# Patient Record
Sex: FEMALE | Race: WHITE | NOT HISPANIC OR LATINO | Employment: OTHER | URBAN - NONMETROPOLITAN AREA
[De-identification: names, ages, dates, MRNs, and addresses within clinical notes are randomized per-mention and may not be internally consistent; named-entity substitution may affect disease eponyms.]

---

## 2018-01-02 ENCOUNTER — COMMUNICATION - GICH (OUTPATIENT)
Dept: FAMILY MEDICINE | Facility: OTHER | Age: 68
End: 2018-01-02

## 2018-01-02 DIAGNOSIS — J00 ACUTE NASOPHARYNGITIS (COMMON COLD): ICD-10-CM

## 2018-01-16 PROBLEM — N31.8 HYPERTONICITY OF BLADDER: Status: ACTIVE | Noted: 2018-01-16

## 2018-01-16 PROBLEM — F34.1 DYSTHYMIC DISORDER: Status: ACTIVE | Noted: 2018-01-16

## 2018-01-16 RX ORDER — LOSARTAN POTASSIUM 100 MG/1
100 TABLET ORAL
COMMUNITY
Start: 2012-10-10 | End: 2018-11-15

## 2018-01-16 RX ORDER — FUROSEMIDE 20 MG
20 TABLET ORAL
COMMUNITY
End: 2018-11-15

## 2018-01-16 RX ORDER — CYANOCOBALAMIN 1000 UG/ML
1 INJECTION, SOLUTION INTRAMUSCULAR; SUBCUTANEOUS
COMMUNITY
End: 2018-11-15

## 2018-01-16 RX ORDER — FERROUS SULFATE 325(65) MG
325 TABLET ORAL
COMMUNITY
End: 2023-01-02

## 2018-01-16 RX ORDER — FLUTICASONE PROPIONATE 50 MCG
1 SPRAY, SUSPENSION (ML) NASAL
COMMUNITY
Start: 2016-04-08 | End: 2018-11-15

## 2018-01-16 RX ORDER — CHLORAL HYDRATE 500 MG
1 CAPSULE ORAL
COMMUNITY
End: 2023-01-02

## 2018-01-16 RX ORDER — METOPROLOL SUCCINATE 100 MG/1
200 TABLET, EXTENDED RELEASE ORAL
COMMUNITY
End: 2018-11-15

## 2018-01-16 RX ORDER — POTASSIUM CHLORIDE 1500 MG/1
20 TABLET, EXTENDED RELEASE ORAL
COMMUNITY
Start: 2012-10-10 | End: 2020-04-07

## 2018-02-12 NOTE — TELEPHONE ENCOUNTER
Patient Information     Patient Name MRN Ness Stein [id number] Female 1950      Telephone Encounter by Louis Gannon RN at 2018 12:43 PM     Author:  Louis Gannon RN Service:  (none) Author Type:  NURS- Registered Nurse     Filed:  2018 12:50 PM Encounter Date:  2018 Status:  Signed     :  Louis Gannon RN (NURS- Registered Nurse)            Nasal Steroids    Office visit in the past 12 months.    Last visit with KESHAWN ROBERSON was on: 2016 in [facility]  Next visit with KESHAWN ROBERSON is on: No future appointment listed with this provider  Next visit with Family Practice is on: No future appointment listed in this department    Max refills 12 months from last office visit.    Chart review shows that patient's PCP is Dr. Lopez from [facility] in [place]. However, writer is aware that Dr. Lopez has relocated and is no longer working at [facility] in [place]. Call placed to patient to discuss, as patient has not been seen at [facility] since 16. Patient reports that she doesn't need a refill of flonase, and is now seeing Steff Landrum NP at [facility] in [place]. PCP should be that NP. Writer will update patient's chart to reflect her PCP as of now, as well as will refuse refill request for flonase as per patient's report.    Unable to complete prescription refill per RN Medication Refill Policy.................... Louis Gannon RN ....................  2018   12:48 PM

## 2018-11-15 ENCOUNTER — MEDICAL CORRESPONDENCE (OUTPATIENT)
Dept: HEALTH INFORMATION MANAGEMENT | Facility: OTHER | Age: 68
End: 2018-11-15

## 2018-11-15 ENCOUNTER — OFFICE VISIT (OUTPATIENT)
Dept: FAMILY MEDICINE | Facility: OTHER | Age: 68
End: 2018-11-15
Attending: FAMILY MEDICINE
Payer: MEDICARE

## 2018-11-15 VITALS
HEIGHT: 64 IN | HEART RATE: 64 BPM | BODY MASS INDEX: 39.91 KG/M2 | SYSTOLIC BLOOD PRESSURE: 170 MMHG | WEIGHT: 233.8 LBS | DIASTOLIC BLOOD PRESSURE: 90 MMHG

## 2018-11-15 DIAGNOSIS — G47.33 OBSTRUCTIVE SLEEP APNEA SYNDROME: Primary | ICD-10-CM

## 2018-11-15 DIAGNOSIS — E03.9 HYPOTHYROIDISM, UNSPECIFIED TYPE: ICD-10-CM

## 2018-11-15 DIAGNOSIS — E55.9 VITAMIN D DEFICIENCY: ICD-10-CM

## 2018-11-15 DIAGNOSIS — Z13.1 SCREENING FOR DIABETES MELLITUS: ICD-10-CM

## 2018-11-15 DIAGNOSIS — N32.81 OVERACTIVE BLADDER: ICD-10-CM

## 2018-11-15 DIAGNOSIS — I10 ESSENTIAL HYPERTENSION, BENIGN: ICD-10-CM

## 2018-11-15 DIAGNOSIS — F34.1 DYSTHYMIC DISORDER: ICD-10-CM

## 2018-11-15 DIAGNOSIS — Z79.899 OTHER LONG TERM (CURRENT) DRUG THERAPY: ICD-10-CM

## 2018-11-15 DIAGNOSIS — R53.83 FATIGUE, UNSPECIFIED TYPE: ICD-10-CM

## 2018-11-15 DIAGNOSIS — Z98.84 S/P GASTRIC BYPASS: ICD-10-CM

## 2018-11-15 DIAGNOSIS — M48.061 SPINAL STENOSIS OF LUMBAR REGION, UNSPECIFIED WHETHER NEUROGENIC CLAUDICATION PRESENT: ICD-10-CM

## 2018-11-15 DIAGNOSIS — E78.00 HYPERCHOLESTEROLEMIA: ICD-10-CM

## 2018-11-15 LAB
ANION GAP SERPL CALCULATED.3IONS-SCNC: 6 MMOL/L (ref 3–14)
BUN SERPL-MCNC: 14 MG/DL (ref 7–25)
CALCIUM SERPL-MCNC: 9.7 MG/DL (ref 8.6–10.3)
CHLORIDE SERPL-SCNC: 104 MMOL/L (ref 98–107)
CHOLEST SERPL-MCNC: 190 MG/DL
CO2 SERPL-SCNC: 27 MMOL/L (ref 21–31)
CREAT SERPL-MCNC: 0.71 MG/DL (ref 0.6–1.2)
DEPRECATED CALCIDIOL+CALCIFEROL SERPL-MC: 36.5 NG/ML
GFR SERPL CREATININE-BSD FRML MDRD: 82 ML/MIN/1.7M2
GLUCOSE SERPL-MCNC: 103 MG/DL (ref 70–105)
HBA1C MFR BLD: 5.3 % (ref 4–6)
HDLC SERPL-MCNC: 64 MG/DL (ref 23–92)
LDLC SERPL CALC-MCNC: 104 MG/DL
NONHDLC SERPL-MCNC: 126 MG/DL
POTASSIUM SERPL-SCNC: 3.7 MMOL/L (ref 3.5–5.1)
SODIUM SERPL-SCNC: 137 MMOL/L (ref 134–144)
T4 FREE SERPL-MCNC: 0.68 NG/DL (ref 0.6–1.6)
TRIGL SERPL-MCNC: 110 MG/DL
TSH SERPL DL<=0.05 MIU/L-ACNC: 5.06 IU/ML (ref 0.34–5.6)

## 2018-11-15 PROCEDURE — 84443 ASSAY THYROID STIM HORMONE: CPT | Performed by: FAMILY MEDICINE

## 2018-11-15 PROCEDURE — 36415 COLL VENOUS BLD VENIPUNCTURE: CPT | Performed by: FAMILY MEDICINE

## 2018-11-15 PROCEDURE — 84439 ASSAY OF FREE THYROXINE: CPT | Performed by: FAMILY MEDICINE

## 2018-11-15 PROCEDURE — 99214 OFFICE O/P EST MOD 30 MIN: CPT | Performed by: FAMILY MEDICINE

## 2018-11-15 PROCEDURE — 80061 LIPID PANEL: CPT | Performed by: FAMILY MEDICINE

## 2018-11-15 PROCEDURE — 82306 VITAMIN D 25 HYDROXY: CPT | Performed by: FAMILY MEDICINE

## 2018-11-15 PROCEDURE — 83036 HEMOGLOBIN GLYCOSYLATED A1C: CPT | Performed by: FAMILY MEDICINE

## 2018-11-15 PROCEDURE — G0463 HOSPITAL OUTPT CLINIC VISIT: HCPCS

## 2018-11-15 PROCEDURE — 80048 BASIC METABOLIC PNL TOTAL CA: CPT | Performed by: FAMILY MEDICINE

## 2018-11-15 RX ORDER — CYANOCOBALAMIN 1000 UG/ML
1 INJECTION, SOLUTION INTRAMUSCULAR; SUBCUTANEOUS
Qty: 0.9 ML | Refills: 11 | Status: SHIPPED | OUTPATIENT
Start: 2018-11-15 | End: 2020-04-17

## 2018-11-15 RX ORDER — LEVOTHYROXINE SODIUM 25 UG/1
25 TABLET ORAL DAILY
Qty: 90 TABLET | Refills: 1 | Status: SHIPPED | OUTPATIENT
Start: 2018-11-15 | End: 2019-04-28

## 2018-11-15 RX ORDER — ESCITALOPRAM OXALATE 10 MG/1
10 TABLET ORAL DAILY
Refills: 0 | COMMUNITY
Start: 2018-07-02 | End: 2018-11-15

## 2018-11-15 RX ORDER — MIRABEGRON 25 MG/1
25 TABLET, FILM COATED, EXTENDED RELEASE ORAL DAILY
Qty: 90 TABLET | Refills: 1 | Status: SHIPPED | OUTPATIENT
Start: 2018-11-15 | End: 2019-04-28

## 2018-11-15 RX ORDER — TRAMADOL HYDROCHLORIDE 50 MG/1
50 TABLET ORAL EVERY 6 HOURS PRN
Qty: 60 TABLET | Refills: 2 | Status: SHIPPED | OUTPATIENT
Start: 2018-11-15 | End: 2019-06-27

## 2018-11-15 RX ORDER — ESCITALOPRAM OXALATE 20 MG/1
10 TABLET ORAL DAILY
Qty: 90 TABLET | Refills: 4 | Status: SHIPPED | OUTPATIENT
Start: 2018-11-15 | End: 2019-01-31

## 2018-11-15 RX ORDER — FUROSEMIDE 20 MG
20 TABLET ORAL DAILY
Qty: 90 TABLET | Refills: 4 | Status: SHIPPED | OUTPATIENT
Start: 2018-11-15 | End: 2020-01-29

## 2018-11-15 RX ORDER — METOPROLOL SUCCINATE 100 MG/1
200 TABLET, EXTENDED RELEASE ORAL DAILY
Qty: 180 TABLET | Refills: 4 | Status: SHIPPED | OUTPATIENT
Start: 2018-11-15 | End: 2020-01-16

## 2018-11-15 RX ORDER — LOSARTAN POTASSIUM 100 MG/1
100 TABLET ORAL DAILY
Qty: 90 TABLET | Refills: 4 | Status: SHIPPED | OUTPATIENT
Start: 2018-11-15 | End: 2020-01-08

## 2018-11-15 RX ORDER — POTASSIUM CHLORIDE 1500 MG/1
20 TABLET, EXTENDED RELEASE ORAL
Qty: 60 TABLET | Status: CANCELLED | OUTPATIENT
Start: 2018-11-15

## 2018-11-15 ASSESSMENT — ANXIETY QUESTIONNAIRES
7. FEELING AFRAID AS IF SOMETHING AWFUL MIGHT HAPPEN: NOT AT ALL
6. BECOMING EASILY ANNOYED OR IRRITABLE: NOT AT ALL
2. NOT BEING ABLE TO STOP OR CONTROL WORRYING: NOT AT ALL
5. BEING SO RESTLESS THAT IT IS HARD TO SIT STILL: NOT AT ALL
GAD7 TOTAL SCORE: 1
1. FEELING NERVOUS, ANXIOUS, OR ON EDGE: SEVERAL DAYS
3. WORRYING TOO MUCH ABOUT DIFFERENT THINGS: NOT AT ALL

## 2018-11-15 ASSESSMENT — PATIENT HEALTH QUESTIONNAIRE - PHQ9
SUM OF ALL RESPONSES TO PHQ QUESTIONS 1-9: 15
5. POOR APPETITE OR OVEREATING: NOT AT ALL

## 2018-11-15 NOTE — PROGRESS NOTES
Nursing Notes:   Ana Yung LPN  11/15/2018  7:57 AM  Unsigned  Patient here for medication management.  Ana Mei............................... 11/15/2018 7:57 AM    SUBJECTIVE:   Ness Bales is a 67 year old female who presents to clinic today for the following health issues:    HPI  Ness is here for refills of her medications for chronic conditions:  1. ALICE.  Uses CPAP.  Doing well.  2. Hypothyroidism.  Has had issues with this in the past.  Interested in having her levels checked and if she may need some medication.  Noticed increased fatigue, decreased mood.  3. HLP. Taking fish oil.  Is aware of risks for statin therapy.  Doesn't watch what she eats all the time.  4. Spinal stenosis/generalized OA.  Occasionally receives Tramadol for aches/pains.  Seems to be worse in the winter.  No swollen/red joints.  5. S/p gastric bypass.  On multiple vitamins/supplements.   6. HTN. Has been off metoprolol x 1 month.  Noticing she is feeling worse without it.  Checks her BPs at home.    Her other concern today is of an overactive bladder.  Has tried a few medications in the past that were not helpful.  She has done some research and would like to discuss either Myrbetriq OR botox injections with Urology.      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     Priority: Medium     Contusion of lower leg 02/25/2011     Priority: Medium     Hypothyroidism 11/12/2010     Priority: Medium     Degeneration of lumbar or lumbosacral intervertebral disc 11/05/2010     Priority: Medium     Spinal stenosis, lumbar 11/05/2010     Priority: Medium     Esophageal reflux 06/25/2010     Priority: Medium     Sacroiliac joint dysfunction 06/25/2010     Priority: Medium     Hypercholesterolemia 04/20/2009     Priority: Medium     Sleep apnea 02/12/2009     Priority: Medium     Disorder of bone and cartilage  10/29/2007     Priority: Medium     Enthesopathy of hip region 08/22/2006     Priority: Medium     Other vitamin B12 deficiency anemia 09/24/2001     Priority: Medium     Essential hypertension, benign 09/24/2001     Priority: Medium     Urge incontinence of urine 09/24/2001     Priority: Medium     Obesity 09/24/2001     Priority: Medium     Past Medical History:   Diagnosis Date     Obesity     No Comments Provided     Personal history of other medical treatment (CODE)     2 vaginal childbirth uncomplicated     Personal history of other medical treatment (CODE)     1983,blood transfusion with stomach stapling      Past Surgical History:   Procedure Laterality Date     ARTHROPLASTY KNEE      2008,Total Right knee replacement     BIOPSY BREAST      Incisional x2 benign breast biopsies     FRACTURE SURGERY      open treatment of Humeral Fx w/ prosthesis     LAPAROSCOPIC BYPASS GASTRIC      1983,Stomach stapling complicated by incision rupture, then repaired wih second surgery     OTHER SURGICAL HISTORY      1983,16085.0,SKIN/SUBCUTANEOUS SURGERY,repaired incision from stomach stapling     OTHER SURGICAL HISTORY      2006,[id number],ARTHROPLASTY,Total glenohumeral joint replacement     OTHER SURGICAL HISTORY      615773,ANESTHESIA ALERT,No problems with anaesthesia. ?     Family History   Problem Relation Age of Onset     Other - See Comments Mother      macular degeneration/Psychiatric illness,takes antidepressant     HEART DISEASE Father 40     Heart Disease,MI     Diabetes Father      Diabetes     Other - See Comments Son      Psychiatric illness,psychotic episode this year smoking marijuana, ephedra use ? bipolar     Family History Negative Child      Good Health     Family History Negative Child      Good Health     Other - See Comments Sister      Psychiatric illness,depression     Other - See Comments Brother      Psychiatric illness,depression     Social History   Substance Use Topics     Smoking status: Never  "Smoker     Smokeless tobacco: Never Used     Alcohol use No      Comment: very little      Social History     Social History Narrative    , 3 adult children, does clerical work part-time for [employer]    p 10/1/2013.     Current Outpatient Prescriptions   Medication Sig Dispense Refill     cholecalciferol (VITAMIN D-1000 MAX ST) 1000 UNITS TABS Take 1,000 Units by mouth       cyanocobalamin (VITAMIN B12) 1000 MCG/ML injection Inject 1 mL into the muscle       escitalopram (LEXAPRO) 10 MG tablet Take 10 mg by mouth daily  0     ferrous sulfate (IRON) 325 (65 FE) MG tablet Take 325 mg by mouth       fish oil-omega-3 fatty acids 1000 MG capsule Take 1 capsule by mouth       furosemide (LASIX) 20 MG tablet Take 20 mg by mouth       losartan (COZAAR) 100 MG tablet Take 100 mg by mouth       metoprolol succinate (TOPROL-XL) 100 MG 24 hr tablet Take 200 mg by mouth       potassium chloride SA (K-DUR/KLOR-CON M) 20 MEQ CR tablet Take 20 mEq by mouth       No Known Allergies    Review of Systems   All other systems reviewed and are negative.       OBJECTIVE:     Ht 5' 4.25\" (1.632 m)  Wt 233 lb 12.8 oz (106.1 kg)  BMI 39.82 kg/m2  Body mass index is 39.82 kg/(m^2).  Physical Exam   Constitutional: She appears well-developed and well-nourished. No distress.   HENT:   Head: Normocephalic and atraumatic.   Right Ear: External ear normal.   Left Ear: External ear normal.   Nose: Nose normal.   Mouth/Throat: Oropharynx is clear and moist. No oropharyngeal exudate.   Eyes: EOM are normal. Pupils are equal, round, and reactive to light. Right eye exhibits no discharge. Left eye exhibits no discharge.   Neck: Normal range of motion. Neck supple. No thyromegaly present.   Cardiovascular: Normal rate and normal heart sounds.    Lymphadenopathy:     She has no cervical adenopathy.   Skin: She is not diaphoretic.   Psychiatric: She has a normal mood and affect. Judgment normal.   Nursing note and vitals reviewed.    Diagnostic Test " Results:  Results for orders placed or performed in visit on 11/15/18   Basic Metabolic Panel   Result Value Ref Range    Sodium 137 134 - 144 mmol/L    Potassium 3.7 3.5 - 5.1 mmol/L    Chloride 104 98 - 107 mmol/L    Carbon Dioxide 27 21 - 31 mmol/L    Anion Gap 6 3 - 14 mmol/L    Glucose 103 70 - 105 mg/dL    Urea Nitrogen 14 7 - 25 mg/dL    Creatinine 0.71 0.60 - 1.20 mg/dL    GFR Estimate 82 >60 mL/min/1.7m2    GFR Estimate If Black >90 >60 mL/min/1.7m2    Calcium 9.7 8.6 - 10.3 mg/dL   TSH   Result Value Ref Range    Thyrotropin 5.06 0.34 - 5.60 IU/mL   T4, Free   Result Value Ref Range    T4 Free 0.68 0.60 - 1.60 ng/dL   Hemoglobin A1c   Result Value Ref Range    Hemoglobin A1C 5.3 4.0 - 6.0 %   Lipid Panel   Result Value Ref Range    Cholesterol 190 <200 mg/dL    Triglycerides 110 <150 mg/dL    HDL Cholesterol 64 23 - 92 mg/dL    LDL Cholesterol Calculated 104 (H) <100 mg/dL    Non HDL Cholesterol 126 <130 mg/dL   Vitamin D Total GH   Result Value Ref Range    Vitamin D Total 36.5 ng/mL     ASSESSMENT/PLAN:     1. Obstructive sleep apnea syndrome  Chronic, stable.  Continue CPAP.  - Hemoglobin A1c; Future  - Hemoglobin A1c    2. Hypothyroidism, unspecified type  High end of normal; with history of problems.  Due to increased symptoms; will start with low dose of levothyroxine x 6 weeks and have levels rechecked at that time.  Monitor for improvement in fatigue/mood.  - levothyroxine (SYNTHROID/LEVOTHROID) 25 MCG tablet; Take 1 tablet (25 mcg) by mouth daily  Dispense: 90 tablet; Refill: 1    3. Hypercholesterolemia  Lipid panel will be monitored today.  - Lipid Panel; Future  - Lipid Panel    4. Essential hypertension, benign  Refilled current medications; BP uncontrolled today.  Discussed goal <140/90; which Ness normally states she is at when she has her medications.  Continue to monitor and return sooner if no improvement.  - furosemide (LASIX) 20 MG tablet; Take 1 tablet (20 mg) by mouth daily   Dispense: 90 tablet; Refill: 4  - losartan (COZAAR) 100 MG tablet; Take 1 tablet (100 mg) by mouth daily  Dispense: 90 tablet; Refill: 4  - metoprolol succinate (TOPROL-XL) 100 MG 24 hr tablet; Take 2 tablets (200 mg) by mouth daily  Dispense: 180 tablet; Refill: 4  - Basic Metabolic Panel; Future  - Basic Metabolic Panel    5. Dysthymic disorder  Refilled current medication.  - escitalopram (LEXAPRO) 20 MG tablet; Take 0.5 tablets (10 mg) by mouth daily  Dispense: 90 tablet; Refill: 4    6. Spinal stenosis of lumbar region, unspecified whether neurogenic claudication present  Worsening with weather changes; will renew Tramadol #60 with 2 refills that will hopefully last her at least through the season.  Other activity and pain control measures reviewed.  - traMADol (ULTRAM) 50 MG tablet; Take 1 tablet (50 mg) by mouth every 6 hours as needed for severe pain  Dispense: 60 tablet; Refill: 2    7. Vitamin D deficiency  On treatment; check level.  - cholecalciferol (VITAMIN D-1000 MAX ST) 1000 units TABS; Take 1,000 Units by mouth daily  Dispense: 90 tablet; Refill: 4  - Vitamin D Total GH    8. S/P gastric bypass  On treatment; will add B12 level.  - cyanocobalamin (VITAMIN B12) 1000 MCG/ML injection; Inject 1 mL (1,000 mcg) into the muscle every 30 days  Dispense: 0.9 mL; Refill: 11  - Hemoglobin A1c; Future  - Hemoglobin A1c    9. Fatigue, unspecified type  See above.  Tx with levothyroxine.  - TSH; Future  - T4, Free; Future  - TSH  - T4, Free    10. Screening for diabetes mellitus  - Hemoglobin A1c; Future  - Hemoglobin A1c    11. Other long term (current) drug therapy   - Hemoglobin A1c; Future  - Hemoglobin A1c    12. Overactive bladder  Start myrbetriq; and reviewed cost vs generics.  She will try this for ~2-3 months.  If not improving - will refer to Urology for possible urodynamic testing and trial of Botox.  - mirabegron (MYRBETRIQ) 25 MG 24 hr tablet; Take 1 tablet (25 mg) by mouth daily  Dispense: 90  tablet; Refill: 1    Follow up in 2-3 months; sooner prn.    Jennifer Maurice, [facility]

## 2018-11-15 NOTE — MR AVS SNAPSHOT
"              After Visit Summary   11/15/2018    Ness Bales    MRN: [id number]           Patient Information     Date Of Birth          1950        Visit Information        Provider Department      11/15/2018 7:45 AM Jennifer Maurice DO [facility]        Today's Diagnoses     Obstructive sleep apnea syndrome    -  1    Hypothyroidism, unspecified type        Hypercholesterolemia        Essential hypertension, benign        Dysthymic disorder        Spinal stenosis of lumbar region, unspecified whether neurogenic claudication present        Vitamin D deficiency        S/P gastric bypass        Fatigue, unspecified type        Screening for diabetes mellitus        Other long term (current) drug therapy         Overactive bladder           Follow-ups after your visit        Who to contact     If you have questions or need follow up information about today's clinic visit or your schedule please contact [facility] directly at [phone].  Normal or non-critical lab and imaging results will be communicated to you by MyChart, letter or phone within 4 business days after the clinic has received the results. If you do not hear from us within 7 days, please contact the clinic through MyChart or phone. If you have a critical or abnormal lab result, we will notify you by phone as soon as possible.  Submit refill requests through [employer] or call your pharmacy and they will forward the refill request to us. Please allow 3 business days for your refill to be completed.          Additional Information About Your Visit        Care EveryWhere ID     This is your Care EveryWhere ID. This could be used by other organizations to access your [facility] medical records  [id number]        Your Vitals Were     Pulse Height BMI (Body Mass Index)             64 1.632 m (5' 4.25\") 39.82 kg/m2          Blood Pressure from Last 3 Encounters:   11/15/18 170/90   04/08/16 147/76    Weight " from Last 3 Encounters:   11/15/18 106.1 kg (233 lb 12.8 oz)   04/08/16 104.8 kg (231 lb)              We Performed the Following     Basic Metabolic Panel     Hemoglobin A1c     Lipid Panel     T4, Free     TSH          Today's Medication Changes          These changes are accurate as of 11/15/18  8:51 AM.  If you have any questions, ask your nurse or doctor.               Start taking these medicines.        Dose/Directions    mirabegron 25 MG 24 hr tablet   Commonly known as:  MYRBETRIQ   Used for:  Overactive bladder   Started by:  Jennifer Maurice DO        Dose:  25 mg   Take 1 tablet (25 mg) by mouth daily   Quantity:  90 tablet   Refills:  1       traMADol 50 MG tablet   Commonly known as:  ULTRAM   Used for:  Spinal stenosis of lumbar region, unspecified whether neurogenic claudication present   Started by:  Jennifer Maurice DO        Dose:  50 mg   Take 1 tablet (50 mg) by mouth every 6 hours as needed for severe pain   Quantity:  60 tablet   Refills:  2         These medicines have changed or have updated prescriptions.        Dose/Directions    cholecalciferol 1000 units Tabs   Commonly known as:  VITAMIN D-1000 MAX ST   This may have changed:  when to take this   Used for:  Vitamin D deficiency   Changed by:  Jennifer Maurice DO        Dose:  1000 Units   Take 1,000 Units by mouth daily   Quantity:  90 tablet   Refills:  4       cyanocobalamin 1000 MCG/ML injection   Commonly known as:  VITAMIN B12   This may have changed:  when to take this   Used for:  S/P gastric bypass   Changed by:  Jennifer Maurice DO        Dose:  1 mL   Inject 1 mL (1,000 mcg) into the muscle every 30 days   Quantity:  0.9 mL   Refills:  11       escitalopram 20 MG tablet   Commonly known as:  LEXAPRO   This may have changed:  medication strength   Used for:  Dysthymic disorder   Changed by:  Jennifer Maurice DO        Dose:  10 mg   Take 0.5 tablets (10 mg) by mouth daily   Quantity:  90 tablet   Refills:  4        furosemide 20 MG tablet   Commonly known as:  LASIX   This may have changed:  when to take this   Used for:  Essential hypertension, benign   Changed by:  Jennifer Maurice DO        Dose:  20 mg   Take 1 tablet (20 mg) by mouth daily   Quantity:  90 tablet   Refills:  4       losartan 100 MG tablet   Commonly known as:  COZAAR   This may have changed:  when to take this   Used for:  Essential hypertension, benign   Changed by:  Jennifer Maurice,         Dose:  100 mg   Take 1 tablet (100 mg) by mouth daily   Quantity:  90 tablet   Refills:  4       metoprolol succinate 100 MG 24 hr tablet   Commonly known as:  TOPROL-XL   This may have changed:  when to take this   Used for:  Essential hypertension, benign   Changed by:  Jennifer Maurice,         Dose:  200 mg   Take 2 tablets (200 mg) by mouth daily   Quantity:  180 tablet   Refills:  4            Where to get your medicines      These medications were sent to [place] [address] [place] [facility] [address]  [address] [facility] [address]     Phone:  [phone]     cholecalciferol 1000 units Tabs    cyanocobalamin 1000 MCG/ML injection    escitalopram 20 MG tablet    furosemide 20 MG tablet    losartan 100 MG tablet    metoprolol succinate 100 MG 24 hr tablet    mirabegron 25 MG 24 hr tablet         Some of these will need a paper prescription and others can be bought over the counter.  Ask your nurse if you have questions.     Bring a paper prescription for each of these medications     traMADol 50 MG tablet               Information about OPIOIDS     PRESCRIPTION OPIOIDS: WHAT YOU NEED TO KNOW   We gave you an opioid (narcotic) pain medicine. It is important to manage your pain, but opioids are not always the best choice. You should first try all the other options your care team gave you. Take this medicine for as short a time (and as few doses) as possible.    Some activities can increase your pain, such as bandage changes or  therapy sessions. It may help to take your pain medicine 30 to 60 minutes before these activities. Reduce your stress by getting enough sleep, working on hobbies you enjoy and practicing relaxation or meditation. Talk to your care team about ways to manage your pain beyond prescription opioids.    These medicines have risks:    DO NOT drive when on new or higher doses of pain medicine. These medicines can affect your alertness and reaction times, and you could be arrested for driving under the influence (DUI). If you need to use opioids long-term, talk to your care team about driving.    DO NOT operate heavy machinery    DO NOT do any other dangerous activities while taking these medicines.    DO NOT drink any alcohol while taking these medicines.     If the opioid prescribed includes acetaminophen, DO NOT take with any other medicines that contain acetaminophen. Read all labels carefully. Look for the word  acetaminophen  or  Tylenol.  Ask your pharmacist if you have questions or are unsure.    You can get addicted to pain medicines, especially if you have a history of addiction (chemical, alcohol or substance dependence). Talk to your care team about ways to reduce this risk.    All opioids tend to cause constipation. Drink plenty of water and eat foods that have a lot of fiber, such as fruits, vegetables, prune juice, apple juice and high-fiber cereal. Take a laxative (Miralax, milk of magnesia, Colace, Senna) if you don t move your bowels at least every other day. Other side effects include upset stomach, sleepiness, dizziness, throwing up, tolerance (needing more of the medicine to have the same effect), physical dependence and slowed breathing.    Store your pills in a secure place, locked if possible. We will not replace any lost or stolen medicine. If you don t finish your medicine, please throw away (dispose) as directed by your pharmacist. The [place] Pollution Control Agency has more information about  safe disposal: https://www.pca.[email or web address].[place].us/living-green/managing-unwanted-medications         Primary Care Provider Fax #    Physician No Ref-Primary [phone]       No address on file        Equal Access to Services     NIKOLAI HIGGINBOTHAM : María aad ku hadhamlet Phillips, waisabellada luqkain, qaybta kaalmada maryan, ricky morenoshadia uribe. So [facility] [phone].    ATENCIÓN: Si habla español, tiene a sandoval disposición servicios gratuitos de asistencia lingüística. Llame al [phone].    We comply with applicable federal civil rights laws and [place] laws. We do not discriminate on the basis of race, color, national origin, age, disability, sex, sexual orientation, or gender identity.            Thank you!     Thank you for choosing [facility]  for your care. Our goal is always to provide you with excellent care. Hearing back from our patients is one way we can continue to improve our services. Please take a few minutes to complete the written survey that you may receive in the mail after your visit with us. Thank you!             Your Updated Medication List - Protect others around you: Learn how to safely use, store and throw away your medicines at www.disposemymeds.org.          This list is accurate as of 11/15/18  8:51 AM.  Always use your most recent med list.                   Brand Name Dispense Instructions for use Diagnosis    cholecalciferol 1000 units Tabs    VITAMIN D-1000 MAX ST    90 tablet    Take 1,000 Units by mouth daily    Vitamin D deficiency       Co Q 10 100 MG Caps           cyanocobalamin 1000 MCG/ML injection    VITAMIN B12    0.9 mL    Inject 1 mL (1,000 mcg) into the muscle every 30 days    S/P gastric bypass       escitalopram 20 MG tablet    LEXAPRO    90 tablet    Take 0.5 tablets (10 mg) by mouth daily    Dysthymic disorder       ferrous sulfate 325 (65 Fe) MG tablet    IRON     Take 325 mg by mouth        fish oil-omega-3 fatty acids 1000 MG  capsule      Take 1 capsule by mouth        furosemide 20 MG tablet    LASIX    90 tablet    Take 1 tablet (20 mg) by mouth daily    Essential hypertension, benign       losartan 100 MG tablet    COZAAR    90 tablet    Take 1 tablet (100 mg) by mouth daily    Essential hypertension, benign       metoprolol succinate 100 MG 24 hr tablet    TOPROL-XL    180 tablet    Take 2 tablets (200 mg) by mouth daily    Essential hypertension, benign       mirabegron 25 MG 24 hr tablet    MYRBETRIQ    90 tablet    Take 1 tablet (25 mg) by mouth daily    Overactive bladder       potassium chloride SA 20 MEQ CR tablet    K-DUR/KLOR-CON M     Take 20 mEq by mouth        traMADol 50 MG tablet    ULTRAM    60 tablet    Take 1 tablet (50 mg) by mouth every 6 hours as needed for severe pain    Spinal stenosis of lumbar region, unspecified whether neurogenic claudication present

## 2018-11-15 NOTE — NURSING NOTE
Patient here for medication management.  Ana Mei............................... 11/15/2018 7:57 AM

## 2018-11-16 ASSESSMENT — ANXIETY QUESTIONNAIRES: GAD7 TOTAL SCORE: 1

## 2018-12-11 ENCOUNTER — TRANSFERRED RECORDS (OUTPATIENT)
Dept: HEALTH INFORMATION MANAGEMENT | Facility: OTHER | Age: 68
End: 2018-12-11

## 2019-01-31 DIAGNOSIS — F34.1 DYSTHYMIC DISORDER: ICD-10-CM

## 2019-01-31 NOTE — TELEPHONE ENCOUNTER
Per pharmacy fax- Patient requesting a new Rx- Please send new Rx for Lexapro 20 mg one tablet daily. Qty 90. Thanks Oc. Current order is for 1/2 tab.     Please note esther'd up as requested.     escitalopram (LEXAPRO) 20 MG tablet 90 tablet 4 11/15/2018  No   Sig - Route: Take 0.5 tablets (10 mg) by mouth daily - Oral   Sent to pharmacy as: escitalopram (LEXAPRO) 20 MG tablet   Class: E-Prescribe   Order: [id number]   E-Prescribing Status: Receipt confirmed by pharmacy (11/15/2018  8:13 AM CST)     LOV-11/15/2018-Follow up in 2-3 months; sooner prn.  Reminder letter sent.     Unable to complete prescription refill per RNMedication Refill Policy.................... Diya Germain ....................  1/31/2019   3:55 PM

## 2019-01-31 NOTE — LETTER
January 31, 2019      Ness Bales  [address] [place] [address]        Dear Ness,    This is to remind you that you are due for your follow up medication management office visit with Jennifer Maurice DO.  Your last visit was on 11/15/2018.     Additional refills of your medication require you to complete this visit.    Please call [phone] to schedule your appointment.    Thank you for choosing [facility] and [facility] for your health care needs.    Sincerely,      Refill RN  [facility]

## 2019-02-01 NOTE — TELEPHONE ENCOUNTER
Called patient and verified last name and , she states she is currently taking 20 mg. She thought this was increased at last office visit.    She just picked up the prescription yesterday and the pharmacy is preparing for the next refill.    She also wanted to have her thyroid rechecked, labs are currently placed and sent to scheduling for lab only appointment.  Dorys Almanza LPN on 2019 at 10:47 AM

## 2019-02-01 NOTE — TELEPHONE ENCOUNTER
Is she taking 10mg (1/2 tablet) or 20mg (full tablet)? My last note states 10mg daily.  Jennifer Maurice, DO

## 2019-02-04 DIAGNOSIS — E03.9 HYPOTHYROIDISM, UNSPECIFIED TYPE: ICD-10-CM

## 2019-02-04 LAB
T4 FREE SERPL-MCNC: 0.68 NG/DL (ref 0.6–1.6)
TSH SERPL DL<=0.05 MIU/L-ACNC: 3.59 IU/ML (ref 0.34–5.6)

## 2019-02-04 PROCEDURE — 36415 COLL VENOUS BLD VENIPUNCTURE: CPT | Performed by: FAMILY MEDICINE

## 2019-02-04 PROCEDURE — 84443 ASSAY THYROID STIM HORMONE: CPT | Performed by: FAMILY MEDICINE

## 2019-02-04 PROCEDURE — 84439 ASSAY OF FREE THYROXINE: CPT | Performed by: FAMILY MEDICINE

## 2019-02-04 RX ORDER — ESCITALOPRAM OXALATE 20 MG/1
20 TABLET ORAL DAILY
Qty: 90 TABLET | Refills: 4 | Status: SHIPPED | OUTPATIENT
Start: 2019-02-04 | End: 2020-10-24

## 2019-02-13 DIAGNOSIS — E03.9 HYPOTHYROIDISM, UNSPECIFIED TYPE: ICD-10-CM

## 2019-02-13 RX ORDER — LEVOTHYROXINE SODIUM 25 UG/1
25 TABLET ORAL DAILY
Qty: 93 TABLET | Refills: 3 | OUTPATIENT
Start: 2019-02-13

## 2019-02-13 NOTE — TELEPHONE ENCOUNTER
Component      Latest Ref Rng & Units 2/4/2019   T4 Free      0.60 - 1.60 ng/dL 0.68   Thyrotropin      0.34 - 5.60 IU/mL 3.59

## 2019-02-13 NOTE — TELEPHONE ENCOUNTER
Filled 11/15/18 #90 x 1. Due 5/15/19. Pharmacy alerted. Unable to complete prescription refill per RNMedication Refill Policy.................... Diya Germain ....................  2/13/2019   9:28 AM    levothyroxine (SYNTHROID/LEVOTHROID) 25 MCG tablet 90 tablet 1 11/15/2018  --   Sig - Route: Take 1 tablet (25 mcg) by mouth daily - Oral   Sent to pharmacy as: levothyroxine (SYNTHROID/LEVOTHROID) 25 MCG tablet   Class: E-Prescribe   Order: [id number]   E-Prescribing Status: Receipt confirmed by pharmacy (11/15/2018 12:36 PM CST)   Printout Tracking     External Result Report   Pharmacy     [facility] [id number] [facility] - [place], [place] - [address]

## 2019-04-28 DIAGNOSIS — E03.9 HYPOTHYROIDISM, UNSPECIFIED TYPE: ICD-10-CM

## 2019-04-28 DIAGNOSIS — N32.81 OVERACTIVE BLADDER: ICD-10-CM

## 2019-05-01 RX ORDER — LEVOTHYROXINE SODIUM 25 UG/1
25 TABLET ORAL DAILY
Qty: 30 TABLET | Refills: 0 | Status: SHIPPED | OUTPATIENT
Start: 2019-05-01 | End: 2019-06-13

## 2019-05-01 RX ORDER — MIRABEGRON 25 MG/1
TABLET, FILM COATED, EXTENDED RELEASE ORAL
Qty: 30 TABLET | Refills: 0 | Status: SHIPPED | OUTPATIENT
Start: 2019-05-01 | End: 2019-05-29

## 2019-05-01 NOTE — TELEPHONE ENCOUNTER
[facility] [place] sent Rx request for the following:      MYRBETRIQ ER 25 MG TABLET  Sig: TAKE 1 TABLET BY MOUTH EVERY DAY  Last Prescription Date:   11/15/18  Last Fill Qty/Refills:         90, R-1    Beta 3 Adrenergic Agonists Failed5/1 3:57 PM  Most recent BP less than 140/90 on record  BP Readings from Last 3 Encounters:   11/15/18 170/90   04/08/16 147/76        LEVOTHYROXINE 25 MCG TABLET  Sig: Take 1 tablet (25 mcg) by mouth daily  Last Prescription Date:   11/15/18  Last Fill Qty/Refills:         90, R-1      Last Office Visit:              11/12/18 (SMS)  Future Office visit:           None.    Per LOV Note, Pt was to follow-up with SMS, between 1/12 and 2/12/18. Pt was going to trial Myrbetriq for 2-3 months, for overactive bladder. If not improving, she could be referred to urology for further testing or trial of Botox. Also, was started on levothyroxine that visit. Per lab result, dated 2/4/19, Pt to continue with same dosing. Pt failed to schedule follow-up appointment with SMS.     Called and spoke to Patient after verifying last name and date of birth. Pt reminded of the above information. Pt currently driving and verbalized plan to call back when she gets home, to schedule appointment. Pt agreed a 30-day zach refill should be adequate. Prescriptions approved per [facility] Refill Protocol for 30-day zach refill at this time, with note to pharmacy. Shawna Prince RN .............. 5/1/2019  4:08 PM

## 2019-05-16 ENCOUNTER — MEDICAL CORRESPONDENCE (OUTPATIENT)
Dept: LAB | Facility: OTHER | Age: 69
End: 2019-05-16

## 2019-05-16 DIAGNOSIS — F01.53 VASCULAR DEMENTIA WITH DEPRESSED MOOD (H): Primary | ICD-10-CM

## 2019-05-16 LAB
ALBUMIN SERPL-MCNC: 3.6 G/DL (ref 3.5–5.7)
ALP SERPL-CCNC: 71 U/L (ref 34–104)
ALT SERPL W P-5'-P-CCNC: 24 U/L (ref 7–52)
AST SERPL W P-5'-P-CCNC: 21 U/L (ref 13–39)
BASOPHILS # BLD AUTO: 0 10E9/L (ref 0–0.2)
BASOPHILS NFR BLD AUTO: 0.4 %
BILIRUB DIRECT SERPL-MCNC: 0.1 MG/DL (ref 0–0.2)
BILIRUB SERPL-MCNC: 0.6 MG/DL (ref 0.3–1)
DIFFERENTIAL METHOD BLD: NORMAL
EOSINOPHIL # BLD AUTO: 0.2 10E9/L (ref 0–0.7)
EOSINOPHIL NFR BLD AUTO: 3.2 %
ERYTHROCYTE [DISTWIDTH] IN BLOOD BY AUTOMATED COUNT: 12.4 % (ref 10–15)
FERRITIN SERPL-MCNC: 91 NG/ML (ref 23.9–336.2)
FOLATE SERPL-MCNC: 16.8 NG/ML
GLUCOSE SERPL-MCNC: 97 MG/DL (ref 70–105)
HCT VFR BLD AUTO: 40.9 % (ref 35–47)
HGB BLD-MCNC: 13.6 G/DL (ref 11.7–15.7)
IMM GRANULOCYTES # BLD: 0 10E9/L (ref 0–0.4)
IMM GRANULOCYTES NFR BLD: 0.1 %
IRON SERPL-MCNC: 97 UG/DL (ref 50–212)
LYMPHOCYTES # BLD AUTO: 2.1 10E9/L (ref 0.8–5.3)
LYMPHOCYTES NFR BLD AUTO: 29.7 %
MAGNESIUM SERPL-MCNC: 1.7 MG/DL (ref 1.9–2.7)
MCH RBC QN AUTO: 29.1 PG (ref 26.5–33)
MCHC RBC AUTO-ENTMCNC: 33.3 G/DL (ref 31.5–36.5)
MCV RBC AUTO: 87 FL (ref 78–100)
MONOCYTES # BLD AUTO: 0.3 10E9/L (ref 0–1.3)
MONOCYTES NFR BLD AUTO: 4.6 %
NEUTROPHILS # BLD AUTO: 4.5 10E9/L (ref 1.6–8.3)
NEUTROPHILS NFR BLD AUTO: 62 %
PLATELET # BLD AUTO: 252 10E9/L (ref 150–450)
PROT SERPL-MCNC: 6.5 G/DL (ref 6.4–8.9)
RBC # BLD AUTO: 4.68 10E12/L (ref 3.8–5.2)
T4 FREE SERPL-MCNC: 0.72 NG/DL (ref 0.6–1.6)
TSH SERPL DL<=0.05 MIU/L-ACNC: 2.71 IU/ML (ref 0.34–5.6)
VIT B12 SERPL-MCNC: 850 PG/ML (ref 180–914)
WBC # BLD AUTO: 7.2 10E9/L (ref 4–11)

## 2019-05-16 PROCEDURE — 84439 ASSAY OF FREE THYROXINE: CPT

## 2019-05-16 PROCEDURE — 80076 HEPATIC FUNCTION PANEL: CPT

## 2019-05-16 PROCEDURE — 85025 COMPLETE CBC W/AUTO DIFF WBC: CPT

## 2019-05-16 PROCEDURE — 83735 ASSAY OF MAGNESIUM: CPT

## 2019-05-16 PROCEDURE — 83540 ASSAY OF IRON: CPT | Mod: GZ

## 2019-05-16 PROCEDURE — 82607 VITAMIN B-12: CPT

## 2019-05-16 PROCEDURE — 82306 VITAMIN D 25 HYDROXY: CPT | Mod: GZ

## 2019-05-16 PROCEDURE — 84630 ASSAY OF ZINC: CPT

## 2019-05-16 PROCEDURE — 36415 COLL VENOUS BLD VENIPUNCTURE: CPT

## 2019-05-16 PROCEDURE — 86800 THYROGLOBULIN ANTIBODY: CPT

## 2019-05-16 PROCEDURE — 84443 ASSAY THYROID STIM HORMONE: CPT

## 2019-05-16 PROCEDURE — 82728 ASSAY OF FERRITIN: CPT

## 2019-05-16 PROCEDURE — 82746 ASSAY OF FOLIC ACID SERUM: CPT

## 2019-05-16 PROCEDURE — 82947 ASSAY GLUCOSE BLOOD QUANT: CPT

## 2019-05-17 LAB
DEPRECATED CALCIDIOL+CALCIFEROL SERPL-MC: 35 UG/L (ref 20–75)
THYROGLOB AB SERPL IA-ACNC: <20 IU/ML (ref 0–40)

## 2019-05-21 LAB — ZINC SERPL-MCNC: 58 UG/DL (ref 60–120)

## 2019-05-29 DIAGNOSIS — N32.81 OVERACTIVE BLADDER: ICD-10-CM

## 2019-05-31 RX ORDER — MIRABEGRON 25 MG/1
TABLET, FILM COATED, EXTENDED RELEASE ORAL
Qty: 30 TABLET | Refills: 0 | Status: SHIPPED | OUTPATIENT
Start: 2019-05-31 | End: 2019-06-27

## 2019-05-31 NOTE — TELEPHONE ENCOUNTER
Prescription approved per [facility] Refill Protocol.  Has office visit scheduled on 6-6-19 with Dr. Maurice. Given small refill at this time. Shawna Rowe RN on 5/31/2019 at 8:41 AM

## 2019-06-13 DIAGNOSIS — E03.9 HYPOTHYROIDISM, UNSPECIFIED TYPE: ICD-10-CM

## 2019-06-14 RX ORDER — LEVOTHYROXINE SODIUM 25 UG/1
25 TABLET ORAL DAILY
Qty: 30 TABLET | Refills: 11 | Status: SHIPPED | OUTPATIENT
Start: 2019-06-14 | End: 2019-06-27

## 2019-06-14 NOTE — TELEPHONE ENCOUNTER
"Requested Prescriptions   Pending Prescriptions Disp Refills     levothyroxine (SYNTHROID/LEVOTHROID) 25 MCG tablet [Pharmacy Med Name: LEVOTHYROXINE 25 MCG TABLET] 30 tablet 0     Sig: TAKE 1 TABLET (25 MCG) BY MOUTH DAILY       Thyroid Protocol Passed - 6/13/2019  8:35 AM        Passed - Patient is 12 years or older        Passed - Recent (12 mo) or future (30 days) visit within the authorizing provider's specialty     Patient had office visit in the last 12 months or has a visit in the next 30 days with authorizing provider or within the authorizing provider's specialty.  See \"Patient Info\" tab in inbasket, or \"Choose Columns\" in Meds & Orders section of the refill encounter.              Passed - Medication is active on med list        Passed - Normal TSH on file in past 12 months     No lab results found.           Passed - No active pregnancy on record     If patient is pregnant or has had a positive pregnancy test, please check TSH.          Passed - No positive pregnancy test in past 12 months     If patient is pregnant or has had a positive pregnancy test, please check TSH.          LOV 11/15/2018  Prescription approved per [facility] Refill Protocol.    "

## 2019-06-22 DIAGNOSIS — N32.81 OVERACTIVE BLADDER: ICD-10-CM

## 2019-06-25 RX ORDER — MIRABEGRON 25 MG/1
TABLET, FILM COATED, EXTENDED RELEASE ORAL
Qty: 30 TABLET | Refills: 0 | OUTPATIENT
Start: 2019-06-25

## 2019-06-25 NOTE — TELEPHONE ENCOUNTER
Chart review shows that patient with upcoming appointment scheduled with PCP for 6/27/19. Patient no showed her appointment as scheduled for 6/6/19. Rx as requested was last filled on 5/31/19 for a 30 day supply as noted below:    Outpatient Medication Detail      Disp Refills Start End ENID   MYRBETRIQ 25 MG 24 hr tablet 30 tablet 0 5/31/2019  No   Sig: TAKE 1 TABLET BY MOUTH EVERY DAY   Sent to pharmacy as: MYRBETRIQ 25 MG 24 hr tablet   Class: E-Prescribe   Notes to Pharmacy: Patient needs to keep visit on 6-6-19 for further refills. Thank you.   Order: [id number]   E-Prescribing Status: Receipt confirmed by pharmacy (5/31/2019  8:42 AM CDT)   Printout Tracking     External Result Report   Pharmacy     [facility] [id number] [place] [facility] [place] [address]     Future refills can be addressed at office visit with PCP on 6/27/19.    Unable to complete prescription refill per RN Medication Refill Policy. Louis Gannon 6/25/2019 12:01 PM

## 2019-06-27 ENCOUNTER — OFFICE VISIT (OUTPATIENT)
Dept: FAMILY MEDICINE | Facility: OTHER | Age: 69
End: 2019-06-27
Attending: FAMILY MEDICINE
Payer: COMMERCIAL

## 2019-06-27 VITALS
HEIGHT: 65 IN | TEMPERATURE: 97.8 F | WEIGHT: 235.6 LBS | RESPIRATION RATE: 20 BRPM | DIASTOLIC BLOOD PRESSURE: 82 MMHG | SYSTOLIC BLOOD PRESSURE: 150 MMHG | BODY MASS INDEX: 39.25 KG/M2 | HEART RATE: 76 BPM

## 2019-06-27 DIAGNOSIS — M48.061 SPINAL STENOSIS OF LUMBAR REGION, UNSPECIFIED WHETHER NEUROGENIC CLAUDICATION PRESENT: ICD-10-CM

## 2019-06-27 DIAGNOSIS — E03.9 HYPOTHYROIDISM, UNSPECIFIED TYPE: ICD-10-CM

## 2019-06-27 DIAGNOSIS — N39.41 URGE INCONTINENCE OF URINE: ICD-10-CM

## 2019-06-27 DIAGNOSIS — M76.899 ENTHESOPATHY OF HIP REGION, UNSPECIFIED LATERALITY: ICD-10-CM

## 2019-06-27 DIAGNOSIS — N31.8 HYPERTONICITY OF BLADDER: Primary | ICD-10-CM

## 2019-06-27 DIAGNOSIS — I10 ESSENTIAL HYPERTENSION, BENIGN: ICD-10-CM

## 2019-06-27 DIAGNOSIS — R60.0 EDEMA OF LEFT LOWER EXTREMITY: ICD-10-CM

## 2019-06-27 PROCEDURE — 99214 OFFICE O/P EST MOD 30 MIN: CPT | Performed by: FAMILY MEDICINE

## 2019-06-27 PROCEDURE — G0463 HOSPITAL OUTPT CLINIC VISIT: HCPCS

## 2019-06-27 RX ORDER — TRAMADOL HYDROCHLORIDE 50 MG/1
50 TABLET ORAL EVERY 6 HOURS PRN
Qty: 60 TABLET | Refills: 2 | Status: SHIPPED | OUTPATIENT
Start: 2019-06-27 | End: 2020-03-09

## 2019-06-27 RX ORDER — LEVOTHYROXINE SODIUM 25 UG/1
25 TABLET ORAL DAILY
Qty: 90 TABLET | Refills: 4 | Status: SHIPPED | OUTPATIENT
Start: 2019-06-27 | End: 2020-08-25

## 2019-06-27 RX ORDER — ARIPIPRAZOLE 5 MG/1
5 TABLET ORAL DAILY
COMMUNITY
Start: 2019-06-27 | End: 2024-01-31

## 2019-06-27 ASSESSMENT — MIFFLIN-ST. JEOR: SCORE: 1604.55

## 2019-06-27 ASSESSMENT — PAIN SCALES - GENERAL: PAINLEVEL: NO PAIN (0)

## 2019-06-27 NOTE — PROGRESS NOTES
"Nursing Notes:   Ana Yung LPN  6/27/2019  2:17 PM  Signed  Patient here to discuss her Tramadol.  Medication Reconciliation: complete  Ana Yung LPN    SUBJECTIVE:   Ness Bales is a 68 year old female who presents to clinic today for the following health issues:    HPI  Ness is here primarily to follow up efficacy of Myrbetriq for OAB.  Started this medication after visit on 11/15/2018.  Since that time has noticed no improvement; and has since taken herself off the medication.  She states she has now tried every medication out there.  Previously followed with Dr. Johnson ([place], [place]) Urology - and interested in returning to discuss botox injections to the bladder.  Continues to have interstim device in place but without being functional (batteries, etc) - will discuss with him re: removal of device.    Knee pain improved with new glucosamine/chondroiten OR her CBD oil she is using.    Is seeing Ar Conseulo in [place] for psychiatric needs - and has recently added Abilify with some results but \"not quite where I want to be.\"  Has appointment next week and is seeing her ~1x/month.    Is needing a refill of her Tramadol.  Changes to scheduled medications as of 7/1/19 where reviewed.  She uses this medication only prn.  #60 with one refill (and has only used ~1/2 that refill) since November -- this averages out to ~90 pills in 6+ months.  Taking for chronic low back pain.    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     Priority: Medium     Contusion of lower leg 02/25/2011     Priority: Medium     Hypothyroidism 11/12/2010     Priority: Medium     Degeneration of lumbar or lumbosacral intervertebral disc 11/05/2010     Priority: Medium     Spinal stenosis, lumbar 11/05/2010     Priority: Medium     Esophageal reflux 06/25/2010     Priority: Medium     Sacroiliac joint dysfunction " 06/25/2010     Priority: Medium     Hypercholesterolemia 04/20/2009     Priority: Medium     Sleep apnea 02/12/2009     Priority: Medium     Disorder of bone and cartilage 10/29/2007     Priority: Medium     Enthesopathy of hip region 08/22/2006     Priority: Medium     Other vitamin B12 deficiency anemia 09/24/2001     Priority: Medium     Essential hypertension, benign 09/24/2001     Priority: Medium     Urge incontinence of urine 09/24/2001     Priority: Medium     Obesity 09/24/2001     Priority: Medium     Past Medical History:   Diagnosis Date     Obesity     No Comments Provided     Personal history of other medical treatment (CODE)     3 vaginal childbirth uncomplicated     Personal history of other medical treatment (CODE)     1983,blood transfusion with stomach stapling      Past Surgical History:   Procedure Laterality Date     ARTHROPLASTY KNEE      2008,Total Right knee replacement     BIOPSY BREAST      Incisional x2 benign breast biopsies     FRACTURE SURGERY      open treatment of Humeral Fx w/ prosthesis     LAPAROSCOPIC BYPASS GASTRIC      1983,Stomach stapling complicated by incision rupture, then repaired wih second surgery     OTHER SURGICAL HISTORY      1983,62462.0,SKIN/SUBCUTANEOUS SURGERY,repaired incision from stomach stapling     OTHER SURGICAL HISTORY      2006,[id number],ARTHROPLASTY,Total glenohumeral joint replacement     OTHER SURGICAL HISTORY      608223,ANESTHESIA ALERT,No problems with anaesthesia. ?     Family History   Problem Relation Age of Onset     Other - See Comments Mother         macular degeneration/Psychiatric illness,takes antidepressant     Heart Disease Father 40        Heart Disease,MI     Diabetes Father         Diabetes     Other - See Comments Son         Psychiatric illness,psychotic episode this year smoking marijuana, ephedra use ? bipolar     Family History Negative Child         Good Health     Family History Negative Child         Good Health     Other - See  Comments Sister         Psychiatric illness,depression     Other - See Comments Brother         Psychiatric illness,depression     Social History     Tobacco Use     Smoking status: Never Smoker     Smokeless tobacco: Never Used   Substance Use Topics     Alcohol use: No     Comment: very little      Social History     Social History Narrative    , 3 adult children, does clerical work part-time for [employer]     Current Outpatient Medications   Medication Sig Dispense Refill     ARIPiprazole (ABILIFY) 5 MG tablet Take 1 tablet (5 mg) by mouth daily       cholecalciferol (VITAMIN D-1000 MAX ST) 1000 units TABS Take 1,000 Units by mouth daily 90 tablet 4     Coenzyme Q10 (CO Q 10) 100 MG CAPS Take by mouth daily        cyanocobalamin (VITAMIN B12) 1000 MCG/ML injection Inject 1 mL (1,000 mcg) into the muscle every 30 days 0.9 mL 11     escitalopram (LEXAPRO) 20 MG tablet Take 1 tablet (20 mg) by mouth daily 90 tablet 4     ferrous sulfate (IRON) 325 (65 FE) MG tablet Take 325 mg by mouth       fish oil-omega-3 fatty acids 1000 MG capsule Take 1 capsule by mouth       furosemide (LASIX) 20 MG tablet Take 1 tablet (20 mg) by mouth daily 90 tablet 4     levothyroxine (SYNTHROID/LEVOTHROID) 25 MCG tablet TAKE 1 TABLET (25 MCG) BY MOUTH DAILY 30 tablet 11     losartan (COZAAR) 100 MG tablet Take 1 tablet (100 mg) by mouth daily 90 tablet 4     metoprolol succinate (TOPROL-XL) 100 MG 24 hr tablet Take 2 tablets (200 mg) by mouth daily 180 tablet 4     MYRBETRIQ 25 MG 24 hr tablet TAKE 1 TABLET BY MOUTH EVERY DAY 30 tablet 0     potassium chloride SA (K-DUR/KLOR-CON M) 20 MEQ CR tablet Take 20 mEq by mouth       traMADol (ULTRAM) 50 MG tablet Take 1 tablet (50 mg) by mouth every 6 hours as needed for severe pain 60 tablet 2     No Known Allergies    Review of Systems   Respiratory: Negative for cough, chest tightness, shortness of breath, wheezing and stridor.    Cardiovascular: Positive for peripheral edema (mild; L>R).  "Negative for chest pain and palpitations.   All other systems reviewed and are negative.     OBJECTIVE:     /82 (BP Location: Right arm, Patient Position: Sitting, Cuff Size: Adult Large)   Pulse 76   Temp 97.8  F (36.6  C) (Tympanic)   Resp 20   Ht 1.651 m (5' 5\")   Wt 106.9 kg (235 lb 9.6 oz)   BMI 39.21 kg/m    Body mass index is 39.21 kg/m .  Physical Exam   Constitutional: She appears well-developed and well-nourished.   HENT:   Head: Normocephalic and atraumatic.   Right Ear: External ear normal.   Left Ear: External ear normal.   Mouth/Throat: Oropharynx is clear and moist.   Eyes: Pupils are equal, round, and reactive to light. EOM are normal.   Neck: Normal range of motion. Neck supple.   Cardiovascular: Normal rate and intact distal pulses.   Mild LLE edema; compared to R.   Pulmonary/Chest: Effort normal and breath sounds normal.   Neurological: She is alert.   Skin: Skin is warm. Capillary refill takes less than 2 seconds. No rash noted. No erythema.   Psychiatric: She has a normal mood and affect. Judgment normal.   Nursing note and vitals reviewed.    Diagnostic Test Results:  Results for orders placed or performed in visit on 05/16/19   Thyrotropin GH   Result Value Ref Range    Thyrotropin 2.71 0.34 - 5.60 IU/mL   T4, free   Result Value Ref Range    T4 Free 0.72 0.60 - 1.60 ng/dL   Iron   Result Value Ref Range    Iron 97 50 - 212 ug/dL   Ferritin   Result Value Ref Range    Ferritin 91 23.9 - 336.2 ng/mL   Magnesium   Result Value Ref Range    Magnesium 1.7 (L) 1.9 - 2.7 mg/dL   Zinc   Result Value Ref Range    Zinc 58.0 (L) 60.0 - 120.0 ug/dL   Vitamin B12   Result Value Ref Range    Vitamin B12 850 180 - 914 pg/mL   Folate   Result Value Ref Range    Folate 16.8 >5.21 ng/mL   Glucose   Result Value Ref Range    Glucose 97 70 - 105 mg/dL   CBC with platelets and differential   Result Value Ref Range    WBC 7.2 4.0 - 11.0 10e9/L    RBC Count 4.68 3.8 - 5.2 10e12/L    Hemoglobin 13.6 " 11.7 - 15.7 g/dL    Hematocrit 40.9 35.0 - 47.0 %    MCV 87 78 - 100 fl    MCH 29.1 26.5 - 33.0 pg    MCHC 33.3 31.5 - 36.5 g/dL    RDW 12.4 10.0 - 15.0 %    Platelet Count 252 150 - 450 10e9/L    Diff Method Automated Method     % Neutrophils 62.0 %    % Lymphocytes 29.7 %    % Monocytes 4.6 %    % Eosinophils 3.2 %    % Basophils 0.4 %    % Immature Granulocytes 0.1 %    Absolute Neutrophil 4.5 1.6 - 8.3 10e9/L    Absolute Lymphocytes 2.1 0.8 - 5.3 10e9/L    Absolute Monocytes 0.3 0.0 - 1.3 10e9/L    Absolute Eosinophils 0.2 0.0 - 0.7 10e9/L    Absolute Basophils 0.0 0.0 - 0.2 10e9/L    Abs Immature Granulocytes 0.0 0 - 0.4 10e9/L   Hepatic panel (Albumin, ALT, AST, Bili, Alk Phos, TP)   Result Value Ref Range    Bilirubin Direct 0.1 0.0 - 0.2 mg/dL    Bilirubin Total 0.6 0.3 - 1.0 mg/dL    Albumin 3.6 3.5 - 5.7 g/dL    Protein Total 6.5 6.4 - 8.9 g/dL    Alkaline Phosphatase 71 34 - 104 U/L    ALT 24 7 - 52 U/L    AST 21 13 - 39 U/L   Anti thyroglobulin antibody   Result Value Ref Range    Thyroglobulin Antibody <20 <40 IU/mL   Vitamin D Deficiency   Result Value Ref Range    Vitamin D Deficiency screening 35 20 - 75 ug/L     Reviewed labs recently completed.    ASSESSMENT/PLAN:     1. Hypertonicity of bladder  Chronic; uncontrolled.  Medications not effective.  Will refer to Dr. Johnson to discuss botox vs need for surgical repair (Urology vs OB/GYN).  - UROLOGY ADULT REFERRAL    2. Urge incontinence of urine  As above.  - UROLOGY ADULT REFERRAL    3. Spinal stenosis of lumbar region, unspecified whether neurogenic claudication present  Chronic, unchanged.  Referral to massage therapy placed; along with refill of current dose of tramadol.  - traMADol (ULTRAM) 50 MG tablet; Take 1 tablet (50 mg) by mouth every 6 hours as needed for severe pain  Dispense: 60 tablet; Refill: 2  - MASSAGE THERAPY REFERRAL    4. Hypothyroidism, unspecified type  Chronic, stable on recent labs.  Medication to be refilled at current  dose.  - levothyroxine (SYNTHROID/LEVOTHROID) 25 MCG tablet; Take 1 tablet (25 mcg) by mouth daily  Dispense: 90 tablet; Refill: 4    5. Enthesopathy of hip region, unspecified laterality  Massage therapy referral placed.  - MASSAGE THERAPY REFERRAL    6. Physiological LLE edema  Compression stockings, low salt, elevation of foot.  Resolves by morning.  Reassurance given.  Will continue to work on BP.    7. HTN  Improved compared to normal baseline.  Denies medication adjustment today despite long term BP effects - discussed.  Encouraged low salt diet.    Jennifer Maurice, [facility]

## 2019-06-28 ASSESSMENT — ENCOUNTER SYMPTOMS
COUGH: 0
CHEST TIGHTNESS: 0
SHORTNESS OF BREATH: 0
STRIDOR: 0
WHEEZING: 0
PALPITATIONS: 0

## 2019-09-12 ENCOUNTER — TRANSFERRED RECORDS (OUTPATIENT)
Dept: HEALTH INFORMATION MANAGEMENT | Facility: OTHER | Age: 69
End: 2019-09-12

## 2019-12-16 ENCOUNTER — OFFICE VISIT (OUTPATIENT)
Dept: FAMILY MEDICINE | Facility: OTHER | Age: 69
End: 2019-12-16
Attending: PHYSICIAN ASSISTANT
Payer: MEDICARE

## 2019-12-16 VITALS
WEIGHT: 218.25 LBS | HEIGHT: 64 IN | RESPIRATION RATE: 20 BRPM | DIASTOLIC BLOOD PRESSURE: 64 MMHG | BODY MASS INDEX: 37.26 KG/M2 | HEART RATE: 76 BPM | TEMPERATURE: 97.4 F | OXYGEN SATURATION: 96 % | SYSTOLIC BLOOD PRESSURE: 124 MMHG

## 2019-12-16 DIAGNOSIS — N39.0 ACUTE UTI: ICD-10-CM

## 2019-12-16 DIAGNOSIS — R35.0 URINARY FREQUENCY: ICD-10-CM

## 2019-12-16 DIAGNOSIS — N39.41 URGE INCONTINENCE OF URINE: ICD-10-CM

## 2019-12-16 DIAGNOSIS — Z91.89 AT RISK FOR OBSTRUCTIVE SLEEP APNEA: ICD-10-CM

## 2019-12-16 DIAGNOSIS — R94.31 ABNORMAL ELECTROCARDIOGRAM: ICD-10-CM

## 2019-12-16 DIAGNOSIS — N31.8 HYPERTONICITY OF BLADDER: ICD-10-CM

## 2019-12-16 DIAGNOSIS — Z01.818 PREOP GENERAL PHYSICAL EXAM: Primary | ICD-10-CM

## 2019-12-16 DIAGNOSIS — I10 ESSENTIAL HYPERTENSION, BENIGN: ICD-10-CM

## 2019-12-16 LAB
ALBUMIN UR-MCNC: NEGATIVE MG/DL
ANION GAP SERPL CALCULATED.3IONS-SCNC: 9 MMOL/L (ref 3–14)
APPEARANCE UR: ABNORMAL
BACTERIA #/AREA URNS HPF: ABNORMAL /HPF
BILIRUB UR QL STRIP: NEGATIVE
BUN SERPL-MCNC: 13 MG/DL (ref 7–25)
CALCIUM SERPL-MCNC: 10.5 MG/DL (ref 8.6–10.3)
CHLORIDE SERPL-SCNC: 98 MMOL/L (ref 98–107)
CO2 SERPL-SCNC: 31 MMOL/L (ref 21–31)
COLOR UR AUTO: YELLOW
CREAT SERPL-MCNC: 0.87 MG/DL (ref 0.6–1.2)
GFR SERPL CREATININE-BSD FRML MDRD: 65 ML/MIN/{1.73_M2}
GLUCOSE SERPL-MCNC: 145 MG/DL (ref 70–105)
GLUCOSE UR STRIP-MCNC: NEGATIVE MG/DL
HGB BLD-MCNC: 14.8 G/DL (ref 11.7–15.7)
HGB UR QL STRIP: NEGATIVE
KETONES UR STRIP-MCNC: ABNORMAL MG/DL
LEUKOCYTE ESTERASE UR QL STRIP: ABNORMAL
MUCOUS THREADS #/AREA URNS LPF: PRESENT /LPF
NITRATE UR QL: POSITIVE
NON-SQ EPI CELLS #/AREA URNS LPF: ABNORMAL /LPF
PH UR STRIP: 5 PH (ref 5–9)
POTASSIUM SERPL-SCNC: 3.6 MMOL/L (ref 3.5–5.1)
RBC #/AREA URNS AUTO: ABNORMAL /HPF
SODIUM SERPL-SCNC: 138 MMOL/L (ref 134–144)
SOURCE: ABNORMAL
SP GR UR STRIP: <1.005 (ref 1–1.03)
UROBILINOGEN UR STRIP-ACNC: 0.2 EU/DL (ref 0.2–1)
WBC #/AREA URNS AUTO: ABNORMAL /HPF

## 2019-12-16 PROCEDURE — 85018 HEMOGLOBIN: CPT | Mod: ZL | Performed by: PHYSICIAN ASSISTANT

## 2019-12-16 PROCEDURE — G0463 HOSPITAL OUTPT CLINIC VISIT: HCPCS | Mod: 25

## 2019-12-16 PROCEDURE — 93010 ELECTROCARDIOGRAM REPORT: CPT | Performed by: INTERNAL MEDICINE

## 2019-12-16 PROCEDURE — 80048 BASIC METABOLIC PNL TOTAL CA: CPT | Mod: ZL | Performed by: PHYSICIAN ASSISTANT

## 2019-12-16 PROCEDURE — 87088 URINE BACTERIA CULTURE: CPT | Mod: ZL | Performed by: PHYSICIAN ASSISTANT

## 2019-12-16 PROCEDURE — 87086 URINE CULTURE/COLONY COUNT: CPT | Mod: ZL | Performed by: PHYSICIAN ASSISTANT

## 2019-12-16 PROCEDURE — 99214 OFFICE O/P EST MOD 30 MIN: CPT | Performed by: PHYSICIAN ASSISTANT

## 2019-12-16 PROCEDURE — 81001 URINALYSIS AUTO W/SCOPE: CPT | Mod: ZL | Performed by: PHYSICIAN ASSISTANT

## 2019-12-16 PROCEDURE — 93005 ELECTROCARDIOGRAM TRACING: CPT

## 2019-12-16 PROCEDURE — 36415 COLL VENOUS BLD VENIPUNCTURE: CPT | Mod: ZL | Performed by: PHYSICIAN ASSISTANT

## 2019-12-16 RX ORDER — PHENTERMINE HYDROCHLORIDE 37.5 MG/1
18.75 TABLET ORAL DAILY
COMMUNITY
Start: 2019-12-16 | End: 2020-03-09

## 2019-12-16 ASSESSMENT — ANXIETY QUESTIONNAIRES
IF YOU CHECKED OFF ANY PROBLEMS ON THIS QUESTIONNAIRE, HOW DIFFICULT HAVE THESE PROBLEMS MADE IT FOR YOU TO DO YOUR WORK, TAKE CARE OF THINGS AT HOME, OR GET ALONG WITH OTHER PEOPLE: NOT DIFFICULT AT ALL
2. NOT BEING ABLE TO STOP OR CONTROL WORRYING: NOT AT ALL
5. BEING SO RESTLESS THAT IT IS HARD TO SIT STILL: NOT AT ALL
1. FEELING NERVOUS, ANXIOUS, OR ON EDGE: NOT AT ALL
7. FEELING AFRAID AS IF SOMETHING AWFUL MIGHT HAPPEN: NOT AT ALL
GAD7 TOTAL SCORE: 0
3. WORRYING TOO MUCH ABOUT DIFFERENT THINGS: NOT AT ALL
6. BECOMING EASILY ANNOYED OR IRRITABLE: NOT AT ALL

## 2019-12-16 ASSESSMENT — PATIENT HEALTH QUESTIONNAIRE - PHQ9
5. POOR APPETITE OR OVEREATING: NOT AT ALL
SUM OF ALL RESPONSES TO PHQ QUESTIONS 1-9: 0

## 2019-12-16 ASSESSMENT — PAIN SCALES - GENERAL: PAINLEVEL: NO PAIN (0)

## 2019-12-16 ASSESSMENT — MIFFLIN-ST. JEOR: SCORE: 1509.98

## 2019-12-16 NOTE — PATIENT INSTRUCTIONS
Patient should take the following medications the morning of surgery with a small sip of water: losartan, lasix, metoprolol.  Patient was instructed to hold the following medications the morning of surgery: hold all meds.     Patient was advised to call our office and the surgical services with any change in condition or new symptoms if they were to develop between today and their surgical date.  Especially any cardiopulmonary symptoms or symptoms concerning for an infection.     Discontinue aspirin, aleve, naproxen and ibuprofen 7 days prior to surgery to reduce bleeding risk.  It is fine to take tylenol the week before surgery.  Hold vitamins and herbal remedies for 7 days before surgery.

## 2019-12-16 NOTE — PROGRESS NOTES
----------------- PREOPERATIVE EXAM ------------------  12/16/2019    SUBJECTIVE:  Ness Bales is a 68 year old female here for preoperative optimization.    I was asked to see Ness Bales by Dr. Weldon for a preoperative optimization due to history of hypertension.    Patient has a history of overactive bladder and urinary incontinence.  Symptoms have been present for many years.  Patient has urinary frequency and urgency.  Over the last few months she has had increased urinary urgency to the point where she is chronically incontinent.  Patient voids at night.  No hematuria, dysuria or recurrent urinary tract infections.  No history of kidney stones.  Patient did have an InterStim 4 years ago.  No fevers or chills.    Patient has history of hypertension well controlled with her medication.  No side effects appreciated with the medication.  Tolerating the medication well.  No chest pain, palpitations, problems breathing, lightheadedness, dizziness.  No vision or hearing concerns.    Patient has tolerated surgery well in the past.    Patient is an elevated risk for sleep apnea.  Patient would like an order for a sleep study to be completed.    Patient has history of hypothyroidism currently well controlled with medication.  Tolerating the levothyroxine well.  No acute concerns.    Fever/Chills or other infectious symptoms in past month: no  >10lb weight loss in past two months: yes, intentional    Health Care Directive/Code status:  Full code  Hx of blood transfusions:   YES? - possibly after gastric bypass surgery   Td up to date:  12/12/2012  History of VRE/MRSA:  no Date: na    Preoperative Evaluation: Obstructive Sleep Apnea screening    S:Snore -  Do you snore loudly? (louder than talking or loud enough to be heard through closed doors)  YES  T: Tired - Do you often feel tired, fatigued, or sleepy during the daytime?  YES  O: Observed - Has anyone ever observed you stop breathing during your sleep?   "no  P: Pressure - Do you have or are you being treated for high blood pressure?  YES  B: BMI - BMI greater than 35kg/m2?  YES- 37.46  A: Age - Age over 50 years old?  YES  N: Neck - Neck circumference greater than 40 cm?  no  G: Gender - Gender: Male?  no    Total number of \"YES\" responses:  5    Scoring: Low risk of ALICE 0-2  At Risk of ALICE: >3 High Risk of ALICE: 5-8      Nursing Notes:   Rehana Milligan LPN  12/16/2019  2:33 PM  Signed  Patient presents to the clinic for pre-op exam.      This patient presents today for a Preoperative exam for this procedure: Botox Injection of the Bladder   Date of Surgery: 12-30-19   Surgeon:  Dr. Joe Weldon  Facility:  [facility]  Fax:  N/a          Chief Complaint   Patient presents with     Pre-Op Exam       Initial /64 (BP Location: Left arm, Patient Position: Sitting, Cuff Size: Adult Regular)   Pulse 76   Temp 97.4  F (36.3  C) (Tympanic)   Resp 20   Ht 1.626 m (5' 4\")   Wt 99 kg (218 lb 4 oz)   SpO2 96%   BMI 37.46 kg/m    Estimated body mass index is 37.46 kg/m  as calculated from the following:    Height as of this encounter: 1.626 m (5' 4\").    Weight as of this encounter: 99 kg (218 lb 4 oz).  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     Priority: Medium     Contusion of lower leg 02/25/2011     Priority: Medium     Hypothyroidism 11/12/2010     Priority: Medium     Degeneration of lumbar or lumbosacral intervertebral disc 11/05/2010     Priority: Medium     Spinal stenosis, lumbar 11/05/2010     Priority: Medium     Esophageal reflux 06/25/2010     Priority: Medium     Sacroiliac joint dysfunction 06/25/2010     Priority: Medium     Hypercholesterolemia 04/20/2009     Priority: Medium     Sleep apnea 02/12/2009     Priority: Medium     Disorder of bone and cartilage 10/29/2007     " Priority: Medium     Enthesopathy of hip region 08/22/2006     Priority: Medium     Other vitamin B12 deficiency anemia 09/24/2001     Priority: Medium     Essential hypertension, benign 09/24/2001     Priority: Medium     Urge incontinence of urine 09/24/2001     Priority: Medium     Obesity 09/24/2001     Priority: Medium       Past Medical History:   Diagnosis Date     Obesity     No Comments Provided     Personal history of other medical treatment (CODE)     3 vaginal childbirth uncomplicated     Personal history of other medical treatment (CODE)     1983,blood transfusion with stomach stapling       Past Surgical History:   Procedure Laterality Date     ARTHROPLASTY KNEE      2008,Total Right knee replacement     BIOPSY BREAST      Incisional x2 benign breast biopsies     FRACTURE SURGERY      open treatment of Humeral Fx w/ prosthesis     LAPAROSCOPIC BYPASS GASTRIC      1983,Stomach stapling complicated by incision rupture, then repaired wih second surgery     OTHER SURGICAL HISTORY      1983,18016.0,SKIN/SUBCUTANEOUS SURGERY,repaired incision from stomach stapling     OTHER SURGICAL HISTORY      2006,[id number],ARTHROPLASTY,Total glenohumeral joint replacement     OTHER SURGICAL HISTORY      [id number],ANESTHESIA ALERT,No problems with anaesthesia. ?       Current Outpatient Medications   Medication Sig Dispense Refill     ARIPiprazole (ABILIFY) 5 MG tablet Take 1 tablet (5 mg) by mouth daily       cholecalciferol (VITAMIN D-1000 MAX ST) 1000 units TABS Take 1,000 Units by mouth daily 90 tablet 4     Coenzyme Q10 (CO Q 10) 100 MG CAPS Take by mouth daily        cyanocobalamin (VITAMIN B12) 1000 MCG/ML injection Inject 1 mL (1,000 mcg) into the muscle every 30 days 0.9 mL 11     escitalopram (LEXAPRO) 20 MG tablet Take 1 tablet (20 mg) by mouth daily 90 tablet 4     ferrous sulfate (IRON) 325 (65 FE) MG tablet Take 325 mg by mouth       fish oil-omega-3 fatty acids 1000 MG capsule Take 1 capsule by mouth        furosemide (LASIX) 20 MG tablet Take 1 tablet (20 mg) by mouth daily 90 tablet 4     levothyroxine (SYNTHROID/LEVOTHROID) 25 MCG tablet Take 1 tablet (25 mcg) by mouth daily 90 tablet 4     losartan (COZAAR) 100 MG tablet Take 1 tablet (100 mg) by mouth daily 90 tablet 4     metoprolol succinate (TOPROL-XL) 100 MG 24 hr tablet Take 2 tablets (200 mg) by mouth daily 180 tablet 4     phentermine (ADIPEX-P) 37.5 MG tablet Take 18.75 mg by mouth daily       potassium chloride SA (K-DUR/KLOR-CON M) 20 MEQ CR tablet Take 20 mEq by mouth       traMADol (ULTRAM) 50 MG tablet Take 1 tablet (50 mg) by mouth every 6 hours as needed for severe pain 60 tablet 2     sulfamethoxazole-trimethoprim (BACTRIM DS/SEPTRA DS) 800-160 MG tablet Take 1 tablet by mouth 2 times daily for 7 days 14 tablet 0       Recent use of ibuprofen, aspirin or aleve: No     Allergies:  No Known Allergies    Latex allergy  No    Family History   Problem Relation Age of Onset     Other - See Comments Mother         macular degeneration/Psychiatric illness,takes antidepressant     Heart Disease Father 40        Heart Disease,MI     Diabetes Father         Diabetes     Other - See Comments Son         Psychiatric illness,psychotic episode this year smoking marijuana, ephedra use ? bipolar     Other - See Comments Sister         Psychiatric illness,depression     Other - See Comments Brother         Psychiatric illness,depression       Denies family hx of bleeding tendencies, anesthesia complications, or other problems with surgery.      Social History     Tobacco Use     Smoking status: Never Smoker     Smokeless tobacco: Never Used   Substance Use Topics     Alcohol use: No     Comment: very little          ROS:    surgical:  patient denies previous complications from prior surgeries including but not limited to prolonged bleeding, anesthesia complications, dysrhythmias, surgical wound infections, or prolonged hospital stay.      REVIEW OF SYSTEMS:  A  "comprehensive review of systems was negative except foritems noted in HPI/Subjective.    Constitutional: Negative for fever, chills and fatigue .   Eyes: Negative for irritation  and redness .  Ears, nose, mouth, throat, and face: Negative for ear drainage, nasal congestion, sore throat and hoarseness .  Respiratory: Negative for cough, sputum production , hemoptysis, dyspnea  and wheezing .  Cardiovascular: Negative for chest discomfort, palpitations and lightheadedness .  Gastrointestinal: Negative for dysphagia, nausea, vomiting, melena, diarrhea, constipation and abdominal pain.  Genitourinary: Negative for frequency, dysuria, urinary incontinence, hematuria.  Integument/breast: Negative for rash.   Hematologic/lymphatic: Negative for easy bruising, bleeding, lymphadenopathy and petechiae.   Musculoskeletal: Negative for myalgias and arthralgias .  Neurological: Negative for headaches, dizziness, vertigo, seizures and weakness.   Psychiatric: Negative for anxiety, depression, panic attacks and suicidal ideations.       -------------------------------------------------------------    PHYSICAL EXAM:  /64 (BP Location: Left arm, Patient Position: Sitting, Cuff Size: Adult Regular)   Pulse 76   Temp 97.4  F (36.3  C) (Tympanic)   Resp 20   Ht 1.626 m (5' 4\")   Wt 99 kg (218 lb 4 oz)   SpO2 96%   BMI 37.46 kg/m      EXAM:  General Appearance: Pleasant, alert, appropriate appearance for age. No acute distress  Head Exam: Normal. Normocephalic, atraumatic.  Eye Exam: Normal external eye, conjunctiva, lids, cornea. RIANNA.  Ear Exam: Normal TM's bilaterally. Normal auditory canals and external ears. Non-tender.  Nose Exam: Normal external nose, mucus membranes, and septum.  OroPharynx Exam: Dental hygiene adequate. Normal buccal mucosa. Normal pharynx.  Neck Exam: Supple, no masses or nodes., no lymphadenopathy  Thyroid Exam: Normal., No nodules or enlargement., normal to palpation  Chest/Respiratory Exam: " Normal chest wall and respirations. Clear to auscultation.  Cardiovascular Exam: Regular rate and rhythm. S1, S2, no murmur, click, gallop, or rubs.  Gastrointestinal Exam: Soft, nontender, no abnormal masses or organomegaly. BS x 4.  Lymphatic Exam: Normal.  Musculoskeletal Exam: Back is straight and non-tender, full ROM of upper and lower extremities.  Skin: no rash or abnormalities  Neurologic Exam: symmetric DTRs, normal gross motor movement, tone, and coordination. No tremor.  Psychiatric Exam: Alert and oriented, appropriate affect.    PHQ Depression Screen  PHQ-9 SCORE 4/8/2016 11/15/2018 12/16/2019   PHQ-9 Total Score 6 15 0       Patient can walk up a flight of stairs without becoming short of breath or having chest pain: YES   Patient is able to tolerate greater than 4 METs of activity without any cardiopulmonary symptoms.    LABS:    Results for orders placed or performed in visit on 12/16/19   Urinalysis w Reflex Microscopic If Positive     Status: Abnormal   Result Value Ref Range    Color Urine Yellow     Appearance Urine Slightly Cloudy     Glucose Urine Negative NEG^Negative mg/dL    Bilirubin Urine Negative NEG^Negative    Ketones Urine Trace (A) NEG^Negative mg/dL    Specific Gravity Urine <1.005 1.000 - 1.030    Blood Urine Negative NEG^Negative    pH Urine 5.0 5.0 - 9.0 pH    Protein Albumin Urine Negative NEG^Negative mg/dL    Urobilinogen Urine 0.2 0.2 - 1.0 EU/dL    Nitrite Urine Positive (A) NEG^Negative    Leukocyte Esterase Urine Small (A) NEG^Negative    Source Midstream Urine    Basic Metabolic Panel     Status: Abnormal   Result Value Ref Range    Sodium 138 134 - 144 mmol/L    Potassium 3.6 3.5 - 5.1 mmol/L    Chloride 98 98 - 107 mmol/L    Carbon Dioxide 31 21 - 31 mmol/L    Anion Gap 9 3 - 14 mmol/L    Glucose 145 (H) 70 - 105 mg/dL    Urea Nitrogen 13 7 - 25 mg/dL    Creatinine 0.87 0.60 - 1.20 mg/dL    GFR Estimate 65 >60 mL/min/[1.73_m2]    GFR Estimate If Black 79 >60  mL/min/[1.73_m2]    Calcium 10.5 (H) 8.6 - 10.3 mg/dL   Hemoglobin     Status: None   Result Value Ref Range    Hemoglobin 14.8 11.7 - 15.7 g/dL   Urine Microscopic     Status: Abnormal   Result Value Ref Range    WBC Urine 10-25 (A) OTO5^0 - 5 /HPF    RBC Urine O - 2 OTO2^O - 2 /HPF    Squamous Epithelial /LPF Urine Moderate (A) FEW^Few /LPF    Bacteria Urine Many (A) NEG^Negative /HPF    Mucous Urine Present (A) NEG^Negative /LPF   Urine Culture Aerobic Bacterial     Status: Abnormal   Result Value Ref Range    Specimen Description Midstream Urine     Culture Micro >100,000 colonies/mL  Escherichia coli   (A)     Culture Micro       ESBL-positive (Extended-spectrum beta-lactamase) multi drug-resistant organism.       Susceptibility    Escherichia coli - GLENNY     CIPROFLOXACIN >2 Resistant ug/mL     NITROFURANTOIN <=32 Sensitive ug/mL     GENTAMICIN <=4 Sensitive ug/mL     IMIPENEM <=1 Sensitive ug/mL     LEVOFLOXACIN <=2 Sensitive ug/mL     Piperacillin/Tazo <=16 Sensitive ug/mL     Trimethoprim/Sulfa <=2/38 Sensitive ug/mL     TETRACYCLINE >8 Resistant ug/mL          CXR:  Not necessary    EKG: Normal Sinus Rhythm, there are no prior tracings available, final read pending by internal medicine.      ---------------------------------------------------------------    No family history of problems with bleeding or anesthetia.    ASA PS class 3. Patient's perioperative risk is minimized and no further cardiopulmonary workup is neccesary.  Please contact the office with any questions or concerns.      ICD-10-CM    1. Preop general physical exam Z01.818 Urinalysis w Reflex Microscopic If Positive     Hemoglobin     Basic Metabolic Panel     Basic Metabolic Panel     Hemoglobin     Urine Microscopic     EKG 12-lead complete w/read - Clinics   2. Urge incontinence of urine N39.41 Urinalysis w Reflex Microscopic If Positive     Hemoglobin     Basic Metabolic Panel     Basic Metabolic Panel     Hemoglobin   3. Hypertonicity  of bladder N31.8    4. Essential hypertension, benign I10 Urinalysis w Reflex Microscopic If Positive     Hemoglobin     Basic Metabolic Panel     Basic Metabolic Panel     Hemoglobin     EKG 12-lead complete w/read - Clinics   5. Urinary frequency R35.0 Urine Culture Aerobic Bacterial   6. Abnormal electrocardiogram R94.31 Echocardiogram Complete   7. At risk for obstructive sleep apnea Z91.89 SLEEP EVALUATION & MANAGEMENT REFERRAL - [facility] [phone] (Age 13 and up)   8. Acute UTI N39.0          ASSESSEMNT AND PLAN:  1.  Preoperative history and physical   consults:  None  Patient is approved for the surgery.  No concerns.     For above listed surgery and anesthesia:    - Patient is Medium risk for perioperative complications.    Patient was administered the following vaccines today: none.  Completed labs today: hemoglobin, BMP, UA/UC. Hemoglobin and BMP are stable.   Nitirites appreciated on UA.  UC showed a bladder infection.  Patient was treated with a seven-day course of Bactrim antibiotic for the bladder infection to be completed prior to surgery.    PRE OP RECOMMENDATIONS:  Patient is on chronic pain medications no   Patient is on antiplatlet/anticoagulation no   Other medications that need adjustment perioperatively no     Patient Instructions   Patient should take the following medications the morning of surgery with a small sip of water: losartan, lasix, metoprolol.  Patient was instructed to hold the following medications the morning of surgery: hold all meds.     Patient was advised to call our office and the surgical services with any change in condition or new symptoms if they were to develop between today and their surgical date.  Especially any cardiopulmonary symptoms or symptoms concerning for an infection.     Discontinue aspirin, aleve, naproxen and ibuprofen 7 days prior to surgery to reduce bleeding risk.  It is fine to take tylenol the week  before surgery.  Hold vitamins and herbal remedies for 7 days before surgery.      Ordered a sleep study to be completed due to being high risk for sleep apnea.     Patient had some minor EKG changes. Consulted with internal medicine, Dr. Black.  There were no acute concerns prior to surgery however he did recommend that the patient have an echocardiogram to rule out concerns.  This can be scheduled after surgery.  Referral placed for an echocardiogram to be completed.    Other:  Patient was advised to call our office and the surgical services with any change in condition or new symptoms if they were to develop between today and their surgical date.  Especially any cardiopulmonary symptoms or symptoms concerning for an infection.     PRE OP RECOMMENDATIONS:  Discontinue ASA 7 days prior to reduce bleeding risk and Discontinue NSAIDS 7 days prior to procedure to reduce bleeding risk    Greater than 25 minutes were spent in counseling and coordination of care.    Ciarra Elizondo PA-C PA-C..................12/16/2019 8:32 AM

## 2019-12-16 NOTE — NURSING NOTE
"Patient presents to the clinic for pre-op exam.      This patient presents today for a Preoperative exam for this procedure: Botox Injection of the Bladder   Date of Surgery: 12-30-19   Surgeon:  Dr. Joe Weldon  Facility:  [facility]  Fax:  N/a          Chief Complaint   Patient presents with     Pre-Op Exam       Initial /64 (BP Location: Left arm, Patient Position: Sitting, Cuff Size: Adult Regular)   Pulse 76   Temp 97.4  F (36.3  C) (Tympanic)   Resp 20   Ht 1.626 m (5' 4\")   Wt 99 kg (218 lb 4 oz)   SpO2 96%   BMI 37.46 kg/m   Estimated body mass index is 37.46 kg/m  as calculated from the following:    Height as of this encounter: 1.626 m (5' 4\").    Weight as of this encounter: 99 kg (218 lb 4 oz).  Medication Reconciliation: complete    Rehana Milligan, RACHELLE    "

## 2019-12-17 ENCOUNTER — TELEPHONE (OUTPATIENT)
Dept: FAMILY MEDICINE | Facility: OTHER | Age: 69
End: 2019-12-17

## 2019-12-17 ASSESSMENT — ANXIETY QUESTIONNAIRES: GAD7 TOTAL SCORE: 0

## 2019-12-17 NOTE — TELEPHONE ENCOUNTER
Reason for call: Request for a referral.    Referral requested for what concern?  Sleep issues    Have you already been seen by the specialty you need the referral for?  no    If yes, Date:   ,  Location:   ,  Provider:       If no,  Where do you want to go?   Sleep study center    Additional comments:   none    Preferred method for responding to this message: Telephone Call    Phone number patient can be reached at? Cell number on file:    Telephone Information:   Mobile [phone]       If we can't reach you directly, may we leave a detailed response at the number you provided? Yes

## 2019-12-18 ENCOUNTER — TELEPHONE (OUTPATIENT)
Dept: FAMILY MEDICINE | Facility: OTHER | Age: 69
End: 2019-12-18

## 2019-12-18 DIAGNOSIS — N39.0 ACUTE UTI: Primary | ICD-10-CM

## 2019-12-18 LAB
BACTERIA SPEC CULT: ABNORMAL
BACTERIA SPEC CULT: ABNORMAL
SPECIMEN SOURCE: ABNORMAL

## 2019-12-18 RX ORDER — SULFAMETHOXAZOLE/TRIMETHOPRIM 800-160 MG
1 TABLET ORAL 2 TIMES DAILY
Qty: 14 TABLET | Refills: 0 | Status: SHIPPED | OUTPATIENT
Start: 2019-12-18 | End: 2019-12-25

## 2019-12-18 NOTE — TELEPHONE ENCOUNTER
Ordered sleep study per our discussion on 12/16/2019.  Paperwork in outbox.  Ciarra Elizondo PA-C.......... 12/18/2019 10:59 AM

## 2019-12-18 NOTE — TELEPHONE ENCOUNTER
Your urine culture shows that you have a bladder infection.  I sent Bactrim antibiotic to the pharmacy for treatment.  Please complete the full 7-day course of antibiotic prior to surgery.  Ciarra Elizondo PA-C.......... 12/18/2019 10:37 AM

## 2020-01-06 DIAGNOSIS — I10 ESSENTIAL HYPERTENSION, BENIGN: ICD-10-CM

## 2020-01-07 ENCOUNTER — HOSPITAL ENCOUNTER (OUTPATIENT)
Dept: CARDIOLOGY | Facility: OTHER | Age: 70
Discharge: HOME | End: 2020-01-07
Attending: PHYSICIAN ASSISTANT | Admitting: PHYSICIAN ASSISTANT
Payer: COMMERCIAL

## 2020-01-07 DIAGNOSIS — R94.31 ABNORMAL ELECTROCARDIOGRAM: ICD-10-CM

## 2020-01-07 PROCEDURE — 25500064 ZZH RX 255 OP 636: Performed by: PHYSICIAN ASSISTANT

## 2020-01-07 PROCEDURE — 93306 TTE W/DOPPLER COMPLETE: CPT

## 2020-01-07 PROCEDURE — 93306 TTE W/DOPPLER COMPLETE: CPT | Mod: 26 | Performed by: INTERNAL MEDICINE

## 2020-01-07 RX ADMIN — PERFLUTREN 3 ML: 6.52 INJECTION, SUSPENSION INTRAVENOUS at 09:30

## 2020-01-08 RX ORDER — LOSARTAN POTASSIUM 100 MG/1
TABLET ORAL
Qty: 90 TABLET | Refills: 2 | Status: SHIPPED | OUTPATIENT
Start: 2020-01-08 | End: 2020-08-25

## 2020-01-08 NOTE — TELEPHONE ENCOUNTER
Prescription approved per [facility] Refill Protocol.  LOV and labs: 12/16/19  Maggie Biswas RN on 1/8/2020 at 8:30 AM

## 2020-01-09 ENCOUNTER — TELEPHONE (OUTPATIENT)
Dept: FAMILY MEDICINE | Facility: OTHER | Age: 70
End: 2020-01-09

## 2020-01-09 DIAGNOSIS — R94.31 ABNORMAL ELECTROCARDIOGRAM: Primary | ICD-10-CM

## 2020-01-09 NOTE — TELEPHONE ENCOUNTER
Your heart echocardiogram is normal.  No acute concerns are appreciated.    With your recent EKG abnormalities that were found during the preop, I would recommend meeting with cardiology in order to rule out concerns.  A referral has been placed.  Ciarra Elizondo PA-C ..................1/9/2020 5:11 PM

## 2020-01-16 DIAGNOSIS — I10 ESSENTIAL HYPERTENSION, BENIGN: ICD-10-CM

## 2020-01-16 RX ORDER — METOPROLOL SUCCINATE 100 MG/1
TABLET, EXTENDED RELEASE ORAL
Qty: 180 TABLET | Refills: 3 | Status: SHIPPED | OUTPATIENT
Start: 2020-01-16 | End: 2020-01-29

## 2020-01-16 NOTE — TELEPHONE ENCOUNTER
"Requested Prescriptions   Pending Prescriptions Disp Refills     metoprolol succinate ER (TOPROL-XL) 100 MG 24 hr tablet [Pharmacy Med Name: METOPROLOL SUCC  MG TAB] 180 tablet 4     Sig: TAKE 2 TABLETS BY MOUTH EVERY DAY       Beta-Blockers Protocol Passed - 1/16/2020  4:09 AM        Passed - Blood pressure under 140/90 in past 12 months     BP Readings from Last 3 Encounters:   12/16/19 124/64   06/27/19 150/82   11/15/18 170/90                 Passed - Patient is age 6 or older        Passed - Recent (12 mo) or future (30 days) visit within the authorizing provider's specialty     Patient has had an office visit with the authorizing provider or a provider within the authorizing providers department within the previous 12 mos or has a future within next 30 days. See \"Patient Info\" tab in inbasket, or \"Choose Columns\" in Meds & Orders section of the refill encounter.              Passed - Medication is active on med list        LOV 12/16/19  Prescription approved per [facility] Refill Protocol.  Brenda J. Goodell, RN on 1/16/2020 at 3:20 PM    "

## 2020-01-22 ENCOUNTER — THERAPY VISIT (OUTPATIENT)
Dept: SLEEP MEDICINE | Facility: OTHER | Age: 70
End: 2020-01-22
Attending: INTERNAL MEDICINE
Payer: COMMERCIAL

## 2020-01-22 DIAGNOSIS — G47.33 OBSTRUCTIVE SLEEP APNEA SYNDROME: Primary | ICD-10-CM

## 2020-01-22 PROCEDURE — 95810 POLYSOM 6/> YRS 4/> PARAM: CPT | Mod: TC

## 2020-01-29 ENCOUNTER — OFFICE VISIT (OUTPATIENT)
Dept: CARDIOLOGY | Facility: OTHER | Age: 70
End: 2020-01-29
Attending: PHYSICIAN ASSISTANT
Payer: COMMERCIAL

## 2020-01-29 VITALS
DIASTOLIC BLOOD PRESSURE: 78 MMHG | OXYGEN SATURATION: 97 % | WEIGHT: 211.6 LBS | TEMPERATURE: 98.6 F | SYSTOLIC BLOOD PRESSURE: 134 MMHG | HEART RATE: 60 BPM | BODY MASS INDEX: 36.12 KG/M2 | RESPIRATION RATE: 16 BRPM | HEIGHT: 64 IN

## 2020-01-29 DIAGNOSIS — R94.31 ABNORMAL ELECTROCARDIOGRAM: Primary | ICD-10-CM

## 2020-01-29 DIAGNOSIS — E66.01 MORBID OBESITY (H): ICD-10-CM

## 2020-01-29 DIAGNOSIS — E78.00 HYPERCHOLESTEROLEMIA: ICD-10-CM

## 2020-01-29 DIAGNOSIS — R53.83 FATIGUE, UNSPECIFIED TYPE: ICD-10-CM

## 2020-01-29 DIAGNOSIS — Z82.49 FAMILY HISTORY OF PREMATURE CAD: ICD-10-CM

## 2020-01-29 DIAGNOSIS — G47.33 OSA (OBSTRUCTIVE SLEEP APNEA): ICD-10-CM

## 2020-01-29 DIAGNOSIS — I10 ESSENTIAL HYPERTENSION, BENIGN: ICD-10-CM

## 2020-01-29 PROCEDURE — 93000 ELECTROCARDIOGRAM COMPLETE: CPT | Performed by: INTERNAL MEDICINE

## 2020-01-29 PROCEDURE — 93005 ELECTROCARDIOGRAM TRACING: CPT

## 2020-01-29 PROCEDURE — G0463 HOSPITAL OUTPT CLINIC VISIT: HCPCS

## 2020-01-29 PROCEDURE — 99204 OFFICE O/P NEW MOD 45 MIN: CPT | Performed by: NURSE PRACTITIONER

## 2020-01-29 RX ORDER — METOPROLOL SUCCINATE 100 MG/1
100 TABLET, EXTENDED RELEASE ORAL DAILY
Qty: 90 TABLET | Refills: 1 | COMMUNITY
Start: 2020-01-29 | End: 2020-08-25

## 2020-01-29 RX ORDER — AMLODIPINE BESYLATE 5 MG/1
5 TABLET ORAL DAILY
Qty: 90 TABLET | Refills: 1 | Status: SHIPPED | OUTPATIENT
Start: 2020-01-29 | End: 2020-07-14

## 2020-01-29 ASSESSMENT — PAIN SCALES - GENERAL: PAINLEVEL: NO PAIN (0)

## 2020-01-29 ASSESSMENT — MIFFLIN-ST. JEOR: SCORE: 1475.06

## 2020-01-29 ASSESSMENT — PATIENT HEALTH QUESTIONNAIRE - PHQ9: SUM OF ALL RESPONSES TO PHQ QUESTIONS 1-9: 7

## 2020-01-29 NOTE — PATIENT INSTRUCTIONS
You were seen by  LIZBETH Muller CNP       1. A Stress Echocardiogram has been ordered. You will be called to schedule this. You will receive instructions for testing at that time. You will be contacted with results.       2. Decrease  Toprol XL 200mg once daily to 100mg once daily.    3. Stop Lasix.  If any signs of swelling (edema) please call the clinic.    4. Start Amlodipine (Norvasc) 5 mg once daily.  Prescription sent to your pharmacy.      5. Call on Friday with blood pressure and heart rate.    6. No other changes.    You will follow up with [facility] after stress test, sooner if needed.       Please call the cardiology office with problems, questions, or concerns at [phone].    If you experience chest pain, chest pressure, chest tightness, shortness of breath, fainting, lightheadedness, nausea, vomiting, or other concerning symptoms, please report to the Emergency Department or call 911. These symptoms may be emergent, and best treated in the Emergency Department.     Cardiology Nurses  DOMINGO Ramirez, RACHELLE SCHUMACHER, RACHELLE  [facility] ([address])  [phone]

## 2020-01-29 NOTE — PROGRESS NOTES
[facility] HEART CARE   CARDIOLOGY CONSULT     Ness Bales   [address] [place] [address]    Jennifer Maurice     Chief Complaint   Patient presents with     Consult        HPI:   Ms. Bales is a 69 year old female who presents for cardiology evaluation with identified abnormal EKG at recent preoperative optimization visit. Patient has a history of hyperlipidemia, hypertension, dysthymia, hypertonicity of bladder, hypothyroidism, degenerative disc disease of the lumbar spine with spinal stenosis, GERD, sleep apnea, B12 deficiency and obesity. She does report a family history of premature CAD, father with fatal MI in his early 60's.    Patient has been following Dr. Jimenez for medical weight management. She is currently following a low carbohydrate and high fat diet along with low dose phentermine.     Recent echocardiogram on 1/7/2020 with normal biventricular function, LVEF 55 to 60%.  LV wall thickness relatively in increased consistent with LVH.  LV diastolic function was indeterminate.  There was no LV regional wall motion abnormalities.  Both atria appeared normal.  The atrial septum was intact.  No hemodynamically significant valvular abnormalities.  No pericardial effusion.    Today patient reports feeling good. No reported chest pain or pressure. No pain in the jaw, arm, neck or back. Denies any exertional symptoms. Admits to energy loss and fatigue which has been present for nearly past 6 months. She denies any palpitations, lightheadedness or syncope. No edema. Occasional shortness of breath, denies any progressive exertional dyspnea.     PAST MEDICAL HISTORY:   Past Medical History:   Diagnosis Date     Obesity     No Comments Provided     Personal history of other medical treatment (CODE)     3 vaginal childbirth uncomplicated     Personal history of other medical treatment (CODE)     1983,blood transfusion with stomach stapling          FAMILY HISTORY:   Family History   Problem Relation Age  of Onset     Other - See Comments Mother         macular degeneration/Psychiatric illness,takes antidepressant     Heart Disease Father 40        Heart Disease,MI     Diabetes Father         Diabetes     Other - See Comments Son         Psychiatric illness,psychotic episode this year smoking marijuana, ephedra use ? bipolar     Other - See Comments Sister         Psychiatric illness,depression     Other - See Comments Brother         Psychiatric illness,depression          PAST SURGICAL HISTORY:   Past Surgical History:   Procedure Laterality Date     ARTHROPLASTY KNEE      2008,Total Right knee replacement     BIOPSY BREAST      Incisional x2 benign breast biopsies     FRACTURE SURGERY      open treatment of Humeral Fx w/ prosthesis     LAPAROSCOPIC BYPASS GASTRIC      1983,Stomach stapling complicated by incision rupture, then repaired wih second surgery     OTHER SURGICAL HISTORY      1983,78728.0,SKIN/SUBCUTANEOUS SURGERY,repaired incision from stomach stapling     OTHER SURGICAL HISTORY      2006,[id number],ARTHROPLASTY,Total glenohumeral joint replacement     OTHER SURGICAL HISTORY      [id number],ANESTHESIA ALERT,No problems with anaesthesia. ?          SOCIAL HISTORY:   Social History     Socioeconomic History     Marital status:      Spouse name: None     Number of children: None     Years of education: None     Highest education level: None   Occupational History     None   Social Needs     Financial resource strain: None     Food insecurity:     Worry: None     Inability: None     Transportation needs:     Medical: None     Non-medical: None   Tobacco Use     Smoking status: Never Smoker     Smokeless tobacco: Never Used   Substance and Sexual Activity     Alcohol use: No     Comment: very little      Drug use: No     Sexual activity: Yes     Partners: Male   Lifestyle     Physical activity:     Days per week: None     Minutes per session: None     Stress: None   Relationships     Social connections:      Talks on phone: None     Gets together: None     Attends Druze service: None     Active member of club or organization: None     Attends meetings of clubs or organizations: None     Relationship status: None     Intimate partner violence:     Fear of current or ex partner: None     Emotionally abused: None     Physically abused: None     Forced sexual activity: None   Other Topics Concern     Parent/sibling w/ CABG, MI or angioplasty before 65F 55M? Not Asked   Social History Narrative    , 3 adult children, does clerical work part-time for [employer]          CURRENT MEDICATIONS:   Prior to Admission medications    Medication Sig Start Date End Date Taking? Authorizing Provider   ferrous sulfate (IRON) 325 (65 FE) MG tablet Take 325 mg by mouth   Yes Reported, Patient   fish oil-omega-3 fatty acids 1000 MG capsule Take 1 capsule by mouth   Yes Reported, Patient   furosemide (LASIX) 20 MG tablet Take 1 tablet (20 mg) by mouth daily 11/15/18  Yes Jennifer Maurice, DO   levothyroxine (SYNTHROID/LEVOTHROID) 25 MCG tablet Take 1 tablet (25 mcg) by mouth daily 6/27/19  Yes Jennifer Maurice, DO   losartan (COZAAR) 100 MG tablet TAKE 1 TABLET BY MOUTH EVERY DAY 1/8/20  Yes Jennifer Maurice, DO   metoprolol succinate ER (TOPROL-XL) 100 MG 24 hr tablet TAKE 2 TABLETS BY MOUTH EVERY DAY 1/16/20  Yes Jennifer Maurice DO   potassium chloride SA (K-DUR/KLOR-CON M) 20 MEQ CR tablet Take 20 mEq by mouth 10/10/12  Yes Reported, Patient   traMADol (ULTRAM) 50 MG tablet Take 1 tablet (50 mg) by mouth every 6 hours as needed for severe pain 6/27/19  Yes Jennifer Maurice,    ARIPiprazole (ABILIFY) 5 MG tablet Take 1 tablet (5 mg) by mouth daily 6/27/19   Jennifer Maurice, DO   cholecalciferol (VITAMIN D-1000 MAX ST) 1000 units TABS Take 1,000 Units by mouth daily 11/15/18   Jennifer Maurice, DO   Coenzyme Q10 (CO Q 10) 100 MG CAPS Take by mouth daily  11/15/18   Jennifer Maurice, DO   cyanocobalamin (VITAMIN B12) 1000 MCG/ML  "injection Inject 1 mL (1,000 mcg) into the muscle every 30 days 11/15/18   Jennifer Maurice ZEENAT, DO   escitalopram (LEXAPRO) 20 MG tablet Take 1 tablet (20 mg) by mouth daily 2/4/19   Kaylene Jennifer ZEENAT, DO   phentermine (ADIPEX-P) 37.5 MG tablet Take 18.75 mg by mouth daily 12/16/19   Reported, Patient   sulfamethoxazole-trimethoprim (BACTRIM DS/SEPTRA DS) 800-160 MG tablet Take 1 tablet by mouth 2 times daily for 7 days 12/18/19 12/25/19  Ciarra Elizondo PA-C          ALLERGIES:   No Known Allergies       ROS:   CONSTITUTIONAL: No reported fever or chills. No changes in weight.  ENT: No visual disturbance, ear ache, epistaxis or sore throat.   CARDIOVASCULAR: No chest pain, chest pressure or chest discomfort. No palpitations or lower extremity edema.   RESPIRATORY: No shortness of breath or increased dyspnea upon exertion. No cough, wheezing or hemoptysis.   GI: No reported abdominal pain.   : No reported hematuria or dysuria.   NEUROLOGICAL: No lightheadedness, dizziness, syncope, ataxia, paresthesias or weakness.   HEMATOLOGIC: No history of anemia. No bleeding or excessive bruising. No history of blood clots.   MUSCULOSKELETAL: No new joint pain or swelling, no muscle pain.  ENDOCRINOLOGIC: No temperature intolerance. No hair or skin changes.  SKIN: No abnormal rashes or sores, no unusual itching.  PSYCHIATRIC: Positive for history of depression. No changes in mood, feeling down or anxious. No changes in sleep.      PHYSICAL EXAM:   /78 (BP Location: Right arm, Patient Position: Sitting, Cuff Size: Adult Large)   Pulse 60   Temp 98.6  F (37  C) (Tympanic)   Resp 16   Ht 1.626 m (5' 4.02\")   Wt 96 kg (211 lb 9.6 oz)   SpO2 97%   BMI 36.30 kg/m     GENERAL: The patient is a well-developed, well-nourished, in no apparent distress.  HEENT: Head is normocephalic and atraumatic. Eyes are symmetrical with normal visual tracking. No icterus, no xanthelasmas. Nares appeared normal without nasal drainage. " Mucous membranes are moist, no cyanosis.  NECK: Supple. No cervical bruits, JVP not visible.   CHEST/ LUNGS: Lungs clear to auscultation, no rales, rhonchi or wheezes, no use of accessory muscles, no retractions, respirations unlabored and normal respiratory rate.   CARDIO: Regular rate and rhythm normal with S1 and S2, no S3 or S4 and no murmur, click or rub.   ABD: Abdomen is nondistended.   EXTREMITIES: No LE edema present.  MUSCULOSKELETAL: No visible joint swelling.   NEUROLOGIC: Alert and oriented X3. Normal speech, gait and affect. No focal neurologic deficits.   SKIN: No jaundice. No rashes or visible skin lesions present. No ecchymosis.       EKG:    Sinus bradycardia with rate 53 bpm, nonspecific T wave changes in V3-6    LAB RESULTS:   Office Visit on 12/16/2019   Component Date Value Ref Range Status     Color Urine 12/16/2019 Yellow   Final     Appearance Urine 12/16/2019 Slightly Cloudy   Final     Glucose Urine 12/16/2019 Negative  NEG^Negative mg/dL Final     Bilirubin Urine 12/16/2019 Negative  NEG^Negative Final     Ketones Urine 12/16/2019 Trace* NEG^Negative mg/dL Final     Specific Gravity Urine 12/16/2019 <1.005  1.000 - 1.030 Final     Blood Urine 12/16/2019 Negative  NEG^Negative Final     pH Urine 12/16/2019 5.0  5.0 - 9.0 pH Final     Protein Albumin Urine 12/16/2019 Negative  NEG^Negative mg/dL Final     Urobilinogen Urine 12/16/2019 0.2  0.2 - 1.0 EU/dL Final     Nitrite Urine 12/16/2019 Positive* NEG^Negative Final     Leukocyte Esterase Urine 12/16/2019 Small* NEG^Negative Final     Source 12/16/2019 Midstream Urine   Final     Sodium 12/16/2019 138  134 - 144 mmol/L Final     Potassium 12/16/2019 3.6  3.5 - 5.1 mmol/L Final     Chloride 12/16/2019 98  98 - 107 mmol/L Final     Carbon Dioxide 12/16/2019 31  21 - 31 mmol/L Final     Anion Gap 12/16/2019 9  3 - 14 mmol/L Final     Glucose 12/16/2019 145* 70 - 105 mg/dL Final     Urea Nitrogen 12/16/2019 13  7 - 25 mg/dL Final      Creatinine 12/16/2019 0.87  0.60 - 1.20 mg/dL Final     GFR Estimate 12/16/2019 65  >60 mL/min/[1.73_m2] Final     GFR Estimate If Black 12/16/2019 79  >60 mL/min/[1.73_m2] Final     Calcium 12/16/2019 10.5* 8.6 - 10.3 mg/dL Final     Hemoglobin 12/16/2019 14.8  11.7 - 15.7 g/dL Final     Specimen Description 12/16/2019 Midstream Urine   Final     Culture Micro 12/16/2019 *  Final                    Value:>100,000 colonies/mL  Escherichia coli       Culture Micro 12/16/2019 ESBL-positive (Extended-spectrum beta-lactamase) multi drug-resistant organism.   Final     WBC Urine 12/16/2019 10-25* OTO5^0 - 5 /HPF Final     RBC Urine 12/16/2019 O - 2  OTO2^O - 2 /HPF Final     Squamous Epithelial /LPF Urine 12/16/2019 Moderate* FEW^Few /LPF Final     Bacteria Urine 12/16/2019 Many* NEG^Negative /HPF Final     Mucous Urine 12/16/2019 Present* NEG^Negative /LPF Final          ASSESSMENT:   Ness Bales presents for  cardiology evaluation with identified abnormal EKG at recent preoperative optimization visit. Patient has a history of hyperlipidemia, hypertension, dysthymia, hypertonicity of bladder, hypothyroidism, degenerative disc disease of the lumbar spine with spinal stenosis, GERD, sleep apnea, B12 deficiency and obesity. She does report a family history of premature CAD, father with fatal MI in his early 60's.    PLAN:   1. Abnormal electrocardiogram  Reviewed nonspecific T wave changes, no significant anginal symptoms. Given her family history and CAD risk factors with EKG change, recommended stress echocardiogram.     - EKG 12-lead, tracing only (Same Day)    2. Essential hypertension, benign  Adjustment to medications today. She will continue on Losartan 100 mg daily, continue Toprol XL at reduced dose of 100 mg daily, stop the loop diuretic she was using for HTN and start Amlodipine 5 mg daily. If increased edema after going off lasix consider thiazide diuretic for HTN, would recommend chlorthalidone.     -  amLODIPine (NORVASC) 5 MG tablet; Take 1 tablet (5 mg) by mouth daily  Dispense: 90 tablet; Refill: 1  - metoprolol succinate ER (TOPROL-XL) 100 MG 24 hr tablet; Take 1 tablet (100 mg) by mouth daily  Dispense: 90 tablet; Refill: 1    3. Fatigue, unspecified type  Patient reports fatigue and energy loss.   B12 level and Vit D level normal.   Thyroid function has been stable.   Recommended she reduce her Toprol XL down to 100 mg daily, was on 200 mg daily with bradycardia and likely contributing to her activity intolerance and energy loss.     4. ALICE (obstructive sleep apnea)  History of ALICE, recent sleep study.   Results have not yet been received.     5. Morbid obesity (H)  Following Dr. Jimenez for medical weight management.     6. Family history of premature CAD  See above.     7. Hypercholesterolemia  Continue on fish oil, lipids have been stable with minimally elevated LDL of 104. Not on statin medication.     Follow-up with cardiology in 6 weeks, certainly sooner if needed.     45 min spent counseling patient on findings, cardiac optimization and HTN management.    Thank you for allowing me to participate in the care of your patient. Please do not hesitate to contact me if you have any questions.     Brie Washington, APRN CNP [facility]

## 2020-01-29 NOTE — NURSING NOTE
"Chief Complaint   Patient presents with     Consult       Initial /78 (BP Location: Right arm, Patient Position: Sitting, Cuff Size: Adult Large)   Pulse 60   Temp 98.6  F (37  C) (Tympanic)   Resp 16   Ht 1.626 m (5' 4.02\")   Wt 96 kg (211 lb 9.6 oz)   SpO2 97%   BMI 36.30 kg/m   Estimated body mass index is 36.3 kg/m  as calculated from the following:    Height as of this encounter: 1.626 m (5' 4.02\").    Weight as of this encounter: 96 kg (211 lb 9.6 oz).  Meds Reconciled: complete  Pt is not on Aspirin  Pt is not on a Statin  PHQ and/or ELIAS reviewed. Pt referred to PCP/MH Provider as appropriate.    Carli Chaudhari RN      "

## 2020-01-31 LAB — INTERPRETATION ECG - MUSE: NORMAL

## 2020-02-06 DIAGNOSIS — I10 ESSENTIAL HYPERTENSION, BENIGN: ICD-10-CM

## 2020-02-06 RX ORDER — FUROSEMIDE 20 MG
TABLET ORAL
Qty: 90 TABLET | Refills: 4 | OUTPATIENT
Start: 2020-02-06

## 2020-02-06 NOTE — TELEPHONE ENCOUNTER
[facility] sent Rx request for the following:      FUROSEMIDE 20 MG TABLET  Sig: TAKE 1 TABLET BY MOUTH EVERY DAY     Last Prescription Date:   11/15/2018  Last Fill Qty/Refills:         90, R-4    Last Office Visit:              6/27/2019  Future Office visit:           none        Request refused as medication was discontinued by LIZBETH Muller CNP in cardiology on 1/29/2020. Farzad Cruz RN, BSN  ....................  2/6/2020   2:50 PM

## 2020-03-09 ENCOUNTER — OFFICE VISIT (OUTPATIENT)
Dept: PULMONOLOGY | Facility: OTHER | Age: 70
End: 2020-03-09
Attending: INTERNAL MEDICINE
Payer: COMMERCIAL

## 2020-03-09 VITALS
OXYGEN SATURATION: 97 % | RESPIRATION RATE: 16 BRPM | SYSTOLIC BLOOD PRESSURE: 122 MMHG | DIASTOLIC BLOOD PRESSURE: 80 MMHG | WEIGHT: 202 LBS | BODY MASS INDEX: 34.49 KG/M2 | HEART RATE: 64 BPM | HEIGHT: 64 IN

## 2020-03-09 DIAGNOSIS — G47.10 HYPERSOMNIA: Primary | ICD-10-CM

## 2020-03-09 DIAGNOSIS — G47.33 OSA (OBSTRUCTIVE SLEEP APNEA): ICD-10-CM

## 2020-03-09 PROCEDURE — G0463 HOSPITAL OUTPT CLINIC VISIT: HCPCS

## 2020-03-09 ASSESSMENT — PAIN SCALES - GENERAL: PAINLEVEL: NO PAIN (0)

## 2020-03-09 ASSESSMENT — MIFFLIN-ST. JEOR: SCORE: 1431.27

## 2020-03-09 NOTE — NURSING NOTE
"Patient presents to the clinic today to follow up on sleep patient.  Usha Larose LPN 3/9/2020   11:47 AM    Chief Complaint   Patient presents with     RECHECK     Sleep study results       Initial /80 (BP Location: Right arm, Patient Position: Sitting, Cuff Size: Adult Regular)   Pulse 64   Resp 16   Ht 5' 4\" (1.626 m)   Wt 202 lb (91.6 kg)   SpO2 97%   Breastfeeding No   BMI 34.67 kg/m   Estimated body mass index is 34.67 kg/m  as calculated from the following:    Height as of this encounter: 5' 4\" (1.626 m).    Weight as of this encounter: 202 lb (91.6 kg).  Medication Reconciliation: complete  Usha Larose LPN    "

## 2020-03-09 NOTE — PROGRESS NOTES
Sleep Medicine  Note  Ness Bales  March 9, 2020  [id number]    Chief Complaint: Mild obstructive sleep apnea    History of Present Illness: Ness Bales is a 69 year old female presenting for above complaint.  She had a sleep study performed January 22, 2020.  At the time of the sleep study her weight was 218 pounds.  She is successfully losing weight with a lower carbohydrate diet per Dr. Jimenez.  She has complaint of daytime tiredness.  She is tired a lot.  She is tired in the afternoon.  She is continuing to work on her diet.  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  She has a history of dysthymic disorder, hypercholesterolemia, hypothyroidism, esophageal reflux.  She scores herself 6 on the Bill[place] sleepiness score however daytime tiredness is affecting her daytime function.  She is on amlodipine and metoprolol for hypertension.  Her apnea hypopnea index was 8.3/h however it was severe during REM sleep with a REM apnea hypopnea index of over 40/h.  She did not meet criteria for split-night study.  She had a high arousal index of over 40/h.        Past Medical History:  Past Medical History:   Diagnosis Date     Obesity     No Comments Provided     Personal history of other medical treatment (CODE)     3 vaginal childbirth uncomplicated     Personal history of other medical treatment (CODE)     1983,blood transfusion with stomach stapling       Medications:  Current Outpatient Medications   Medication     amLODIPine (NORVASC) 5 MG tablet     ARIPiprazole (ABILIFY) 5 MG tablet     cholecalciferol (VITAMIN D-1000 MAX ST) 1000 units TABS     Coenzyme Q10 (CO Q 10) 100 MG CAPS     cyanocobalamin (VITAMIN B12) 1000 MCG/ML injection     escitalopram (LEXAPRO) 20 MG tablet     ferrous sulfate (IRON) 325 (65 FE) MG tablet     fish oil-omega-3 fatty acids 1000 MG capsule     levothyroxine (SYNTHROID/LEVOTHROID) 25 MCG tablet     losartan (COZAAR) 100 MG tablet     metoprolol succinate ER (TOPROL-XL) 100 MG 24  "hr tablet     potassium chloride SA (K-DUR/KLOR-CON M) 20 MEQ CR tablet     No current facility-administered medications for this visit.        Physical Exam:  /80 (BP Location: Right arm, Patient Position: Sitting, Cuff Size: Adult Regular)   Pulse 64   Resp 16   Ht 5' 4\" (1.626 m)   Wt 202 lb (91.6 kg)   SpO2 97%   Breastfeeding No   BMI 34.67 kg/m    Neck circumference 14 inches, exam was limited to vitals, posterior crowding is noted, malampatti class III.    Assessment and Plan:  69 year old female presenting for mild sleep apnea overall however her arousal index was high and she had severe sleep apnea during REM sleep.  The diagnosis, pathophysiology, and treatment of obstructive sleep apnea was discussed in detail.  We discussed with mild sleep apnea and her losing weight the consideration for observation to see if she improves further.  Howerver with daytime tiredness, hypertension, and dysthymia we discussed the benefits of a trial of CPAP.  She would like to try CPAP.  Durable medical equipment options were discussed with her.  CPAP will be ordered with sleep follow-up scheduled between 31 and 90 days to assess efficacy, tolerance, benefit, and compliance with CPAP therapy.    CC: Kyung Jauregui  "

## 2020-04-07 DIAGNOSIS — E87.6 HYPOKALEMIA: Primary | ICD-10-CM

## 2020-04-07 RX ORDER — POTASSIUM CHLORIDE 1500 MG/1
20 TABLET, EXTENDED RELEASE ORAL DAILY
Qty: 90 TABLET | Refills: 1 | Status: SHIPPED | OUTPATIENT
Start: 2020-04-07 | End: 2020-09-28

## 2020-04-07 NOTE — PROGRESS NOTES
Refill request from [facility] - KCl 20mEq daily.  Rx sent.  Jennifer Maurice DO on 4/7/2020 at 4:21 PM

## 2020-04-17 DIAGNOSIS — Z98.84 S/P GASTRIC BYPASS: ICD-10-CM

## 2020-04-17 RX ORDER — CYANOCOBALAMIN 1000 UG/ML
1 INJECTION, SOLUTION INTRAMUSCULAR; SUBCUTANEOUS
Qty: 3 ML | Refills: 3 | Status: SHIPPED | OUTPATIENT
Start: 2020-04-17 | End: 2021-03-08

## 2020-04-17 NOTE — TELEPHONE ENCOUNTER
Cyanocobalamin (VITAMIN B12) 1000 MCG/ML injection   Last Written Prescription Date: 11/15/2018. Due 11/15/2019  Last Fill Quantity: 0.9 ml,   # refills: 11  Last Office Visit: 12/16/2019  Future Office visit:       Routing refill request to provider for review/approval because:  A break in medication     Unable to complete prescription refill per RNMedication Refill Policy.................... Diya Germain RN ....................  4/17/2020   3:04 PM

## 2020-04-21 DIAGNOSIS — D51.8 OTHER VITAMIN B12 DEFICIENCY ANEMIA: Primary | ICD-10-CM

## 2020-04-21 NOTE — TELEPHONE ENCOUNTER
[facility] sent Rx request for the following:   Syringes for B12 injections  Sig:  For administering 1 mL B12 every 30 days    cyanocobalamin (CYANOCOBALAMIN) 1000 MCG/ML injection   Was filled 4/17/2020    Routing refill request to provider for review/approval because:  Drug not active on patient's medication list    Maine Otoole RN  ....................  4/21/2020   4:21 PM

## 2020-07-12 DIAGNOSIS — I10 ESSENTIAL HYPERTENSION, BENIGN: ICD-10-CM

## 2020-07-14 RX ORDER — AMLODIPINE BESYLATE 5 MG/1
TABLET ORAL
Qty: 90 TABLET | Refills: 1 | Status: SHIPPED | OUTPATIENT
Start: 2020-07-14 | End: 2020-08-25 | Stop reason: SINTOL

## 2020-07-14 NOTE — TELEPHONE ENCOUNTER
"Requested Prescriptions   Pending Prescriptions Disp Refills     amLODIPine (NORVASC) 5 MG tablet [Pharmacy Med Name: AMLODIPINE BESYLATE 5 MG TAB] 90 tablet 1     Sig: TAKE 1 TABLET BY MOUTH EVERY DAY       Calcium Channel Blockers Protocol  Passed - 7/13/2020 11:31 AM        Passed - Blood pressure under 140/90 in past 12 months     BP Readings from Last 3 Encounters:   03/09/20 122/80   01/29/20 134/78   12/16/19 124/64                 Passed - Recent (12 mo) or future (30 days) visit within the authorizing provider's specialty     Patient has had an office visit with the authorizing provider or a provider within the authorizing providers department within the previous 12 mos or has a future within next 30 days. See \"Patient Info\" tab in inbasket, or \"Choose Columns\" in Meds & Orders section of the refill encounter.              Passed - Medication is active on med list        Passed - Patient is age 18 or older        Passed - No active pregnancy on record        Passed - Normal serum creatinine on file in past 12 months     Recent Labs   Lab Test 12/16/19  1516   CR 0.87       Ok to refill medication if creatinine is low          Passed - No positive pregnancy test in past 12 months               Last Written Prescription Date:  01/29/2020  Last Fill Quantity: 90,   # refills: 1  Last Office Visit: 01/29/2020 with LIZBETH Muller CNP  Patient is over due for follow up with LIZBETH Muller CNP.  Needs appointment scheduled.  Unable to complete prescription refill per RN medication refill policy. Will route to provider for review and consideration.  Carli Chaudhari RN on 7/14/2020 at 8:25 AM        "

## 2020-08-25 ENCOUNTER — MEDICAL CORRESPONDENCE (OUTPATIENT)
Dept: HEALTH INFORMATION MANAGEMENT | Facility: OTHER | Age: 70
End: 2020-08-25

## 2020-08-25 ENCOUNTER — OFFICE VISIT (OUTPATIENT)
Dept: FAMILY MEDICINE | Facility: OTHER | Age: 70
End: 2020-08-25
Attending: FAMILY MEDICINE
Payer: COMMERCIAL

## 2020-08-25 VITALS
RESPIRATION RATE: 12 BRPM | HEART RATE: 52 BPM | DIASTOLIC BLOOD PRESSURE: 84 MMHG | WEIGHT: 183 LBS | TEMPERATURE: 97.5 F | SYSTOLIC BLOOD PRESSURE: 138 MMHG | BODY MASS INDEX: 31.41 KG/M2

## 2020-08-25 DIAGNOSIS — R30.0 DYSURIA: ICD-10-CM

## 2020-08-25 DIAGNOSIS — I10 ESSENTIAL HYPERTENSION, BENIGN: ICD-10-CM

## 2020-08-25 DIAGNOSIS — E55.9 VITAMIN D DEFICIENCY: ICD-10-CM

## 2020-08-25 DIAGNOSIS — F03.93: Primary | ICD-10-CM

## 2020-08-25 DIAGNOSIS — R82.90 ABNORMAL URINALYSIS: ICD-10-CM

## 2020-08-25 DIAGNOSIS — L20.9 ATOPIC DERMATITIS, UNSPECIFIED TYPE: ICD-10-CM

## 2020-08-25 DIAGNOSIS — E03.9 HYPOTHYROIDISM, UNSPECIFIED TYPE: ICD-10-CM

## 2020-08-25 DIAGNOSIS — R39.89 SUSPECTED UTI: ICD-10-CM

## 2020-08-25 LAB
ALBUMIN SERPL-MCNC: 4 G/DL (ref 3.5–5.7)
ALBUMIN UR-MCNC: NEGATIVE MG/DL
ALP SERPL-CCNC: 67 U/L (ref 34–104)
ALT SERPL W P-5'-P-CCNC: 18 U/L (ref 7–52)
APPEARANCE UR: ABNORMAL
AST SERPL W P-5'-P-CCNC: 16 U/L (ref 13–39)
BACTERIA #/AREA URNS HPF: ABNORMAL /HPF
BASOPHILS # BLD AUTO: 0 10E9/L (ref 0–0.2)
BASOPHILS NFR BLD AUTO: 0.4 %
BILIRUB DIRECT SERPL-MCNC: 0.1 MG/DL (ref 0–0.2)
BILIRUB SERPL-MCNC: 0.5 MG/DL (ref 0.3–1)
BILIRUB UR QL STRIP: NEGATIVE
CHOLEST SERPL-MCNC: 229 MG/DL
COLOR UR AUTO: YELLOW
DIFFERENTIAL METHOD BLD: NORMAL
EOSINOPHIL # BLD AUTO: 0.2 10E9/L (ref 0–0.7)
EOSINOPHIL NFR BLD AUTO: 2.8 %
ERYTHROCYTE [DISTWIDTH] IN BLOOD BY AUTOMATED COUNT: 12.7 % (ref 10–15)
ERYTHROCYTE [SEDIMENTATION RATE] IN BLOOD BY WESTERGREN METHOD: 10 MM/H (ref 1–15)
GLUCOSE SERPL-MCNC: 100 MG/DL (ref 70–105)
GLUCOSE UR STRIP-MCNC: NEGATIVE MG/DL
HCT VFR BLD AUTO: 40.2 % (ref 35–47)
HDLC SERPL-MCNC: 69 MG/DL (ref 23–92)
HGB BLD-MCNC: 13.1 G/DL (ref 11.7–15.7)
HGB UR QL STRIP: ABNORMAL
IMM GRANULOCYTES # BLD: 0 10E9/L (ref 0–0.4)
IMM GRANULOCYTES NFR BLD: 0.2 %
KETONES UR STRIP-MCNC: NEGATIVE MG/DL
LDLC SERPL CALC-MCNC: 140 MG/DL
LEUKOCYTE ESTERASE UR QL STRIP: ABNORMAL
LYMPHOCYTES # BLD AUTO: 2.2 10E9/L (ref 0.8–5.3)
LYMPHOCYTES NFR BLD AUTO: 27.2 %
MCH RBC QN AUTO: 29 PG (ref 26.5–33)
MCHC RBC AUTO-ENTMCNC: 32.6 G/DL (ref 31.5–36.5)
MCV RBC AUTO: 89 FL (ref 78–100)
MONOCYTES # BLD AUTO: 0.5 10E9/L (ref 0–1.3)
MONOCYTES NFR BLD AUTO: 5.7 %
MUCOUS THREADS #/AREA URNS LPF: PRESENT /LPF
NEUTROPHILS # BLD AUTO: 5.2 10E9/L (ref 1.6–8.3)
NEUTROPHILS NFR BLD AUTO: 63.7 %
NITRATE UR QL: POSITIVE
NONHDLC SERPL-MCNC: 160 MG/DL
PH UR STRIP: 6 PH (ref 5–7)
PLATELET # BLD AUTO: 285 10E9/L (ref 150–450)
PROLACTIN SERPL-MCNC: 6.4 NG/ML
PROT SERPL-MCNC: 6.9 G/DL (ref 6.4–8.9)
RBC # BLD AUTO: 4.52 10E12/L (ref 3.8–5.2)
RBC #/AREA URNS AUTO: 8 /HPF (ref 0–2)
SOURCE: ABNORMAL
SP GR UR STRIP: 1.01 (ref 1–1.03)
T4 FREE SERPL-MCNC: 0.83 NG/DL (ref 0.6–1.6)
TRIGL SERPL-MCNC: 98 MG/DL
TSH SERPL DL<=0.05 MIU/L-ACNC: 3.05 IU/ML (ref 0.34–5.6)
UROBILINOGEN UR STRIP-MCNC: NORMAL MG/DL (ref 0–2)
WBC # BLD AUTO: 8.2 10E9/L (ref 4–11)
WBC #/AREA URNS AUTO: >182 /HPF (ref 0–5)
WBC CLUMPS #/AREA URNS HPF: PRESENT /HPF

## 2020-08-25 PROCEDURE — 99214 OFFICE O/P EST MOD 30 MIN: CPT | Performed by: FAMILY MEDICINE

## 2020-08-25 PROCEDURE — 82947 ASSAY GLUCOSE BLOOD QUANT: CPT | Mod: ZL

## 2020-08-25 PROCEDURE — 85025 COMPLETE CBC W/AUTO DIFF WBC: CPT | Mod: ZL

## 2020-08-25 PROCEDURE — 84443 ASSAY THYROID STIM HORMONE: CPT | Mod: ZL | Performed by: FAMILY MEDICINE

## 2020-08-25 PROCEDURE — 80076 HEPATIC FUNCTION PANEL: CPT | Mod: ZL

## 2020-08-25 PROCEDURE — 36415 COLL VENOUS BLD VENIPUNCTURE: CPT | Mod: ZL | Performed by: FAMILY MEDICINE

## 2020-08-25 PROCEDURE — 87088 URINE BACTERIA CULTURE: CPT | Mod: ZL | Performed by: FAMILY MEDICINE

## 2020-08-25 PROCEDURE — 84439 ASSAY OF FREE THYROXINE: CPT | Mod: ZL | Performed by: FAMILY MEDICINE

## 2020-08-25 PROCEDURE — 81001 URINALYSIS AUTO W/SCOPE: CPT | Mod: ZL | Performed by: FAMILY MEDICINE

## 2020-08-25 PROCEDURE — G0463 HOSPITAL OUTPT CLINIC VISIT: HCPCS

## 2020-08-25 PROCEDURE — 80061 LIPID PANEL: CPT | Mod: ZL

## 2020-08-25 PROCEDURE — 87086 URINE CULTURE/COLONY COUNT: CPT | Mod: ZL | Performed by: FAMILY MEDICINE

## 2020-08-25 PROCEDURE — 85652 RBC SED RATE AUTOMATED: CPT | Mod: ZL

## 2020-08-25 PROCEDURE — 84146 ASSAY OF PROLACTIN: CPT | Mod: ZL

## 2020-08-25 RX ORDER — TRIAMCINOLONE ACETONIDE 1 MG/G
CREAM TOPICAL 2 TIMES DAILY
Qty: 45 G | Refills: 2 | Status: SHIPPED | OUTPATIENT
Start: 2020-08-25 | End: 2021-04-15

## 2020-08-25 RX ORDER — LOSARTAN POTASSIUM 100 MG/1
100 TABLET ORAL DAILY
Qty: 90 TABLET | Refills: 4 | Status: SHIPPED | OUTPATIENT
Start: 2020-08-25 | End: 2021-09-07

## 2020-08-25 RX ORDER — CHLORTHALIDONE 25 MG/1
25 TABLET ORAL DAILY
Qty: 90 TABLET | Refills: 1 | Status: SHIPPED | OUTPATIENT
Start: 2020-08-25 | End: 2020-09-15

## 2020-08-25 RX ORDER — PSYLLIUM HUSK 0.4 G
10000 CAPSULE ORAL DAILY
COMMUNITY
Start: 2020-08-25 | End: 2024-02-01

## 2020-08-25 RX ORDER — CEPHALEXIN 500 MG/1
500 CAPSULE ORAL 2 TIMES DAILY
Qty: 14 CAPSULE | Refills: 0 | Status: SHIPPED | OUTPATIENT
Start: 2020-08-25 | End: 2020-09-01

## 2020-08-25 RX ORDER — MODAFINIL 200 MG/1
400 TABLET ORAL DAILY
COMMUNITY
Start: 2020-08-25

## 2020-08-25 RX ORDER — METOPROLOL SUCCINATE 100 MG/1
200 TABLET, EXTENDED RELEASE ORAL DAILY
Qty: 90 TABLET | Refills: 1 | COMMUNITY
Start: 2020-08-25 | End: 2021-01-05

## 2020-08-25 RX ORDER — LEVOTHYROXINE SODIUM 25 UG/1
25 TABLET ORAL DAILY
Qty: 90 TABLET | Refills: 4 | Status: SHIPPED | OUTPATIENT
Start: 2020-08-25 | End: 2021-09-08

## 2020-08-25 ASSESSMENT — ENCOUNTER SYMPTOMS
CHEST TIGHTNESS: 0
SHORTNESS OF BREATH: 0
CONSTIPATION: 0
APPETITE CHANGE: 0
COUGH: 0
HEADACHES: 0
DIZZINESS: 0
DIARRHEA: 0
CONFUSION: 0
LIGHT-HEADEDNESS: 0
FATIGUE: 0
CHILLS: 0
ACTIVITY CHANGE: 0
PALPITATIONS: 0

## 2020-08-25 ASSESSMENT — PATIENT HEALTH QUESTIONNAIRE - PHQ9: SUM OF ALL RESPONSES TO PHQ QUESTIONS 1-9: 1

## 2020-08-25 ASSESSMENT — PAIN SCALES - GENERAL: PAINLEVEL: NO PAIN (0)

## 2020-08-25 NOTE — LETTER
My Depression Action Plan  Name: Ness Bales   Date of Birth 1950  Date: 8/25/2020    My doctor: Jennifer Maurice   My clinic: [facility]  [address]  [place] [id number] [phone]          GREEN    ZONE   Good Control    What it looks like:     Things are going generally well. You have normal ups and downs. You may even feel depressed from time to time, but bad moods usually last less than a day.   What you need to do:  1. Continue to care for yourself (see self care plan)  2. Check your depression survival kit and update it as needed  3. Follow your physician s recommendations including any medication.  4. Do not stop taking medication unless you consult with your physician first.           YELLOW         ZONE Getting Worse    What it looks like:     Depression is starting to interfere with your life.     It may be hard to get out of bed; you may be starting to isolate yourself from others.    Symptoms of depression are starting to last most all day and this has happened for several days.     You may have suicidal thoughts but they are not constant.   What you need to do:     1. Call your care team. Your response to treatment will improve if you keep your care team informed of your progress. Yellow periods are signs an adjustment may need to be made.     2. Continue your self-care.  Just get dressed and ready for the day.  Don't give yourself time to talk yourself out of it.    3. Talk to someone in your support network.    4. Open up your Depression Self-Care Plan/Wellness Kit.           RED    ZONE Medical Alert - Get Help    What it looks like:     Depression is seriously interfering with your life.     You may experience these or other symptoms: You can t get out of bed most days, can t work or engage in other necessary activities, you have trouble taking care of basic hygiene, or basic responsibilities, thoughts of suicide or death that will not  go away, self-injurious behavior.     What you need to do:  1. Call your care team and request a same-day appointment. If they are not available (weekends or after hours) call your local crisis line, emergency room or 911.            Depression Self-Care Plan / Wellness Kit    Self-Care for Depression  Here s the deal. Your body and mind are really not as separate as most people think.  What you do and think affects how you feel and how you feel influences what you do and think. This means if you do things that people who feel good do, it will help you feel better.  Sometimes this is all it takes.  There is also a place for medication and therapy depending on how severe your depression is, so be sure to consult with your medical provider and/ or Behavioral Health Consultant if your symptoms are worsening or not improving.     In order to better manage my stress, I will:    Exercise  Get some form of exercise, every day. This will help reduce pain and release endorphins, the  feel good  chemicals in your brain. This is almost as good as taking antidepressants!  This is not the same as joining a gym and then never going! (they count on that by the way ) It can be as simple as just going for a walk or doing some gardening, anything that will get you moving.      Hygiene   Maintain good hygiene (get out of bed in the morning, make your bed, brush your teeth, take a shower, and get dressed like you were going to work, even if you are unemployed).  If your clothes don't fit try to get ones that do.    Diet  Strive to eat foods that are good for me, drink plenty of water, and avoid excessive sugar, caffeine, alcohol, and other mood-altering substances.  Some foods that are helpful in depression are: complex carbohydrates, B vitamins, flaxseed, fish or fish oil, fresh fruits and vegetables.    Psychotherapy  Agree to participate in Individual Therapy (if recommended).    Medication  If prescribed medications, I agree to  take them.  Missing doses can result in serious side effects.  I understand that drinking alcohol, or other illicit drug use, may cause potential side effects.  I will not stop my medication abruptly without first discussing it with my provider.    Staying Connected With Others  Stay in touch with my friends, family members, and my primary care provider/team.    Use your imagination  Be creative.  We all have a creative side; it doesn t matter if it s oil painting, sand castles, or mud pies! This will also kick up the endorphins.    Witness Beauty  (AKA stop and smell the roses) Take a look outside, even in mid-winter. Notice colors, textures. Watch the squirrels and birds.     Service to others  Be of service to others.  There is always someone else in need.  By helping others we can  get out of ourselves  and remember the really important things.  This also provides opportunities for practicing all the other parts of the program.    Humor  Laugh and be silly!  Adjust your TV habits for less news and crime-drama and more comedy.    Control your stress  Try breathing deep, massage therapy, biofeedback, and meditation. Find time to relax each day.     Crisis Text Line  http://www.crisistextline.org    The Crisis Text Line serves anyone, in any type of crisis, providing access to free, 24/7 support and information via the medium people already use and trust:    Here's how it works:  1.  Text 803-723 from anywhere in the [place], anytime, about any type of crisis.  2.  A live, trained Crisis Counselor receives the text and responds quickly.  3.  The volunteer Crisis Counselor will help you move from a 'hot moment to a cool moment'.    My support system    Clinic Contact:  Phone number:    Contact 1:  Phone number:    Contact 2:  Phone number:    Yazdanism/:  Phone number:    Therapist:  Phone number:    Local crisis center:    Phone number:    Other community support:  Phone number:

## 2020-08-25 NOTE — PROGRESS NOTES
SUBJECTIVE:   Ness Bales is a 69 year old female who presents to clinic today for the following health issues:    HPI  Ness is here for BP concerns and need to obtain labs.  She is checking her BP at home: but machine is giving her an error message.  Machine is 2 years old.  Her last two office visit BPs were within normal ranges.  Was hopeful that after losing ~50# she would have more improved blood pressures.  At a preop-exam in Jan 2020, an abnormal EKG was noted with T wave inversion/changes in V3-V6.  At that time, she had some medications adjusted by Cardiology at that time to include: starting amlodipine 5mg daily, reducing her beta-blocker due to complains of daytime fatigue, reduced energy.  She was planning to follow up with Cardiology, but this has been postponed due to Covid-19.    She is currently following with weight loss management (Dr. Juwan Jimenez); tolerating a low-carbohydrate diet well.  She has lost ~30-35# in 1 year.  Has also recently seen Dr. Zarco in 3/2020 after completing a sleep study in Jan 2020 showing mild ALICE but a high arousal index and severe sleep apnea during REM.  Therefore she was planning to undergo a trial of CPAP at that time; currently using it nightly and having improved symptoms.      Since our last visit, Ness also completed botox injection to the bladder for OAB and failure of her interstim device. Was helpful initially but has since wore off.  Lasted ~3 months, and then tapered off.  Now getting up 4x/night to urinate.  Feels like there is a bulging in vaginal region; and can feel something with her fingers.  2 weeks ago, felt like she was sitting on a golf ball, but it must have moved. Still uncomfortable.  Notices it with squatting, but not with lifting objects.    Needing a refill of her anti-itch cream.    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     Priority: Medium     Contusion of lower leg 02/25/2011     Priority: Medium     Hypothyroidism 11/12/2010     Priority: Medium     Degeneration of lumbar or lumbosacral intervertebral disc 11/05/2010     Priority: Medium     Spinal stenosis, lumbar 11/05/2010     Priority: Medium     Esophageal reflux 06/25/2010     Priority: Medium     Sacroiliac joint dysfunction 06/25/2010     Priority: Medium     Hypercholesterolemia 04/20/2009     Priority: Medium     Sleep apnea 02/12/2009     Priority: Medium     Disorder of bone and cartilage 10/29/2007     Priority: Medium     Enthesopathy of hip region 08/22/2006     Priority: Medium     Other vitamin B12 deficiency anemia 09/24/2001     Priority: Medium     Essential hypertension, benign 09/24/2001     Priority: Medium     Urge incontinence of urine 09/24/2001     Priority: Medium     Obesity 09/24/2001     Priority: Medium     Past Medical History:   Diagnosis Date     Obesity     No Comments Provided     Personal history of other medical treatment (CODE)     3 vaginal childbirth uncomplicated     Personal history of other medical treatment (CODE)     1983,blood transfusion with stomach stapling      Past Surgical History:   Procedure Laterality Date     ARTHROPLASTY KNEE      2008,Total Right knee replacement     BIOPSY BREAST      Incisional x2 benign breast biopsies     FRACTURE SURGERY      open treatment of Humeral Fx w/ prosthesis     LAPAROSCOPIC BYPASS GASTRIC      1983,Stomach stapling complicated by incision rupture, then repaired wih second surgery     OTHER SURGICAL HISTORY      1983,15608.0,SKIN/SUBCUTANEOUS SURGERY,repaired incision from stomach stapling     OTHER SURGICAL HISTORY      2006,[id number],ARTHROPLASTY,Total glenohumeral joint replacement     OTHER SURGICAL HISTORY      [id number],ANESTHESIA ALERT,No problems with anaesthesia. ?     Family History    Problem Relation Age of Onset     Other - See Comments Mother         macular degeneration/Psychiatric illness,takes antidepressant     Heart Disease Father 40        Heart Disease,MI     Diabetes Father         Diabetes     Other - See Comments Son         Psychiatric illness,psychotic episode this year smoking marijuana, ephedra use ? bipolar     Other - See Comments Sister         Psychiatric illness,depression     Other - See Comments Brother         Psychiatric illness,depression     Social History     Tobacco Use     Smoking status: Never Smoker     Smokeless tobacco: Never Used   Substance Use Topics     Alcohol use: No     Comment: very little      Social History     Social History Narrative    , 3 adult children, does clerical work part-time for [employer]     Current Outpatient Medications   Medication Sig Dispense Refill     chlorthalidone (HYGROTON) 25 MG tablet Take 1 tablet (25 mg) by mouth daily 90 tablet 1     cholecalciferol (VITAMIN D-1000 MAX ST) 25 MCG (1000 UT) TABS Take 8,000 Units by mouth daily       levothyroxine (SYNTHROID/LEVOTHROID) 25 MCG tablet Take 1 tablet (25 mcg) by mouth daily 90 tablet 4     losartan (COZAAR) 100 MG tablet Take 1 tablet (100 mg) by mouth daily 90 tablet 4     metoprolol succinate ER (TOPROL-XL) 100 MG 24 hr tablet Take 2 tablets (200 mg) by mouth daily 90 tablet 1     modafinil (PROVIGIL) 200 MG tablet Take 2 tablets (400 mg) by mouth daily       triamcinolone (KENALOG) 0.1 % external cream Apply topically 2 times daily 45 g 2     ARIPiprazole (ABILIFY) 5 MG tablet Take 1 tablet (5 mg) by mouth daily       Coenzyme Q10 (CO Q 10) 100 MG CAPS Take by mouth daily        cyanocobalamin (CYANOCOBALAMIN) 1000 MCG/ML injection INJECT 1 ML (1,000 MCG) INTO THE MUSCLE EVERY 30 DAYS 3 mL 3     escitalopram (LEXAPRO) 20 MG tablet Take 1 tablet (20 mg) by mouth daily 90 tablet 4     ferrous sulfate (IRON) 325 (65 FE) MG tablet Take 325 mg by mouth       fish oil-omega-3  fatty acids 1000 MG capsule Take 1 capsule by mouth       potassium chloride ER (KLOR-CON M) 20 MEQ CR tablet Take 1 tablet (20 mEq) by mouth daily 90 tablet 1     Syringe Luer Slip 3 ML MISC 1 each every 30 days 25 each 0     No Known Allergies    Review of Systems   Constitutional: Negative for activity change, appetite change, chills and fatigue.   Respiratory: Negative for cough, chest tightness and shortness of breath.    Cardiovascular: Positive for peripheral edema (since amlodipine). Negative for chest pain and palpitations.   Gastrointestinal: Negative for constipation and diarrhea.   Skin: Negative for rash.   Neurological: Negative for dizziness, light-headedness and headaches.   Psychiatric/Behavioral: Negative for behavioral problems and confusion.      OBJECTIVE:     /84   Pulse 52   Temp 97.5  F (36.4  C) (Tympanic)   Resp 12   Wt 83 kg (183 lb)   BMI 31.41 kg/m    Body mass index is 31.41 kg/m .  Physical Exam  Vitals signs and nursing note reviewed.   Constitutional:       Appearance: Normal appearance.   HENT:      Head: Normocephalic and atraumatic.      Right Ear: External ear normal.      Left Ear: External ear normal.      Nose: Nose normal.   Eyes:      Extraocular Movements: Extraocular movements intact.      Pupils: Pupils are equal, round, and reactive to light.   Neck:      Musculoskeletal: Normal range of motion.   Cardiovascular:      Rate and Rhythm: Normal rate and regular rhythm.      Pulses: Normal pulses.      Heart sounds: Normal heart sounds.   Pulmonary:      Effort: Pulmonary effort is normal.   Abdominal:      General: Abdomen is flat.      Hernia: There is no hernia in the left inguinal area or right inguinal area.   Genitourinary:     Exam position: Prone.      Erickson stage (genital): 5.      Labia:         Right: No rash or lesion.         Left: No rash or lesion.       Vagina: Prolapsed vaginal walls (2+ cystocele; 1+ rectocele) present.      Adnexa: Right adnexa  normal and left adnexa normal.   Lymphadenopathy:      Lower Body: No right inguinal adenopathy. No left inguinal adenopathy.   Skin:     Capillary Refill: Capillary refill takes less than 2 seconds.   Neurological:      General: No focal deficit present.      Mental Status: She is alert.   Psychiatric:         Mood and Affect: Mood normal.       Diagnostic Test Results:  Results for orders placed or performed in visit on 08/25/20   TSH     Status: None   Result Value Ref Range    Thyrotropin 3.05 0.34 - 5.60 IU/mL   T4, Free     Status: None   Result Value Ref Range    T4 Free 0.83 0.60 - 1.60 ng/dL   UA reflex to Microscopic     Status: Abnormal   Result Value Ref Range    Color Urine Yellow     Appearance Urine Slightly Cloudy     Glucose Urine Negative NEG^Negative mg/dL    Bilirubin Urine Negative NEG^Negative    Ketones Urine Negative NEG^Negative mg/dL    Specific Gravity Urine 1.010 1.003 - 1.035    Blood Urine Trace (A) NEG^Negative    pH Urine 6.0 5.0 - 7.0 pH    Protein Albumin Urine Negative NEG^Negative mg/dL    Urobilinogen mg/dL Normal 0.0 - 2.0 mg/dL    Nitrite Urine Positive (A) NEG^Negative    Leukocyte Esterase Urine Large (A) NEG^Negative    Source Midstream Urine     RBC Urine 8 (H) 0 - 2 /HPF    WBC Urine >182 (H) 0 - 5 /HPF    WBC Clumps Present (A) NEG^Negative /HPF    Bacteria Urine Few (A) NEG^Negative /HPF    Mucous Urine Present (A) NEG^Negative /LPF   Hepatic Function Panel     Status: None   Result Value Ref Range    Bilirubin Direct 0.1 0.0 - 0.2 mg/dL    Bilirubin Total 0.5 0.3 - 1.0 mg/dL    Albumin 4.0 3.5 - 5.7 g/dL    Protein Total 6.9 6.4 - 8.9 g/dL    Alkaline Phosphatase 67 34 - 104 U/L    ALT 18 7 - 52 U/L    AST 16 13 - 39 U/L   CBC and Differential     Status: None   Result Value Ref Range    WBC 8.2 4.0 - 11.0 10e9/L    RBC Count 4.52 3.8 - 5.2 10e12/L    Hemoglobin 13.1 11.7 - 15.7 g/dL    Hematocrit 40.2 35.0 - 47.0 %    MCV 89 78 - 100 fl    MCH 29.0 26.5 - 33.0 pg     MCHC 32.6 31.5 - 36.5 g/dL    RDW 12.7 10.0 - 15.0 %    Platelet Count 285 150 - 450 10e9/L    Diff Method Automated Method     % Neutrophils 63.7 %    % Lymphocytes 27.2 %    % Monocytes 5.7 %    % Eosinophils 2.8 %    % Basophils 0.4 %    % Immature Granulocytes 0.2 %    Absolute Neutrophil 5.2 1.6 - 8.3 10e9/L    Absolute Lymphocytes 2.2 0.8 - 5.3 10e9/L    Absolute Monocytes 0.5 0.0 - 1.3 10e9/L    Absolute Eosinophils 0.2 0.0 - 0.7 10e9/L    Absolute Basophils 0.0 0.0 - 0.2 10e9/L    Abs Immature Granulocytes 0.0 0 - 0.4 10e9/L   Glucose     Status: None   Result Value Ref Range    Glucose 100 70 - 105 mg/dL   Sedimentation Rate (ESR)     Status: None   Result Value Ref Range    Sed Rate 10 1 - 15 mm/h   Lipid panel     Status: Abnormal   Result Value Ref Range    Cholesterol 229 (H) <200 mg/dL    Triglycerides 98 <150 mg/dL    HDL Cholesterol 69 23 - 92 mg/dL    LDL Cholesterol Calculated 140 (H) <100 mg/dL    Non HDL Cholesterol 160 (H) <130 mg/dL   Prolactin GH     Status: None   Result Value Ref Range    Prolactin 6.40 ng/mL       ASSESSMENT/PLAN:     1. Essential hypertension, benign  Chronic, stable but having LE edema side effect from amlodipine; would like to return to diuretic therapy.   Continue metoprolol, losartan at current doses; stop amlodipine for now - but discussed may need to return to it if chlorthalidone 25mg daily is not controlling her BP.  Monitoring labs today.  - metoprolol succinate ER (TOPROL-XL) 100 MG 24 hr tablet; Take 2 tablets (200 mg) by mouth daily  Dispense: 90 tablet; Refill: 1  - losartan (COZAAR) 100 MG tablet; Take 1 tablet (100 mg) by mouth daily  Dispense: 90 tablet; Refill: 4  - chlorthalidone (HYGROTON) 25 MG tablet; Take 1 tablet (25 mg) by mouth daily  Dispense: 90 tablet; Refill: 1    2. Hypothyroidism, unspecified type  Added to labs collected today.  If stable; continue on same dose of levothyroxine otherwise will adjust prn.  - TSH  - T4, Free  - levothyroxine  (SYNTHROID/LEVOTHROID) 25 MCG tablet; Take 1 tablet (25 mcg) by mouth daily  Dispense: 90 tablet; Refill: 4    3. Vitamin D deficiency  Refilled at current supplement dose.  - cholecalciferol (VITAMIN D-1000 MAX ST) 25 MCG (1000 UT) TABS; Take 8,000 Units by mouth daily    4. Dysuria  UA concerning for infection; Rx to pharmacy.  UC initiated.   - UA reflex to Microscopic  - Cephalexin 500mg PO BID x 7 days.    5. Presenile dementia with depressive features (H)  Labs collected per mental health provider's order.  - Glucose; Future  - CBC and Differential; Future  - Hepatic Function Panel; Future  - Hepatic Function Panel  - CBC and Differential  - Glucose  - Lipid panel; Future  - Sedimentation Rate (ESR); Future  - Sedimentation Rate (ESR)  - Lipid panel  - Prolactin GH    6. Atopic dermatitis, unspecified type  Chronic, waxes and wanes.  Rx for triamcinolone to pharmacy.  - triamcinolone (KENALOG) 0.1 % external cream; Apply topically 2 times daily  Dispense: 45 g; Refill: 2    7. Abnormal urinalysis  - Urine Culture Aerobic Bacterial      Jennifer Maurice [facility]

## 2020-08-25 NOTE — NURSING NOTE
"Chief Complaint   Patient presents with     Hypertension     Gyn Exam       Initial /84   Pulse 52   Temp 97.5  F (36.4  C) (Tympanic)   Resp 12   Wt 83 kg (183 lb)   BMI 31.41 kg/m   Estimated body mass index is 31.41 kg/m  as calculated from the following:    Height as of 3/9/20: 1.626 m (5' 4\").    Weight as of this encounter: 83 kg (183 lb).  Medication Reconciliation: complete    Conchita Bautista LPN  "

## 2020-08-27 LAB
BACTERIA SPEC CULT: ABNORMAL
SPECIMEN SOURCE: ABNORMAL

## 2020-09-14 ENCOUNTER — OFFICE VISIT (OUTPATIENT)
Dept: FAMILY MEDICINE | Facility: OTHER | Age: 70
End: 2020-09-14
Attending: PHYSICIAN ASSISTANT
Payer: COMMERCIAL

## 2020-09-14 VITALS
HEIGHT: 64 IN | DIASTOLIC BLOOD PRESSURE: 80 MMHG | WEIGHT: 178 LBS | OXYGEN SATURATION: 97 % | RESPIRATION RATE: 16 BRPM | HEART RATE: 65 BPM | SYSTOLIC BLOOD PRESSURE: 128 MMHG | BODY MASS INDEX: 30.39 KG/M2 | TEMPERATURE: 98.4 F

## 2020-09-14 DIAGNOSIS — R35.0 URINARY FREQUENCY: ICD-10-CM

## 2020-09-14 DIAGNOSIS — Z01.818 PREOP GENERAL PHYSICAL EXAM: Primary | ICD-10-CM

## 2020-09-14 DIAGNOSIS — R82.998 URINE LEUKOCYTES: ICD-10-CM

## 2020-09-14 DIAGNOSIS — I10 ESSENTIAL HYPERTENSION, BENIGN: ICD-10-CM

## 2020-09-14 DIAGNOSIS — N39.41 URGE INCONTINENCE OF URINE: ICD-10-CM

## 2020-09-14 DIAGNOSIS — E87.1 HYPONATREMIA: ICD-10-CM

## 2020-09-14 DIAGNOSIS — N31.8 HYPERTONICITY OF BLADDER: ICD-10-CM

## 2020-09-14 LAB
ALBUMIN UR-MCNC: NEGATIVE MG/DL
ANION GAP SERPL CALCULATED.3IONS-SCNC: 7 MMOL/L (ref 3–14)
APPEARANCE UR: CLEAR
BACTERIA #/AREA URNS HPF: ABNORMAL /HPF
BILIRUB UR QL STRIP: NEGATIVE
BUN SERPL-MCNC: 16 MG/DL (ref 7–25)
CALCIUM SERPL-MCNC: 10.5 MG/DL (ref 8.6–10.3)
CHLORIDE SERPL-SCNC: 84 MMOL/L (ref 98–107)
CO2 SERPL-SCNC: 31 MMOL/L (ref 21–31)
COLOR UR AUTO: ABNORMAL
CREAT SERPL-MCNC: 0.68 MG/DL (ref 0.6–1.2)
GFR SERPL CREATININE-BSD FRML MDRD: 86 ML/MIN/{1.73_M2}
GLUCOSE SERPL-MCNC: 104 MG/DL (ref 70–105)
GLUCOSE UR STRIP-MCNC: NEGATIVE MG/DL
HGB UR QL STRIP: NEGATIVE
KETONES UR STRIP-MCNC: 5 MG/DL
LEUKOCYTE ESTERASE UR QL STRIP: ABNORMAL
MUCOUS THREADS #/AREA URNS LPF: PRESENT /LPF
NITRATE UR QL: NEGATIVE
PH UR STRIP: 6.5 PH (ref 5–7)
POTASSIUM SERPL-SCNC: 3.9 MMOL/L (ref 3.5–5.1)
RBC #/AREA URNS AUTO: 2 /HPF (ref 0–2)
SODIUM SERPL-SCNC: 122 MMOL/L (ref 134–144)
SOURCE: ABNORMAL
SP GR UR STRIP: 1.01 (ref 1–1.03)
UROBILINOGEN UR STRIP-MCNC: NORMAL MG/DL (ref 0–2)
WBC #/AREA URNS AUTO: 76 /HPF (ref 0–5)

## 2020-09-14 PROCEDURE — 87086 URINE CULTURE/COLONY COUNT: CPT | Mod: ZL | Performed by: PHYSICIAN ASSISTANT

## 2020-09-14 PROCEDURE — 36415 COLL VENOUS BLD VENIPUNCTURE: CPT | Mod: ZL | Performed by: PHYSICIAN ASSISTANT

## 2020-09-14 PROCEDURE — 87088 URINE BACTERIA CULTURE: CPT | Mod: ZL | Performed by: PHYSICIAN ASSISTANT

## 2020-09-14 PROCEDURE — 80048 BASIC METABOLIC PNL TOTAL CA: CPT | Mod: ZL | Performed by: PHYSICIAN ASSISTANT

## 2020-09-14 PROCEDURE — G0463 HOSPITAL OUTPT CLINIC VISIT: HCPCS

## 2020-09-14 PROCEDURE — 99214 OFFICE O/P EST MOD 30 MIN: CPT | Performed by: PHYSICIAN ASSISTANT

## 2020-09-14 PROCEDURE — 81001 URINALYSIS AUTO W/SCOPE: CPT | Mod: ZL | Performed by: PHYSICIAN ASSISTANT

## 2020-09-14 ASSESSMENT — MIFFLIN-ST. JEOR: SCORE: 1322.4

## 2020-09-14 NOTE — PROGRESS NOTES
[facility] AND [facility]  [address]  [place] [address]  Phone: [phone]  Fax: [phone]  Primary Provider: Jennifer Maurice  Pre-op Performing Provider: NGOC KOTHARI    PREOPERATIVE EVALUATION:  Today's date: 9/14/2020    Ness Bales is a 69 year old female who presents for a preoperative evaluation.    Surgical Information:  Surgery Details 9/14/2020   Surgery/Procedure: Botox of bladder   Surgery Location: [facility]   Surgeon: Dr. Cruz   Surgery Date: 9/22/20   Where patient plans to recover: At home with family   Additional recovery plan details: N/A     Fax number for surgical facility:   Type of Anesthesia Anticipated: to be determined    Subjective     HPI related to upcoming procedure: Patient struggles with an overactive bladder.  Scheduled for Botox of the bladder for treatment.  Feels like she always has urinary frequency.  No dysuria, hematuria, fevers, chills.  Patient has tolerated surgery well in the past.    Preop Questions 9/14/2020   1. Have you ever had a heart attack or stroke? No   2. Have you ever had surgery on your heart or blood vessels, such as a stent placement, a coronary artery bypass, or surgery on an artery in your head, neck, heart, or legs? No   3. Do you have chest pain with activity? No   4. Do you have a history of  heart failure? No   5. Do you currently have a cold, bronchitis or symptoms of other infection? No   6. Do you have a cough, shortness of breath, or wheezing? No   7. Do you or anyone in your family have previous history of blood clots? No    8. Do you or does anyone in your family have a serious bleeding problem such as prolonged bleeding following surgeries or cuts? No   9. Have you ever had problems with anemia or been told to take iron pills? YES - taking iron pills   10. Have you had any abnormal blood loss such as black, tarry or bloody stools, or abnormal vaginal bleeding? No   11. Have you ever had a blood  transfusion? No   Are you willing to have a blood transfusion if it is medically needed before, during, or after your surgery? Yes   13. Have you or any of your relatives ever had problems with anesthesia? No   14. Do you have sleep apnea, excessive snoring or daytime drowsiness? YES - have sleep apnea   14a. Do you have a CPAP machine? Yes   15. Do you have any artifical heart valves or other implanted medical devices like a pacemaker, defibrillator, or continuous glucose monitor? No   16. Do you have artificial joints? YES - right knee and left shoulder   17. Are you allergic to latex? No   18. Is there any chance that you may be pregnant? No     Patient does not have a Health Care Directive or Living Will: Discussed advance care planning with patient; information given to patient to review.    RX [place] ([place] reviewed:  reviewed- no concerns    HYPERTENSION - Patient has longstanding history of HTN , currently denies any symptoms referable to elevated blood pressure. Specifically denies chest pain, palpitations, dyspnea, orthopnea, PND or peripheral edema. Blood pressure readings have been in normal range. Current medication regimen is as listed below. Patient denies any side effects of medication.     HYPOTHYROIDISM - Patient has a longstanding history of chronic Hypothyroidism. Patient has been doing well, noting no tremor, insomnia, hair loss or changes in skin texture. Continues to take medications as directed, without adverse reactions or side effects. Last TSH was stable.     Patient has tolerated surgery well in the past.  Patient has no recent cough or cold symptoms.  No recent fevers, chills, sore throat, ear pain, chest pain, palpitations, problems breathing, stomachaches, nausea, vomiting, diarrhea, constipation, blood in stool or urine, dysuria.    Latex allergy  No    Denies person or family hx of bleeding tendencies, anesthesia complications, surgical wound infections, or other  problems with surgery.      Patient can walk up a flight of stairs without becoming short of breath or having chest pain: YES   Patient is able to tolerate greater than 4 METs of activity without any cardiopulmonary symptoms.        Review of Systems  CONSTITUTIONAL: NEGATIVE for fever, chills, change in weight  INTEGUMENTARY/SKIN: NEGATIVE for worrisome rashes, moles or lesions  EYES: NEGATIVE for vision changes or irritation  ENT/MOUTH: NEGATIVE for ear, mouth and throat problems  RESP: NEGATIVE for significant cough or SOB  BREAST: NEGATIVE for masses, tenderness or discharge  CV: NEGATIVE for chest pain, palpitations or peripheral edema  GI: NEGATIVE for nausea, abdominal pain, heartburn, or change in bowel habits  : NEGATIVE for dysuria, or hematuria.  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     Priority: Medium     Contusion of lower leg 02/25/2011     Priority: Medium     Hypothyroidism 11/12/2010     Priority: Medium     Degeneration of lumbar or lumbosacral intervertebral disc 11/05/2010     Priority: Medium     Spinal stenosis, lumbar 11/05/2010     Priority: Medium     Esophageal reflux 06/25/2010     Priority: Medium     Sacroiliac joint dysfunction 06/25/2010     Priority: Medium     Hypercholesterolemia 04/20/2009     Priority: Medium     Sleep apnea 02/12/2009     Priority: Medium     Disorder of bone and cartilage 10/29/2007     Priority: Medium     Enthesopathy of hip region 08/22/2006     Priority: Medium     Other vitamin B12 deficiency anemia 09/24/2001     Priority: Medium     Essential hypertension,  benign 09/24/2001     Priority: Medium     Urge incontinence of urine 09/24/2001     Priority: Medium     Obesity 09/24/2001     Priority: Medium      Past Medical History:   Diagnosis Date     Obesity     No Comments Provided     Personal history of other medical treatment (CODE)     3 vaginal childbirth uncomplicated     Personal history of other medical treatment (CODE)     1983,blood transfusion with stomach stapling     Past Surgical History:   Procedure Laterality Date     ARTHROPLASTY KNEE      2008,Total Right knee replacement     BIOPSY BREAST      Incisional x2 benign breast biopsies     FRACTURE SURGERY      open treatment of Humeral Fx w/ prosthesis     LAPAROSCOPIC BYPASS GASTRIC      1983,Stomach stapling complicated by incision rupture, then repaired wih second surgery     OTHER SURGICAL HISTORY      1983,10409.0,SKIN/SUBCUTANEOUS SURGERY,repaired incision from stomach stapling     OTHER SURGICAL HISTORY      2006,[id number],ARTHROPLASTY,Total glenohumeral joint replacement     OTHER SURGICAL HISTORY      [id number],ANESTHESIA ALERT,No problems with anaesthesia. ?     Current Outpatient Medications   Medication Sig Dispense Refill     ARIPiprazole (ABILIFY) 5 MG tablet Take 1 tablet (5 mg) by mouth daily       cholecalciferol (VITAMIN D-1000 MAX ST) 25 MCG (1000 UT) TABS Take 8,000 Units by mouth daily       Coenzyme Q10 (CO Q 10) 100 MG CAPS Take by mouth daily        cyanocobalamin (CYANOCOBALAMIN) 1000 MCG/ML injection INJECT 1 ML (1,000 MCG) INTO THE MUSCLE EVERY 30 DAYS 3 mL 3     escitalopram (LEXAPRO) 20 MG tablet Take 1 tablet (20 mg) by mouth daily 90 tablet 4     ferrous sulfate (IRON) 325 (65 FE) MG tablet Take 325 mg by mouth       fish oil-omega-3 fatty acids 1000 MG capsule Take 1 capsule by mouth       levothyroxine (SYNTHROID/LEVOTHROID) 25 MCG tablet Take 1 tablet (25 mcg) by mouth daily 90 tablet 4     losartan (COZAAR) 100 MG tablet Take 1 tablet (100 mg) by mouth daily 90 tablet 4      "metoprolol succinate ER (TOPROL-XL) 100 MG 24 hr tablet Take 2 tablets (200 mg) by mouth daily 90 tablet 1     modafinil (PROVIGIL) 200 MG tablet Take 2 tablets (400 mg) by mouth daily       potassium chloride ER (KLOR-CON M) 20 MEQ CR tablet Take 1 tablet (20 mEq) by mouth daily 90 tablet 1     Syringe Luer Slip 3 ML MISC 1 each every 30 days 25 each 0     triamcinolone (KENALOG) 0.1 % external cream Apply topically 2 times daily 45 g 2     cefdinir (OMNICEF) 300 MG capsule Take 1 capsule (300 mg) by mouth 2 times daily for 7 days 14 capsule 0       Allergies   Allergen Reactions     Chlorthalidone Other (See Comments)     Caused hyponatremia        Social History     Tobacco Use     Smoking status: Never Smoker     Smokeless tobacco: Never Used   Substance Use Topics     Alcohol use: No     Comment: very little      Family History   Problem Relation Age of Onset     Other - See Comments Mother         macular degeneration/Psychiatric illness,takes antidepressant     Heart Disease Father 40        Heart Disease,MI     Diabetes Father         Diabetes     Other - See Comments Son         Psychiatric illness,psychotic episode this year smoking marijuana, ephedra use ? bipolar     Other - See Comments Sister         Psychiatric illness,depression     Other - See Comments Brother         Psychiatric illness,depression     History   Drug Use No            Objective   /80 (BP Location: Right arm, Patient Position: Sitting, Cuff Size: Adult Regular)   Pulse 65   Temp 98.4  F (36.9  C)   Resp 16   Ht 1.626 m (5' 4\")   Wt 80.7 kg (178 lb)   SpO2 97%   BMI 30.55 kg/m    Physical Exam    GENERAL APPEARANCE: healthy, alert and no distress     EYES: EOMI, PERRL     HENT: ear canals and TM's normal and nose and mouth without ulcers or lesions     NECK: no adenopathy, no asymmetry, masses, or scars and thyroid normal to palpation     RESP: lungs clear to auscultation - no rales, rhonchi or wheezes     CV: regular " rates and rhythm, normal S1 S2, no S3 or S4 and no murmur, click or rub     ABDOMEN:  soft, nontender, no HSM or masses and bowel sounds normal     MS: extremities normal- no gross deformities noted, no evidence of inflammation in joints, FROM in all extremities.     SKIN: no suspicious lesions or rashes     NEURO: Normal strength and tone, sensory exam grossly normal, mentation intact and speech normal     PSYCH: mentation appears normal. and affect normal/bright     LYMPHATICS: No cervical adenopathy    Recent Labs   Lab Test 08/25/20  0943 12/16/19  1516 05/16/19  1115  11/15/18  0842   HGB 13.1 14.8 13.6   < >  --      --  252  --   --    NA  --  138  --   --  137   POTASSIUM  --  3.6  --   --  3.7   CR  --  0.87  --   --  0.71   A1C  --   --   --   --  5.3    < > = values in this interval not displayed.        PRE-OP Diagnostics:  Recent Results (from the past 168 hour(s))   Basic Metabolic Panel    Collection Time: 09/14/20  1:49 PM   Result Value Ref Range    Sodium 122 (L) 134 - 144 mmol/L    Potassium 3.9 3.5 - 5.1 mmol/L    Chloride 84 (L) 98 - 107 mmol/L    Carbon Dioxide 31 21 - 31 mmol/L    Anion Gap 7 3 - 14 mmol/L    Glucose 104 70 - 105 mg/dL    Urea Nitrogen 16 7 - 25 mg/dL    Creatinine 0.68 0.60 - 1.20 mg/dL    GFR Estimate 86 >60 mL/min/[1.73_m2]    GFR Estimate If Black >90 >60 mL/min/[1.73_m2]    Calcium 10.5 (H) 8.6 - 10.3 mg/dL   UA reflex to Microscopic    Collection Time: 09/14/20  4:28 PM   Result Value Ref Range    Color Urine Light Yellow     Appearance Urine Clear     Glucose Urine Negative NEG^Negative mg/dL    Bilirubin Urine Negative NEG^Negative    Ketones Urine 5 (A) NEG^Negative mg/dL    Specific Gravity Urine 1.008 1.003 - 1.035    Blood Urine Negative NEG^Negative    pH Urine 6.5 5.0 - 7.0 pH    Protein Albumin Urine Negative NEG^Negative mg/dL    Urobilinogen mg/dL Normal 0.0 - 2.0 mg/dL    Nitrite Urine Negative NEG^Negative    Leukocyte Esterase Urine Large (A)  NEG^Negative    Source Midstream Urine     RBC Urine 2 0 - 2 /HPF    WBC Urine 76 (H) 0 - 5 /HPF    Bacteria Urine Few (A) NEG^Negative /HPF    Mucous Urine Present (A) NEG^Negative /LPF   Urine Culture Aerobic Bacterial    Collection Time: 20  4:28 PM    Specimen: Urine clean catch; Unspecified Urine   Result Value Ref Range    Specimen Description Unspecified Urine     Culture Micro (A)      50,000 to 100,000 colonies/mL  Lactose fermenting gram negative rods       EK2020 sinus bradycardia  Patient previously had some minor EKG changes with the last preop. Previously consulted with internal medicine, Dr. Black.  There were no acute concerns prior to surgery however he did recommend that the patient have an echocardiogram to rule out concerns.  This was unremarkable.  Patient is following with cardiology in regards to the minor EKG changes.        Assessment & Plan   The proposed surgical procedure is considered LOW risk.    REVISED CARDIAC RISK INDEX  The patient has the following serious cardiovascular risks for perioperative complications:  No serious cardiac risks = 0 points    INTERPRETATION: 0 points: Class I (very low risk - 0.4% complication rate)         ICD-10-CM    1. Preop general physical exam  Z01.818    2. Urge incontinence of urine  N39.41    3. Hypertonicity of bladder  N31.8    4. Urinary frequency  R35.0 Basic Metabolic Panel     UA reflex to Microscopic     Basic Metabolic Panel   5. Essential hypertension, benign  I10 Basic Metabolic Panel     Basic Metabolic Panel   6. Urine leukocytes  R82.998 Urine Culture Aerobic Bacterial   7. Hyponatremia  E87.1        The patient has the following additional risks and recommendations for perioperative complications:    Patient had urinary leukocytes appreciated on the urinalysis.  Urine culture is pending.  Patient was started on cefdinir for a probable bladder infection.  Encouraged to complete the course of the antibiotic prior to  surgery.    Patient recently started on chlorthalidone for hypertension.  Patient had BMP completed.  Hyponatremia was found on the BMP.  Likely due to the recent start of the chlorthalidone.  Chlorthalidone was discontinued.  Patient will have her sodium level rechecked on 9/18/2020 prior to surgery to ensure that her blood pressure and sodium are stable.     MEDICATION INSTRUCTIONS:    Patient Instructions   Patient should take the following medications the morning of surgery with a small sip of water: hold all meds.  Patient was instructed to hold the following medications the morning of surgery: hold all meds.     Patient was advised to call our office and the surgical services with any change in condition or new symptoms if they were to develop between today and their surgical date.  Especially any cardiopulmonary symptoms or symptoms concerning for an infection.     Discontinue aspirin, aleve, naproxen and ibuprofen 7 days prior to surgery to reduce bleeding risk.  It is fine to take tylenol the week before surgery.  Hold vitamins and herbal remedies for 7 days before surgery.       RECOMMENDATION:  APPROVAL GIVEN pending follow up appointment on 9/18/2020 to proceed with proposed procedure.    Return if symptoms worsen or fail to improve.    Signed Electronically by: Ciarra Elizondo PA-C    Copy of this evaluation report is provided to requesting physician.    [facility] Preop Guidelines    Revised Cardiac Risk Index

## 2020-09-15 ENCOUNTER — TELEPHONE (OUTPATIENT)
Dept: FAMILY MEDICINE | Facility: OTHER | Age: 70
End: 2020-09-15

## 2020-09-15 DIAGNOSIS — N39.0 ACUTE UTI: Primary | ICD-10-CM

## 2020-09-15 RX ORDER — CEFDINIR 300 MG/1
300 CAPSULE ORAL 2 TIMES DAILY
Qty: 14 CAPSULE | Refills: 0 | Status: SHIPPED | OUTPATIENT
Start: 2020-09-15 | End: 2020-09-22

## 2020-09-15 NOTE — TELEPHONE ENCOUNTER
Your urine sample was positive for large amount of leukocytes.  This is similar to a few weeks ago with your previous bladder infection.  Due to your upcoming surgery I would like to start you on an antibiotic for treatment of a bladder infection.  I will also be completing urine culture.  I sent cefdinir to the pharmacy for treatment.  Please complete the full course of antibiotic for treatment. Please start the antibiotic today if possible with the upcoming surgery.   Ciarra Elizondo PA-C ..................9/15/2020 9:46 AM

## 2020-09-17 LAB
BACTERIA SPEC CULT: ABNORMAL
SPECIMEN SOURCE: ABNORMAL

## 2020-09-18 ENCOUNTER — OFFICE VISIT (OUTPATIENT)
Dept: FAMILY MEDICINE | Facility: OTHER | Age: 70
End: 2020-09-18
Attending: PHYSICIAN ASSISTANT
Payer: COMMERCIAL

## 2020-09-18 VITALS
DIASTOLIC BLOOD PRESSURE: 92 MMHG | BODY MASS INDEX: 30.55 KG/M2 | HEART RATE: 68 BPM | WEIGHT: 178 LBS | SYSTOLIC BLOOD PRESSURE: 162 MMHG | TEMPERATURE: 98.4 F

## 2020-09-18 DIAGNOSIS — I10 ESSENTIAL HYPERTENSION, BENIGN: Primary | ICD-10-CM

## 2020-09-18 LAB
ANION GAP SERPL CALCULATED.3IONS-SCNC: 7 MMOL/L (ref 3–14)
BUN SERPL-MCNC: 9 MG/DL (ref 7–25)
CALCIUM SERPL-MCNC: 10 MG/DL (ref 8.6–10.3)
CHLORIDE SERPL-SCNC: 85 MMOL/L (ref 98–107)
CO2 SERPL-SCNC: 31 MMOL/L (ref 21–31)
CREAT SERPL-MCNC: 0.63 MG/DL (ref 0.6–1.2)
GFR SERPL CREATININE-BSD FRML MDRD: >90 ML/MIN/{1.73_M2}
GLUCOSE SERPL-MCNC: 107 MG/DL (ref 70–105)
POTASSIUM SERPL-SCNC: 4.1 MMOL/L (ref 3.5–5.1)
SODIUM SERPL-SCNC: 123 MMOL/L (ref 134–144)

## 2020-09-18 PROCEDURE — 36415 COLL VENOUS BLD VENIPUNCTURE: CPT | Mod: ZL | Performed by: PHYSICIAN ASSISTANT

## 2020-09-18 PROCEDURE — G0463 HOSPITAL OUTPT CLINIC VISIT: HCPCS

## 2020-09-18 PROCEDURE — 99213 OFFICE O/P EST LOW 20 MIN: CPT | Performed by: PHYSICIAN ASSISTANT

## 2020-09-18 PROCEDURE — 80048 BASIC METABOLIC PNL TOTAL CA: CPT | Mod: ZL | Performed by: PHYSICIAN ASSISTANT

## 2020-09-18 RX ORDER — HYDROCHLOROTHIAZIDE 12.5 MG/1
12.5 TABLET ORAL DAILY
Qty: 90 TABLET | Refills: 1 | Status: SHIPPED | OUTPATIENT
Start: 2020-09-18 | End: 2020-10-13

## 2020-09-18 NOTE — NURSING NOTE
Chief Complaint   Patient presents with     Follow Up         Medication Reconciliation: complete    Tammy Werner, LPN

## 2020-09-18 NOTE — PATIENT INSTRUCTIONS
Blood pressure is mildly elevated today. Encouraged to monitor blood pressure a couple times a week over the next few weeks. Return in 3 weeks for a recheck appointment. Work on diet and exercise to decrease blood pressure. Weight loss is helpful.  Decrease salt intake.

## 2020-09-18 NOTE — PROGRESS NOTES
Nursing Notes:   Tammy Werner LPN  9/18/2020 11:02 AM  Signed  Chief Complaint   Patient presents with     Follow Up         Medication Reconciliation: complete    Tammy Werner LPN      HPI:    Ness Bales is a 69 year old female who presents for preop follow-up.  Patient was started on chlorthalidone in August.  Her sodium level was found to be very low at her preop appointment on 9/14.  Chlorthalidone was discontinued.  Patient was also started on cefdinir for probable bladder infection.  Patient is here for sodium recheck along with blood pressure recheck.  Patient states that she did not add a lot of sodium to her diet to help increase her sodium level in the last few days.  Blood pressure is elevated today.  No chest pain, palpitations, problems breathing, arm pain, jaw pain.    Past Medical History:   Diagnosis Date     Obesity     No Comments Provided     Personal history of other medical treatment (CODE)     3 vaginal childbirth uncomplicated     Personal history of other medical treatment (CODE)     1983,blood transfusion with stomach stapling       Past Surgical History:   Procedure Laterality Date     ARTHROPLASTY KNEE      2008,Total Right knee replacement     BIOPSY BREAST      Incisional x2 benign breast biopsies     FRACTURE SURGERY      open treatment of Humeral Fx w/ prosthesis     LAPAROSCOPIC BYPASS GASTRIC      1983,Stomach stapling complicated by incision rupture, then repaired wih second surgery     OTHER SURGICAL HISTORY      1983,03971.0,SKIN/SUBCUTANEOUS SURGERY,repaired incision from stomach stapling     OTHER SURGICAL HISTORY      2006,[id number],ARTHROPLASTY,Total glenohumeral joint replacement     OTHER SURGICAL HISTORY      [id number],ANESTHESIA ALERT,No problems with anaesthesia. ?       Family History   Problem Relation Age of Onset     Other - See Comments Mother         macular degeneration/Psychiatric illness,takes antidepressant     Heart Disease Father 40         Heart Disease,MI     Diabetes Father         Diabetes     Other - See Comments Son         Psychiatric illness,psychotic episode this year smoking marijuana, ephedra use ? bipolar     Other - See Comments Sister         Psychiatric illness,depression     Other - See Comments Brother         Psychiatric illness,depression       Social History     Tobacco Use     Smoking status: Never Smoker     Smokeless tobacco: Never Used   Substance Use Topics     Alcohol use: No     Comment: very little        Current Outpatient Medications   Medication Sig Dispense Refill     ARIPiprazole (ABILIFY) 5 MG tablet Take 1 tablet (5 mg) by mouth daily       cholecalciferol (VITAMIN D-1000 MAX ST) 25 MCG (1000 UT) TABS Take 8,000 Units by mouth daily       Coenzyme Q10 (CO Q 10) 100 MG CAPS Take by mouth daily        cyanocobalamin (CYANOCOBALAMIN) 1000 MCG/ML injection INJECT 1 ML (1,000 MCG) INTO THE MUSCLE EVERY 30 DAYS 3 mL 3     escitalopram (LEXAPRO) 20 MG tablet Take 1 tablet (20 mg) by mouth daily 90 tablet 4     ferrous sulfate (IRON) 325 (65 FE) MG tablet Take 325 mg by mouth       fish oil-omega-3 fatty acids 1000 MG capsule Take 1 capsule by mouth       hydrochlorothiazide (HYDRODIURIL) 12.5 MG tablet Take 1 tablet (12.5 mg) by mouth daily 90 tablet 1     levothyroxine (SYNTHROID/LEVOTHROID) 25 MCG tablet Take 1 tablet (25 mcg) by mouth daily 90 tablet 4     losartan (COZAAR) 100 MG tablet Take 1 tablet (100 mg) by mouth daily 90 tablet 4     metoprolol succinate ER (TOPROL-XL) 100 MG 24 hr tablet Take 2 tablets (200 mg) by mouth daily 90 tablet 1     modafinil (PROVIGIL) 200 MG tablet Take 2 tablets (400 mg) by mouth daily       potassium chloride ER (KLOR-CON M) 20 MEQ CR tablet Take 1 tablet (20 mEq) by mouth daily 90 tablet 1     Syringe Luer Slip 3 ML MISC 1 each every 30 days 25 each 0     triamcinolone (KENALOG) 0.1 % external cream Apply topically 2 times daily 45 g 2       Allergies   Allergen Reactions      Chlorthalidone Other (See Comments)     Caused hyponatremia       REVIEW OF SYSTEMS:  Refer to HPI.    EXAM:   Vitals:    BP (!) 162/92 (BP Location: Right arm, Patient Position: Sitting, Cuff Size: Adult Regular)   Pulse 68   Temp 98.4  F (36.9  C)   Wt 80.7 kg (178 lb)   BMI 30.55 kg/m      General Appearance: Pleasant, alert, appropriate appearance for age. No acute distress  Chest/Respiratory Exam: Normal chest wall and respirations. Clear to auscultation.  Cardiovascular Exam: Regular rate and rhythm. S1, S2, no murmur, click, gallop, or rubs.  Skin: no rash or abnormalities  Psychiatric Exam: Alert and oriented - appropriate affect.    PHQ Depression Screen  PHQ-9 SCORE 12/16/2019 1/29/2020 8/25/2020   PHQ-9 Total Score 0 7 1     Labs:   Results for orders placed or performed in visit on 09/18/20   Basic Metabolic Panel     Status: Abnormal   Result Value Ref Range    Sodium 123 (L) 134 - 144 mmol/L    Potassium 4.1 3.5 - 5.1 mmol/L    Chloride 85 (L) 98 - 107 mmol/L    Carbon Dioxide 31 21 - 31 mmol/L    Anion Gap 7 3 - 14 mmol/L    Glucose 107 (H) 70 - 105 mg/dL    Urea Nitrogen 9 7 - 25 mg/dL    Creatinine 0.63 0.60 - 1.20 mg/dL    GFR Estimate >90 >60 mL/min/[1.73_m2]    GFR Estimate If Black >90 >60 mL/min/[1.73_m2]    Calcium 10.0 8.6 - 10.3 mg/dL      ASSESSMENT AND PLAN:      ICD-10-CM    1. Essential hypertension, benign  I10 hydrochlorothiazide (HYDRODIURIL) 12.5 MG tablet     Basic Metabolic Panel     Basic Metabolic Panel     Basic Metabolic Panel     Hypertension: Completed BMP for monitoring.  Sodium level is still decreased at 123.  Highly stressed the need to increase sodium intake with her diet over the next few days in order to increase her sodium level prior to her surgery.  Started on hydrochlorothiazide for hypertension as patient has tolerated this medication in the past.  Patient will schedule recheck appointment on 9/21/2020 prior to surgery.    Blood pressure is mildly elevated  today. Encouraged to monitor blood pressure a couple times a week over the next few weeks. Return in 3 weeks for a recheck appointment. Work on diet and exercise to decrease blood pressure. Weight loss is helpful.  Decrease salt intake.       Patient Instructions   Blood pressure is mildly elevated today. Encouraged to monitor blood pressure a couple times a week over the next few weeks. Return in 3 weeks for a recheck appointment. Work on diet and exercise to decrease blood pressure. Weight loss is helpful.  Decrease salt intake.          Ciarra Elizondo PA-C PA-C..................9/18/2020 11:02 AM

## 2020-09-21 ENCOUNTER — OFFICE VISIT (OUTPATIENT)
Dept: FAMILY MEDICINE | Facility: OTHER | Age: 70
End: 2020-09-21
Attending: PHYSICIAN ASSISTANT
Payer: COMMERCIAL

## 2020-09-21 VITALS
DIASTOLIC BLOOD PRESSURE: 90 MMHG | HEART RATE: 60 BPM | TEMPERATURE: 98.4 F | RESPIRATION RATE: 16 BRPM | SYSTOLIC BLOOD PRESSURE: 150 MMHG

## 2020-09-21 DIAGNOSIS — I10 ESSENTIAL HYPERTENSION, BENIGN: ICD-10-CM

## 2020-09-21 DIAGNOSIS — I10 ESSENTIAL HYPERTENSION, BENIGN: Primary | ICD-10-CM

## 2020-09-21 LAB
ANION GAP SERPL CALCULATED.3IONS-SCNC: 6 MMOL/L (ref 3–14)
BUN SERPL-MCNC: 14 MG/DL (ref 7–25)
CALCIUM SERPL-MCNC: 9.9 MG/DL (ref 8.6–10.3)
CHLORIDE SERPL-SCNC: 94 MMOL/L (ref 98–107)
CO2 SERPL-SCNC: 29 MMOL/L (ref 21–31)
CREAT SERPL-MCNC: 0.82 MG/DL (ref 0.6–1.2)
GFR SERPL CREATININE-BSD FRML MDRD: 69 ML/MIN/{1.73_M2}
GLUCOSE SERPL-MCNC: 105 MG/DL (ref 70–105)
POTASSIUM SERPL-SCNC: 4.5 MMOL/L (ref 3.5–5.1)
SODIUM SERPL-SCNC: 129 MMOL/L (ref 134–144)

## 2020-09-21 PROCEDURE — 99213 OFFICE O/P EST LOW 20 MIN: CPT | Performed by: PHYSICIAN ASSISTANT

## 2020-09-21 PROCEDURE — G0463 HOSPITAL OUTPT CLINIC VISIT: HCPCS

## 2020-09-21 PROCEDURE — 80048 BASIC METABOLIC PNL TOTAL CA: CPT | Mod: ZL | Performed by: PHYSICIAN ASSISTANT

## 2020-09-21 PROCEDURE — 36415 COLL VENOUS BLD VENIPUNCTURE: CPT | Mod: ZL | Performed by: PHYSICIAN ASSISTANT

## 2020-09-21 NOTE — LETTER
September 21, 2020      Ness Bales  [address] [place] [address]        To Whom It May Concern:    Ness Bales was seen in our clinic. Patient previously was taking Thermofight X.  This is not a healthy option for the patient especially with her history of hypertension and hypothyroidism.   Please let me know if you have any questions or concerns.        Sincerely,        Ciarra Elizondo PA-C

## 2020-09-21 NOTE — PROGRESS NOTES
Nursing Notes:   Tammy Werner LPN  9/21/2020  2:06 PM  Signed  Chief Complaint   Patient presents with     Follow Up         Medication Reconciliation: complete    Tammy Werner LPN      HPI:    Ness Bales is a 69 year old female who presents for blood pressure recheck.  Patient is scheduled for surgery tomorrow.  Needs her sodium level rechecked.  Sodium level is increasing close with a normal range at 129.  Previously was 122 due to chlorthalidone side effect 1 week ago.  Patient worked on increasing dietary sodium intake over the weekend to help increase her sodium level.    Blood pressure is mildly elevated however it is improving as compared to previous.  Patient is tolerating hydrochlorothiazide well.  No side effects noted.    Patient is interested in getting a note in regards to a weight loss supplement that she tried.  She states that the medication did not work.  She also has safety concerns with taking the medication with her history of hypertension and hypothyroidism.  Wondering about getting a letter written so she can return the medication.    Past Medical History:   Diagnosis Date     Obesity     No Comments Provided     Personal history of other medical treatment (CODE)     3 vaginal childbirth uncomplicated     Personal history of other medical treatment (CODE)     1983,blood transfusion with stomach stapling       Past Surgical History:   Procedure Laterality Date     ARTHROPLASTY KNEE      2008,Total Right knee replacement     BIOPSY BREAST      Incisional x2 benign breast biopsies     FRACTURE SURGERY      open treatment of Humeral Fx w/ prosthesis     LAPAROSCOPIC BYPASS GASTRIC      1983,Stomach stapling complicated by incision rupture, then repaired wih second surgery     OTHER SURGICAL HISTORY      1983,41904.0,SKIN/SUBCUTANEOUS SURGERY,repaired incision from stomach stapling     OTHER SURGICAL HISTORY      2006,[id number],ARTHROPLASTY,Total glenohumeral joint  replacement     OTHER SURGICAL HISTORY      208489,ANESTHESIA ALERT,No problems with anaesthesia. ?       Family History   Problem Relation Age of Onset     Other - See Comments Mother         macular degeneration/Psychiatric illness,takes antidepressant     Heart Disease Father 40        Heart Disease,MI     Diabetes Father         Diabetes     Other - See Comments Son         Psychiatric illness,psychotic episode this year smoking marijuana, ephedra use ? bipolar     Other - See Comments Sister         Psychiatric illness,depression     Other - See Comments Brother         Psychiatric illness,depression       Social History     Tobacco Use     Smoking status: Never Smoker     Smokeless tobacco: Never Used   Substance Use Topics     Alcohol use: No     Comment: very little        Current Outpatient Medications   Medication Sig Dispense Refill     ARIPiprazole (ABILIFY) 5 MG tablet Take 1 tablet (5 mg) by mouth daily       cholecalciferol (VITAMIN D-1000 MAX ST) 25 MCG (1000 UT) TABS Take 8,000 Units by mouth daily       Coenzyme Q10 (CO Q 10) 100 MG CAPS Take by mouth daily        cyanocobalamin (CYANOCOBALAMIN) 1000 MCG/ML injection INJECT 1 ML (1,000 MCG) INTO THE MUSCLE EVERY 30 DAYS 3 mL 3     escitalopram (LEXAPRO) 20 MG tablet Take 1 tablet (20 mg) by mouth daily 90 tablet 4     ferrous sulfate (IRON) 325 (65 FE) MG tablet Take 325 mg by mouth       fish oil-omega-3 fatty acids 1000 MG capsule Take 1 capsule by mouth       hydrochlorothiazide (HYDRODIURIL) 12.5 MG tablet Take 1 tablet (12.5 mg) by mouth daily 90 tablet 1     levothyroxine (SYNTHROID/LEVOTHROID) 25 MCG tablet Take 1 tablet (25 mcg) by mouth daily 90 tablet 4     losartan (COZAAR) 100 MG tablet Take 1 tablet (100 mg) by mouth daily 90 tablet 4     metoprolol succinate ER (TOPROL-XL) 100 MG 24 hr tablet Take 2 tablets (200 mg) by mouth daily 90 tablet 1     modafinil (PROVIGIL) 200 MG tablet Take 2 tablets (400 mg) by mouth daily       potassium  chloride ER (KLOR-CON M) 20 MEQ CR tablet Take 1 tablet (20 mEq) by mouth daily 90 tablet 1     Syringe Luer Slip 3 ML MISC 1 each every 30 days 25 each 0     triamcinolone (KENALOG) 0.1 % external cream Apply topically 2 times daily 45 g 2       Allergies   Allergen Reactions     Chlorthalidone Other (See Comments)     Caused hyponatremia       REVIEW OF SYSTEMS:  Refer to HPI.    EXAM:   Vitals:    BP (!) 150/90 (BP Location: Right arm, Patient Position: Sitting, Cuff Size: Adult Regular)   Pulse 60   Temp 98.4  F (36.9  C)   Resp 16     General Appearance: Pleasant, alert, appropriate appearance for age. No acute distress  Chest/Respiratory Exam: Normal chest wall and respirations. Clear to auscultation.  Cardiovascular Exam: Regular rate and rhythm. S1, S2, no murmur, click, gallop, or rubs.  Skin: no rash or abnormalities  Psychiatric Exam: Alert and oriented - appropriate affect.    PHQ Depression Screen  PHQ-9 SCORE 12/16/2019 1/29/2020 8/25/2020   PHQ-9 Total Score 0 7 1     Labs:   Results for orders placed or performed in visit on 09/21/20   Basic Metabolic Panel     Status: Abnormal   Result Value Ref Range    Sodium 129 (L) 134 - 144 mmol/L    Potassium 4.5 3.5 - 5.1 mmol/L    Chloride 94 (L) 98 - 107 mmol/L    Carbon Dioxide 29 21 - 31 mmol/L    Anion Gap 6 3 - 14 mmol/L    Glucose 105 70 - 105 mg/dL    Urea Nitrogen 14 7 - 25 mg/dL    Creatinine 0.82 0.60 - 1.20 mg/dL    GFR Estimate 69 >60 mL/min/[1.73_m2]    GFR Estimate If Black 84 >60 mL/min/[1.73_m2]    Calcium 9.9 8.6 - 10.3 mg/dL      ASSESSMENT AND PLAN:         ICD-10-CM    1. Essential hypertension, benign  I10          Patient's blood pressure is improving.  Patient sodium level has also increased to 129.  Continue medications as previously prescribed.  Patient is approved for surgery tomorrow.  Notified surgery center.  Recheck appointment in 3 weeks for medication monitoring.  Encourage good diet, exercise and weight loss.  Okay to have  a little extra sodium in her diet over the next few days in order to increase her sodium level further.    Ciarra Elizondo PA-C PA-C..................9/21/2020 2:05 PM

## 2020-09-22 ENCOUNTER — TRANSFERRED RECORDS (OUTPATIENT)
Dept: HEALTH INFORMATION MANAGEMENT | Facility: OTHER | Age: 70
End: 2020-09-22

## 2020-09-28 DIAGNOSIS — E87.6 HYPOKALEMIA: ICD-10-CM

## 2020-09-28 RX ORDER — POTASSIUM CHLORIDE 1500 MG/1
TABLET, EXTENDED RELEASE ORAL
Qty: 90 TABLET | Refills: 1 | Status: SHIPPED | OUTPATIENT
Start: 2020-09-28 | End: 2021-03-09

## 2020-10-13 ENCOUNTER — OFFICE VISIT (OUTPATIENT)
Dept: FAMILY MEDICINE | Facility: OTHER | Age: 70
End: 2020-10-13
Attending: PHYSICIAN ASSISTANT
Payer: COMMERCIAL

## 2020-10-13 ENCOUNTER — TELEPHONE (OUTPATIENT)
Dept: FAMILY MEDICINE | Facility: OTHER | Age: 70
End: 2020-10-13

## 2020-10-13 VITALS
SYSTOLIC BLOOD PRESSURE: 168 MMHG | RESPIRATION RATE: 16 BRPM | HEART RATE: 72 BPM | WEIGHT: 181.6 LBS | TEMPERATURE: 98.3 F | DIASTOLIC BLOOD PRESSURE: 82 MMHG | BODY MASS INDEX: 31.17 KG/M2

## 2020-10-13 DIAGNOSIS — I10 ESSENTIAL HYPERTENSION, BENIGN: Primary | ICD-10-CM

## 2020-10-13 DIAGNOSIS — I10 ESSENTIAL HYPERTENSION, BENIGN: ICD-10-CM

## 2020-10-13 PROCEDURE — 99213 OFFICE O/P EST LOW 20 MIN: CPT | Performed by: PHYSICIAN ASSISTANT

## 2020-10-13 PROCEDURE — G0463 HOSPITAL OUTPT CLINIC VISIT: HCPCS

## 2020-10-13 RX ORDER — HYDROCHLOROTHIAZIDE 25 MG/1
25 TABLET ORAL DAILY
Qty: 90 TABLET | Refills: 1 | Status: SHIPPED | OUTPATIENT
Start: 2020-10-13 | End: 2021-03-09

## 2020-10-13 NOTE — NURSING NOTE
Chief Complaint   Patient presents with     Follow Up     blood pressure         Medication Reconciliation: complete    Tammy Werner, LPN

## 2020-10-13 NOTE — TELEPHONE ENCOUNTER
Called with lab results.  Sodium is in the normal range.  Increased hydrochlorothiazide to 25 mg daily.  Will recheck in 3 to 4 weeks for blood pressure monitoring and lab work.  iCarra Elizondo PA-C ..................10/13/2020 3:40 PM

## 2020-10-13 NOTE — PATIENT INSTRUCTIONS
Elevate feet 10-15 minutes at a time 3 times daily  Decrease salt intake if sodium level is normal.

## 2020-10-13 NOTE — PROGRESS NOTES
Nursing Notes:   Tammy Werner LPN  10/13/2020  1:37 PM  Signed  Chief Complaint   Patient presents with     Follow Up     blood pressure         Medication Reconciliation: complete    Tammy Werner LPN        HPI:    Ness Bales is a 69 year old female who presents for blood pressure follow up.  Patient was started on chlorthalidone in August.  She ended up having severe hyponatremia that was found 3 weeks after starting the medication.  Chlorthalidone was stopped.  Patient previously tried amlodipine however this caused lower extremity edema.  Currently taking losartan, metoprolol and hydrochlorothiazide.  Patient is been tolerating medication well.  Wondering about increasing the current dose of hydrochlorothiazide.  She has noticed some lower extremity edema.  With her hyponatremia in the past she has been adding extra salt to her food.  Not completed a lot of exercise.  No chest pain, palpitations, problems breathing.  Not elevating her feet.    Past Medical History:   Diagnosis Date     Obesity     No Comments Provided     Personal history of other medical treatment (CODE)     3 vaginal childbirth uncomplicated     Personal history of other medical treatment (CODE)     1983,blood transfusion with stomach stapling       Past Surgical History:   Procedure Laterality Date     ARTHROPLASTY KNEE      2008,Total Right knee replacement     BIOPSY BREAST      Incisional x2 benign breast biopsies     FRACTURE SURGERY      open treatment of Humeral Fx w/ prosthesis     INJECT BOTOX N/A 09/22/2020    [facility]; [facility] ([place], [place]), Dr. Joe Cruz     LAPAROSCOPIC BYPASS GASTRIC      1983,Stomach stapling complicated by incision rupture, then repaired wi second surgery     OTHER SURGICAL HISTORY      1983,89701.0,SKIN/SUBCUTANEOUS SURGERY,repaired incision from stomach stapling     OTHER SURGICAL HISTORY      2006,[id number],ARTHROPLASTY,Total glenohumeral joint replacement     OTHER SURGICAL  HISTORY      [id number],ANESTHESIA ALERT,No problems with anaesthesia. ?       Family History   Problem Relation Age of Onset     Other - See Comments Mother         macular degeneration/Psychiatric illness,takes antidepressant     Heart Disease Father 40        Heart Disease,MI     Diabetes Father         Diabetes     Other - See Comments Son         Psychiatric illness,psychotic episode this year smoking marijuana, ephedra use ? bipolar     Other - See Comments Sister         Psychiatric illness,depression     Other - See Comments Brother         Psychiatric illness,depression       Social History     Tobacco Use     Smoking status: Never Smoker     Smokeless tobacco: Never Used   Substance Use Topics     Alcohol use: No     Comment: very little        Current Outpatient Medications   Medication Sig Dispense Refill     ARIPiprazole (ABILIFY) 5 MG tablet Take 1 tablet (5 mg) by mouth daily       cholecalciferol (VITAMIN D-1000 MAX ST) 25 MCG (1000 UT) TABS Take 8,000 Units by mouth daily       Coenzyme Q10 (CO Q 10) 100 MG CAPS Take by mouth daily        cyanocobalamin (CYANOCOBALAMIN) 1000 MCG/ML injection INJECT 1 ML (1,000 MCG) INTO THE MUSCLE EVERY 30 DAYS 3 mL 3     escitalopram (LEXAPRO) 20 MG tablet Take 1 tablet (20 mg) by mouth daily 90 tablet 4     ferrous sulfate (IRON) 325 (65 FE) MG tablet Take 325 mg by mouth       fish oil-omega-3 fatty acids 1000 MG capsule Take 1 capsule by mouth       hydrochlorothiazide (HYDRODIURIL) 12.5 MG tablet Take 1 tablet (12.5 mg) by mouth daily 90 tablet 1     KLOR-CON 20 MEQ CR tablet TAKE 1 TABLET BY MOUTH DAILY 90 tablet 1     levothyroxine (SYNTHROID/LEVOTHROID) 25 MCG tablet Take 1 tablet (25 mcg) by mouth daily 90 tablet 4     losartan (COZAAR) 100 MG tablet Take 1 tablet (100 mg) by mouth daily 90 tablet 4     metoprolol succinate ER (TOPROL-XL) 100 MG 24 hr tablet Take 2 tablets (200 mg) by mouth daily 90 tablet 1     modafinil (PROVIGIL) 200 MG tablet Take 2 tablets  (400 mg) by mouth daily       Syringe Luer Slip 3 ML MISC 1 each every 30 days 25 each 0     triamcinolone (KENALOG) 0.1 % external cream Apply topically 2 times daily 45 g 2       Allergies   Allergen Reactions     Chlorthalidone Other (See Comments)     Caused hyponatremia       REVIEW OF SYSTEMS:  Refer to HPI.    EXAM:   Vitals:    BP (!) 168/82 (BP Location: Right arm, Patient Position: Sitting, Cuff Size: Adult Regular)   Pulse 72   Temp 98.3  F (36.8  C)   Resp 16   Wt 82.4 kg (181 lb 9.6 oz)   BMI 31.17 kg/m      General Appearance: Pleasant, alert, appropriate appearance for age. No acute distress  Chest/Respiratory Exam: Normal chest wall and respirations. Clear to auscultation.  Cardiovascular Exam: Regular rate and rhythm. S1, S2, no murmur, click, gallop, or rubs.  Musculoskeletal Exam: Full range of motion of lower extremities.  1+ pedal edema bilaterally.  Skin: no rash or abnormalities  Neurologic Exam: Nonfocal, normal gross motor, tone coordination and no tremor.  Psychiatric Exam: Alert and oriented - appropriate affect.    PHQ Depression Screen  PHQ-9 SCORE 12/16/2019 1/29/2020 8/25/2020   PHQ-9 Total Score 0 7 1       ASSESSMENT AND PLAN:      ICD-10-CM    1. Essential hypertension, benign  I10 Basic Metabolic Panel       Hypertension: Blood pressure is elevated today.  We will complete BMP for monitoring.  If BMP is stable we may need to increase the hydrochlorothiazide dose in order to better control her blood pressure.  Will need close monitoring to ensure that her sodium continues to be stable.  If her sodium is currently stable patient can hold off on adding extra salt to her food.  Encouraged elevating her feet 10 to 15 minutes at a time several times daily in order to decrease the edema.  Gave warning signs and symptoms.  If patient sodium level is persistently decreased we may need to place referral for internal medicine in order to rule out concerns.    Patient Instructions    Elevate feet 10-15 minutes at a time 3 times daily  Decrease salt intake if sodium level is normal.        Ciarra Elizondo PA-C PA-C..................10/13/2020 1:31 PM

## 2020-10-24 ENCOUNTER — OFFICE VISIT (OUTPATIENT)
Dept: FAMILY MEDICINE | Facility: OTHER | Age: 70
End: 2020-10-24
Attending: NURSE PRACTITIONER
Payer: COMMERCIAL

## 2020-10-24 VITALS
SYSTOLIC BLOOD PRESSURE: 138 MMHG | TEMPERATURE: 97.4 F | WEIGHT: 180 LBS | DIASTOLIC BLOOD PRESSURE: 88 MMHG | BODY MASS INDEX: 30.9 KG/M2 | HEART RATE: 69 BPM | RESPIRATION RATE: 16 BRPM | OXYGEN SATURATION: 97 %

## 2020-10-24 DIAGNOSIS — N30.01 ACUTE CYSTITIS WITH HEMATURIA: Primary | ICD-10-CM

## 2020-10-24 DIAGNOSIS — R30.0 DYSURIA: ICD-10-CM

## 2020-10-24 LAB
ALBUMIN UR-MCNC: 10 MG/DL
AMORPH CRY #/AREA URNS HPF: ABNORMAL /HPF
APPEARANCE UR: ABNORMAL
BACTERIA #/AREA URNS HPF: ABNORMAL /HPF
BILIRUB UR QL STRIP: NEGATIVE
COLOR UR AUTO: YELLOW
GLUCOSE UR STRIP-MCNC: NEGATIVE MG/DL
HGB UR QL STRIP: ABNORMAL
KETONES UR STRIP-MCNC: NEGATIVE MG/DL
LEUKOCYTE ESTERASE UR QL STRIP: ABNORMAL
NITRATE UR QL: POSITIVE
PH UR STRIP: 6 PH (ref 5–7)
RBC #/AREA URNS AUTO: 5 /HPF (ref 0–2)
SOURCE: ABNORMAL
SP GR UR STRIP: 1.01 (ref 1–1.03)
UROBILINOGEN UR STRIP-MCNC: NORMAL MG/DL (ref 0–2)
WBC #/AREA URNS AUTO: >182 /HPF (ref 0–5)
WBC CLUMPS #/AREA URNS HPF: PRESENT /HPF

## 2020-10-24 PROCEDURE — 81001 URINALYSIS AUTO W/SCOPE: CPT | Mod: ZL | Performed by: FAMILY MEDICINE

## 2020-10-24 PROCEDURE — 87088 URINE BACTERIA CULTURE: CPT | Mod: ZL | Performed by: FAMILY MEDICINE

## 2020-10-24 PROCEDURE — 87086 URINE CULTURE/COLONY COUNT: CPT | Mod: ZL | Performed by: FAMILY MEDICINE

## 2020-10-24 PROCEDURE — 99213 OFFICE O/P EST LOW 20 MIN: CPT | Performed by: FAMILY MEDICINE

## 2020-10-24 PROCEDURE — G0463 HOSPITAL OUTPT CLINIC VISIT: HCPCS

## 2020-10-24 RX ORDER — SULFAMETHOXAZOLE/TRIMETHOPRIM 800-160 MG
1 TABLET ORAL 2 TIMES DAILY
Qty: 6 TABLET | Refills: 0 | Status: SHIPPED | OUTPATIENT
Start: 2020-10-24 | End: 2020-10-27

## 2020-10-24 RX ORDER — DULOXETIN HYDROCHLORIDE 30 MG/1
30 CAPSULE, DELAYED RELEASE ORAL DAILY
COMMUNITY
Start: 2020-10-12 | End: 2024-03-19 | Stop reason: DRUGHIGH

## 2020-10-24 ASSESSMENT — PAIN SCALES - GENERAL: PAINLEVEL: MODERATE PAIN (5)

## 2020-10-24 NOTE — PROGRESS NOTES
SUBJECTIVE:   Ness Bales is a 69 year old female who presents to clinic today for the following health issues: Urinary symptoms    Patient arrives here for urinary symptoms.  Started 2 days ago with urinary frequency.  She also reports that she is leaking more.  Patient is also noticed some dribbling.  She is also urine being cloudy.  No back pain.        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     Priority: Medium     Contusion of lower leg 02/25/2011     Priority: Medium     Hypothyroidism 11/12/2010     Priority: Medium     Degeneration of lumbar or lumbosacral intervertebral disc 11/05/2010     Priority: Medium     Spinal stenosis, lumbar 11/05/2010     Priority: Medium     Esophageal reflux 06/25/2010     Priority: Medium     Sacroiliac joint dysfunction 06/25/2010     Priority: Medium     Hypercholesterolemia 04/20/2009     Priority: Medium     Sleep apnea 02/12/2009     Priority: Medium     Disorder of bone and cartilage 10/29/2007     Priority: Medium     Enthesopathy of hip region 08/22/2006     Priority: Medium     Other vitamin B12 deficiency anemia 09/24/2001     Priority: Medium     Essential hypertension, benign 09/24/2001     Priority: Medium     Urge incontinence of urine 09/24/2001     Priority: Medium     Obesity 09/24/2001     Priority: Medium     Past Medical History:   Diagnosis Date     Obesity     No Comments Provided     Personal history of other medical treatment (CODE)     3 vaginal childbirth uncomplicated     Personal history of other medical treatment (CODE)     1983,blood transfusion with stomach stapling      Past Surgical History:   Procedure Laterality Date     ARTHROPLASTY KNEE      2008,Total Right knee replacement     BIOPSY BREAST      Incisional x2 benign breast biopsies     FRACTURE SURGERY      open treatment of Humeral Fx w/ prosthesis     INJECT BOTOX N/A  09/22/2020    [facility]; [facility] ([place], [place]), Dr. Joe Cruz     LAPAROSCOPIC BYPASS GASTRIC      1983,Stomach stapling complicated by incision rupture, then repaired [facility] second surgery     OTHER SURGICAL HISTORY      1983,54840.0,SKIN/SUBCUTANEOUS SURGERY,repaired incision from stomach stapling     OTHER SURGICAL HISTORY      2006,[id number],ARTHROPLASTY,Total glenohumeral joint replacement     OTHER SURGICAL HISTORY      275753,ANESTHESIA ALERT,No problems with anaesthesia. ?     Current Outpatient Medications   Medication Sig Dispense Refill     ARIPiprazole (ABILIFY) 5 MG tablet Take 1 tablet (5 mg) by mouth daily       cholecalciferol (VITAMIN D-1000 MAX ST) 25 MCG (1000 UT) TABS Take 8,000 Units by mouth daily       Coenzyme Q10 (CO Q 10) 100 MG CAPS Take by mouth daily        cyanocobalamin (CYANOCOBALAMIN) 1000 MCG/ML injection INJECT 1 ML (1,000 MCG) INTO THE MUSCLE EVERY 30 DAYS 3 mL 3     DULoxetine (CYMBALTA) 60 MG capsule TAKE 2 CAPS IN AM.. MAY USE ANY FORMULATION TO EQUAL 120 MG,       ferrous sulfate (IRON) 325 (65 FE) MG tablet Take 325 mg by mouth       fish oil-omega-3 fatty acids 1000 MG capsule Take 1 capsule by mouth       hydrochlorothiazide (HYDRODIURIL) 25 MG tablet Take 1 tablet (25 mg) by mouth daily 90 tablet 1     KLOR-CON 20 MEQ CR tablet TAKE 1 TABLET BY MOUTH DAILY 90 tablet 1     levothyroxine (SYNTHROID/LEVOTHROID) 25 MCG tablet Take 1 tablet (25 mcg) by mouth daily 90 tablet 4     losartan (COZAAR) 100 MG tablet Take 1 tablet (100 mg) by mouth daily 90 tablet 4     metoprolol succinate ER (TOPROL-XL) 100 MG 24 hr tablet Take 2 tablets (200 mg) by mouth daily 90 tablet 1     modafinil (PROVIGIL) 200 MG tablet Take 2 tablets (400 mg) by mouth daily       sulfamethoxazole-trimethoprim (BACTRIM DS) 800-160 MG tablet Take 1 tablet by mouth 2 times daily for 3 days 6 tablet 0     Syringe Luer Slip 3 ML MISC 1 each every 30 days 25 each 0     triamcinolone (KENALOG) 0.1 % external cream  Apply topically 2 times daily 45 g 2     Allergies   Allergen Reactions     Chlorthalidone Other (See Comments)     Caused hyponatremia       Review of Systems     OBJECTIVE:     /88   Pulse 69   Temp 97.4  F (36.3  C) (Temporal)   Resp 16   Wt 81.6 kg (180 lb)   SpO2 97%   Breastfeeding No   BMI 30.90 kg/m    Body mass index is 30.9 kg/m .  Physical Exam  Constitutional:       Appearance: Normal appearance.   Abdominal:      General: There is no distension.      Palpations: Abdomen is soft.      Tenderness: There is no abdominal tenderness.   Musculoskeletal: Normal range of motion.      Comments: No CVA tenderness on percussion   Neurological:      Mental Status: She is alert.         Diagnostic Test Results:  Results for orders placed or performed in visit on 10/24/20 (from the past 24 hour(s))   UA reflex to Microscopic and Culture    Specimen: Midstream Urine   Result Value Ref Range    Color Urine Yellow     Appearance Urine Cloudy     Glucose Urine Negative NEG^Negative mg/dL    Bilirubin Urine Negative NEG^Negative    Ketones Urine Negative NEG^Negative mg/dL    Specific Gravity Urine 1.009 1.003 - 1.035    Blood Urine Trace (A) NEG^Negative    pH Urine 6.0 5.0 - 7.0 pH    Protein Albumin Urine 10 (A) NEG^Negative mg/dL    Urobilinogen mg/dL Normal 0.0 - 2.0 mg/dL    Nitrite Urine Positive (A) NEG^Negative    Leukocyte Esterase Urine Large (A) NEG^Negative    Source Midstream Urine     RBC Urine 5 (H) 0 - 2 /HPF    WBC Urine >182 (H) 0 - 5 /HPF    WBC Clumps Present (A) NEG^Negative /HPF    Bacteria Urine Few (A) NEG^Negative /HPF    Amorphous Crystals Many (A) NEG^Negative /HPF       ASSESSMENT/PLAN:         1. Dysuria    - UA reflex to Microscopic and Culture  - Urine Culture Aerobic Bacterial    2. Acute cystitis with hematuria  Start follow-up if getting worse  - sulfamethoxazole-trimethoprim (BACTRIM DS) 800-160 MG tablet; Take 1 tablet by mouth 2 times daily for 3 days  Dispense: 6 tablet;  Refill: 0      Jose Armando Castellanos MD  [facility] AND [facility]

## 2020-10-24 NOTE — NURSING NOTE
"Chief Complaint   Patient presents with     UTI     urinary frequency for the past 2 days   She has had urinary frequency and incontinence for the past few days and her urinae has looked cloudy.  Louise Weldon LPN..................10/24/2020   2:31 PM      Initial /88   Pulse 69   Temp 97.4  F (36.3  C) (Temporal)   Resp 16   Wt 81.6 kg (180 lb)   SpO2 97%   Breastfeeding No   BMI 30.90 kg/m   Estimated body mass index is 30.9 kg/m  as calculated from the following:    Height as of 9/14/20: 1.626 m (5' 4\").    Weight as of this encounter: 81.6 kg (180 lb).  Medication Reconciliation: complete    Louise Weldon LPN  "

## 2020-10-26 LAB
BACTERIA SPEC CULT: ABNORMAL
SPECIMEN SOURCE: ABNORMAL

## 2020-11-27 ENCOUNTER — OFFICE VISIT (OUTPATIENT)
Dept: FAMILY MEDICINE | Facility: OTHER | Age: 70
End: 2020-11-27
Attending: NURSE PRACTITIONER
Payer: COMMERCIAL

## 2020-11-27 VITALS
HEART RATE: 56 BPM | TEMPERATURE: 97.9 F | HEIGHT: 64 IN | DIASTOLIC BLOOD PRESSURE: 88 MMHG | RESPIRATION RATE: 24 BRPM | OXYGEN SATURATION: 99 % | BODY MASS INDEX: 30.48 KG/M2 | SYSTOLIC BLOOD PRESSURE: 144 MMHG | WEIGHT: 178.5 LBS

## 2020-11-27 DIAGNOSIS — R39.89 SUSPECTED UTI: ICD-10-CM

## 2020-11-27 DIAGNOSIS — N30.01 ACUTE CYSTITIS WITH HEMATURIA: Primary | ICD-10-CM

## 2020-11-27 DIAGNOSIS — R82.90 CLOUDY URINE: ICD-10-CM

## 2020-11-27 LAB
ALBUMIN UR-MCNC: NEGATIVE MG/DL
APPEARANCE UR: ABNORMAL
BACTERIA #/AREA URNS HPF: ABNORMAL /HPF
BILIRUB UR QL STRIP: NEGATIVE
COLOR UR AUTO: YELLOW
GLUCOSE UR STRIP-MCNC: NEGATIVE MG/DL
HGB UR QL STRIP: ABNORMAL
KETONES UR STRIP-MCNC: NEGATIVE MG/DL
LEUKOCYTE ESTERASE UR QL STRIP: ABNORMAL
NITRATE UR QL: POSITIVE
PH UR STRIP: 6 PH (ref 5–7)
RBC #/AREA URNS AUTO: 3 /HPF (ref 0–2)
SOURCE: ABNORMAL
SP GR UR STRIP: 1.01 (ref 1–1.03)
UROBILINOGEN UR STRIP-MCNC: NORMAL MG/DL (ref 0–2)
WBC #/AREA URNS AUTO: >182 /HPF (ref 0–5)

## 2020-11-27 PROCEDURE — 87088 URINE BACTERIA CULTURE: CPT | Mod: ZL | Performed by: NURSE PRACTITIONER

## 2020-11-27 PROCEDURE — G0463 HOSPITAL OUTPT CLINIC VISIT: HCPCS

## 2020-11-27 PROCEDURE — 87086 URINE CULTURE/COLONY COUNT: CPT | Mod: ZL | Performed by: NURSE PRACTITIONER

## 2020-11-27 PROCEDURE — 99214 OFFICE O/P EST MOD 30 MIN: CPT | Performed by: NURSE PRACTITIONER

## 2020-11-27 PROCEDURE — 81001 URINALYSIS AUTO W/SCOPE: CPT | Mod: ZL | Performed by: NURSE PRACTITIONER

## 2020-11-27 RX ORDER — SULFAMETHOXAZOLE/TRIMETHOPRIM 800-160 MG
1 TABLET ORAL 2 TIMES DAILY
Qty: 10 TABLET | Refills: 0 | Status: SHIPPED | OUTPATIENT
Start: 2020-11-27 | End: 2020-12-02

## 2020-11-27 ASSESSMENT — PAIN SCALES - GENERAL: PAINLEVEL: NO PAIN (0)

## 2020-11-27 ASSESSMENT — MIFFLIN-ST. JEOR: SCORE: 1324.67

## 2020-11-27 NOTE — NURSING NOTE
Chief Complaint   Patient presents with     Urinary Problem     frequency, itchy feeling     Patient reports cloudy urine. Symptoms present for 5 days     Medication Reconciliation: complete    Carol Ann Arthur LPN

## 2020-11-27 NOTE — PROGRESS NOTES
HPI:    Ness Bales is a 69 year old female  who presents to [facility] today for UTI.    Symptoms for the past 5 days including cloudy urine, urinary frequency, nocturia, urethral itching.  No dysuria.  No noted blood in urine.  No fevers or chills.  No nausea or vomiting.  No change in appetite.  No change in bowel pattern.  No diarrhea or constipation.  No abdominal pain.  No suprapubic pressure.  No back pain.  No vaginal discharge.    States hx of UTI, states she had an UTI about 6 weeks ago and a month prior to that.  Treated on 10/24/2020 with Bactrim.  No hx of kidney stones.    She is getting Botox injections for her urinary symptoms and states it is working well.        Past Medical History:   Diagnosis Date     Obesity     No Comments Provided     Personal history of other medical treatment (CODE)     3 vaginal childbirth uncomplicated     Personal history of other medical treatment (CODE)     1983,blood transfusion with stomach stapling     Past Surgical History:   Procedure Laterality Date     ARTHROPLASTY KNEE      2008,Total Right knee replacement     BIOPSY BREAST      Incisional x2 benign breast biopsies     FRACTURE SURGERY      open treatment of Humeral Fx w/ prosthesis     INJECT BOTOX N/A 09/22/2020    [facility]; [facility] ([place], [place]), Dr. Joe Cruz     LAPAROSCOPIC BYPASS GASTRIC      1983,Stomach stapling complicated by incision rupture, then repaired [facility] second surgery     OTHER SURGICAL HISTORY      1983,12393.0,SKIN/SUBCUTANEOUS SURGERY,repaired incision from stomach stapling     OTHER SURGICAL HISTORY      2006,[id number],ARTHROPLASTY,Total glenohumeral joint replacement     OTHER SURGICAL HISTORY      140519,ANESTHESIA ALERT,No problems with anaesthesia. ?     Social History     Tobacco Use     Smoking status: Never Smoker     Smokeless tobacco: Never Used   Substance Use Topics     Alcohol use: No     Comment: very little      Current Outpatient Medications   Medication Sig  "Dispense Refill     ARIPiprazole (ABILIFY) 5 MG tablet Take 1 tablet (5 mg) by mouth daily       cholecalciferol (VITAMIN D-1000 MAX ST) 25 MCG (1000 UT) TABS Take 8,000 Units by mouth daily       Coenzyme Q10 (CO Q 10) 100 MG CAPS Take by mouth daily        cyanocobalamin (CYANOCOBALAMIN) 1000 MCG/ML injection INJECT 1 ML (1,000 MCG) INTO THE MUSCLE EVERY 30 DAYS 3 mL 3     DULoxetine (CYMBALTA) 60 MG capsule TAKE 2 CAPS IN AM.. MAY USE ANY FORMULATION TO EQUAL 120 MG,       ferrous sulfate (IRON) 325 (65 FE) MG tablet Take 325 mg by mouth       fish oil-omega-3 fatty acids 1000 MG capsule Take 1 capsule by mouth       hydrochlorothiazide (HYDRODIURIL) 25 MG tablet Take 1 tablet (25 mg) by mouth daily 90 tablet 1     KLOR-CON 20 MEQ CR tablet TAKE 1 TABLET BY MOUTH DAILY 90 tablet 1     levothyroxine (SYNTHROID/LEVOTHROID) 25 MCG tablet Take 1 tablet (25 mcg) by mouth daily 90 tablet 4     losartan (COZAAR) 100 MG tablet Take 1 tablet (100 mg) by mouth daily 90 tablet 4     metoprolol succinate ER (TOPROL-XL) 100 MG 24 hr tablet Take 2 tablets (200 mg) by mouth daily 90 tablet 1     modafinil (PROVIGIL) 200 MG tablet Take 2 tablets (400 mg) by mouth daily       Syringe Luer Slip 3 ML MISC 1 each every 30 days 25 each 0     triamcinolone (KENALOG) 0.1 % external cream Apply topically 2 times daily 45 g 2     Allergies   Allergen Reactions     Chlorthalidone Other (See Comments)     Caused hyponatremia         Past medical history, past surgical history, current medications and allergies reviewed and accurate to the best of my knowledge.        ROS:  Refer to HPI    BP (!) 144/88 (BP Location: Right arm, Patient Position: Sitting, Cuff Size: Adult Regular)   Pulse 56   Temp 97.9  F (36.6  C) (Tympanic)   Resp 24   Ht 1.626 m (5' 4\")   Wt 81 kg (178 lb 8 oz)   SpO2 99%   Breastfeeding No   BMI 30.64 kg/m      EXAM:  General Appearance: Well appearing middle aged adult female, non ill appearance, appropriate " appearance for age. No acute distress  Respiratory: normal chest wall and respirations.  Normal effort.  Clear to auscultation bilaterally, no wheezing, crackles or rhonchi.  No increased work of breathing.  No cough appreciated.  Cardiac: RRR with no murmurs  Abdomen: soft, nontender, no rigidity, no rebound tenderness or guarding, normal bowel sounds present  :  No suprapubic tenderness to palpation.  No CVA tenderness to palpation.    Musculoskeletal:  Equal movement of bilateral upper extremities.  Equal movement of bilateral lower extremities.  Normal gait.    Psychological: normal affect, alert, oriented, and pleasant.       Labs:  Results for orders placed or performed in visit on 11/27/20   UA reflex to Microscopic and Culture     Status: Abnormal    Specimen: Midstream Urine   Result Value Ref Range    Color Urine Yellow     Appearance Urine Slightly Cloudy     Glucose Urine Negative NEG^Negative mg/dL    Bilirubin Urine Negative NEG^Negative    Ketones Urine Negative NEG^Negative mg/dL    Specific Gravity Urine 1.013 1.003 - 1.035    Blood Urine Trace (A) NEG^Negative    pH Urine 6.0 5.0 - 7.0 pH    Protein Albumin Urine Negative NEG^Negative mg/dL    Urobilinogen mg/dL Normal 0.0 - 2.0 mg/dL    Nitrite Urine Positive (A) NEG^Negative    Leukocyte Esterase Urine Large (A) NEG^Negative    Source Midstream Urine     RBC Urine 3 (H) 0 - 2 /HPF    WBC Urine >182 (H) 0 - 5 /HPF    Bacteria Urine Few (A) NEG^Negative /HPF             ASSESSMENT/PLAN:      1. Cloudy urine    - UA reflex to Microscopic and Culture  - Urine Culture Aerobic Bacterial    2. Suspected UTI    - UA reflex to Microscopic and Culture  - Urine Culture Aerobic Bacterial    3. Acute cystitis with hematuria    - sulfamethoxazole-trimethoprim (BACTRIM DS) 800-160 MG tablet; Take 1 tablet by mouth 2 times daily for 5 days  Dispense: 10 tablet; Refill: 0    Patient treated in the past with Bactrim for UTI and states it worked well with no side  effects and requests this again today.    I have reviewed the nursing notes.  I have reviewed the findings, diagnosis, plan and need for follow up with the patient.    Urinalysis - cloudy urine, trace blood, positive nitrite, large leukocyte estrace  Urine microscopic - RBC 3, WBC >182, bacteria few    Urine culture pending  Will call if culture warrants change of abx.     Encouraged fluids and frequent bladder emptying.    Discussed warning signs/symptoms indicative of need to f/u  Follow up if symptoms persist or worsen or concerns      I explained my diagnostic considerations and recommendations to the patient, who voiced understanding and agreement with the treatment plan. All questions were answered. We discussed potential side effects of any prescribed or recommended therapies, as well as expectations for response to treatments.    Disclaimer:  This note consists of words and symbols derived from keyboarding, dictation, or using voice recognition software. As a result, there may be errors in the script that have gone undetected. Please consider this when interpreting information found in this note.

## 2020-11-29 LAB
BACTERIA SPEC CULT: ABNORMAL
SPECIMEN SOURCE: ABNORMAL

## 2021-01-03 ENCOUNTER — HEALTH MAINTENANCE LETTER (OUTPATIENT)
Age: 71
End: 2021-01-03

## 2021-01-03 DIAGNOSIS — I10 ESSENTIAL HYPERTENSION, BENIGN: ICD-10-CM

## 2021-01-04 RX ORDER — AMLODIPINE BESYLATE 5 MG/1
TABLET ORAL
Qty: 90 TABLET | Refills: 1 | Status: SHIPPED | OUTPATIENT
Start: 2021-01-04 | End: 2021-07-06

## 2021-01-04 NOTE — TELEPHONE ENCOUNTER
norvasc      Last Written Prescription Date:  Not on medication list  Last Fill Quantity: ,   # refills:   Last Office Visit: 10/13/2020  Future Office visit:

## 2021-01-05 RX ORDER — METOPROLOL SUCCINATE 100 MG/1
TABLET, EXTENDED RELEASE ORAL
Qty: 180 TABLET | Refills: 3 | Status: SHIPPED | OUTPATIENT
Start: 2021-01-05 | End: 2022-01-28

## 2021-01-05 NOTE — TELEPHONE ENCOUNTER
metoprolol succinate ER (TOPROL-XL) 100 MG 24 hr tablet- listed as historical dated 08/25/2020    Last Office Visit: 10/24/2020 with Dr. Castellanos.  Future Office visit:       Routing refill request to provider for review/approval because:  Failed - Blood pressure over 140/90 in past 12 months   BP Readings from Last 3 Encounters:   11/27/20 (!) 144/88   10/24/20 138/88   10/13/20 (!) 168/82          Unable to complete prescription refill per RNMedication Refill Policy.................... Diya Germain RN ....................  1/5/2021   10:36 AM

## 2021-03-08 DIAGNOSIS — Z98.84 S/P GASTRIC BYPASS: ICD-10-CM

## 2021-03-08 DIAGNOSIS — I10 ESSENTIAL HYPERTENSION, BENIGN: ICD-10-CM

## 2021-03-08 RX ORDER — CYANOCOBALAMIN 1000 UG/ML
1 INJECTION, SOLUTION INTRAMUSCULAR; SUBCUTANEOUS
Qty: 3 ML | Refills: 3 | Status: SHIPPED | OUTPATIENT
Start: 2021-03-08 | End: 2022-01-28

## 2021-03-08 NOTE — TELEPHONE ENCOUNTER
[facility] sent Rx request for the following:      CYANOCOBALAMIN 1,000 MCG/ML  Sig: INJECT 1 ML (1,000 MCG) INTO THE MUSCLE EVERY 30 DAYS      Last Prescription Date:   4/17/2020  Last Fill Qty/Refills:         3ml, R-3    Last Office Visit:              10/24/2020   Future Office visit:           none    Prescription refilled per RN Medication Refill Policy.................... Farzad Cruz RN ....................  3/8/2021   4:16 PM

## 2021-03-09 ENCOUNTER — OFFICE VISIT (OUTPATIENT)
Dept: FAMILY MEDICINE | Facility: OTHER | Age: 71
End: 2021-03-09
Attending: FAMILY MEDICINE
Payer: COMMERCIAL

## 2021-03-09 VITALS
RESPIRATION RATE: 16 BRPM | WEIGHT: 190.5 LBS | HEART RATE: 62 BPM | OXYGEN SATURATION: 96 % | BODY MASS INDEX: 32.7 KG/M2 | SYSTOLIC BLOOD PRESSURE: 138 MMHG | TEMPERATURE: 97.8 F | DIASTOLIC BLOOD PRESSURE: 70 MMHG

## 2021-03-09 DIAGNOSIS — I10 ESSENTIAL HYPERTENSION, BENIGN: ICD-10-CM

## 2021-03-09 DIAGNOSIS — E87.6 HYPOKALEMIA: ICD-10-CM

## 2021-03-09 LAB
ALBUMIN SERPL-MCNC: 4 G/DL (ref 3.5–5.7)
ALP SERPL-CCNC: 66 U/L (ref 34–104)
ALT SERPL W P-5'-P-CCNC: 38 U/L (ref 7–52)
ANION GAP SERPL CALCULATED.3IONS-SCNC: 6 MMOL/L (ref 3–14)
AST SERPL W P-5'-P-CCNC: 30 U/L (ref 13–39)
BILIRUB SERPL-MCNC: 0.4 MG/DL (ref 0.3–1)
BUN SERPL-MCNC: 17 MG/DL (ref 7–25)
CALCIUM SERPL-MCNC: 10.3 MG/DL (ref 8.6–10.3)
CHLORIDE SERPL-SCNC: 96 MMOL/L (ref 98–107)
CHOLEST SERPL-MCNC: 196 MG/DL
CO2 SERPL-SCNC: 28 MMOL/L (ref 21–31)
CREAT SERPL-MCNC: 0.82 MG/DL (ref 0.6–1.2)
GFR SERPL CREATININE-BSD FRML MDRD: 69 ML/MIN/{1.73_M2}
GLUCOSE SERPL-MCNC: 108 MG/DL (ref 70–105)
HDLC SERPL-MCNC: 94 MG/DL (ref 23–92)
LDLC SERPL CALC-MCNC: 86 MG/DL
NONHDLC SERPL-MCNC: 102 MG/DL
POTASSIUM SERPL-SCNC: 4.1 MMOL/L (ref 3.5–5.1)
PROT SERPL-MCNC: 6.5 G/DL (ref 6.4–8.9)
SODIUM SERPL-SCNC: 130 MMOL/L (ref 134–144)
TRIGL SERPL-MCNC: 80 MG/DL

## 2021-03-09 PROCEDURE — 999N001160 HC STATISICAL ALDOSTERONE/RENIN RATIO: Mod: ZL | Performed by: FAMILY MEDICINE

## 2021-03-09 PROCEDURE — 99214 OFFICE O/P EST MOD 30 MIN: CPT | Performed by: FAMILY MEDICINE

## 2021-03-09 PROCEDURE — 84244 ASSAY OF RENIN: CPT | Mod: ZL | Performed by: FAMILY MEDICINE

## 2021-03-09 PROCEDURE — 82088 ASSAY OF ALDOSTERONE: CPT | Mod: ZL | Performed by: FAMILY MEDICINE

## 2021-03-09 PROCEDURE — 80053 COMPREHEN METABOLIC PANEL: CPT | Mod: ZL | Performed by: FAMILY MEDICINE

## 2021-03-09 PROCEDURE — G0463 HOSPITAL OUTPT CLINIC VISIT: HCPCS

## 2021-03-09 PROCEDURE — 36415 COLL VENOUS BLD VENIPUNCTURE: CPT | Mod: ZL | Performed by: FAMILY MEDICINE

## 2021-03-09 PROCEDURE — 80061 LIPID PANEL: CPT | Mod: ZL | Performed by: FAMILY MEDICINE

## 2021-03-09 RX ORDER — POTASSIUM CHLORIDE 1500 MG/1
20 TABLET, EXTENDED RELEASE ORAL DAILY
Qty: 90 TABLET | Refills: 4 | Status: SHIPPED | OUTPATIENT
Start: 2021-03-09 | End: 2022-01-28

## 2021-03-09 RX ORDER — HYDROCHLOROTHIAZIDE 25 MG/1
25 TABLET ORAL DAILY
Qty: 90 TABLET | Refills: 4 | Status: SHIPPED | OUTPATIENT
Start: 2021-03-09 | End: 2022-01-28

## 2021-03-09 RX ORDER — HYDROCHLOROTHIAZIDE 12.5 MG/1
TABLET ORAL
Qty: 90 TABLET | Refills: 1 | OUTPATIENT
Start: 2021-03-09

## 2021-03-09 ASSESSMENT — PAIN SCALES - GENERAL: PAINLEVEL: NO PAIN (0)

## 2021-03-09 NOTE — TELEPHONE ENCOUNTER
Please note pharmacy requesting hydrochlorothiazide 12.5 mg. Dose increased on 10/13/2020. Patient due for a follow up. Message left for patient requesting a return call to verify dose and assist her in scheduling a follow up B/P and labs monitoring visit as recommended. Patient returns call and informed. Patient states she is taking the hydrochlorothiazide 25 mg as recommended. Patient agreed to be transferred to scheduling to set up a visit. See below. Writer will refuse hydrochlorothiazide 12.5 mg.     Filled 10/13/2020 #90 x 1. Due 04/13/2021  hydrochlorothiazide (HYDRODIURIL) 25 MG tablet 90 tablet 1 10/13/2020  No   Sig - Route: Take 1 tablet (25 mg) by mouth daily - Oral   Sent to pharmacy as: hydroCHLOROthiazide 25 MG Oral Tablet (HYDRODIURIL)   Class: E-Prescribe   Order: [id number]   E-Prescribing Status: Receipt confirmed by pharmacy (10/13/2020  3:39 PM CDT)     Last Office Visit: 10/13/2020-Hypertension: Blood pressure is elevated today.  We will complete BMP for monitoring.  If BMP is stable we may need to increase the hydrochlorothiazide dose in order to better control her blood pressure.  Will need close monitoring to ensure that her sodium continues to be stable.      Called with lab results.  Sodium is in the normal range.  Increased hydrochlorothiazide to 25 mg daily.  Will recheck in 3 to 4 weeks for blood pressure monitoring and lab work.  Ciarra Elizondo PA-C ..................10/13/2020 3:40 PM       Future Office visit:    Next 5 appointments (look out 90 days)    Mar 09, 2021  3:00 PM  SHORT with Jennifer Maurice DO  [facility] ([facility] ) [address]  [place] [address] [phone]         Unable to complete prescription refill per RNMedication Refill Policy.................... Diya Germain RN ....................  3/9/2021   11:19 AM

## 2021-03-09 NOTE — PROGRESS NOTES
SUBJECTIVE:   Ness Bales is a 70 year old female who presents to clinic today for the following health issues:    HPI  Ness is here for BP check and labs.  She is currently taking: losartan 100mg daily, amlodipine 5mg, metoprolol 200mg daily, hydrochlorothiazide 25mg daily.  Rarely checks her BP.  Feels as if they have been too high as of late; but no symptoms.  Basing this on her numbers at appointments.  She denies any CP, SOB, vision changes, tinnitus.   No other supplement/mental health med changes noted today.  Tolerating medications okay: did end up changing to taking them all in the AM to help ease her medication regimen.  Noticed some improvement in her LE edema at that time.    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     Priority: Medium     Contusion of lower leg 02/25/2011     Priority: Medium     Hypothyroidism 11/12/2010     Priority: Medium     Degeneration of lumbar or lumbosacral intervertebral disc 11/05/2010     Priority: Medium     Spinal stenosis, lumbar 11/05/2010     Priority: Medium     Esophageal reflux 06/25/2010     Priority: Medium     Sacroiliac joint dysfunction 06/25/2010     Priority: Medium     Hypercholesterolemia 04/20/2009     Priority: Medium     Sleep apnea 02/12/2009     Priority: Medium     Disorder of bone and cartilage 10/29/2007     Priority: Medium     Enthesopathy of hip region 08/22/2006     Priority: Medium     Other vitamin B12 deficiency anemia 09/24/2001     Priority: Medium     Essential hypertension, benign 09/24/2001     Priority: Medium     Urge incontinence of urine 09/24/2001     Priority: Medium     Obesity 09/24/2001     Priority: Medium     Past Medical History:   Diagnosis Date     Obesity     No Comments Provided     Personal history of other medical treatment (CODE)     3 vaginal childbirth uncomplicated     Personal history of other medical  treatment (CODE)     1983,blood transfusion with stomach stapling      Past Surgical History:   Procedure Laterality Date     ARTHROPLASTY KNEE      2008,Total Right knee replacement     BIOPSY BREAST      Incisional x2 benign breast biopsies     FRACTURE SURGERY      open treatment of Humeral Fx w/ prosthesis     INJECT BOTOX N/A 09/22/2020    [facility]; [place] ([place], [place]), Dr. Joe Cruz     LAPAROSCOPIC BYPASS GASTRIC      1983,Stomach stapling complicated by incision rupture, then repaired wi second surgery     OTHER SURGICAL HISTORY      1983,22875.0,SKIN/SUBCUTANEOUS SURGERY,repaired incision from stomach stapling     OTHER SURGICAL HISTORY      2006,[id number],ARTHROPLASTY,Total glenohumeral joint replacement     OTHER SURGICAL HISTORY      [id number],ANESTHESIA ALERT,No problems with anaesthesia. ?     Family History   Problem Relation Age of Onset     Other - See Comments Mother         macular degeneration/Psychiatric illness,takes antidepressant     Heart Disease Father 40        Heart Disease,MI     Diabetes Father         Diabetes     Other - See Comments Son         Psychiatric illness,psychotic episode this year smoking marijuana, ephedra use ? bipolar     Other - See Comments Sister         Psychiatric illness,depression     Other - See Comments Brother         Psychiatric illness,depression     Social History     Tobacco Use     Smoking status: Never Smoker     Smokeless tobacco: Never Used   Substance Use Topics     Alcohol use: No     Comment: very little      Social History     Social History Narrative    , 3 adult children, does clerical work part-time for [employer]     Current Outpatient Medications   Medication Sig Dispense Refill     hydrochlorothiazide (HYDRODIURIL) 25 MG tablet Take 1 tablet (25 mg) by mouth daily 90 tablet 4     potassium chloride ER (KLOR-CON) 20 MEQ CR tablet Take 1 tablet (20 mEq) by mouth daily 90 tablet 4     amLODIPine (NORVASC) 5 MG tablet TAKE 1 TABLET BY MOUTH  EVERY DAY 90 tablet 1     ARIPiprazole (ABILIFY) 5 MG tablet Take 1 tablet (5 mg) by mouth daily       cholecalciferol (VITAMIN D-1000 MAX ST) 25 MCG (1000 UT) TABS Take 8,000 Units by mouth daily       Coenzyme Q10 (CO Q 10) 100 MG CAPS Take by mouth daily        cyanocobalamin (CYANOCOBALAMIN) 1000 MCG/ML injection INJECT 1 ML (1,000 MCG) INTO THE MUSCLE EVERY 30 DAYS 3 mL 3     DULoxetine (CYMBALTA) 60 MG capsule TAKE 2 CAPS IN AM.. MAY USE ANY FORMULATION TO EQUAL 120 MG,       ferrous sulfate (IRON) 325 (65 FE) MG tablet Take 325 mg by mouth       fish oil-omega-3 fatty acids 1000 MG capsule Take 1 capsule by mouth       levothyroxine (SYNTHROID/LEVOTHROID) 25 MCG tablet Take 1 tablet (25 mcg) by mouth daily 90 tablet 4     losartan (COZAAR) 100 MG tablet Take 1 tablet (100 mg) by mouth daily 90 tablet 4     metoprolol succinate ER (TOPROL-XL) 100 MG 24 hr tablet TAKE 2 TABLETS BY MOUTH EVERY  tablet 3     modafinil (PROVIGIL) 200 MG tablet Take 2 tablets (400 mg) by mouth daily       Syringe Luer Slip 3 ML MISC 1 each every 30 days 25 each 0     triamcinolone (KENALOG) 0.1 % external cream Apply topically 2 times daily 45 g 2     Allergies   Allergen Reactions     Chlorthalidone Other (See Comments)     Caused hyponatremia     OBJECTIVE:     /70   Pulse 62   Temp 97.8  F (36.6  C) (Tympanic)   Resp 16   Wt 86.4 kg (190 lb 8 oz)   SpO2 96%   BMI 32.70 kg/m    Body mass index is 32.7 kg/m .  Physical Exam  Vitals signs and nursing note reviewed.   Constitutional:       Appearance: Normal appearance. She is obese.   HENT:      Head: Normocephalic and atraumatic.   Cardiovascular:      Rate and Rhythm: Normal rate and regular rhythm.      Pulses: Normal pulses.      Heart sounds: No murmur. No gallop.    Pulmonary:      Effort: Pulmonary effort is normal. No respiratory distress.      Breath sounds: No rales.   Skin:     Capillary Refill: Capillary refill takes less than 2 seconds.    Neurological:      General: No focal deficit present.      Mental Status: She is alert.   Psychiatric:         Mood and Affect: Mood normal.     Diagnostic Test Results:  Results for orders placed or performed in visit on 03/09/21   Lipid Panel     Status: Abnormal   Result Value Ref Range    Cholesterol 196 <200 mg/dL    Triglycerides 80 <150 mg/dL    HDL Cholesterol 94 (H) 23 - 92 mg/dL    LDL Cholesterol Calculated 86 <100 mg/dL    Non HDL Cholesterol 102 <130 mg/dL   Comprehensive Metabolic Panel     Status: Abnormal   Result Value Ref Range    Sodium 130 (L) 134 - 144 mmol/L    Potassium 4.1 3.5 - 5.1 mmol/L    Chloride 96 (L) 98 - 107 mmol/L    Carbon Dioxide 28 21 - 31 mmol/L    Anion Gap 6 3 - 14 mmol/L    Glucose 108 (H) 70 - 105 mg/dL    Urea Nitrogen 17 7 - 25 mg/dL    Creatinine 0.82 0.60 - 1.20 mg/dL    GFR Estimate 69 >60 mL/min/[1.73_m2]    GFR Estimate If Black 84 >60 mL/min/[1.73_m2]    Calcium 10.3 8.6 - 10.3 mg/dL    Bilirubin Total 0.4 0.3 - 1.0 mg/dL    Albumin 4.0 3.5 - 5.7 g/dL    Protein Total 6.5 6.4 - 8.9 g/dL    Alkaline Phosphatase 66 34 - 104 U/L    ALT 38 7 - 52 U/L    AST 30 13 - 39 U/L       ASSESSMENT/PLAN:     1. Essential hypertension, benign  Borderline high; but not concerning to increase any of her medications.  Continue efforts of weight loss and suspect continued improvement.  However, due to requiring 4 medications; recommended secondary HTN work up.  Patient would like to do a limited work up with the more common of the secondary HTN causes; and not urine metanephrines, etc for some of the more rare conditions.  Therefore will add aldosterone/renin labs and obtain a renal US to look for causes.  She will reduce salt, caffeine and attempt maximizing use of CPAP to also help improve BP.  No changes to medications today.  Monitoring labs obtained.  - hydrochlorothiazide (HYDRODIURIL) 25 MG tablet; Take 1 tablet (25 mg) by mouth daily  Dispense: 90 tablet; Refill: 4  -  Comprehensive Metabolic Panel; Future  - Lipid Panel; Future  - Aldosterone; Future  - Renin activity; Future  - Aldosterone Renin Ratio; Future  - US Renal Complete w Doppler Complete; Future  - Aldosterone Renin Ratio  - Renin activity  - Aldosterone  - Lipid Panel  - Comprehensive Metabolic Panel    2. Hypokalemia  Caused by hydrochlorothiazide.  Will continue to evaluate ability to reduce if BP improves with lifestyle measures; however for now - will continue to Rx KCl 20mEq daily.  - potassium chloride ER (KLOR-CON) 20 MEQ CR tablet; Take 1 tablet (20 mEq) by mouth daily  Dispense: 90 tablet; Refill: 4    Activity Duration    Pre-charting 2 minutes    Exam room 25 minutes    Exam room < 1 minute    Chart accessed 2 minutes    Total time: 30 minutes*       Jennifer Maurice, [facility]

## 2021-03-09 NOTE — NURSING NOTE
"Chief Complaint   Patient presents with     Recheck Medication       Initial BP (!) 140/74   Pulse 62   Temp 97.8  F (36.6  C) (Tympanic)   Resp 16   Wt 86.4 kg (190 lb 8 oz)   SpO2 96%   BMI 32.70 kg/m   Estimated body mass index is 32.7 kg/m  as calculated from the following:    Height as of 11/27/20: 1.626 m (5' 4\").    Weight as of this encounter: 86.4 kg (190 lb 8 oz).  Medication Reconciliation: complete    Conchita Bautista LPN  "

## 2021-03-12 LAB
ALDOST SERPL-MCNC: 11.1 NG/DL (ref 0–31)
RENIN PLAS-CCNC: 0.1 NG/ML/HR

## 2021-03-14 ENCOUNTER — MYC MEDICAL ADVICE (OUTPATIENT)
Dept: FAMILY MEDICINE | Facility: OTHER | Age: 71
End: 2021-03-14

## 2021-03-14 LAB — ALDOSTERONE RENIN RATIO: 111 (ref 0–25)

## 2021-03-16 DIAGNOSIS — E87.1 HYPONATREMIA: ICD-10-CM

## 2021-03-16 DIAGNOSIS — R79.89 ABNORMAL ALDOSTERONE TO RENIN RATIO: ICD-10-CM

## 2021-03-16 DIAGNOSIS — I10 ESSENTIAL HYPERTENSION, BENIGN: Primary | ICD-10-CM

## 2021-03-16 DIAGNOSIS — E87.6 HYPOKALEMIA: ICD-10-CM

## 2021-03-16 NOTE — PROGRESS NOTES
Abnormal aldosterone:renin ratio - referral placed to Endocrinology.  Jennifer Maurice DO on 3/16/2021 at 6:13 PM

## 2021-03-19 ENCOUNTER — OFFICE VISIT (OUTPATIENT)
Dept: FAMILY MEDICINE | Facility: OTHER | Age: 71
End: 2021-03-19
Attending: NURSE PRACTITIONER
Payer: COMMERCIAL

## 2021-03-19 VITALS
HEART RATE: 60 BPM | RESPIRATION RATE: 18 BRPM | SYSTOLIC BLOOD PRESSURE: 136 MMHG | DIASTOLIC BLOOD PRESSURE: 82 MMHG | HEIGHT: 64 IN | WEIGHT: 190.6 LBS | BODY MASS INDEX: 32.54 KG/M2 | TEMPERATURE: 98.6 F | OXYGEN SATURATION: 100 %

## 2021-03-19 DIAGNOSIS — N89.8 VAGINAL ITCHING: ICD-10-CM

## 2021-03-19 DIAGNOSIS — R39.89 SUSPECTED UTI: Primary | ICD-10-CM

## 2021-03-19 DIAGNOSIS — B37.41 YEAST CYSTITIS: ICD-10-CM

## 2021-03-19 DIAGNOSIS — N30.00 ACUTE CYSTITIS WITHOUT HEMATURIA: ICD-10-CM

## 2021-03-19 LAB
ALBUMIN UR-MCNC: NEGATIVE MG/DL
APPEARANCE UR: CLEAR
BACTERIA #/AREA URNS HPF: ABNORMAL /HPF
BILIRUB UR QL STRIP: NEGATIVE
COLOR UR AUTO: ABNORMAL
GLUCOSE UR STRIP-MCNC: NEGATIVE MG/DL
HGB UR QL STRIP: NEGATIVE
KETONES UR STRIP-MCNC: NEGATIVE MG/DL
LEUKOCYTE ESTERASE UR QL STRIP: ABNORMAL
MUCOUS THREADS #/AREA URNS LPF: PRESENT /LPF
NITRATE UR QL: NEGATIVE
PH UR STRIP: 6.5 PH (ref 5–7)
RBC #/AREA URNS AUTO: 1 /HPF (ref 0–2)
SOURCE: ABNORMAL
SP GR UR STRIP: 1.01 (ref 1–1.03)
UROBILINOGEN UR STRIP-MCNC: NORMAL MG/DL (ref 0–2)
WBC #/AREA URNS AUTO: 107 /HPF (ref 0–5)
YEAST #/AREA URNS HPF: ABNORMAL /HPF

## 2021-03-19 PROCEDURE — 87086 URINE CULTURE/COLONY COUNT: CPT | Mod: ZL | Performed by: NURSE PRACTITIONER

## 2021-03-19 PROCEDURE — 81001 URINALYSIS AUTO W/SCOPE: CPT | Mod: ZL | Performed by: NURSE PRACTITIONER

## 2021-03-19 PROCEDURE — 99214 OFFICE O/P EST MOD 30 MIN: CPT | Performed by: NURSE PRACTITIONER

## 2021-03-19 PROCEDURE — G0463 HOSPITAL OUTPT CLINIC VISIT: HCPCS

## 2021-03-19 PROCEDURE — 87088 URINE BACTERIA CULTURE: CPT | Mod: ZL | Performed by: NURSE PRACTITIONER

## 2021-03-19 RX ORDER — SULFAMETHOXAZOLE/TRIMETHOPRIM 800-160 MG
1 TABLET ORAL 2 TIMES DAILY
Qty: 6 TABLET | Refills: 0 | Status: SHIPPED | OUTPATIENT
Start: 2021-03-19 | End: 2021-03-22

## 2021-03-19 RX ORDER — FLUCONAZOLE 150 MG/1
TABLET ORAL
Qty: 3 TABLET | Refills: 0 | Status: SHIPPED | OUTPATIENT
Start: 2021-03-19 | End: 2021-04-15

## 2021-03-19 RX ORDER — TOPIRAMATE 25 MG/1
TABLET, FILM COATED ORAL
COMMUNITY
Start: 2021-03-15 | End: 2022-12-09

## 2021-03-19 ASSESSMENT — PAIN SCALES - GENERAL: PAINLEVEL: NO PAIN (0)

## 2021-03-19 ASSESSMENT — MIFFLIN-ST. JEOR: SCORE: 1374.56

## 2021-03-19 NOTE — PROGRESS NOTES
HPI:    Ness Bales is a 70 year old female  who presents to [facility] today for possible UTI.    Urinary frequency and vaginal itching for the past 2 days.  States she is having to urinate about every 20 minutes.  No abdominal pressure.  No dysuria or burning.  No blood in urine.  No fevers or chills.  No nausea or vomiting.  No change in appetite.  Softer bowel movements which is her normal, no change.  No back pain.  No vaginal discharge.  Last UTI was 11/27/2020, treated with Bactrim, tolerated well.  She sees urology for overactive bladder.  She last seen urology on 3/12/21 and had cystourethroscopy with botox injections.  She gets the injections about every 6 months.       Past Medical History:   Diagnosis Date     Obesity     No Comments Provided     Personal history of other medical treatment (CODE)     3 vaginal childbirth uncomplicated     Personal history of other medical treatment (CODE)     1983,blood transfusion with stomach stapling     Past Surgical History:   Procedure Laterality Date     ARTHROPLASTY KNEE      2008,Total Right knee replacement     BIOPSY BREAST      Incisional x2 benign breast biopsies     FRACTURE SURGERY      open treatment of Humeral Fx w/ prosthesis     INJECT BOTOX N/A 09/22/2020    [facility]; [facility] ([place], [place]), Dr. Joe Cruz     LAPAROSCOPIC BYPASS GASTRIC      1983,Stomach stapling complicated by incision rupture, then repaired [facility] second surgery     OTHER SURGICAL HISTORY      1983,11549.0,SKIN/SUBCUTANEOUS SURGERY,repaired incision from stomach stapling     OTHER SURGICAL HISTORY      2006,[id number],ARTHROPLASTY,Total glenohumeral joint replacement     OTHER SURGICAL HISTORY      807374,ANESTHESIA ALERT,No problems with anaesthesia. ?     Social History     Tobacco Use     Smoking status: Never Smoker     Smokeless tobacco: Never Used   Substance Use Topics     Alcohol use: No     Comment: very little      Current Outpatient Medications   Medication Sig  "Dispense Refill     amLODIPine (NORVASC) 5 MG tablet TAKE 1 TABLET BY MOUTH EVERY DAY 90 tablet 1     ARIPiprazole (ABILIFY) 5 MG tablet Take 1 tablet (5 mg) by mouth daily       cholecalciferol (VITAMIN D-1000 MAX ST) 25 MCG (1000 UT) TABS Take 8,000 Units by mouth daily       Coenzyme Q10 (CO Q 10) 100 MG CAPS Take by mouth daily        cyanocobalamin (CYANOCOBALAMIN) 1000 MCG/ML injection INJECT 1 ML (1,000 MCG) INTO THE MUSCLE EVERY 30 DAYS 3 mL 3     DULoxetine (CYMBALTA) 60 MG capsule TAKE 2 CAPS IN AM.. MAY USE ANY FORMULATION TO EQUAL 120 MG,       ferrous sulfate (IRON) 325 (65 FE) MG tablet Take 325 mg by mouth       fish oil-omega-3 fatty acids 1000 MG capsule Take 1 capsule by mouth       hydrochlorothiazide (HYDRODIURIL) 25 MG tablet Take 1 tablet (25 mg) by mouth daily 90 tablet 4     levothyroxine (SYNTHROID/LEVOTHROID) 25 MCG tablet Take 1 tablet (25 mcg) by mouth daily 90 tablet 4     losartan (COZAAR) 100 MG tablet Take 1 tablet (100 mg) by mouth daily 90 tablet 4     metoprolol succinate ER (TOPROL-XL) 100 MG 24 hr tablet TAKE 2 TABLETS BY MOUTH EVERY  tablet 3     modafinil (PROVIGIL) 200 MG tablet Take 2 tablets (400 mg) by mouth daily       potassium chloride ER (KLOR-CON) 20 MEQ CR tablet Take 1 tablet (20 mEq) by mouth daily 90 tablet 4     Syringe Luer Slip 3 ML MISC 1 each every 30 days 25 each 0     topiramate (TOPAMAX) 25 MG tablet TAKE 1 TABLET BY MOUTH TWO TIMES A DAY. DO NOT CRUSH.       triamcinolone (KENALOG) 0.1 % external cream Apply topically 2 times daily (Patient not taking: Reported on 3/19/2021) 45 g 2     Allergies   Allergen Reactions     Chlorthalidone Other (See Comments)     Caused hyponatremia         Past medical history, past surgical history, current medications and allergies reviewed and accurate to the best of my knowledge.        ROS:  Refer to HPI    /82   Pulse 60   Temp 98.6  F (37  C) (Tympanic)   Resp 18   Ht 1.626 m (5' 4\")   Wt 86.5 kg (190 " lb 9.6 oz)   SpO2 100%   BMI 32.72 kg/m      EXAM:  General Appearance: Well appearing middle aged adult female, appropriate appearance for age. No acute distress  Respiratory: normal chest wall and respirations.  Normal effort. No cough appreciated.  Cardiac: RRR with no murmurs  Abdomen: soft, nontender, no rigidity, no rebound tenderness or guarding, normal bowel sounds present  :  No suprapubic tenderness to palpation.  No CVA tenderness to palpation.    Musculoskeletal:  Equal movement of bilateral upper extremities.  Equal movement of bilateral lower extremities.  Normal gait.    Psychological: normal affect, alert, oriented, and pleasant.       Labs:  Results for orders placed or performed in visit on 03/19/21   UA reflex to Microscopic and Culture     Status: Abnormal    Specimen: Midstream Urine   Result Value Ref Range    Color Urine Light Yellow     Appearance Urine Clear     Glucose Urine Negative NEG^Negative mg/dL    Bilirubin Urine Negative NEG^Negative    Ketones Urine Negative NEG^Negative mg/dL    Specific Gravity Urine 1.013 1.003 - 1.035    Blood Urine Negative NEG^Negative    pH Urine 6.5 5.0 - 7.0 pH    Protein Albumin Urine Negative NEG^Negative mg/dL    Urobilinogen mg/dL Normal 0.0 - 2.0 mg/dL    Nitrite Urine Negative NEG^Negative    Leukocyte Esterase Urine Large (A) NEG^Negative    Source Midstream Urine     RBC Urine 1 0 - 2 /HPF    WBC Urine 107 (H) 0 - 5 /HPF    Bacteria Urine Few (A) NEG^Negative /HPF    Yeast Urine Few (A) NEG^Negative /HPF    Mucous Urine Present (A) NEG^Negative /LPF               ASSESSMENT/PLAN:    I have reviewed the nursing notes.  I have reviewed the findings, diagnosis, plan and need for follow up with the patient.    1. Suspected UTI    - UA reflex to Microscopic and Culture  - Urine Culture Aerobic Bacterial    2. Vaginal itching    - fluconazole (DIFLUCAN) 150 MG tablet; Take one tab every 3 days for 3 doses  Dispense: 3 tablet; Refill: 0    3. Acute  cystitis without hematuria    - sulfamethoxazole-trimethoprim (BACTRIM DS) 800-160 MG tablet; Take 1 tablet by mouth 2 times daily for 3 days  Dispense: 6 tablet; Refill: 0    Urinalysis - clear, negative blood, negative nitrite, large leukocyte estrace  Urine microscopic - RBC 1, , bacteria few, yeast few    Urine culture pending  Will call if culture warrants change of abx.     May use Pyridium OTC PRN.     Encouraged fluids and frequent bladder emptying.      4. Yeast cystitis    - fluconazole (DIFLUCAN) 150 MG tablet; Take one tab every 3 days for 3 doses  Dispense: 3 tablet; Refill: 0    Yeast present in urine microscopic and patient having vaginal itching so will treat with Fluconazole.        I explained my diagnostic considerations and recommendations to the patient, who voiced understanding and agreement with the treatment plan. All questions were answered. We discussed potential side effects of any prescribed or recommended therapies, as well as expectations for response to treatments.    Disclaimer:  This note consists of words and symbols derived from keyboarding, dictation, or using voice recognition software. As a result, there may be errors in the script that have gone undetected. Please consider this when interpreting information found in this note.

## 2021-03-19 NOTE — NURSING NOTE
"Chief Complaint   Patient presents with     Urinary Problem     Patient is here for urinary frequency and vaginal itching that started 2 days ago. Patient denies taking anything for her symptoms.     Initial /82   Pulse 60   Temp 98.6  F (37  C) (Tympanic)   Resp 18   Ht 1.626 m (5' 4\")   Wt 86.5 kg (190 lb 9.6 oz)   SpO2 100%   BMI 32.72 kg/m   Estimated body mass index is 32.72 kg/m  as calculated from the following:    Height as of this encounter: 1.626 m (5' 4\").    Weight as of this encounter: 86.5 kg (190 lb 9.6 oz).  Medication Reconciliation: complete    Sagrario Ch LPN  "

## 2021-03-21 LAB
BACTERIA SPEC CULT: ABNORMAL
SPECIMEN SOURCE: ABNORMAL

## 2021-04-15 ENCOUNTER — HOSPITAL ENCOUNTER (OUTPATIENT)
Dept: ULTRASOUND IMAGING | Facility: OTHER | Age: 71
Discharge: HOME | End: 2021-04-15
Attending: FAMILY MEDICINE | Admitting: FAMILY MEDICINE
Payer: COMMERCIAL

## 2021-04-15 DIAGNOSIS — I10 ESSENTIAL HYPERTENSION, BENIGN: ICD-10-CM

## 2021-04-15 DIAGNOSIS — I15.0 RENOVASCULAR HYPERTENSION: ICD-10-CM

## 2021-04-15 DIAGNOSIS — I70.1 RENAL ARTERY STENOSIS (H): Primary | ICD-10-CM

## 2021-04-15 PROCEDURE — 93975 VASCULAR STUDY: CPT

## 2021-04-15 PROCEDURE — 76770 US EXAM ABDO BACK WALL COMP: CPT

## 2021-04-15 NOTE — PROGRESS NOTES
Referral for vascular surgery placed due to R renal artery stenosis.  Jennifer Maurice,  on 4/15/2021 at 1:07 PM

## 2021-07-01 DIAGNOSIS — I10 ESSENTIAL HYPERTENSION, BENIGN: ICD-10-CM

## 2021-07-01 NOTE — TELEPHONE ENCOUNTER
amLODIPine (NORVASC) 5 MG tablet    Last Written Prescription Date:  1/4/21  Last Fill Quantity: 90,   # refills: 1  Last Office Visit: 3/19/21  Future Office visit:       Routing refill request to provider for review/approval

## 2021-07-02 DIAGNOSIS — I10 ESSENTIAL HYPERTENSION, BENIGN: ICD-10-CM

## 2021-07-05 RX ORDER — HYDROCHLOROTHIAZIDE 12.5 MG/1
TABLET ORAL
Qty: 90 TABLET | Refills: 1 | OUTPATIENT
Start: 2021-07-05

## 2021-07-05 NOTE — TELEPHONE ENCOUNTER
[facility]  sent Rx request for the following:     Requested Prescriptions   Pending Prescriptions Disp Refills     hydrochlorothiazide (HYDRODIURIL) 12.5 MG tablet [Pharmacy Med Name: HYDROCHLOROTHIAZIDE 12.5 MG TB] 90 tablet 1     Sig: TAKE 1 TABLET BY MOUTH EVERY DAY        Last Prescription Date:   3/9/2021  Last Fill Qty/Refills:         90, R-4    Last Office Visit:              3/9/2021  Future Office visit:           none    Redundant refill request refused: Too soon:    Unable to complete prescription refill per RN Medication Refill Policy.................... Holli Merida RN ....................  7/5/2021   10:48 AM

## 2021-07-06 RX ORDER — AMLODIPINE BESYLATE 5 MG/1
TABLET ORAL
Qty: 90 TABLET | Refills: 1 | Status: SHIPPED | OUTPATIENT
Start: 2021-07-06 | End: 2022-01-25

## 2021-07-06 NOTE — TELEPHONE ENCOUNTER
"Requested Prescriptions   Pending Prescriptions Disp Refills     amLODIPine (NORVASC) 5 MG tablet [Pharmacy Med Name: AMLODIPINE BESYLATE 5 MG TAB] 90 tablet 1     Sig: TAKE 1 TABLET BY MOUTH EVERY DAY       Calcium Channel Blockers Protocol  Failed - 7/1/2021 12:15 PM        Failed - Recent (12 mo) or future (30 days) visit within the authorizing provider's specialty     Patient has had an office visit with the authorizing provider or a provider within the authorizing providers department within the previous 12 mos or has a future within next 30 days. See \"Patient Info\" tab in inbasket, or \"Choose Columns\" in Meds & Orders section of the refill encounter.              Passed - Blood pressure under 140/90 in past 12 months     BP Readings from Last 3 Encounters:   03/19/21 136/82   03/09/21 138/70   11/27/20 (!) 144/88                 Passed - Medication is active on med list        Passed - Patient is age 18 or older        Passed - No active pregnancy on record        Passed - Normal serum creatinine on file in past 12 months     Recent Labs   Lab Test 03/09/21  1546   CR 0.82       Ok to refill medication if creatinine is low          Passed - No positive pregnancy test in past 12 months           Last Written Prescription Date:  1/4/21  Last Fill Quantity: 90,   # refills: 1  Last Office Visit: 1/29/20  Future Office visit:   none    Routing refill request to provider for review/approval because:  Abnormal blood pressure and over a year since seen by Cardiology    Unable to complete prescription refill per RN Medication Refill Policy.   Lynda Yung RN ....................  7/6/2021   8:21 AM    "

## 2021-08-01 ENCOUNTER — OFFICE VISIT (OUTPATIENT)
Dept: FAMILY MEDICINE | Facility: OTHER | Age: 71
End: 2021-08-01
Attending: NURSE PRACTITIONER
Payer: COMMERCIAL

## 2021-08-01 VITALS
DIASTOLIC BLOOD PRESSURE: 80 MMHG | WEIGHT: 199.4 LBS | HEIGHT: 64 IN | TEMPERATURE: 98.3 F | SYSTOLIC BLOOD PRESSURE: 136 MMHG | BODY MASS INDEX: 34.04 KG/M2

## 2021-08-01 DIAGNOSIS — N39.9 URINARY PROBLEM IN FEMALE: Primary | ICD-10-CM

## 2021-08-01 LAB
ALBUMIN UR-MCNC: 30 MG/DL
APPEARANCE UR: ABNORMAL
BILIRUB UR QL STRIP: NEGATIVE
COLOR UR AUTO: ABNORMAL
GLUCOSE UR STRIP-MCNC: NEGATIVE MG/DL
HGB UR QL STRIP: ABNORMAL
KETONES UR STRIP-MCNC: NEGATIVE MG/DL
LEUKOCYTE ESTERASE UR QL STRIP: ABNORMAL
NITRATE UR QL: NEGATIVE
PH UR STRIP: 6 [PH] (ref 5–9)
RBC URINE: 10 /HPF
SP GR UR STRIP: 1.01 (ref 1–1.03)
UROBILINOGEN UR STRIP-MCNC: NORMAL MG/DL
WBC URINE: >182 /HPF

## 2021-08-01 PROCEDURE — 81001 URINALYSIS AUTO W/SCOPE: CPT | Mod: ZL

## 2021-08-01 PROCEDURE — 87086 URINE CULTURE/COLONY COUNT: CPT | Mod: ZL | Performed by: FAMILY MEDICINE

## 2021-08-01 PROCEDURE — G0463 HOSPITAL OUTPT CLINIC VISIT: HCPCS

## 2021-08-01 PROCEDURE — 99213 OFFICE O/P EST LOW 20 MIN: CPT | Performed by: FAMILY MEDICINE

## 2021-08-01 RX ORDER — SULFAMETHOXAZOLE/TRIMETHOPRIM 800-160 MG
1 TABLET ORAL 2 TIMES DAILY
Qty: 10 TABLET | Refills: 0 | Status: SHIPPED | OUTPATIENT
Start: 2021-08-01 | End: 2021-10-17

## 2021-08-01 ASSESSMENT — MIFFLIN-ST. JEOR: SCORE: 1410.5

## 2021-08-01 ASSESSMENT — PAIN SCALES - GENERAL: PAINLEVEL: NO PAIN (0)

## 2021-08-01 NOTE — NURSING NOTE
"Chief Complaint   Patient presents with     UTI     urinary incontinence, intense itching with urination     Patient stated her symptoms started about 2 days ago.    Initial /80 (BP Location: Right arm, Patient Position: Sitting, Cuff Size: Adult Large)   Temp 98.3  F (36.8  C) (Tympanic)   Ht 1.619 m (5' 3.75\")   Wt 90.4 kg (199 lb 6.4 oz)   Breastfeeding No   BMI 34.50 kg/m   Estimated body mass index is 34.5 kg/m  as calculated from the following:    Height as of this encounter: 1.619 m (5' 3.75\").    Weight as of this encounter: 90.4 kg (199 lb 6.4 oz).  Medication Reconciliation: Completed     Elton Lima LPN  "

## 2021-08-01 NOTE — PROGRESS NOTES
"Nursing Notes:   Elton Lima LPN  8/1/2021  2:19 PM  Signed  Chief Complaint   Patient presents with     UTI     urinary incontinence, intense itching with urination     Patient stated her symptoms started about 2 days ago.    Initial /80 (BP Location: Right arm, Patient Position: Sitting, Cuff Size: Adult Large)   Temp 98.3  F (36.8  C) (Tympanic)   Ht 1.619 m (5' 3.75\")   Wt 90.4 kg (199 lb 6.4 oz)   Breastfeeding No   BMI 34.50 kg/m   Estimated body mass index is 34.5 kg/m  as calculated from the following:    Height as of this encounter: 1.619 m (5' 3.75\").    Weight as of this encounter: 90.4 kg (199 lb 6.4 oz).  Medication Reconciliation: Completed     Elton Lima LPN        ASSESSMENT: UTI uncomplicated without evidence of pyelonephritis    PLAN: Treatment per orders and treated with Bactrim DS pending UC - also push fluids, may use Pyridium OTC prn. Call or return to clinic prn if these symptoms worsen or fail to improve as anticipated.    Shira French MD  3:29 PM 8/1/2021   SUBJECTIVE: Ness Bales is a 70 year old female who complains of urinary frequency, some increase in incontinence and an itching sensation that occurs with voiding. This has been her symptoms with previous UTIs and had + urine culture this past spring.      Social History     Social History Narrative    , 3 adult children, does clerical work part-time for [employer]     Medications reviewed.     OBJECTIVE:   Vital signs:  Temp: 98.3  F (36.8  C) Temp src: Tympanic BP: 136/80             Height: 161.9 cm (5' 3.75\") Weight: 90.4 kg (199 lb 6.4 oz)  Estimated body mass index is 34.5 kg/m  as calculated from the following:    Height as of this encounter: 1.619 m (5' 3.75\").    Weight as of this encounter: 90.4 kg (199 lb 6.4 oz).      Appears well, in no apparent distress.   Results for orders placed or performed in visit on 08/01/21   UA reflex to Microscopic and Culture     Status: Abnormal    Specimen: Urine, " Midstream   Result Value Ref Range    Color Urine Light Yellow Colorless, Straw, Light Yellow, Yellow    Appearance Urine Slightly Cloudy (A) Clear    Glucose Urine Negative Negative mg/dL    Bilirubin Urine Negative Negative    Ketones Urine Negative Negative mg/dL    Specific Gravity Urine 1.014 1.000 - 1.030    Blood Urine Trace (A) Negative    pH Urine 6.0 5.0 - 9.0    Protein Albumin Urine 30  (A) Negative mg/dL    Urobilinogen Urine Normal Normal, 2.0 mg/dL    Nitrite Urine Negative Negative    Leukocyte Esterase Urine Large (A) Negative    RBC Urine 10 (H) <=2 /HPF    WBC Urine >182 (H) <=5 /HPF    Narrative    Urine Culture ordered based on laboratory criteria     I have personally reviewed the labs listed above.

## 2021-08-03 LAB — BACTERIA UR CULT: ABNORMAL

## 2021-09-02 DIAGNOSIS — I10 ESSENTIAL HYPERTENSION, BENIGN: ICD-10-CM

## 2021-09-02 DIAGNOSIS — E03.9 HYPOTHYROIDISM, UNSPECIFIED TYPE: ICD-10-CM

## 2021-09-07 RX ORDER — LOSARTAN POTASSIUM 100 MG/1
TABLET ORAL
Qty: 90 TABLET | Refills: 4 | Status: SHIPPED | OUTPATIENT
Start: 2021-09-07 | End: 2022-10-17

## 2021-09-07 NOTE — TELEPHONE ENCOUNTER
[facility] for the following:      Requested Prescriptions   Pending Prescriptions Disp Refills     losartan (COZAAR) 100 MG tablet [Pharmacy Med Name: LOSARTAN POTASSIUM 100 MG TAB] 90 tablet 4     Sig: TAKE 1 TABLET BY MOUTH EVERY DAY       Angiotensin-II Receptors Passed - 9/7/2021  2:45 PM      Last Prescription Date:   8/25/2020  Last Fill Qty/Refills:         90, R-4        levothyroxine (SYNTHROID/LEVOTHROID) 25 MCG tablet [Pharmacy Med Name: LEVOTHYROXINE 25 MCG TABLET] 90 tablet 4     Sig: TAKE 1 TABLET BY MOUTH EVERY DAY       Thyroid Protocol Failed - 9/7/2021  2:45 PM        Failed - Normal TSH on file in past 12 months     No lab results found.         Last Prescription Date:   8/25/2020  Last Fill Qty/Refills:         90, R-4     Last Office Visit:              3/9/2021   Future Office visit:           None  Routing refill request to provider for review/approval because:  Unable to complete prescription refill per RN Medication Refill Policy. Tracey Cullen RN ....................  9/7/2021   2:48 PM

## 2021-09-08 RX ORDER — LEVOTHYROXINE SODIUM 25 UG/1
TABLET ORAL
Qty: 90 TABLET | Refills: 4 | Status: SHIPPED | OUTPATIENT
Start: 2021-09-08 | End: 2022-11-03

## 2021-10-09 ENCOUNTER — HEALTH MAINTENANCE LETTER (OUTPATIENT)
Age: 71
End: 2021-10-09

## 2021-10-17 ENCOUNTER — OFFICE VISIT (OUTPATIENT)
Dept: FAMILY MEDICINE | Facility: OTHER | Age: 71
End: 2021-10-17
Attending: NURSE PRACTITIONER
Payer: COMMERCIAL

## 2021-10-17 VITALS
HEIGHT: 64 IN | RESPIRATION RATE: 12 BRPM | HEART RATE: 60 BPM | OXYGEN SATURATION: 98 % | BODY MASS INDEX: 36.74 KG/M2 | SYSTOLIC BLOOD PRESSURE: 136 MMHG | WEIGHT: 215.2 LBS | TEMPERATURE: 98.7 F | DIASTOLIC BLOOD PRESSURE: 82 MMHG
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
## 2021-10-17 PROCEDURE — 99213 OFFICE O/P EST LOW 20 MIN: CPT | Performed by: NURSE PRACTITIONER

## 2021-10-17 PROCEDURE — G0463 HOSPITAL OUTPT CLINIC VISIT: HCPCS

## 2021-10-17 RX ORDER — DOXYCYCLINE 100 MG/1
200 CAPSULE ORAL ONCE
Qty: 2 CAPSULE | Refills: 0 | Status: SHIPPED | OUTPATIENT
Start: 2021-10-17 | End: 2021-10-17

## 2021-10-17 RX ORDER — PHENTERMINE HYDROCHLORIDE 37.5 MG/1
37.5 TABLET ORAL
Status: ON HOLD | COMMUNITY
Start: 2021-09-16 | End: 2023-05-16

## 2021-10-17 ASSESSMENT — PAIN SCALES - GENERAL: PAINLEVEL: NO PAIN (0)

## 2021-10-17 ASSESSMENT — MIFFLIN-ST. JEOR: SCORE: 1486.14

## 2021-10-17 NOTE — PROGRESS NOTES
"ASSESSMENT/PLAN:  1. Tick bite, unspecified site, initial encounter    - doxycycline hyclate (VIBRAMYCIN) 100 MG capsule; Take 2 capsules (200 mg) by mouth once for 1 dose  Dispense: 2 capsule; Refill: 0      Instructed the patient to monitor for any secondary infection symptoms including increased erythema, swelling, pain or discharge. Instructed the patient to avoid \"digging out\" any ticks in the future as this can put her at risk for infection.     Doxycycline 200 mg one time for prophylactic dosing    No lab work needed today as it is too soon for accurate results   Instructed to wash tick bite with soap and water. Apply antibiotic ointment to site 1-2 times daily until healed.    Watch for flu like illness such as fevers and chills; arthritis type pain such as joint pains and stiffness.     Follow up if development of any of these symptoms for further evaluation.     May use over-the-counter Tylenol or ibuprofen PRN    Discussed warning signs/symptoms indicative of need to f/u    Follow up if symptoms persist or worsen or concerns      I explained my diagnostic considerations and recommendations to the patient, who voiced understanding and agreement with the treatment plan. All questions were answered. We discussed potential side effects of any prescribed or recommended therapies, as well as expectations for response to treatments.        HPI:    Ness Bales is a 70 year old female  who presents to [facility] today for tick bite to her left arm. The patient states that she removed the tick this morning. Identified this as a deer tick. Believes that the tick was attached for a few days. Denies any fevers or increase in joint pain. The patient states that a knife was used to dig out the remaining tick.     Past Medical History:   Diagnosis Date     Obesity     No Comments Provided     Personal history of other medical treatment (CODE)     3 vaginal childbirth uncomplicated     Personal history of other " medical treatment (CODE)     1983,blood transfusion with stomach stapling     Past Surgical History:   Procedure Laterality Date     ARTHROPLASTY KNEE      2008,Total Right knee replacement     BIOPSY BREAST      Incisional x2 benign breast biopsies     FRACTURE SURGERY      open treatment of Humeral Fx w/ prosthesis     INJECT BOTOX N/A 09/22/2020    [facility]; [facility] ([place], [place]), Dr. Joe Cruz     LAPAROSCOPIC BYPASS GASTRIC      1983,Stomach stapling complicated by incision rupture, then repaired wi second surgery     OTHER SURGICAL HISTORY      1983,58520.0,SKIN/SUBCUTANEOUS SURGERY,repaired incision from stomach stapling     OTHER SURGICAL HISTORY      2006,[id number],ARTHROPLASTY,Total glenohumeral joint replacement     OTHER SURGICAL HISTORY      718140,ANESTHESIA ALERT,No problems with anaesthesia. ?     Social History     Tobacco Use     Smoking status: Never Smoker     Smokeless tobacco: Never Used   Substance Use Topics     Alcohol use: No     Comment: very little      Current Outpatient Medications   Medication Sig Dispense Refill     amLODIPine (NORVASC) 5 MG tablet TAKE 1 TABLET BY MOUTH EVERY DAY 90 tablet 1     ARIPiprazole (ABILIFY) 5 MG tablet Take 1 tablet (5 mg) by mouth daily       cholecalciferol (VITAMIN D-1000 MAX ST) 25 MCG (1000 UT) TABS Take 8,000 Units by mouth daily       Coenzyme Q10 (CO Q 10) 100 MG CAPS Take by mouth daily        cyanocobalamin (CYANOCOBALAMIN) 1000 MCG/ML injection INJECT 1 ML (1,000 MCG) INTO THE MUSCLE EVERY 30 DAYS 3 mL 3     DULoxetine (CYMBALTA) 60 MG capsule TAKE 2 CAPS IN AM.. MAY USE ANY FORMULATION TO EQUAL 120 MG,       ferrous sulfate (IRON) 325 (65 FE) MG tablet Take 325 mg by mouth       fish oil-omega-3 fatty acids 1000 MG capsule Take 1 capsule by mouth       hydrochlorothiazide (HYDRODIURIL) 25 MG tablet Take 1 tablet (25 mg) by mouth daily 90 tablet 4     levothyroxine (SYNTHROID/LEVOTHROID) 25 MCG tablet TAKE 1 TABLET BY MOUTH EVERY DAY 90  "tablet 4     losartan (COZAAR) 100 MG tablet TAKE 1 TABLET BY MOUTH EVERY DAY 90 tablet 4     metoprolol succinate ER (TOPROL-XL) 100 MG 24 hr tablet TAKE 2 TABLETS BY MOUTH EVERY  tablet 3     modafinil (PROVIGIL) 200 MG tablet Take 2 tablets (400 mg) by mouth daily       phentermine (ADIPEX-P) 37.5 MG tablet        potassium chloride ER (KLOR-CON) 20 MEQ CR tablet Take 1 tablet (20 mEq) by mouth daily 90 tablet 4     Syringe Luer Slip 3 ML MISC 1 each every 30 days 25 each 0     topiramate (TOPAMAX) 25 MG tablet TAKE 1 TABLET BY MOUTH TWO TIMES A DAY. DO NOT CRUSH.       Allergies   Allergen Reactions     Chlorthalidone Other (See Comments)     Caused hyponatremia         Past medical history, past surgical history, current medications and allergies reviewed and accurate to the best of my knowledge.        ROS:  Refer to [facility] [place]    BP (!) 142/80 (BP Location: Right arm, Patient Position: Sitting, Cuff Size: Adult Regular)   Pulse 60   Temp 98.7  F (37.1  C) (Tympanic)   Resp 12   Ht 1.626 m (5' 4\")   Wt 97.6 kg (215 lb 3.2 oz)   SpO2 98%   Breastfeeding No   BMI 36.94 kg/m      EXAM:  General Appearance: Well appearing female, appropriate appearance for age. No acute distress  Dermatological: Approximately .5 cm circular area of erythema to her left posterior aspect of her forearm with mild surrounding erythema. No swelling or increased warmth with palpation to the affected area.   Psychological: normal affect, alert, oriented, and pleasant.             "

## 2021-10-17 NOTE — NURSING NOTE
Patient presents to the clinic for tick bite on left arm. Patient states she found the tick today but believes its been there for 3 days.        FOOD SECURITY SCREENING QUESTIONS  Hunger Vital Signs:  Within the past 12 months we worried whether our food would run out before we got money to buy more. Never  Within the past 12 months the food we bought just didn't last and we didn't have money to get more. Never  Medication Reconciliation: complete  Kylee Langley CMA 10/17/2021 1:25 PM

## 2021-10-17 NOTE — PATIENT INSTRUCTIONS
Doxycycline 200 mg one time for prophylactic dosing    No lab work needed today as it is too soon for accurate results   Instructed to wash tick bite with soap and water. Apply antibiotic ointment to site 1-2 times daily until healed.    Watch for flu like illness such as fevers and chills; arthritis type pain such as joint pains and stiffness.     Follow up if development of any of these symptoms for further evaluation.   Patient Education     Tick Bite, Antibiotic Treatment     Ticks are small arachnids that feed on the blood of rodents, rabbits, birds, deer, dogs and humans. The bite may cause a small reaction like that of a spider, with a small amount of limited redness, itching and slight swelling. Sometimes there is no local reaction.  Most tick bites are harmless. But some ticks carry diseases, such as Lyme disease or Damien Mountain spotted fever. These can be passed to people at the time of the bite. Lyme disease is of greatest concern. Right now you have no symptoms of Lyme disease or other serious reaction to the bite. It's important to watch for the warning signs, which could appear weeks to months after the tick bite.  Home care  These guidelines can help you care for your bite at home:    If itching is a problem, don't wear tight clothing or anything that heats up your skin. This includes hot showers or baths and direct sunlight. This often makes the itching worse.    An ice pack will reduce local areas of redness and itching. Make your own ice pack by putting ice cubes in a zip-top plastic bag and wrapping it in a thin towel. Over-the-counter creams containing benzocaine may help with itching.    You can use an antihistamine with diphenhydramine if your healthcare provider didn't give you another antihistamine. This medicine may be used to reduce itching if large areas of the skin are involved. It's available at drugstores and grocery stores. Use caution because this medicine may cause drowsiness. If  symptoms continue, talk with your healthcare provider or pharmacist about other over-the counter or prescription medicines that may be helpful.    Your healthcare provider may prescribe antibiotics to reduce your risk of getting Lyme disease. It's very important that you take them exactly as directed until they are completely finished.  Follow-up care  Follow up with your healthcare provider, or as advised.  Call 911  Call 911 if any of these occur:    Irregular or rapid heartbeat    Numbness, tingling, or weakness in the arms or legs  When to seek medical advice  Call your healthcare provider right away if any of these occur:  Signs of local infection. Watch for these during the next few days:    Increasing redness around the bite site    Increased pain or swelling    Fever over 100.4 F (38 C), or as directed by your healthcare provider    Fluid draining from the bite area  Signs of tick-related disease. Watch for these over the next few weeks to months:    Circular, red, ring-like rash appears at the bite area within 1 to 3 weeks    Tiredness, fever or chills, nausea or vomiting    Neck pain or stiffness, headache, or confusion    Muscle or bone aches    Joint pain or swelling, especially in the knee    Weakness on one side of the face    A spotted rash that starts on or includes the palms of the hands and soles of the feet  [employer] last reviewed this educational content on 12/1/2019 2000-2021 [employer]. All rights reserved. This information is not intended as a substitute for professional medical care. Always follow your healthcare professional's instructions.

## 2021-10-18 ENCOUNTER — TRANSFERRED RECORDS (OUTPATIENT)
Dept: HEALTH INFORMATION MANAGEMENT | Facility: OTHER | Age: 71
End: 2021-10-18

## 2021-10-26 DIAGNOSIS — I10 ESSENTIAL HYPERTENSION, BENIGN: ICD-10-CM

## 2021-10-27 DIAGNOSIS — I10 ESSENTIAL HYPERTENSION, BENIGN: ICD-10-CM

## 2021-10-27 RX ORDER — METOPROLOL SUCCINATE 100 MG/1
TABLET, EXTENDED RELEASE ORAL
Qty: 180 TABLET | Refills: 3 | OUTPATIENT
Start: 2021-10-27

## 2021-11-04 DIAGNOSIS — I10 ESSENTIAL HYPERTENSION, BENIGN: ICD-10-CM

## 2021-11-04 RX ORDER — METOPROLOL SUCCINATE 100 MG/1
TABLET, EXTENDED RELEASE ORAL
Qty: 180 TABLET | Refills: 3 | OUTPATIENT
Start: 2021-11-04

## 2021-11-04 NOTE — TELEPHONE ENCOUNTER
[facility] in #[id number] in [facility] [place] sent Rx request for the following:      Requested Prescriptions   Pending Prescriptions Disp Refills     metoprolol succinate ER (TOPROL-XL) 100 MG 24 hr tablet 180 tablet 3       Beta-Blockers Protocol Passed - 11/4/2021  9:03 AM         Last Prescription Date:   1/5/2021  Last Fill Qty/Refills:         180, R-3    Last Office Visit:              3/9/2021 Kaylene   Future Office visit:           None noted    Medication is being denied due to: Redundant refill request refused: Too soon:  Alba Rodriguez RN ....................  11/4/2021   12:18 PM

## 2022-01-22 DIAGNOSIS — I10 ESSENTIAL HYPERTENSION, BENIGN: ICD-10-CM

## 2022-01-25 RX ORDER — AMLODIPINE BESYLATE 5 MG/1
TABLET ORAL
Qty: 90 TABLET | Refills: 1 | Status: SHIPPED | OUTPATIENT
Start: 2022-01-25 | End: 2022-08-01

## 2022-01-25 NOTE — TELEPHONE ENCOUNTER
"Requested Prescriptions   Pending Prescriptions Disp Refills     amLODIPine (NORVASC) 5 MG tablet [Pharmacy Med Name: AMLODIPINE BESYLATE 5 MG TAB] 90 tablet 1     Sig: TAKE 1 TABLET BY MOUTH EVERY DAY       Calcium Channel Blockers Protocol  Failed - 1/24/2022 12:45 PM        Failed - Recent (12 mo) or future (30 days) visit within the authorizing provider's specialty     Patient has had an office visit with the authorizing provider or a provider within the authorizing providers department within the previous 12 mos or has a future within next 30 days. See \"Patient Info\" tab in inbasket, or \"Choose Columns\" in Meds & Orders section of the refill encounter.              Passed - Blood pressure under 140/90 in past 12 months     BP Readings from Last 3 Encounters:   10/17/21 136/82   08/01/21 136/80   03/19/21 136/82                 Passed - Medication is active on med list        Passed - Patient is age 18 or older        Passed - No active pregnancy on record        Passed - Normal serum creatinine on file in past 12 months     Recent Labs   Lab Test 03/09/21  1546   CR 0.82       Ok to refill medication if creatinine is low          Passed - No positive pregnancy test in past 12 months           Last Written Prescription Date:  7/6/21  Last Fill Quantity: 90,   # refills: 1  Last Office Visit: 1/29/20  Future Office visit:    Next 5 appointments (look out 90 days)    Jan 28, 2022 10:40 AM  PHYSICAL with Jennifer Maurice DO  [facility] ([facility] ) [address]  [place] [id number] [phone]     Routing refill request to provider for review/approval because:  Patient is overdue to be seen    Unable to complete prescription refill per RN Medication Refill Policy.   Lynda Yung RN ....................  1/25/2022   11:06 AM   "

## 2022-01-26 ENCOUNTER — ALLIED HEALTH/NURSE VISIT (OUTPATIENT)
Dept: FAMILY MEDICINE | Facility: OTHER | Age: 72
End: 2022-01-26
Attending: FAMILY MEDICINE
Payer: COMMERCIAL

## 2022-01-26 DIAGNOSIS — R05.9 COUGH: Primary | ICD-10-CM

## 2022-01-26 PROCEDURE — C9803 HOPD COVID-19 SPEC COLLECT: HCPCS

## 2022-01-26 PROCEDURE — U0005 INFEC AGEN DETEC AMPLI PROBE: HCPCS | Mod: ZL

## 2022-01-26 NOTE — PROGRESS NOTES
Patient here for Covid Testing. Weakness, fatigue and cough.    Paula Artis MA on 1/26/2022 at 10:59 AM

## 2022-01-27 LAB — SARS-COV-2 RNA RESP QL NAA+PROBE: NEGATIVE

## 2022-01-28 ENCOUNTER — OFFICE VISIT (OUTPATIENT)
Dept: FAMILY MEDICINE | Facility: OTHER | Age: 72
End: 2022-01-28
Attending: FAMILY MEDICINE
Payer: COMMERCIAL

## 2022-01-28 VITALS
DIASTOLIC BLOOD PRESSURE: 66 MMHG | SYSTOLIC BLOOD PRESSURE: 126 MMHG | RESPIRATION RATE: 18 BRPM | WEIGHT: 214.25 LBS | HEIGHT: 64 IN | HEART RATE: 76 BPM | BODY MASS INDEX: 36.58 KG/M2 | TEMPERATURE: 98.1 F

## 2022-01-28 DIAGNOSIS — I10 ESSENTIAL HYPERTENSION, BENIGN: Primary | ICD-10-CM

## 2022-01-28 DIAGNOSIS — Z98.84 S/P GASTRIC BYPASS: ICD-10-CM

## 2022-01-28 DIAGNOSIS — Z00.00 ENCOUNTER FOR MEDICARE ANNUAL WELLNESS EXAM: ICD-10-CM

## 2022-01-28 DIAGNOSIS — Z78.0 MENOPAUSE: ICD-10-CM

## 2022-01-28 DIAGNOSIS — E03.9 HYPOTHYROIDISM, UNSPECIFIED TYPE: ICD-10-CM

## 2022-01-28 DIAGNOSIS — D51.8 OTHER VITAMIN B12 DEFICIENCY ANEMIA: ICD-10-CM

## 2022-01-28 DIAGNOSIS — E55.9 VITAMIN D DEFICIENCY: ICD-10-CM

## 2022-01-28 DIAGNOSIS — Z79.899 ENCOUNTER FOR LONG-TERM (CURRENT) USE OF MEDICATIONS: ICD-10-CM

## 2022-01-28 DIAGNOSIS — E83.52 HYPERCALCEMIA: ICD-10-CM

## 2022-01-28 DIAGNOSIS — Z12.11 COLON CANCER SCREENING: ICD-10-CM

## 2022-01-28 DIAGNOSIS — Z11.59 ENCOUNTER FOR HEPATITIS C SCREENING TEST FOR LOW RISK PATIENT: ICD-10-CM

## 2022-01-28 DIAGNOSIS — E66.01 MORBID OBESITY (H): ICD-10-CM

## 2022-01-28 DIAGNOSIS — Z12.31 VISIT FOR SCREENING MAMMOGRAM: ICD-10-CM

## 2022-01-28 LAB
ALBUMIN SERPL-MCNC: 3.7 G/DL (ref 3.5–5.7)
ALP SERPL-CCNC: 68 U/L (ref 34–104)
ALT SERPL W P-5'-P-CCNC: 17 U/L (ref 7–52)
ANION GAP SERPL CALCULATED.3IONS-SCNC: 10 MMOL/L (ref 3–14)
AST SERPL W P-5'-P-CCNC: 13 U/L (ref 13–39)
BASOPHILS # BLD AUTO: 0.1 10E3/UL (ref 0–0.2)
BASOPHILS NFR BLD AUTO: 0 %
BILIRUB SERPL-MCNC: 0.4 MG/DL (ref 0.3–1)
BUN SERPL-MCNC: 19 MG/DL (ref 7–25)
CALCIUM SERPL-MCNC: 11.1 MG/DL (ref 8.6–10.3)
CHLORIDE BLD-SCNC: 95 MMOL/L (ref 98–107)
CHOLEST SERPL-MCNC: 162 MG/DL
CO2 SERPL-SCNC: 27 MMOL/L (ref 21–31)
CREAT SERPL-MCNC: 1.07 MG/DL (ref 0.6–1.2)
DEPRECATED CALCIDIOL+CALCIFEROL SERPL-MC: 71 UG/L (ref 30–100)
EOSINOPHIL # BLD AUTO: 0.2 10E3/UL (ref 0–0.7)
EOSINOPHIL NFR BLD AUTO: 2 %
ERYTHROCYTE [DISTWIDTH] IN BLOOD BY AUTOMATED COUNT: 12.6 % (ref 10–15)
FASTING STATUS PATIENT QL REPORTED: NORMAL
GFR SERPL CREATININE-BSD FRML MDRD: 56 ML/MIN/1.73M2
GLUCOSE BLD-MCNC: 105 MG/DL (ref 70–105)
HBA1C MFR BLD: 6 % (ref 4–6.2)
HCT VFR BLD AUTO: 36.6 % (ref 35–47)
HDLC SERPL-MCNC: 41 MG/DL (ref 23–92)
HGB BLD-MCNC: 12.5 G/DL (ref 11.7–15.7)
IMM GRANULOCYTES # BLD: 0.1 10E3/UL
IMM GRANULOCYTES NFR BLD: 1 %
LDLC SERPL CALC-MCNC: 95 MG/DL
LYMPHOCYTES # BLD AUTO: 2.3 10E3/UL (ref 0.8–5.3)
LYMPHOCYTES NFR BLD AUTO: 20 %
MCH RBC QN AUTO: 29.5 PG (ref 26.5–33)
MCHC RBC AUTO-ENTMCNC: 34.2 G/DL (ref 31.5–36.5)
MCV RBC AUTO: 86 FL (ref 78–100)
MONOCYTES # BLD AUTO: 0.4 10E3/UL (ref 0–1.3)
MONOCYTES NFR BLD AUTO: 4 %
NEUTROPHILS # BLD AUTO: 8.2 10E3/UL (ref 1.6–8.3)
NEUTROPHILS NFR BLD AUTO: 73 %
NONHDLC SERPL-MCNC: 121 MG/DL
NRBC # BLD AUTO: 0 10E3/UL
NRBC BLD AUTO-RTO: 0 /100
PLATELET # BLD AUTO: 372 10E3/UL (ref 150–450)
POTASSIUM BLD-SCNC: 5.4 MMOL/L (ref 3.5–5.1)
PROT SERPL-MCNC: 6.2 G/DL (ref 6.4–8.9)
RBC # BLD AUTO: 4.24 10E6/UL (ref 3.8–5.2)
SODIUM SERPL-SCNC: 132 MMOL/L (ref 134–144)
T4 FREE SERPL-MCNC: 1.15 NG/DL (ref 0.6–1.6)
TRIGL SERPL-MCNC: 130 MG/DL
TSH SERPL DL<=0.005 MIU/L-ACNC: 3.14 MU/L (ref 0.4–4)
VIT B12 SERPL-MCNC: >1500 PG/ML (ref 180–914)
WBC # BLD AUTO: 11.4 10E3/UL (ref 4–11)

## 2022-01-28 PROCEDURE — 80053 COMPREHEN METABOLIC PANEL: CPT | Mod: ZL | Performed by: FAMILY MEDICINE

## 2022-01-28 PROCEDURE — G0463 HOSPITAL OUTPT CLINIC VISIT: HCPCS

## 2022-01-28 PROCEDURE — G0439 PPPS, SUBSEQ VISIT: HCPCS | Performed by: FAMILY MEDICINE

## 2022-01-28 PROCEDURE — 82306 VITAMIN D 25 HYDROXY: CPT | Mod: ZL | Performed by: FAMILY MEDICINE

## 2022-01-28 PROCEDURE — 86803 HEPATITIS C AB TEST: CPT | Mod: ZL | Performed by: FAMILY MEDICINE

## 2022-01-28 PROCEDURE — 85025 COMPLETE CBC W/AUTO DIFF WBC: CPT | Mod: ZL | Performed by: FAMILY MEDICINE

## 2022-01-28 PROCEDURE — 83036 HEMOGLOBIN GLYCOSYLATED A1C: CPT | Mod: ZL | Performed by: FAMILY MEDICINE

## 2022-01-28 PROCEDURE — 36415 COLL VENOUS BLD VENIPUNCTURE: CPT | Mod: ZL | Performed by: FAMILY MEDICINE

## 2022-01-28 PROCEDURE — 80061 LIPID PANEL: CPT | Mod: ZL | Performed by: FAMILY MEDICINE

## 2022-01-28 PROCEDURE — 84443 ASSAY THYROID STIM HORMONE: CPT | Mod: ZL | Performed by: FAMILY MEDICINE

## 2022-01-28 PROCEDURE — 83970 ASSAY OF PARATHORMONE: CPT | Mod: ZL | Performed by: FAMILY MEDICINE

## 2022-01-28 PROCEDURE — 84439 ASSAY OF FREE THYROXINE: CPT | Mod: ZL | Performed by: FAMILY MEDICINE

## 2022-01-28 PROCEDURE — 82607 VITAMIN B-12: CPT | Mod: ZL | Performed by: FAMILY MEDICINE

## 2022-01-28 RX ORDER — SPIRONOLACTONE 50 MG/1
1 TABLET, FILM COATED ORAL DAILY
Status: ON HOLD | COMMUNITY
Start: 2021-12-28 | End: 2023-05-18

## 2022-01-28 RX ORDER — METOPROLOL SUCCINATE 100 MG/1
TABLET, EXTENDED RELEASE ORAL
Qty: 180 TABLET | Refills: 4 | Status: SHIPPED | OUTPATIENT
Start: 2022-01-28 | End: 2023-02-17

## 2022-01-28 RX ORDER — CYANOCOBALAMIN 1000 UG/ML
1 INJECTION, SOLUTION INTRAMUSCULAR; SUBCUTANEOUS
Qty: 3 ML | Refills: 4 | Status: SHIPPED | OUTPATIENT
Start: 2022-01-28 | End: 2023-01-29

## 2022-01-28 ASSESSMENT — PATIENT HEALTH QUESTIONNAIRE - PHQ9
SUM OF ALL RESPONSES TO PHQ QUESTIONS 1-9: 2
10. IF YOU CHECKED OFF ANY PROBLEMS, HOW DIFFICULT HAVE THESE PROBLEMS MADE IT FOR YOU TO DO YOUR WORK, TAKE CARE OF THINGS AT HOME, OR GET ALONG WITH OTHER PEOPLE: NOT DIFFICULT AT ALL
SUM OF ALL RESPONSES TO PHQ QUESTIONS 1-9: 2

## 2022-01-28 ASSESSMENT — MIFFLIN-ST. JEOR: SCORE: 1476.83

## 2022-01-28 ASSESSMENT — ACTIVITIES OF DAILY LIVING (ADL): CURRENT_FUNCTION: NO ASSISTANCE NEEDED

## 2022-01-28 ASSESSMENT — PAIN SCALES - GENERAL: PAINLEVEL: NO PAIN (0)

## 2022-01-28 NOTE — PROGRESS NOTES
"SUBJECTIVE:   Ness Bales is a 71 year old female who presents for Preventive Visit.      Patient has been advised of split billing requirements and indicates understanding: Yes  Are you in the first 12 months of your Medicare Part B coverage?  No    Physical Health:    In general, how would you rate your overall physical health? fair    Outside of work, how many days during the week do you exercise? none    Outside of work, approximately how many minutes a day do you exercise?not applicable    If you drink alcohol do you typically have >3 drinks per day or >7 drinks per week? Not Applicable    Do you usually eat at least 4 servings of fruit and vegetables a day, include whole grains & fiber and avoid regularly eating high fat or \"junk\" foods? Yes    Do you have any problems taking medications regularly?  No    Do you have any side effects from medications? none    Needs assistance for the following daily activities: no assistance needed    Which of the following safety concerns are present in your home?  lack of grab bars in the bathroom     Hearing impairment: No    In the past 6 months, have you been bothered by leaking of urine? yes    Mental Health:    In general, how would you rate your overall mental or emotional health? good  PHQ-2 Score: (P) 0    Do you feel safe in your environment? Yes    Have you ever done Advance Care Planning? (For example, a Health Directive, POLST, or a discussion with a medical provider or your loved ones about your wishes): No, advance care planning information given to patient to review.  Advanced care planning was discussed at today's visit.    Additional concerns to address?  YES    Fall risk:  Fallen 2 or more times in the past year?: (P) Yes  Any fall with injury in the past year?: (P) No    Cognitive Screening: passed cognitive screening , recalled all 3 words    Do you have sleep apnea, excessive snoring or daytime drowsiness?: yes, daytime drowsiness  Reviewed and " updated as needed this visit by clinical staff  Tobacco  Allergies  Meds  Problems  Med Hx  Surg Hx  Fam Hx  Soc Hx     Reviewed and updated as needed this visit by Provider  Tobacco  Allergies  Meds  Problems  Med Hx  Surg Hx  Fam Hx       OAB:  November was last for injection.  Needs to do intermittently.    Endocrinology:  April?  Following with Dr. Akhtar; testing adrenals.  Now on spironolactone.    Pap: NA  Mammo: DUE, ordered  Dexa: DUE, ordered  Colonoscopy: 12/24/2018 cologuard - negative.  DUE.  Has one on the way.  Tdap 12/12/12; flu declines; Shingrix DUE - will get at pharmacy; PNA DUE - declines; Covid x3 complete    Social History     Tobacco Use     Smoking status: Never Smoker     Smokeless tobacco: Never Used   Substance Use Topics     Alcohol use: No     Comment: very little                 Current providers sharing in care for this patient include:   Patient Care Team:  Jennifer Maurice DO as PCP - General (Family Practice)  Jennifer Maurice DO as Assigned PCP    The following health maintenance items are reviewed in Epic and correct as of today:  Health Maintenance   Topic Date Due     DEXA  Never done     MAMMO SCREENING  Never done     HEPATITIS C SCREENING  Never done     ZOSTER IMMUNIZATION (2 of 3) 03/17/2014     Pneumococcal Vaccine: 65+ Years (1 of 1 - PPSV23) Never done     PHQ-9  02/25/2021     INFLUENZA VACCINE (1) Never done     COLORECTAL CANCER SCREENING  12/11/2021     FALL RISK ASSESSMENT  08/01/2022     DTAP/TDAP/TD IMMUNIZATION (2 - Td or Tdap) 12/12/2022     MEDICARE ANNUAL WELLNESS VISIT  01/28/2023     LIPID  03/09/2026     ADVANCE CARE PLANNING  01/28/2027     DEPRESSION ACTION PLAN  Completed     COVID-19 Vaccine  Completed     IPV IMMUNIZATION  Aged Out     MENINGITIS IMMUNIZATION  Aged Out     HEPATITIS B IMMUNIZATION  Aged Out     ROS:  CONSTITUTIONAL: NEGATIVE for fever, chills, change in weight  ENT/MOUTH: NEGATIVE for ear, mouth and throat problems  RESP:  "NEGATIVE for significant cough or SOB  CV: NEGATIVE for chest pain, palpitations or peripheral edema    OBJECTIVE:   /66   Pulse 76   Temp 98.1  F (36.7  C) (Tympanic)   Resp 18   Ht 1.626 m (5' 4\")   Wt 97.2 kg (214 lb 4 oz)   BMI 36.78 kg/m   Estimated body mass index is 36.78 kg/m  as calculated from the following:    Height as of this encounter: 1.626 m (5' 4\").    Weight as of this encounter: 97.2 kg (214 lb 4 oz).  EXAM:   GENERAL APPEARANCE: healthy, alert and no distress  EYES: Eyes grossly normal to inspection, PERRL and conjunctivae and sclerae normal  HENT: ear canals and TM's normal, nose and mouth without ulcers or lesions, oropharynx clear and oral mucous membranes moist  NECK: no adenopathy, no asymmetry, masses, or scars and thyroid normal to palpation  RESP: lungs clear to auscultation - no rales, rhonchi or wheezes  CV: regular rate and rhythm, normal S1 S2, no S3 or S4, no murmur, click or rub, no peripheral edema and peripheral pulses strong  ABDOMEN: soft, nontender, no hepatosplenomegaly, no masses and bowel sounds normal  MS: no musculoskeletal defects are noted and gait is age appropriate without ataxia  SKIN: no suspicious lesions or rashes.  Great toe with callous formation at distal lateral nail end/corner.  No significant abnormality.  NEURO: Normal strength and tone, sensory exam grossly normal, mentation intact and speech normal  PSYCH: mentation appears normal and affect normal/bright    Diagnostic Test Results:  Labs reviewed in Epic    ASSESSMENT / PLAN:   1. Essential hypertension, benign  Chronic, stable.  Due for monitoring labs.  Continue on current regimen.  Only needing metoprolol 200mg daily refilled at this time.  - Comprehensive Metabolic Panel; Future  - Lipid Panel; Future  - metoprolol succinate ER (TOPROL-XL) 100 MG 24 hr tablet; TAKE 2 TABLETS BY MOUTH EVERY DAY  Dispense: 180 tablet; Refill: 4  - Comprehensive Metabolic Panel  - Lipid Panel    2. " Hypothyroidism, unspecified type  Chronic, stable.  Will obtain monitoring labs.  No new thyroid related symptoms.  Continue to monitor.  - CBC and Differential; Future  - TSH; Future  - T4, Free; Future  - CBC and Differential  - TSH  - T4, Free    3. Vitamin D deficiency  Will add to monitoring level as above.  - Vitamin D Total; Future  - Vitamin D Total    4. Colon cancer screening  Has nissa on it's way to her home/contacted company after receiving reminder letter.    5. Visit for screening mammogram  Ordered.    - MA Screen Bilateral w/González; Future    6. Menopause  Schedule dexa  - DX Hip/Pelvis/Spine; Future    7. Encounter for hepatitis C screening test for low risk patient  Added to labs collected today.  - Hepatitis C Screen Reflex to HCV RNA Quant and Genotype; Future  - Hepatitis C Screen Reflex to HCV RNA Quant and Genotype    8. Encounter for long-term (current) use of medications  As requested by vandana hoffman.  - Comprehensive Metabolic Panel; Future  - CBC and Differential; Future  - TSH; Future  - T4, Free; Future  - Lipid Panel; Future  - Hemoglobin A1c; Future  - Vitamin D Total; Future  - Comprehensive Metabolic Panel  - CBC and Differential  - TSH  - T4, Free  - Lipid Panel  - Hemoglobin A1c  - Vitamin D Total    9. S/P gastric bypass  10. Other vitamin B12 deficiency anemia  Chronic, stable. Refilled b12 injection.  - cyanocobalamin (CYANOCOBALAMIN) 1000 MCG/ML injection; Inject 1 mL (1,000 mcg) into the muscle every 30 days  Dispense: 3 mL; Refill: 4    11. Morbid obesity (H)    12. Hypercalcemia  New, okay to reduce calcium level.  Added PTH (already had Vitamin D ordered).  REcheck in one month.  - Comprehensive Metabolic Panel; Future  - PTH, Intact; Future  - PTH, Intact    13. Encounter for Medicare annual wellness exam    Patient has been advised of split billing requirements and indicates understanding: Yes    COUNSELING:  Reviewed preventive health counseling, as reflected in  "patient instructions    Estimated body mass index is 36.78 kg/m  as calculated from the following:    Height as of this encounter: 1.626 m (5' 4\").    Weight as of this encounter: 97.2 kg (214 lb 4 oz).    Weight management plan: Discussed healthy diet and exercise guidelines    She reports that she has never smoked. She has never used smokeless tobacco.    Appropriate preventive services were discussed with this patient, including applicable screening as appropriate for cardiovascular disease, diabetes, osteopenia/osteoporosis, and glaucoma.  As appropriate for age/gender, discussed screening for colorectal cancer, prostate cancer, breast cancer, and cervical cancer. Checklist reviewing preventive services available has been given to the patient.    Reviewed patients plan of care and provided an AVS. The Basic Care Plan (routine screening as documented in Health Maintenance) for Ness meets the Care Plan requirement. This Care Plan has been established and reviewed with the Patient.    Counseling Resources:  ATP IV Guidelines  Pooled Cohorts Equation Calculator  Breast Cancer Risk Calculator  BRCA-Related Cancer Risk Assessment: FHS-7 Tool  FRAX Risk Assessment  ICSI Preventive Guidelines  Dietary Guidelines for Americans, 2010  USDA's MyPlate  ASA Prophylaxis  Lung CA Screening    Jennifer Maurice DO  [facility]  Answers for HPI/ROS submitted by the patient on 1/28/2022  If you checked off any problems, how difficult have these problems made it for you to do your work, take care of things at home, or get along with other people?: Not difficult at all  PHQ9 TOTAL SCORE: 2  In general, how would you rate your overall physical health?: good  Frequency of exercise:: None  Do you usually eat at least 4 servings of fruit and vegetables a day, include whole grains & fiber, and avoid regularly eating high fat or \"junk\" foods? : Yes  Taking medications regularly:: Yes  Medication side effects:: " None  Activities of Daily Living: no assistance needed  Home safety: lack of grab bars in the bathroom  Hearing Impairment:: no hearing concerns  In the past 6 months, have you been bothered by leaking of urine?: Yes  In general, how would you rate your overall mental or emotional health?: good  Additional concerns today:: No        She is at risk for falling and has been provided with information to reduce the risk of falling at home.

## 2022-01-29 ASSESSMENT — PATIENT HEALTH QUESTIONNAIRE - PHQ9: SUM OF ALL RESPONSES TO PHQ QUESTIONS 1-9: 2

## 2022-01-30 LAB — HCV AB SERPL QL IA: NONREACTIVE

## 2022-01-31 LAB — PTH-INTACT SERPL-MCNC: 61 PG/ML (ref 18–80)

## 2022-02-11 ENCOUNTER — HOSPITAL ENCOUNTER (OUTPATIENT)
Dept: MAMMOGRAPHY | Facility: OTHER | Age: 72
End: 2022-02-11
Attending: FAMILY MEDICINE
Payer: COMMERCIAL

## 2022-02-11 ENCOUNTER — HOSPITAL ENCOUNTER (OUTPATIENT)
Dept: BONE DENSITY | Facility: OTHER | Age: 72
End: 2022-02-11
Attending: FAMILY MEDICINE
Payer: COMMERCIAL

## 2022-02-11 DIAGNOSIS — Z78.0 MENOPAUSE: ICD-10-CM

## 2022-02-11 DIAGNOSIS — Z12.31 VISIT FOR SCREENING MAMMOGRAM: ICD-10-CM

## 2022-02-11 PROCEDURE — 77067 SCR MAMMO BI INCL CAD: CPT

## 2022-02-11 PROCEDURE — 77080 DXA BONE DENSITY AXIAL: CPT

## 2022-02-22 ENCOUNTER — TELEPHONE (OUTPATIENT)
Dept: FAMILY MEDICINE | Facility: OTHER | Age: 72
End: 2022-02-22
Payer: COMMERCIAL

## 2022-02-22 NOTE — TELEPHONE ENCOUNTER
Left message to call back....................  2/22/2022   2:51 PM  Conchita Bautista LPN, LPN  2/22/2022  2:51 PM

## 2022-02-22 NOTE — TELEPHONE ENCOUNTER
Please call, regarding some lab work questions, thank you!    Imelda Eagle on 2/22/2022 at 2:45 PM

## 2022-02-23 NOTE — TELEPHONE ENCOUNTER
Talked with patient and she was inquiring about the CMP that was done back in 1/28/22. She states that Dr. Maurice wanted to repeat it due to her electrolyte levels. Did confirm that there is a pending order in there and that she can make a lab only appointment.  Conchita Bautista, RACHELLE, LPN  2/23/2022  9:46 AM

## 2022-02-24 ENCOUNTER — TELEPHONE (OUTPATIENT)
Dept: LAB | Facility: OTHER | Age: 72
End: 2022-02-24

## 2022-02-24 ENCOUNTER — LAB (OUTPATIENT)
Dept: LAB | Facility: OTHER | Age: 72
End: 2022-02-24
Attending: FAMILY MEDICINE
Payer: COMMERCIAL

## 2022-02-24 DIAGNOSIS — E03.9 HYPOTHYROIDISM, UNSPECIFIED TYPE: ICD-10-CM

## 2022-02-24 DIAGNOSIS — Z01.89 PATIENT REQUEST FOR DIAGNOSTIC TESTING: Primary | ICD-10-CM

## 2022-02-24 DIAGNOSIS — Z01.89 PATIENT REQUEST FOR DIAGNOSTIC TESTING: ICD-10-CM

## 2022-02-24 DIAGNOSIS — E87.1 HYPONATREMIA: ICD-10-CM

## 2022-02-24 DIAGNOSIS — E83.52 HYPERCALCEMIA: ICD-10-CM

## 2022-02-24 DIAGNOSIS — E83.52 HYPERCALCEMIA: Primary | ICD-10-CM

## 2022-02-24 LAB
ALBUMIN SERPL-MCNC: 4 G/DL (ref 3.5–5.7)
ALP SERPL-CCNC: 72 U/L (ref 34–104)
ALT SERPL W P-5'-P-CCNC: 17 U/L (ref 7–52)
ANION GAP SERPL CALCULATED.3IONS-SCNC: 5 MMOL/L (ref 3–14)
AST SERPL W P-5'-P-CCNC: 19 U/L (ref 13–39)
BILIRUB SERPL-MCNC: 0.3 MG/DL (ref 0.3–1)
BUN SERPL-MCNC: 30 MG/DL (ref 7–25)
CALCIUM SERPL-MCNC: 10.5 MG/DL (ref 8.6–10.3)
CHLORIDE BLD-SCNC: 93 MMOL/L (ref 98–107)
CO2 SERPL-SCNC: 28 MMOL/L (ref 21–31)
CREAT SERPL-MCNC: 1.06 MG/DL (ref 0.6–1.2)
DEPRECATED CALCIDIOL+CALCIFEROL SERPL-MC: 77 UG/L (ref 30–100)
GFR SERPL CREATININE-BSD FRML MDRD: 56 ML/MIN/1.73M2
GLUCOSE BLD-MCNC: 122 MG/DL (ref 70–105)
POTASSIUM BLD-SCNC: 4.6 MMOL/L (ref 3.5–5.1)
PROT SERPL-MCNC: 6.7 G/DL (ref 6.4–8.9)
PTH-INTACT SERPL-MCNC: 82 PG/ML (ref 12–88)
SODIUM SERPL-SCNC: 126 MMOL/L (ref 134–144)

## 2022-02-24 PROCEDURE — 36415 COLL VENOUS BLD VENIPUNCTURE: CPT | Mod: ZL

## 2022-02-24 PROCEDURE — 83970 ASSAY OF PARATHORMONE: CPT | Mod: ZL

## 2022-02-24 PROCEDURE — 82306 VITAMIN D 25 HYDROXY: CPT | Mod: ZL

## 2022-02-24 PROCEDURE — 80053 COMPREHEN METABOLIC PANEL: CPT | Mod: ZL

## 2022-02-24 NOTE — TELEPHONE ENCOUNTER
Please notify patient:     I discussed with lab; they are unable to add because of it requiring a special tube/vial.  This is something that can be done with next thyroid test or discussed at a later time.    I do not suspect any deficiency in iodine as it is extremely rare.  Americans eat too much salt (and not everything is sea salt in packaged/processed/restaurant foods); therefore we tend to have plenty of the micronutrient that our body needs to carry out body functions/thyroid function/etc.    Jennifer Maurice, DO

## 2022-02-24 NOTE — TELEPHONE ENCOUNTER
Ness was here for her lab draw and was wondering if she could have her iodine levels checked. She said her  was concerned about her because he uses iodized salt and she uses sea salt. Extra blood was taken and I said I would send a note to Dr. Maurice but whether it got done or not would be up to her. If it is needed please place the order as an add-on. [id number] for serum iodine.

## 2022-02-24 NOTE — PROGRESS NOTES
Ness was here for her lab draw and was wondering if she could have her iodine levels checked. She said her  was concerned about her because he uses iodized salt and she uses sea salt. Extra blood was taken and I said I would send a note to Dr. Maurice but whether it got done or not would be up to her. If it is needed please place the order as an add-on. [id number] for serum iodine.

## 2022-04-08 DIAGNOSIS — E87.6 HYPOKALEMIA: ICD-10-CM

## 2022-04-08 RX ORDER — POTASSIUM CHLORIDE 1500 MG/1
TABLET, EXTENDED RELEASE ORAL
Qty: 90 TABLET | Refills: 4 | OUTPATIENT
Start: 2022-04-08

## 2022-04-08 NOTE — TELEPHONE ENCOUNTER
[facility] sent Rx request for the following:      KLOR-CON M20 TABLET      Medication discontinued during med reconciliation clean up. Spoke with patient and she is no longer taking medication. Will notify pharmacy. Farzad Cruz RN, BSN  ....................  4/8/2022   1:58 PM

## 2022-04-22 ENCOUNTER — LAB (OUTPATIENT)
Dept: LAB | Facility: OTHER | Age: 72
End: 2022-04-22
Attending: FAMILY MEDICINE
Payer: COMMERCIAL

## 2022-04-22 DIAGNOSIS — E83.52 HYPERCALCEMIA: ICD-10-CM

## 2022-04-22 DIAGNOSIS — E87.1 HYPONATREMIA: ICD-10-CM

## 2022-04-22 LAB
ANION GAP SERPL CALCULATED.3IONS-SCNC: 6 MMOL/L (ref 3–14)
BUN SERPL-MCNC: 17 MG/DL (ref 7–25)
CALCIUM SERPL-MCNC: 11 MG/DL (ref 8.6–10.3)
CHLORIDE BLD-SCNC: 90 MMOL/L (ref 98–107)
CO2 SERPL-SCNC: 28 MMOL/L (ref 21–31)
CREAT SERPL-MCNC: 0.88 MG/DL (ref 0.6–1.2)
GFR SERPL CREATININE-BSD FRML MDRD: 70 ML/MIN/1.73M2
GLUCOSE BLD-MCNC: 121 MG/DL (ref 70–105)
OSMOLALITY UR: 320 MMOL/KG (ref 100–1200)
POTASSIUM BLD-SCNC: 4.8 MMOL/L (ref 3.5–5.1)
SODIUM SERPL-SCNC: 124 MMOL/L (ref 134–144)
SODIUM UR-SCNC: 58 MMOL/L

## 2022-04-22 PROCEDURE — 36415 COLL VENOUS BLD VENIPUNCTURE: CPT | Mod: ZL

## 2022-04-22 PROCEDURE — 83935 ASSAY OF URINE OSMOLALITY: CPT | Mod: ZL

## 2022-04-22 PROCEDURE — 80048 BASIC METABOLIC PNL TOTAL CA: CPT | Mod: ZL

## 2022-04-22 PROCEDURE — 84300 ASSAY OF URINE SODIUM: CPT | Mod: ZL

## 2022-06-01 DIAGNOSIS — I10 ESSENTIAL HYPERTENSION, BENIGN: ICD-10-CM

## 2022-06-02 RX ORDER — HYDROCHLOROTHIAZIDE 25 MG/1
25 TABLET ORAL DAILY
Qty: 90 TABLET | Refills: 4 | OUTPATIENT
Start: 2022-06-02

## 2022-06-02 NOTE — TELEPHONE ENCOUNTER
Requested Prescriptions   Pending Prescriptions Disp Refills     hydrochlorothiazide (HYDRODIURIL) 25 MG tablet [Pharmacy Med Name: HYDROCHLOROTHIAZIDE 25 MG TAB] 90 tablet 4     Sig: TAKE 1 TABLET (25 MG) BY MOUTH DAILY       Diuretics (Including Combos) Protocol Failed - 6/1/2022 12:34 AM        Failed - Medication is active on med list        Failed - Normal serum sodium on file in past 12 months     Recent Labs   Lab Test 04/22/22  1008   *           Rx has been discontinued.   This Order Has Been Discontinued    Order Status Reason By On   Discontinued Medication Reconciliation Clean Up Conchita Bautista, RACHELLE 1/28/22 1107         Unable to complete prescription refill per RN Medication Refill Policy.................... Jennifer Joyce RN ....................  6/2/2022   9:27 AM

## 2022-07-30 DIAGNOSIS — I10 ESSENTIAL HYPERTENSION, BENIGN: ICD-10-CM

## 2022-08-01 RX ORDER — AMLODIPINE BESYLATE 5 MG/1
TABLET ORAL
Qty: 90 TABLET | Refills: 1 | Status: SHIPPED | OUTPATIENT
Start: 2022-08-01 | End: 2023-02-01

## 2022-08-01 NOTE — TELEPHONE ENCOUNTER
[facility] sent Rx request for the following:      AMLODIPINE BESYLATE 5 MG TAB      Last Prescription Date:   1/25/2022  Last Fill Qty/Refills:         90, R-1    Last Office Visit:              1/28/2022   Future Office visit:           none      Farzad Cruz RN, BSN  ....................  8/1/2022   9:22 AM

## 2022-09-17 ENCOUNTER — HEALTH MAINTENANCE LETTER (OUTPATIENT)
Age: 72
End: 2022-09-17

## 2022-09-21 ENCOUNTER — TRANSFERRED RECORDS (OUTPATIENT)
Dept: HEALTH INFORMATION MANAGEMENT | Facility: OTHER | Age: 72
End: 2022-09-21

## 2022-09-21 LAB — HEMOCCULT STL QL IA: NEGATIVE

## 2022-10-03 ASSESSMENT — ANXIETY QUESTIONNAIRES
GAD7 TOTAL SCORE: 0
5. BEING SO RESTLESS THAT IT IS HARD TO SIT STILL: NOT AT ALL
7. FEELING AFRAID AS IF SOMETHING AWFUL MIGHT HAPPEN: NOT AT ALL
2. NOT BEING ABLE TO STOP OR CONTROL WORRYING: NOT AT ALL
1. FEELING NERVOUS, ANXIOUS, OR ON EDGE: NOT AT ALL
7. FEELING AFRAID AS IF SOMETHING AWFUL MIGHT HAPPEN: NOT AT ALL
6. BECOMING EASILY ANNOYED OR IRRITABLE: NOT AT ALL
3. WORRYING TOO MUCH ABOUT DIFFERENT THINGS: NOT AT ALL
4. TROUBLE RELAXING: NOT AT ALL

## 2022-10-03 ASSESSMENT — PATIENT HEALTH QUESTIONNAIRE - PHQ9
SUM OF ALL RESPONSES TO PHQ QUESTIONS 1-9: 2
SUM OF ALL RESPONSES TO PHQ QUESTIONS 1-9: 2
10. IF YOU CHECKED OFF ANY PROBLEMS, HOW DIFFICULT HAVE THESE PROBLEMS MADE IT FOR YOU TO DO YOUR WORK, TAKE CARE OF THINGS AT HOME, OR GET ALONG WITH OTHER PEOPLE: SOMEWHAT DIFFICULT

## 2022-10-04 ENCOUNTER — HOSPITAL ENCOUNTER (OUTPATIENT)
Dept: GENERAL RADIOLOGY | Facility: OTHER | Age: 72
Discharge: HOME | End: 2022-10-04
Attending: FAMILY MEDICINE
Payer: COMMERCIAL

## 2022-10-04 ENCOUNTER — OFFICE VISIT (OUTPATIENT)
Dept: FAMILY MEDICINE | Facility: OTHER | Age: 72
End: 2022-10-04
Attending: FAMILY MEDICINE
Payer: COMMERCIAL

## 2022-10-04 VITALS
SYSTOLIC BLOOD PRESSURE: 108 MMHG | BODY MASS INDEX: 36.78 KG/M2 | HEIGHT: 64 IN | RESPIRATION RATE: 16 BRPM | TEMPERATURE: 98 F | DIASTOLIC BLOOD PRESSURE: 76 MMHG | HEART RATE: 72 BPM

## 2022-10-04 DIAGNOSIS — L98.9 SKIN LESION: ICD-10-CM

## 2022-10-04 DIAGNOSIS — M25.362 KNEE INSTABILITY, LEFT: ICD-10-CM

## 2022-10-04 DIAGNOSIS — E26.09 PRIMARY HYPERALDOSTERONISM (H): Primary | ICD-10-CM

## 2022-10-04 DIAGNOSIS — E55.9 VITAMIN D DEFICIENCY: ICD-10-CM

## 2022-10-04 DIAGNOSIS — M81.0 OSTEOPOROSIS, UNSPECIFIED OSTEOPOROSIS TYPE, UNSPECIFIED PATHOLOGICAL FRACTURE PRESENCE: ICD-10-CM

## 2022-10-04 DIAGNOSIS — M17.12 PRIMARY OSTEOARTHRITIS OF LEFT KNEE: ICD-10-CM

## 2022-10-04 DIAGNOSIS — I10 ESSENTIAL HYPERTENSION, BENIGN: ICD-10-CM

## 2022-10-04 DIAGNOSIS — Z23 NEED FOR COVID-19 VACCINE: ICD-10-CM

## 2022-10-04 DIAGNOSIS — E87.6 HYPOKALEMIA: ICD-10-CM

## 2022-10-04 DIAGNOSIS — E65 ABDOMINAL PANNUS: ICD-10-CM

## 2022-10-04 DIAGNOSIS — E87.1 HYPONATREMIA: ICD-10-CM

## 2022-10-04 LAB
ANION GAP SERPL CALCULATED.3IONS-SCNC: 3 MMOL/L (ref 3–14)
BUN SERPL-MCNC: 34 MG/DL (ref 7–25)
CALCIUM SERPL-MCNC: 10.8 MG/DL (ref 8.6–10.3)
CHLORIDE BLD-SCNC: 103 MMOL/L (ref 98–107)
CO2 SERPL-SCNC: 28 MMOL/L (ref 21–31)
CREAT SERPL-MCNC: 1.26 MG/DL (ref 0.6–1.2)
DEPRECATED CALCIDIOL+CALCIFEROL SERPL-MC: 78 UG/L (ref 30–100)
GFR SERPL CREATININE-BSD FRML MDRD: 46 ML/MIN/1.73M2
GLUCOSE BLD-MCNC: 110 MG/DL (ref 70–105)
POTASSIUM BLD-SCNC: 4.9 MMOL/L (ref 3.5–5.1)
SODIUM SERPL-SCNC: 134 MMOL/L (ref 134–144)

## 2022-10-04 PROCEDURE — 73560 X-RAY EXAM OF KNEE 1 OR 2: CPT | Mod: RT

## 2022-10-04 PROCEDURE — 36415 COLL VENOUS BLD VENIPUNCTURE: CPT | Mod: ZL | Performed by: FAMILY MEDICINE

## 2022-10-04 PROCEDURE — 99214 OFFICE O/P EST MOD 30 MIN: CPT | Performed by: FAMILY MEDICINE

## 2022-10-04 PROCEDURE — G0463 HOSPITAL OUTPT CLINIC VISIT: HCPCS | Mod: 25

## 2022-10-04 PROCEDURE — 91312 COVID-19,PF,PFIZER BOOSTER BIVALENT: CPT

## 2022-10-04 PROCEDURE — G0463 HOSPITAL OUTPT CLINIC VISIT: HCPCS

## 2022-10-04 PROCEDURE — 82306 VITAMIN D 25 HYDROXY: CPT | Mod: ZL | Performed by: FAMILY MEDICINE

## 2022-10-04 PROCEDURE — 0124A COVID-19,PF,PFIZER BOOSTER BIVALENT: CPT

## 2022-10-04 PROCEDURE — 82310 ASSAY OF CALCIUM: CPT | Mod: ZL | Performed by: FAMILY MEDICINE

## 2022-10-04 ASSESSMENT — PATIENT HEALTH QUESTIONNAIRE - PHQ9
SUM OF ALL RESPONSES TO PHQ QUESTIONS 1-9: 2
10. IF YOU CHECKED OFF ANY PROBLEMS, HOW DIFFICULT HAVE THESE PROBLEMS MADE IT FOR YOU TO DO YOUR WORK, TAKE CARE OF THINGS AT HOME, OR GET ALONG WITH OTHER PEOPLE: SOMEWHAT DIFFICULT

## 2022-10-04 ASSESSMENT — PAIN SCALES - GENERAL: PAINLEVEL: MODERATE PAIN (5)

## 2022-10-04 ASSESSMENT — ANXIETY QUESTIONNAIRES: GAD7 TOTAL SCORE: 0

## 2022-10-04 NOTE — NURSING NOTE
"Chief Complaint   Patient presents with     RECHECK       Initial /76   Pulse 72   Temp 98  F (36.7  C) (Tympanic)   Resp 16   Ht 1.626 m (5' 4\")   LMP  (LMP Unknown)   BMI 36.78 kg/m   Estimated body mass index is 36.78 kg/m  as calculated from the following:    Height as of this encounter: 1.626 m (5' 4\").    Weight as of 1/28/22: 97.2 kg (214 lb 4 oz).  Medication Reconciliation: complete    Conchita Bautista LPN     Advanced Care Directive reviewed.     "

## 2022-10-04 NOTE — PROGRESS NOTES
Assessment & Plan     1. Primary hyperaldosteronism (H)  2. Hyponatremia  3. Hypokalemia  Chronic, stable on spironolactone.  Following with Endocrinology.  Will obtain monitoring BMP for electrolytes while on new K-sparing diuretic.  - Basic Metabolic Panel; Future  - Basic Metabolic Panel    4. Primary osteoarthritis of left knee  5. Knee instability, left  Will proceed with plain films today; suspect some degeneration and possibly meniscal involvement which we discussed would not show up on plain films.  However, degree of OA.  Consideration for injection trial vs start discussion of replacement.  Obesity contributing as well; would recommend trying to attain a normal BMI to decrease pain/stress to joint.  - XR Knee Standing 2v  Bilateral & 2v Left; Future  - Orthopedic  Referral; Future    6. Need for COVID-19 vaccine  Given Bivalent today.  - COVID-19,PF,PFIZER BOOSTER BIVALENT 12+Yrs    7. Vitamin D deficiency  Will add to monitoring labs.  - Vitamin D Total; Future  - Vitamin D Total    8. Abdominal pannus  Will plan for plastics referral to [facility] to consider panus reduction.  Is s/p bariatric surgery, weight loss and having mobility issues/rash caused by excess skin/friction.  - Adult Plastic Surgery  Referral; Future    9. Skin lesion  New, has been present for >1 month.  Non healing.  Due to location; may possibly require Mohs procedure.  Will place referral to Dermatology to evaluate further.  - Adult Dermatology Referral; Future    10. Essential hypertension, benign  Chronic, stable.  No changes to current medications/doses.    11. Osteoporosis, unspecified osteoporosis type, unspecified pathological fracture presence  Chronic, worsening.  Seen on last Dexa in 2/2022.  Discussed osteoporosis and bisphosphonate medications.  R/B/O of meds discussed.  Start with fosamax; report any s/s.  Make dentist aware.  - alendronate (FOSAMAX) 70 MG tablet; Take 1 tablet (70 mg) by mouth every 7  "days  Dispense: 12 tablet; Refill: 4       BMI:   Estimated body mass index is 36.78 kg/m  as calculated from the following:    Height as of this encounter: 1.626 m (5' 4\").    Weight as of 1/28/22: 97.2 kg (214 lb 4 oz).   Weight management plan: Discussed healthy diet and exercise guidelines      No follow-ups on file.    Declines influenza and pneumonia vaccines.  Received bivalent today.  Consider shingles vaccine at pharmacy.  Kurt in process.      Jennifer Maurice, [facility]      Subjective   Ness is a 71 year old, presenting for the following health issues:  RECHECK    History of Present Illness       Reason for visit:  Knee pain, nose, general physiacal  Symptom onset:  More than a month  Symptom intensity:  Moderate  Symptom progression:  Worsening  Had these symptoms before:  No  What makes it worse:  Yes  What makes it better:  No    She eats 2-3 servings of fruits and vegetables daily.She exercises with enough effort to increase her heart rate 9 or less minutes per day.  She exercises with enough effort to increase her heart rate 3 or less days per week.   She is taking medications regularly.    Today's PHQ-9         PHQ-9 Total Score: 2    PHQ-9 Q9 Thoughts of better off dead/self-harm past 2 weeks :   Not at all    How difficult have these problems made it for you to do your work, take care of things at home, or get along with other people: Somewhat difficult  Today's ELIAS-7 Score: 0     Ness is here for recheck of labs.  Currently suffering with low sodium, normal to high normal potassium.  She has followed with Dr. Nando Feliz ([facility]) for hyperaldosteronism.   Not a surgical candidate.  BP is stable on spironolactone; denies any hypotension symptoms.    Lowest weight: 129#; had gastric bypass.  Back up in weight; but interested in pannus reduction to help with mobility/appearance.  Rash in folds can bother her intermittently, noticed more this " "summer.    Knee: pain in lateral aspect, and suprapatellar.  Feels like it could give out, or lock up on her.  No known injury.  Extremes in flexion painful, twisting can also make worse.    Follows with [facility] for mental health.    Preventative --  Pap: NA  Mammo: 2/11/22, birads1  Dexa: 2/11/22, osteoporosis.  DISCUSS BISPHOSPHONATE.  Colonoscopy: 12/24/2018 cologuard - negative.  Has just completed; awaiting results.  Tdap 12/12/12; flu declines; Shingrix #1 done - will get at pharmacy; PNA DUE - declines; Covid x4 complete      Objective    /76   Pulse 72   Temp 98  F (36.7  C) (Tympanic)   Resp 16   Ht 1.626 m (5' 4\")   LMP  (LMP Unknown)   BMI 36.78 kg/m    Body mass index is 36.78 kg/m .  Physical Exam   GENERAL: healthy, alert and no distress  EYES: Eyes grossly normal to inspection, PERRL and conjunctivae and sclerae normal  HENT: ear canals and TM's normal, nose and mouth without ulcers or lesions  NECK: no adenopathy, no asymmetry, masses, or scars and thyroid normal to palpation  RESP: lungs clear to auscultation - no rales, rhonchi or wheezes  CV: regular rate and rhythm, normal S1 S2, no S3 or S4, no murmur, click or rub, no peripheral edema and peripheral pulses strong  ABDOMEN: soft, nontender, no hepatosplenomegaly, no masses and bowel sounds normal.  Large panus; no obvious rash seen on exam but slight discoloration likely from chronic friction.  MS: LLE exam shows normal strength and muscle mass, no erythema, induration, or nodules, no evidence of joint effusion and pain with varus motion.  SKIN: ulcerated, erythematous lesion on R lateral nose; with rough appearance surrounding.  NEURO: Normal strength and tone, mentation intact and speech normal  PSYCH: mentation appears normal, affect normal/bright    Results for orders placed or performed during the hospital encounter of 10/04/22   XR Knee Standing 2v  Bilateral & 2v Left     Status: None    Narrative    XR KNEE STANDING 2 VW " BILAT AND 2 VW LEFT    HISTORY: 71 years Female Knee instability, left    COMPARISON: None    TECHNIQUE: XR KNEE STANDING 2 VW BILAT AND 2 VW LEFT 4 views total    FINDINGS: There is moderate severe lateral compartment joint space  narrowing left knee. There is marginal osteophytic change and  subcortical sclerosis. There is slight tibia valgus alignment. There  is severe patellofemoral compartment joint space narrowing, slight  lateral subluxation and marginal osteophytic change.    Patient is status post right total knee arthroplasty without evidence  of complication.      Impression    IMPRESSION: Advanced lateral and patellofemoral compartment  osteoarthritic change of the left knee.    PUSHPA CONWAY MD         SYSTEM ID:  [id number]   Results for orders placed or performed in visit on 10/04/22   Basic Metabolic Panel     Status: Abnormal   Result Value Ref Range    Sodium 134 134 - 144 mmol/L    Potassium 4.9 3.5 - 5.1 mmol/L    Chloride 103 98 - 107 mmol/L    Carbon Dioxide (CO2) 28 21 - 31 mmol/L    Anion Gap 3 3 - 14 mmol/L    Urea Nitrogen 34 (H) 7 - 25 mg/dL    Creatinine 1.26 (H) 0.60 - 1.20 mg/dL    Calcium 10.8 (H) 8.6 - 10.3 mg/dL    Glucose 110 (H) 70 - 105 mg/dL    GFR Estimate 46 (L) >60 mL/min/1.73m2   Vitamin D Total     Status: Normal   Result Value Ref Range    Vitamin D, Total (25-Hydroxy) 78 30 - 100 ug/L       Answers for HPI/ROS submitted by the patient on 10/3/2022  If you checked off any problems, how difficult have these problems made it for you to do your work, take care of things at home, or get along with other people?: Somewhat difficult  PHQ9 TOTAL SCORE: 2  ELIAS 7 TOTAL SCORE: 0

## 2022-10-06 PROBLEM — E26.09 PRIMARY HYPERALDOSTERONISM (H): Status: ACTIVE | Noted: 2022-10-06

## 2022-10-06 PROBLEM — M81.0 OSTEOPOROSIS, UNSPECIFIED OSTEOPOROSIS TYPE, UNSPECIFIED PATHOLOGICAL FRACTURE PRESENCE: Status: ACTIVE | Noted: 2022-10-06

## 2022-10-06 RX ORDER — ALENDRONATE SODIUM 70 MG/1
70 TABLET ORAL
Qty: 12 TABLET | Refills: 4 | Status: SHIPPED | OUTPATIENT
Start: 2022-10-06 | End: 2023-01-02

## 2022-10-15 DIAGNOSIS — I10 ESSENTIAL HYPERTENSION, BENIGN: ICD-10-CM

## 2022-10-17 NOTE — TELEPHONE ENCOUNTER
[id number] [employer] [facility] [place] sent Rx request for the following:      Requested Prescriptions   Pending Prescriptions Disp Refills     losartan (COZAAR) 100 MG tablet [Pharmacy Med Name: LOSARTAN POTASSIUM 100 MG TAB] 90 tablet 4     Sig: TAKE 1 TABLET BY MOUTH EVERY DAY       Angiotensin-II Receptors Failed - 10/17/2022 11:56 AM        Failed - Normal serum creatinine on file in past 12 months     Recent Labs   Lab Test 10/04/22  1149   CR 1.26*        Last Prescription Date:   9/7/21  Last Fill Qty/Refills:         90, R-4    Last Office Visit:              10/4/22  Future Office visit:           None    Shawna Prince RN .............. 10/17/2022  12:05 PM

## 2022-10-18 RX ORDER — LOSARTAN POTASSIUM 100 MG/1
TABLET ORAL
Qty: 90 TABLET | Refills: 4 | Status: SHIPPED | OUTPATIENT
Start: 2022-10-18 | End: 2023-12-20

## 2022-11-01 DIAGNOSIS — E03.9 HYPOTHYROIDISM, UNSPECIFIED TYPE: ICD-10-CM

## 2022-11-03 RX ORDER — LEVOTHYROXINE SODIUM 25 UG/1
TABLET ORAL
Qty: 90 TABLET | Refills: 4 | Status: SHIPPED | OUTPATIENT
Start: 2022-11-03 | End: 2023-06-30

## 2022-11-03 NOTE — TELEPHONE ENCOUNTER
"  Last Prescription Date: 9/8/21  Last Qty/Refills: 90 / 4  Last Office Visit: 10/4/22 PCP  Future Office Visit: none     Requested Prescriptions   Pending Prescriptions Disp Refills     levothyroxine (SYNTHROID/LEVOTHROID) 25 MCG tablet [Pharmacy Med Name: LEVOTHYROXINE 25 MCG TABLET] 90 tablet 4     Sig: TAKE 1 TABLET BY MOUTH EVERY DAY       Thyroid Protocol Passed - 11/1/2022 12:36 AM        Passed - Patient is 12 years or older        Passed - Recent (12 mo) or future (30 days) visit within the authorizing provider's specialty     Patient has had an office visit with the authorizing provider or a provider within the authorizing providers department within the previous 12 mos or has a future within next 30 days. See \"Patient Info\" tab in inbasket, or \"Choose Columns\" in Meds & Orders section of the refill encounter.              Passed - Medication is active on med list        Passed - Normal TSH on file in past 12 months     Recent Labs   Lab Test 01/28/22  1201   TSH 3.14              Passed - No active pregnancy on record     If patient is pregnant or has had a positive pregnancy test, please check TSH.          Passed - No positive pregnancy test in past 12 months     If patient is pregnant or has had a positive pregnancy test, please check TSH.                 "

## 2022-12-09 DIAGNOSIS — E66.9 OBESITY, UNSPECIFIED CLASSIFICATION, UNSPECIFIED OBESITY TYPE, UNSPECIFIED WHETHER SERIOUS COMORBIDITY PRESENT: Primary | ICD-10-CM

## 2022-12-13 NOTE — TELEPHONE ENCOUNTER
Routing refill request to provider for review/approval because:    Medication noted as historical on current med list.  Unsure of diagnosis to associate with Topamax    LOV: 10/4/22   Anti-Seizure Meds Protocol  Failed 12/09/2022 09:39 AM   Protocol Details       Normal CBC on file in past 26 months    Normal serum creatinine on file in past 26 months          Maggie Biswas RN on 12/13/2022 at 1:48 PM

## 2022-12-14 RX ORDER — TOPIRAMATE 25 MG/1
TABLET, FILM COATED ORAL
Qty: 180 TABLET | Refills: 1 | Status: ON HOLD | OUTPATIENT
Start: 2022-12-14 | End: 2023-05-16

## 2023-01-02 ENCOUNTER — OFFICE VISIT (OUTPATIENT)
Dept: INTERNAL MEDICINE | Facility: OTHER | Age: 73
End: 2023-01-02
Attending: INTERNAL MEDICINE
Payer: COMMERCIAL

## 2023-01-02 VITALS
HEART RATE: 63 BPM | TEMPERATURE: 97 F | RESPIRATION RATE: 18 BRPM | SYSTOLIC BLOOD PRESSURE: 132 MMHG | BODY MASS INDEX: 37.76 KG/M2 | DIASTOLIC BLOOD PRESSURE: 80 MMHG | OXYGEN SATURATION: 98 % | WEIGHT: 220 LBS

## 2023-01-02 DIAGNOSIS — E26.09 PRIMARY HYPERALDOSTERONISM (H): ICD-10-CM

## 2023-01-02 DIAGNOSIS — I10 ESSENTIAL HYPERTENSION, BENIGN: ICD-10-CM

## 2023-01-02 DIAGNOSIS — G47.33 OBSTRUCTIVE SLEEP APNEA SYNDROME: ICD-10-CM

## 2023-01-02 DIAGNOSIS — D51.8 OTHER VITAMIN B12 DEFICIENCY ANEMIA: Primary | ICD-10-CM

## 2023-01-02 DIAGNOSIS — M25.562 CHRONIC PAIN OF LEFT KNEE: ICD-10-CM

## 2023-01-02 DIAGNOSIS — G89.29 CHRONIC PAIN OF LEFT KNEE: ICD-10-CM

## 2023-01-02 DIAGNOSIS — Z01.818 PREOPERATIVE EXAMINATION: ICD-10-CM

## 2023-01-02 DIAGNOSIS — N30.00 ACUTE CYSTITIS WITHOUT HEMATURIA: ICD-10-CM

## 2023-01-02 DIAGNOSIS — E78.00 HYPERCHOLESTEROLEMIA: ICD-10-CM

## 2023-01-02 DIAGNOSIS — E03.9 HYPOTHYROIDISM, UNSPECIFIED TYPE: ICD-10-CM

## 2023-01-02 DIAGNOSIS — M81.0 OSTEOPOROSIS, UNSPECIFIED OSTEOPOROSIS TYPE, UNSPECIFIED PATHOLOGICAL FRACTURE PRESENCE: ICD-10-CM

## 2023-01-02 LAB
ALBUMIN UR-MCNC: NEGATIVE MG/DL
AMORPH CRY #/AREA URNS HPF: ABNORMAL /HPF
ANION GAP SERPL CALCULATED.3IONS-SCNC: 8 MMOL/L (ref 7–15)
APPEARANCE UR: ABNORMAL
ATRIAL RATE - MUSE: 61 BPM
BACTERIA #/AREA URNS HPF: ABNORMAL /HPF
BILIRUB UR QL STRIP: NEGATIVE
BUN SERPL-MCNC: 26.9 MG/DL (ref 8–23)
CALCIUM SERPL-MCNC: 10.6 MG/DL (ref 8.8–10.2)
CHLORIDE SERPL-SCNC: 102 MMOL/L (ref 98–107)
COLOR UR AUTO: YELLOW
CREAT SERPL-MCNC: 1.05 MG/DL (ref 0.51–0.95)
DEPRECATED HCO3 PLAS-SCNC: 25 MMOL/L (ref 22–29)
DIASTOLIC BLOOD PRESSURE - MUSE: NORMAL MMHG
ERYTHROCYTE [DISTWIDTH] IN BLOOD BY AUTOMATED COUNT: 12.5 % (ref 10–15)
GFR SERPL CREATININE-BSD FRML MDRD: 57 ML/MIN/1.73M2
GLUCOSE SERPL-MCNC: 105 MG/DL (ref 70–99)
GLUCOSE UR STRIP-MCNC: NEGATIVE MG/DL
HCT VFR BLD AUTO: 37 % (ref 35–47)
HGB BLD-MCNC: 11.9 G/DL (ref 11.7–15.7)
HGB UR QL STRIP: NEGATIVE
HOLD SPECIMEN: NORMAL
INTERPRETATION ECG - MUSE: NORMAL
KETONES UR STRIP-MCNC: NEGATIVE MG/DL
LEUKOCYTE ESTERASE UR QL STRIP: ABNORMAL
MCH RBC QN AUTO: 28.7 PG (ref 26.5–33)
MCHC RBC AUTO-ENTMCNC: 32.2 G/DL (ref 31.5–36.5)
MCV RBC AUTO: 89 FL (ref 78–100)
MUCOUS THREADS #/AREA URNS LPF: PRESENT /LPF
NITRATE UR QL: NEGATIVE
P AXIS - MUSE: 93 DEGREES
PH UR STRIP: 6.5 [PH] (ref 5–9)
PLATELET # BLD AUTO: 355 10E3/UL (ref 150–450)
POTASSIUM SERPL-SCNC: 5.1 MMOL/L (ref 3.4–5.3)
PR INTERVAL - MUSE: 194 MS
QRS DURATION - MUSE: 72 MS
QT - MUSE: 374 MS
QTC - MUSE: 376 MS
R AXIS - MUSE: 33 DEGREES
RBC # BLD AUTO: 4.15 10E6/UL (ref 3.8–5.2)
RBC URINE: 19 /HPF
SODIUM SERPL-SCNC: 135 MMOL/L (ref 136–145)
SP GR UR STRIP: 1.01 (ref 1–1.03)
SQUAMOUS EPITHELIAL: 9 /HPF
SYSTOLIC BLOOD PRESSURE - MUSE: NORMAL MMHG
T AXIS - MUSE: 43 DEGREES
URATE CRY #/AREA URNS HPF: ABNORMAL /HPF
UROBILINOGEN UR STRIP-MCNC: NORMAL MG/DL
VENTRICULAR RATE- MUSE: 61 BPM
WBC # BLD AUTO: 9 10E3/UL (ref 4–11)
WBC CLUMPS #/AREA URNS HPF: PRESENT /HPF
WBC URINE: 130 /HPF
YEAST #/AREA URNS HPF: ABNORMAL /HPF

## 2023-01-02 PROCEDURE — 85027 COMPLETE CBC AUTOMATED: CPT | Mod: ZL | Performed by: INTERNAL MEDICINE

## 2023-01-02 PROCEDURE — 99215 OFFICE O/P EST HI 40 MIN: CPT | Performed by: INTERNAL MEDICINE

## 2023-01-02 PROCEDURE — 93005 ELECTROCARDIOGRAM TRACING: CPT | Performed by: INTERNAL MEDICINE

## 2023-01-02 PROCEDURE — 80048 BASIC METABOLIC PNL TOTAL CA: CPT | Mod: ZL | Performed by: INTERNAL MEDICINE

## 2023-01-02 PROCEDURE — 81001 URINALYSIS AUTO W/SCOPE: CPT | Mod: ZL | Performed by: INTERNAL MEDICINE

## 2023-01-02 PROCEDURE — G0463 HOSPITAL OUTPT CLINIC VISIT: HCPCS | Performed by: INTERNAL MEDICINE

## 2023-01-02 PROCEDURE — 93010 ELECTROCARDIOGRAM REPORT: CPT | Performed by: INTERNAL MEDICINE

## 2023-01-02 PROCEDURE — 87186 SC STD MICRODIL/AGAR DIL: CPT | Mod: ZL | Performed by: INTERNAL MEDICINE

## 2023-01-02 PROCEDURE — 36415 COLL VENOUS BLD VENIPUNCTURE: CPT | Mod: ZL | Performed by: INTERNAL MEDICINE

## 2023-01-02 RX ORDER — CHLORAL HYDRATE 500 MG
1 CAPSULE ORAL DAILY
COMMUNITY
Start: 2023-01-02

## 2023-01-02 RX ORDER — ALENDRONATE SODIUM 70 MG/1
70 TABLET ORAL
Qty: 12 TABLET | Refills: 4 | COMMUNITY
Start: 2023-01-02 | End: 2024-01-31

## 2023-01-02 RX ORDER — FERROUS SULFATE 325(65) MG
325 TABLET ORAL
COMMUNITY
Start: 2023-01-02

## 2023-01-02 ASSESSMENT — ENCOUNTER SYMPTOMS
AGITATION: 0
FATIGUE: 0
FREQUENCY: 0
HEMATURIA: 0
HEADACHES: 0
FLANK PAIN: 0
FACIAL SWELLING: 0
RHINORRHEA: 0
HALLUCINATIONS: 0
ABDOMINAL PAIN: 0
DIFFICULTY URINATING: 0
WHEEZING: 0
SINUS PRESSURE: 0
PALPITATIONS: 0
CHILLS: 0
CONSTIPATION: 0
TROUBLE SWALLOWING: 0
FEVER: 0
EYE REDNESS: 0
WEAKNESS: 0
NECK STIFFNESS: 0
SEIZURES: 0
SLEEP DISTURBANCE: 0
ADENOPATHY: 0
DIAPHORESIS: 0
DYSURIA: 0
CONFUSION: 0
NECK PAIN: 0
BRUISES/BLEEDS EASILY: 0
NAUSEA: 0
JOINT SWELLING: 0
SHORTNESS OF BREATH: 0
ARTHRALGIAS: 1
SORE THROAT: 0
TREMORS: 0
SINUS PAIN: 0
COUGH: 0
DIARRHEA: 0
CHEST TIGHTNESS: 0
DIZZINESS: 0
BACK PAIN: 0
NUMBNESS: 0
ABDOMINAL DISTENTION: 0
EYE PAIN: 0
BLOOD IN STOOL: 0
VOMITING: 0

## 2023-01-02 ASSESSMENT — PAIN SCALES - GENERAL: PAINLEVEL: MODERATE PAIN (5)

## 2023-01-02 NOTE — PROGRESS NOTES
[facility]  [address]  [place] [address]  Phone: [phone]  Primary Provider: Jennifer Maurice  Pre-op Performing Provider: GELY CHAO    {Provider  Link to PREOP SmartSet  Use this to apply standard patient instructions to AVS; includes medication directions, common orders, guidelines for anemia, warfarin, additional testing   :959287}  PREOPERATIVE EVALUATION:  Today's date: 1/2/2023    Ness Bales is a 72 year old female who presents for a preoperative evaluation.    Surgical Information:  Surgery/Procedure: L knee replacement  Surgery Location: [facility]  Surgeon: Dr. Díaz  Surgery Date: 1/19/23  Time of Surgery: TBD  Where patient plans to recover: At home with family  Fax number for surgical facility: Note does not need to be faxed, will be available electronically in Epic.    Type of Anesthesia Anticipated: {ANESTHESIA:297596}    {2021 Provider Charting Preference for Preop :434966}    Subjective     HPI related to upcoming procedure: ***    Preop Questions 1/1/2023   1. Have you ever had a heart attack or stroke? No   2. Have you ever had surgery on your heart or blood vessels, such as a stent placement, a coronary artery bypass, or surgery on an artery in your head, neck, heart, or legs? No   3. Do you have chest pain with activity? No   4. Do you have a history of  heart failure? No   5. Do you currently have a cold, bronchitis or symptoms of other infection? No   6. Do you have a cough, shortness of breath, or wheezing? No   7. Do you or anyone in your family have previous history of blood clots? UNKNOWN - ***   8. Do you or does anyone in your family have a serious bleeding problem such as prolonged bleeding following surgeries or cuts? No   9. Have you ever had problems with anemia or been told to take iron pills? YES - ***   10. Have you had any abnormal blood loss such as black, tarry or bloody stools, or abnormal vaginal bleeding? No    11. Have you ever had a blood transfusion? YES - ***   11a. Have you ever had a transfusion reaction? No   12. Are you willing to have a blood transfusion if it is medically needed before, during, or after your surgery? Yes   13. Have you or any of your relatives ever had problems with anesthesia? No   14. Do you have sleep apnea, excessive snoring or daytime drowsiness? YES - ***   14a. Do you have a CPAP machine? Yes   15. Do you have any artifical heart valves or other implanted medical devices like a pacemaker, defibrillator, or continuous glucose monitor? YES - ***   15a. What type of device do you have? Bladder control   15b. Name of the clinic that manages your device:  [facility]   16. Do you have artificial joints? YES - ***   17. Are you allergic to latex? No   18. Is there any chance that you may be pregnant? -       Health Care Directive:  Patient does not have a Health Care Directive or Living Will: {ADVANCE_DIRECTIVE_STATUS:790366}    Preoperative Review of :  {Mnpmpreport:314795}  {Review MNPMP for all patients per [employer] MNPMP Profile:780093}    {Chronic problem details (Optional) :258892}    Review of Systems  {ROS Preop Choices:900722}    Patient Active Problem List    Diagnosis Date Noted     Primary hyperaldosteronism (H) 10/06/2022     Priority: Medium     Osteoporosis, unspecified osteoporosis type, unspecified pathological fracture presence 10/06/2022     Priority: Medium     Morbid obesity (H) 01/28/2022     Priority: Medium     Dysthymic disorder 01/16/2018     Priority: Medium     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     Priority: Medium     Contusion of lower leg 02/25/2011     Priority: Medium     Hypothyroidism 11/12/2010     Priority: Medium     Degeneration of lumbar or lumbosacral intervertebral disc 11/05/2010     Priority: Medium     Spinal stenosis, lumbar 11/05/2010     Priority: Medium     Esophageal reflux 06/25/2010     Priority: Medium     Sacroiliac joint dysfunction  06/25/2010     Priority: Medium     Hypercholesterolemia 04/20/2009     Priority: Medium     Sleep apnea 02/12/2009     Priority: Medium     Disorder of bone and cartilage 10/29/2007     Priority: Medium     Enthesopathy of hip region 08/22/2006     Priority: Medium     Other vitamin B12 deficiency anemia 09/24/2001     Priority: Medium     Essential hypertension, benign 09/24/2001     Priority: Medium     Urge incontinence of urine 09/24/2001     Priority: Medium     Obesity 09/24/2001     Priority: Medium      Past Medical History:   Diagnosis Date     Obesity     No Comments Provided     Personal history of other medical treatment (CODE)     3 vaginal childbirth uncomplicated     Personal history of other medical treatment (CODE)     1983,blood transfusion with stomach stapling     Past Surgical History:   Procedure Laterality Date     ARTHROPLASTY KNEE      2008,Total Right knee replacement     BIOPSY Bilateral 10/18/2021    Adrenal Vein; Hyperaldosteronism     BIOPSY BREAST      Incisional x2 benign breast biopsies     FRACTURE SURGERY      open treatment of Humeral Fx w/ prosthesis     INJECT BOTOX N/A 09/22/2020    [facility]AB; [place] ([place], [place]), Dr. Joe Cruz     LAPAROSCOPIC BYPASS GASTRIC      1983,Stomach stapling complicated by incision rupture, then repaired [facility] second surgery     OTHER SURGICAL HISTORY      1983,01290.0,SKIN/SUBCUTANEOUS SURGERY,repaired incision from stomach stapling     OTHER SURGICAL HISTORY      2006,[id number],ARTHROPLASTY,Total glenohumeral joint replacement     OTHER SURGICAL HISTORY      110040,ANESTHESIA ALERT,No problems with anaesthesia. ?     Current Outpatient Medications   Medication Sig Dispense Refill     alendronate (FOSAMAX) 70 MG tablet Take 1 tablet (70 mg) by mouth every 7 days 12 tablet 4     amLODIPine (NORVASC) 5 MG tablet TAKE 1 TABLET BY MOUTH EVERY DAY 90 tablet 1     ARIPiprazole (ABILIFY) 5 MG tablet Take 1 tablet (5 mg) by mouth daily        cholecalciferol (VITAMIN D-1000 MAX ST) 25 MCG (1000 UT) TABS Take 10,000 Units by mouth daily       Coenzyme Q10 (CO Q 10) 100 MG CAPS Take by mouth daily        cyanocobalamin (CYANOCOBALAMIN) 1000 MCG/ML injection Inject 1 mL (1,000 mcg) into the muscle every 30 days 3 mL 4     DULoxetine (CYMBALTA) 30 MG capsule Take 30 mg by mouth daily       ferrous sulfate (IRON) 325 (65 FE) MG tablet Take 325 mg by mouth       fish oil-omega-3 fatty acids 1000 MG capsule Take 1 capsule by mouth       levothyroxine (SYNTHROID/LEVOTHROID) 25 MCG tablet TAKE 1 TABLET BY MOUTH EVERY DAY 90 tablet 4     losartan (COZAAR) 100 MG tablet TAKE 1 TABLET BY MOUTH EVERY DAY 90 tablet 4     metoprolol succinate ER (TOPROL-XL) 100 MG 24 hr tablet TAKE 2 TABLETS BY MOUTH EVERY  tablet 4     modafinil (PROVIGIL) 200 MG tablet Take 2 tablets (400 mg) by mouth daily       phentermine (ADIPEX-P) 37.5 MG tablet Take 37.5 mg by mouth every morning (before breakfast)       spironolactone (ALDACTONE) 50 MG tablet Take 1 tablet by mouth 2 times daily       Syringe Luer Slip 3 ML MISC 1 each every 30 days 25 each 0     topiramate (TOPAMAX) 25 MG tablet TAKE 1 TABLET BY MOUTH TWO TIMES A DAY. DO NOT CRUSH. 180 tablet 1       Allergies   Allergen Reactions     Chlorthalidone Other (See Comments)     Caused hyponatremia        Social History     Tobacco Use     Smoking status: Never     Smokeless tobacco: Never   Substance Use Topics     Alcohol use: No     Comment: very little      {FAMILY HISTORY (Optional):176321237}  History   Drug Use No         Objective     LMP  (LMP Unknown)     Physical Exam  {EXAM Preop Choices:314345}    Recent Labs   Lab Test 10/04/22  1149 04/22/22  1008 02/24/22  1515 01/28/22  1201   HGB  --   --   --  12.5   PLT  --   --   --  372    124*   < > 132*   POTASSIUM 4.9 4.8   < > 5.4*   CR 1.26* 0.88   < > 1.07   A1C  --   --   --  6.0    < > = values in this interval not displayed.     "    Diagnostics:  {LABS:173896}   {EK}    Revised Cardiac Risk Index (RCRI):  The patient has the following serious cardiovascular risks for perioperative complications:  {PREOP REVISED CARDIAC RISK INDEX (RCRI) :806334::\" - No serious cardiac risks = 0 points\"}     RCRI Interpretation: {REVISED CARDIAC RISK INTERPRETATION :354718}         Signed Electronically by: GELY CHAO MD  Copy of this evaluation report is provided to requesting physician.    {Provider Resources  Preop [facility] Preop Guidelines  Revised Cardiac Risk Index :059925}  "

## 2023-01-02 NOTE — PROGRESS NOTES
Chief Complaint:  This patient is here for a preop consultation and clearance.    HPI: Preoperative consultation and clearance is requested by Dr. Díaz.  This patient is scheduled for left total knee arthroplasty.  This will be done at [place] on January 19.    The patient has a past medical history remarkable for hypertension.  She is treated appropriately with multidrug therapy.  She also has a history of hyperaldosteronism for which she is treated with spironolactone.  She has been previously evaluated at the [facility] and was deemed nonsurgical for this diagnosis.  She has a history of osteoporosis and it had been recommended that she start Fosamax which she has not yet started.  She has a history of hypothyroidism for which she takes thyroid replacement therapy.    The patient also has a history of obesity and is status post bypass surgery.  She is also on multiple medications to help with weight loss through a bariatric provider.  She has a history of obstructive sleep apnea but does not use her CPAP.  She has a history of B12 deficiency following her gastric bypass and does give herself B12 injections monthly.    She currently feels well other than her knee pain.  She denies any acute illness including fevers or chills.  She denies sore throat or rash.  She denies shortness of breath or cough of any significance, she does admit that she has a very mild occasional tickle or cough.  She denies chest pain, pressure or palpitations, has no previous history of cardiac disease and can walk a flight of stairs without difficulty from a cardiac perspective.  She denies any GI or  issues.  She does have chronic difficulties with lower extremity edema.    Medications are reconciled.  Past medical history, past surgical history, family history and social histories are reviewed and updated.  She has never had an anesthesia complication.  She may have had a blood transfusion with her gastric bypass.  She has  never had a bleeding diathesis.      Past Medical History:   Diagnosis Date     Benign essential hypertension      Depression      Hyperaldosteronism (H)      Hypothyroidism      Obesity     No Comments Provided     Personal history of other medical treatment (CODE)     3 vaginal childbirth uncomplicated     Personal history of other medical treatment (CODE)     1983,blood transfusion with stomach stapling     Vitamin B12 deficiency (non anemic)        Past Surgical History:   Procedure Laterality Date     ARTHROPLASTY KNEE Right 2008     BIOPSY Bilateral 10/18/2021    Adrenal Vein; Hyperaldosteronism     BIOPSY BREAST      Incisional x2 benign breast biopsies     FRACTURE SURGERY Left     open treatment of Humeral Fx w/ prosthesis     INJECT BOTOX N/A 09/22/2020    [facility]; [facility] ([place], [place]), Dr. Joe Cruz     LAPAROSCOPIC BYPASS GASTRIC      1983,Stomach stapling complicated by incision rupture, then repaired [facility] second surgery     OTHER SURGICAL HISTORY      1983,31880.0,SKIN/SUBCUTANEOUS SURGERY,repaired incision from stomach stapling     OTHER SURGICAL HISTORY      208489,ANESTHESIA ALERT,No problems with anaesthesia. ?       Current Outpatient Medications   Medication Sig Dispense Refill     alendronate (FOSAMAX) 70 MG tablet Take 1 tablet (70 mg) by mouth every 7 days --not taking. 12 tablet 4     amLODIPine (NORVASC) 5 MG tablet TAKE 1 TABLET BY MOUTH EVERY DAY 90 tablet 1     ARIPiprazole (ABILIFY) 5 MG tablet Take 1 tablet (5 mg) by mouth daily       cholecalciferol (VITAMIN D-1000 MAX ST) 25 MCG (1000 UT) TABS Take 10,000 Units by mouth daily       Coenzyme Q10 (CO Q 10) 100 MG CAPS Take by mouth daily        cyanocobalamin (CYANOCOBALAMIN) 1000 MCG/ML injection Inject 1 mL (1,000 mcg) into the muscle every 30 days 3 mL 4     DULoxetine (CYMBALTA) 30 MG capsule Take 30 mg by mouth daily       ferrous sulfate (FEROSUL) 325 (65 Fe) MG tablet Take 1 tablet (325 mg) by mouth daily (with breakfast)        fish oil-omega-3 fatty acids 1000 MG capsule Take 1 capsule (1 g) by mouth daily       levothyroxine (SYNTHROID/LEVOTHROID) 25 MCG tablet TAKE 1 TABLET BY MOUTH EVERY DAY 90 tablet 4     losartan (COZAAR) 100 MG tablet TAKE 1 TABLET BY MOUTH EVERY DAY 90 tablet 4     metoprolol succinate ER (TOPROL-XL) 100 MG 24 hr tablet TAKE 2 TABLETS BY MOUTH EVERY  tablet 4     modafinil (PROVIGIL) 200 MG tablet Take 2 tablets (400 mg) by mouth daily       phentermine (ADIPEX-P) 37.5 MG tablet Take 37.5 mg by mouth every morning (before breakfast)       spironolactone (ALDACTONE) 50 MG tablet Take 1 tablet by mouth 2 times daily       Syringe Luer Slip 3 ML MISC 1 each every 30 days 25 each 0     topiramate (TOPAMAX) 25 MG tablet TAKE 1 TABLET BY MOUTH TWO TIMES A DAY. DO NOT CRUSH. 180 tablet 1     sulfamethoxazole-trimethoprim (BACTRIM DS) 800-160 MG tablet Take 1 tablet by mouth 2 times daily for 5 days 10 tablet 0       Allergies   Allergen Reactions     Chlorthalidone Other (See Comments)     Caused hyponatremia       Family History   Problem Relation Age of Onset     Other - See Comments Mother         macular degeneration/Psychiatric illness,takes antidepressant     Heart Disease Father 40        Heart Disease,MI     Diabetes Father         Diabetes     Other - See Comments Son         Psychiatric illness,psychotic episode this year smoking marijuana, ephedra use ? bipolar     Other - See Comments Sister         Psychiatric illness,depression     Other - See Comments Brother         Psychiatric illness,depression       Social History     Socioeconomic History     Marital status:      Spouse name: Not on file     Number of children: Not on file     Years of education: Not on file     Highest education level: Not on file   Occupational History     Not on file   Tobacco Use     Smoking status: Never     Smokeless tobacco: Never   Vaping Use     Vaping Use: Never used   Substance and Sexual Activity      Alcohol use: No     Comment: very little      Drug use: No     Sexual activity: Yes     Partners: Male   Other Topics Concern     Parent/sibling w/ CABG, MI or angioplasty before 65F 55M? 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  [place]ives [place] [facility].     Social Determinants of Health     Financial Resource Strain: Not on file   Food Insecurity: Not on file   Transportation Needs: Not on file   Physical Activity: Not on file   Stress: Not on file   Social Connections: Not on file   Intimate Partner Violence: Not on file   Housing Stability: Not on file       Review of Systems   Constitutional: Negative for chills, diaphoresis, fatigue and fever.   HENT: Negative for congestion, ear pain, facial swelling, mouth sores, nosebleeds, rhinorrhea, sinus pressure, sinus pain, sore throat and trouble swallowing.    Eyes: Negative for pain, redness and visual disturbance.   Respiratory: Negative for cough, chest tightness, shortness of breath and wheezing.    Cardiovascular: Positive for leg swelling. Negative for chest pain and palpitations.   Gastrointestinal: Negative for abdominal distention, abdominal pain, blood in stool, constipation, diarrhea, nausea and vomiting.   Endocrine: Negative for cold intolerance and heat intolerance.   Genitourinary: Negative for difficulty urinating, dysuria, flank pain, frequency, hematuria, pelvic pain, vaginal bleeding, vaginal discharge and vaginal pain.   Musculoskeletal: Positive for arthralgias. Negative for back pain, gait problem, joint swelling, neck pain and neck stiffness.   Skin: Negative for rash.   Neurological: Negative for dizziness, tremors, seizures, syncope, weakness, numbness and headaches.   Hematological: Negative for adenopathy. Does not bruise/bleed easily.   Psychiatric/Behavioral: Negative for agitation, confusion, hallucinations and sleep disturbance.       Physical Exam  Vitals and nursing  note reviewed.   Constitutional:       General: She is not in acute distress.     Appearance: She is well-developed. She is not diaphoretic.   HENT:      Head: Normocephalic.      Right Ear: External ear normal.      Left Ear: External ear normal.      Mouth/Throat:      Pharynx: No oropharyngeal exudate.   Eyes:      Conjunctiva/sclera: Conjunctivae normal.      Pupils: Pupils are equal, round, and reactive to light.   Neck:      Thyroid: No thyromegaly.      Vascular: Normal carotid pulses. No carotid bruit or JVD.      Trachea: No tracheal deviation.   Cardiovascular:      Rate and Rhythm: Normal rate and regular rhythm.      Heart sounds: Normal heart sounds. No murmur heard.    No friction rub. No gallop.   Pulmonary:      Effort: Pulmonary effort is normal. No respiratory distress.      Breath sounds: Normal breath sounds. No wheezing or rales.   Abdominal:      General: Bowel sounds are normal. There is no distension.      Palpations: Abdomen is soft. There is no mass.      Tenderness: There is no abdominal tenderness. There is no rebound.   Musculoskeletal:         General: Normal range of motion.      Cervical back: Normal range of motion and neck supple.      Right lower leg: Edema present.      Left lower leg: Edema present.      Comments: 1+ pedal edema   Lymphadenopathy:      Cervical: No cervical adenopathy.   Skin:     General: Skin is warm and dry.      Findings: No rash.   Neurological:      Mental Status: She is alert and oriented to person, place, and time.      Cranial Nerves: No cranial nerve deficit.      Coordination: Coordination normal.      Deep Tendon Reflexes: Reflexes are normal and symmetric.   Psychiatric:         Behavior: Behavior normal.       Results for orders placed or performed in visit on 01/02/23   CBC W PLT No Diff     Status: Normal   Result Value Ref Range    WBC Count 9.0 4.0 - 11.0 10e3/uL    RBC Count 4.15 3.80 - 5.20 10e6/uL    Hemoglobin 11.9 11.7 - 15.7 g/dL     Hematocrit 37.0 35.0 - 47.0 %    MCV 89 78 - 100 fL    MCH 28.7 26.5 - 33.0 pg    MCHC 32.2 31.5 - 36.5 g/dL    RDW 12.5 10.0 - 15.0 %    Platelet Count 355 150 - 450 10e3/uL   Basic Metabolic Panel     Status: Abnormal   Result Value Ref Range    Sodium 135 (L) 136 - 145 mmol/L    Potassium 5.1 3.4 - 5.3 mmol/L    Chloride 102 98 - 107 mmol/L    Carbon Dioxide (CO2) 25 22 - 29 mmol/L    Anion Gap 8 7 - 15 mmol/L    Urea Nitrogen 26.9 (H) 8.0 - 23.0 mg/dL    Creatinine 1.05 (H) 0.51 - 0.95 mg/dL    Calcium 10.6 (H) 8.8 - 10.2 mg/dL    Glucose 105 (H) 70 - 99 mg/dL    GFR Estimate 57 (L) >60 mL/min/1.73m2   Extra Tube     Status: None    Narrative    The following orders were created for panel order Extra Tube.  Procedure                               Abnormality         Status                     ---------                               -----------         ------                     Extra Serum Separator Tu...[[id number]]                      Final result                 Please view results for these tests on the individual orders.   Extra Serum Separator Tube (SST)     Status: None   Result Value Ref Range    Hold Specimen JI    UA with Microscopic reflex to Culture     Status: Abnormal    Specimen: Urine, Clean Catch   Result Value Ref Range    Color Urine Yellow Colorless, Straw, Light Yellow, Yellow    Appearance Urine Cloudy (A) Clear    Glucose Urine Negative Negative mg/dL    Bilirubin Urine Negative Negative    Ketones Urine Negative Negative mg/dL    Specific Gravity Urine 1.015 1.000 - 1.030    Blood Urine Negative Negative    pH Urine 6.5 5.0 - 9.0    Protein Albumin Urine Negative Negative mg/dL    Urobilinogen Urine Normal Normal, 2.0 mg/dL    Nitrite Urine Negative Negative    Leukocyte Esterase Urine Large (A) Negative    Bacteria Urine Moderate (A) None Seen /HPF    WBC Clumps Urine Present (A) None Seen /HPF    Budding Yeast Urine Few (A) None Seen /HPF    Mucus Urine Present (A) None Seen /LPF     Amorphous Crystals Urine Few (A) None Seen /HPF    Uric Acid Crystals Urine Moderate (A) None Seen /HPF    RBC Urine 19 (H) <=2 /HPF    WBC Urine 130 (H) <=5 /HPF    Squamous Epithelials Urine 9 (H) <=1 /HPF    Narrative    Urine Culture ordered based on laboratory criteria   EKG 12-lead, tracing only     Status: None   Result Value Ref Range    Systolic Blood Pressure  mmHg    Diastolic Blood Pressure  mmHg    Ventricular Rate 61 BPM    Atrial Rate 61 BPM    TX Interval 194 ms    QRS Duration 72 ms     ms    QTc 376 ms    P Axis 93 degrees    R AXIS 33 degrees    T Axis 43 degrees    Interpretation ECG       Sinus rhythm  Normal ECG  When compared with ECG of 29-JAN-2020 14:09,  T wave inversion no longer evident in Anterior leads  Confirmed by MD JEREMIE, GELY ([id number]) on 1/2/2023 12:49:01 PM     Urine Culture     Status: Abnormal    Specimen: Urine, Clean Catch   Result Value Ref Range    Culture >100,000 CFU/mL Escherichia coli (A)        Susceptibility    Escherichia coli - GLENNY     Amoxicillin/Clavulanate  Susceptible      Ampicillin  Susceptible      Ampicillin/ Sulbactam  Susceptible      Aztreonam  Susceptible      Cefazolin  Susceptible      Cefepime  Susceptible      Ceftazidime  Susceptible      Ceftriaxone  Susceptible      Cefoxitin  Susceptible      Ciprofloxacin*         * Ciprofloxacin not resistant.  Due to test limitations, lab cannot provide GLENNY to determine susceptibility.     Ertapenem  Susceptible      Gentamicin  Susceptible      Imipenem  Susceptible      Levofloxacin*         * Levofloxacin not resistant.  Due to test limitations, lab cannot provide GLENNY to determine susceptibility.     Nitrofurantoin  Susceptible      Piperacillin/Tazobactam  Intermediate      Tetracycline  Susceptible      Tobramycin  Susceptible      Trimethoprim/Sulfamethoxazole  Susceptible      Cefpodoxime  Susceptible      Cefuroxime  Susceptible      Trimethoprim  Susceptible        Assessment:      ICD-10-CM     1. Other vitamin B12 deficiency anemia  D51.8       2. Osteoporosis, unspecified osteoporosis type, unspecified pathological fracture presence  M81.0 alendronate (FOSAMAX) 70 MG tablet      3. Primary hyperaldosteronism (H)  E26.09       4. Hypothyroidism, unspecified type  E03.9       5. Hypercholesterolemia  E78.00       6. Essential hypertension, benign  I10       7. Obstructive sleep apnea syndrome  G47.33       8. Chronic pain of left knee  M25.562     G89.29       9. Preoperative examination  Z01.818 EKG 12-lead, tracing only     CBC W PLT No Diff     Basic Metabolic Panel     UA with Microscopic reflex to Culture     CBC W PLT No Diff     Basic Metabolic Panel     UA with Microscopic reflex to Culture     Urine Culture      10. Acute cystitis without hematuria  N30.00 UA with Microscopic reflex to Culture           Plan: This patient presents for preoperative clearance prior to undergoing knee replacement surgery.  She appears to be medically optimized.  Her exam today was unremarkable.  Her EKG is normal.  Lab is acceptable aside from a possible UTI based on microscopic urine examination.  I will await culture and will treat if UTI is confirmed.  She is okay for her planned surgical procedure without contraindication.  I did ask her to stop ibuprofen and fish oil 1 week prior to the procedure.  She does not use any aspirin or aspirin products.  The morning of surgery, I recommended that she take her antihypertensives with a small sip of water including amlodipine, metoprolol, losartan and spironolactone.  All other medications can be reinstituted postoperatively when deemed appropriate.  With her history, she will need to be monitored for obstructive apnea as well as hypertension in the perioperative timeframe.    Over 50% of a 45 minute visit were spent in face-to-face time, previsit planning, obtaining and reviewing history, counseling and education, ordering tests and procedures and documenting clinical  information into the electronic health record.    Urine has returned showing a pansensitive E. coli.  She is treated with Septra DS twice daily for 5 days and I have recommended a follow-up urine after completion of therapy.

## 2023-01-02 NOTE — LETTER
January 2, 2023      Ness Bales  [address]  [place] [address]        Dear Ness,    Below are the results of your recent labs:    Results for orders placed or performed in visit on 01/02/23   CBC W PLT No Diff     Status: Normal   Result Value Ref Range    WBC Count 9.0 4.0 - 11.0 10e3/uL    RBC Count 4.15 3.80 - 5.20 10e6/uL    Hemoglobin 11.9 11.7 - 15.7 g/dL    Hematocrit 37.0 35.0 - 47.0 %    MCV 89 78 - 100 fL    MCH 28.7 26.5 - 33.0 pg    MCHC 32.2 31.5 - 36.5 g/dL    RDW 12.5 10.0 - 15.0 %    Platelet Count 355 150 - 450 10e3/uL   Basic Metabolic Panel     Status: Abnormal   Result Value Ref Range    Sodium 135 (L) 136 - 145 mmol/L    Potassium 5.1 3.4 - 5.3 mmol/L    Chloride 102 98 - 107 mmol/L    Carbon Dioxide (CO2) 25 22 - 29 mmol/L    Anion Gap 8 7 - 15 mmol/L    Urea Nitrogen 26.9 (H) 8.0 - 23.0 mg/dL    Creatinine 1.05 (H) 0.51 - 0.95 mg/dL    Calcium 10.6 (H) 8.8 - 10.2 mg/dL    Glucose 105 (H) 70 - 99 mg/dL    GFR Estimate 57 (L) >60 mL/min/1.73m2   Extra Tube     Status: None (In process)    Narrative    The following orders were created for panel order Extra Tube.  Procedure                               Abnormality         Status                     ---------                               -----------         ------                     Extra Serum Separator Tu...[[id number]]                      In process                   Please view results for these tests on the individual orders.   UA with Microscopic reflex to Culture     Status: Abnormal    Specimen: Urine, Clean Catch   Result Value Ref Range    Color Urine Yellow Colorless, Straw, Light Yellow, Yellow    Appearance Urine Cloudy (A) Clear    Glucose Urine Negative Negative mg/dL    Bilirubin Urine Negative Negative    Ketones Urine Negative Negative mg/dL    Specific Gravity Urine 1.015 1.000 - 1.030    Blood Urine Negative Negative    pH Urine 6.5 5.0 - 9.0    Protein Albumin Urine Negative Negative mg/dL     Urobilinogen Urine Normal Normal, 2.0 mg/dL    Nitrite Urine Negative Negative    Leukocyte Esterase Urine Large (A) Negative    Bacteria Urine Moderate (A) None Seen /HPF    WBC Clumps Urine Present (A) None Seen /HPF    Budding Yeast Urine Few (A) None Seen /HPF    Mucus Urine Present (A) None Seen /LPF    Amorphous Crystals Urine Few (A) None Seen /HPF    Uric Acid Crystals Urine Moderate (A) None Seen /HPF    RBC Urine 19 (H) <=2 /HPF    WBC Urine 130 (H) <=5 /HPF    Squamous Epithelials Urine 9 (H) <=1 /HPF    Narrative    Urine Culture ordered based on laboratory criteria   EKG 12-lead, tracing only     Status: None   Result Value Ref Range    Systolic Blood Pressure  mmHg    Diastolic Blood Pressure  mmHg    Ventricular Rate 61 BPM    Atrial Rate 61 BPM    ND Interval 194 ms    QRS Duration 72 ms     ms    QTc 376 ms    P Axis 93 degrees    R AXIS 33 degrees    T Axis 43 degrees    Interpretation ECG       Sinus rhythm  Normal ECG  When compared with ECG of 29-JAN-2020 14:09,  T wave inversion no longer evident in Anterior leads  Confirmed by MD JEREMIE, MIAH ([id number]) on 1/2/2023 12:49:01 PM          In general, your blood tests look fine.  Your urine shows that you may have an infection.  I am waiting on the urine culture and if indeed an infection is present I will let you know and we will send in an antibiotic for you.    Sincerely,        Miah Black MD  Internal Medicine  [facility]

## 2023-01-04 DIAGNOSIS — N30.00 ACUTE CYSTITIS WITHOUT HEMATURIA: Primary | ICD-10-CM

## 2023-01-04 LAB — BACTERIA UR CULT: ABNORMAL

## 2023-01-04 RX ORDER — SULFAMETHOXAZOLE/TRIMETHOPRIM 800-160 MG
1 TABLET ORAL 2 TIMES DAILY
Qty: 10 TABLET | Refills: 0 | Status: SHIPPED | OUTPATIENT
Start: 2023-01-04 | End: 2023-01-09

## 2023-01-11 ENCOUNTER — TELEPHONE (OUTPATIENT)
Dept: INTERNAL MEDICINE | Facility: OTHER | Age: 73
End: 2023-01-11

## 2023-01-11 ENCOUNTER — APPOINTMENT (OUTPATIENT)
Dept: LAB | Facility: OTHER | Age: 73
End: 2023-01-11
Attending: INTERNAL MEDICINE
Payer: COMMERCIAL

## 2023-01-11 NOTE — TELEPHONE ENCOUNTER
Called and spoke to Patient after verifying last name and date of birth.  Notified of lab order already being placed. Patient states she is coming in today at 3pm.  Dorys Almanza RN on 1/11/2023 at 10:56 AM

## 2023-01-11 NOTE — TELEPHONE ENCOUNTER
Patient had UTI and is scheduled for surgery in 1 week.  She needs another lab test to ensure the UTI is gone.  Dr. Black is the one who originally saw patient for her UTI.  Will he please place an order for UA to verify that UTI has cleared?    Mariposa Kenney on 1/11/2023 at 10:34 AM

## 2023-01-12 ENCOUNTER — LAB (OUTPATIENT)
Dept: LAB | Facility: OTHER | Age: 73
End: 2023-01-12
Attending: INTERNAL MEDICINE
Payer: COMMERCIAL

## 2023-01-12 DIAGNOSIS — N30.00 ACUTE CYSTITIS WITHOUT HEMATURIA: ICD-10-CM

## 2023-01-12 LAB
ALBUMIN UR-MCNC: NEGATIVE MG/DL
APPEARANCE UR: CLEAR
BACTERIA #/AREA URNS HPF: ABNORMAL /HPF
BILIRUB UR QL STRIP: NEGATIVE
COLOR UR AUTO: ABNORMAL
GLUCOSE UR STRIP-MCNC: NEGATIVE MG/DL
HGB UR QL STRIP: NEGATIVE
KETONES UR STRIP-MCNC: NEGATIVE MG/DL
LEUKOCYTE ESTERASE UR QL STRIP: ABNORMAL
NITRATE UR QL: NEGATIVE
PH UR STRIP: 5.5 [PH] (ref 5–9)
RBC URINE: 2 /HPF
SP GR UR STRIP: 1.02 (ref 1–1.03)
UROBILINOGEN UR STRIP-MCNC: NORMAL MG/DL
WBC URINE: 18 /HPF

## 2023-01-12 PROCEDURE — 87088 URINE BACTERIA CULTURE: CPT | Mod: ZL

## 2023-01-12 PROCEDURE — 81001 URINALYSIS AUTO W/SCOPE: CPT | Mod: ZL

## 2023-01-13 NOTE — RESULT ENCOUNTER NOTE
Wait for culture results.     Electronically signed by:  Art Victor MD  on January 13, 2023 5:39 PM

## 2023-01-14 LAB — BACTERIA UR CULT: ABNORMAL

## 2023-01-16 RX ORDER — NITROFURANTOIN 25; 75 MG/1; MG/1
100 CAPSULE ORAL 2 TIMES DAILY
Qty: 6 CAPSULE | Refills: 0 | Status: SHIPPED | OUTPATIENT
Start: 2023-01-16 | End: 2023-01-19

## 2023-01-27 DIAGNOSIS — I10 ESSENTIAL HYPERTENSION, BENIGN: ICD-10-CM

## 2023-01-29 DIAGNOSIS — Z98.84 S/P GASTRIC BYPASS: ICD-10-CM

## 2023-01-29 RX ORDER — CYANOCOBALAMIN 1000 UG/ML
1 INJECTION, SOLUTION INTRAMUSCULAR; SUBCUTANEOUS
Qty: 3 ML | Refills: 4 | Status: SHIPPED | OUTPATIENT
Start: 2023-01-29 | End: 2024-02-06

## 2023-01-31 NOTE — TELEPHONE ENCOUNTER
[facility] in #[id number] in [facility] [place] sent Rx request for the following:      Requested Prescriptions   Pending Prescriptions Disp Refills     amLODIPine (NORVASC) 5 MG tablet [Pharmacy Med Name: AMLODIPINE BESYLATE 5 MG TAB] 90 tablet 1     Sig: TAKE 1 TABLET BY MOUTH EVERY DAY       Calcium Channel Blockers Protocol  Failed - 1/27/2023 12:40 AM        Failed - Normal serum creatinine on file in past 12 months     Recent Labs   Lab Test 01/02/23  1130   CR 1.05*        Last Prescription Date:   8/1/22  Last Fill Qty/Refills:         90, R-1    Last Office Visit:              1/2/23   Future Office visit:           None    LEROY DARLING, RN on 1/31/2023 at 4:43 PM

## 2023-02-01 RX ORDER — AMLODIPINE BESYLATE 5 MG/1
TABLET ORAL
Qty: 90 TABLET | Refills: 1 | Status: SHIPPED | OUTPATIENT
Start: 2023-02-01 | End: 2023-12-14

## 2023-02-15 DIAGNOSIS — I10 ESSENTIAL HYPERTENSION, BENIGN: ICD-10-CM

## 2023-02-16 ENCOUNTER — TRANSFERRED RECORDS (OUTPATIENT)
Dept: HEALTH INFORMATION MANAGEMENT | Facility: OTHER | Age: 73
End: 2023-02-16
Payer: MEDICARE

## 2023-02-17 NOTE — TELEPHONE ENCOUNTER
[facility] in #[id number] in [facility] [place] sent Rx request for the following:      Requested Prescriptions   Pending Prescriptions Disp Refills     metoprolol succinate ER (TOPROL XL) 100 MG 24 hr tablet [Pharmacy Med Name: METOPROLOL SUCC  MG TAB] 180 tablet 4     Sig: TAKE 2 TABLETS BY MOUTH EVERY DAY   Last Prescription Date:   1/28/22  Last Fill Qty/Refills:         180, R-4    Last Office Visit:              1/2/23   Future Office visit:           None    Shawna Prince RN .............. 2/17/2023  4:26 PM

## 2023-02-20 RX ORDER — METOPROLOL SUCCINATE 100 MG/1
TABLET, EXTENDED RELEASE ORAL
Qty: 180 TABLET | Refills: 3 | Status: SHIPPED | OUTPATIENT
Start: 2023-02-20 | End: 2024-01-17

## 2023-05-06 ENCOUNTER — HEALTH MAINTENANCE LETTER (OUTPATIENT)
Age: 73
End: 2023-05-06

## 2023-05-15 ENCOUNTER — OFFICE VISIT (OUTPATIENT)
Dept: FAMILY MEDICINE | Facility: OTHER | Age: 73
DRG: 872 | End: 2023-05-15
Payer: COMMERCIAL

## 2023-05-15 ENCOUNTER — HOSPITAL ENCOUNTER (OUTPATIENT)
Dept: GENERAL RADIOLOGY | Facility: OTHER | Age: 73
Discharge: HOME | DRG: 872 | End: 2023-05-15
Attending: NURSE PRACTITIONER
Payer: COMMERCIAL

## 2023-05-15 ENCOUNTER — APPOINTMENT (OUTPATIENT)
Dept: CT IMAGING | Facility: OTHER | Age: 73
DRG: 872 | End: 2023-05-15
Attending: PHYSICIAN ASSISTANT
Payer: COMMERCIAL

## 2023-05-15 ENCOUNTER — HOSPITAL ENCOUNTER (INPATIENT)
Facility: OTHER | Age: 73
LOS: 3 days | Discharge: HOME | DRG: 872 | End: 2023-05-18
Attending: PHYSICIAN ASSISTANT | Admitting: FAMILY MEDICINE
Payer: COMMERCIAL

## 2023-05-15 VITALS
WEIGHT: 205.5 LBS | OXYGEN SATURATION: 99 % | TEMPERATURE: 99 F | RESPIRATION RATE: 12 BRPM | SYSTOLIC BLOOD PRESSURE: 163 MMHG | BODY MASS INDEX: 35.08 KG/M2 | DIASTOLIC BLOOD PRESSURE: 111 MMHG | HEIGHT: 64 IN | HEART RATE: 118 BPM

## 2023-05-15 DIAGNOSIS — N12 PYELONEPHRITIS: ICD-10-CM

## 2023-05-15 DIAGNOSIS — R10.12 ABDOMINAL PAIN, LEFT UPPER QUADRANT: Primary | ICD-10-CM

## 2023-05-15 DIAGNOSIS — D72.829 LEUKOCYTOSIS, UNSPECIFIED TYPE: ICD-10-CM

## 2023-05-15 DIAGNOSIS — N30.00 ACUTE CYSTITIS WITHOUT HEMATURIA: ICD-10-CM

## 2023-05-15 DIAGNOSIS — N39.0 UTI (URINARY TRACT INFECTION): ICD-10-CM

## 2023-05-15 DIAGNOSIS — R10.11 ABDOMINAL PAIN, RIGHT UPPER QUADRANT: ICD-10-CM

## 2023-05-15 DIAGNOSIS — R11.0 NAUSEA WITHOUT VOMITING: ICD-10-CM

## 2023-05-15 DIAGNOSIS — E87.1 HYPOSMOLALITY SYNDROME: ICD-10-CM

## 2023-05-15 DIAGNOSIS — A41.9 SEPSIS WITHOUT ACUTE ORGAN DYSFUNCTION, DUE TO UNSPECIFIED ORGANISM (H): ICD-10-CM

## 2023-05-15 DIAGNOSIS — A41.9 BACTERIAL SEPSIS (H): Primary | ICD-10-CM

## 2023-05-15 DIAGNOSIS — D68.59 THROMBOPHILIA (H): ICD-10-CM

## 2023-05-15 DIAGNOSIS — E83.52 HYPERCALCEMIA: ICD-10-CM

## 2023-05-15 DIAGNOSIS — E87.1 HYPONATREMIA: ICD-10-CM

## 2023-05-15 DIAGNOSIS — N39.0 URINARY TRACT INFECTION WITHOUT HEMATURIA, SITE UNSPECIFIED: ICD-10-CM

## 2023-05-15 DIAGNOSIS — Z98.84 BARIATRIC SURGERY STATUS: ICD-10-CM

## 2023-05-15 DIAGNOSIS — Z98.84 HX OF GASTRIC BYPASS: ICD-10-CM

## 2023-05-15 DIAGNOSIS — A41.9 UNSPECIFIED SEPTICEMIA(038.9) (H): ICD-10-CM

## 2023-05-15 DIAGNOSIS — E87.5 HYPERPOTASSEMIA: ICD-10-CM

## 2023-05-15 DIAGNOSIS — E87.5 HYPERKALEMIA: ICD-10-CM

## 2023-05-15 LAB
ALBUMIN SERPL BCG-MCNC: 4.6 G/DL (ref 3.5–5.2)
ALBUMIN UR-MCNC: 300 MG/DL
ALP SERPL-CCNC: 103 U/L (ref 35–104)
ALT SERPL W P-5'-P-CCNC: 9 U/L (ref 10–35)
AMYLASE SERPL-CCNC: 44 U/L (ref 28–100)
ANION GAP SERPL CALCULATED.3IONS-SCNC: 14 MMOL/L (ref 7–15)
APPEARANCE UR: ABNORMAL
AST SERPL W P-5'-P-CCNC: 14 U/L (ref 10–35)
BACTERIA #/AREA URNS HPF: ABNORMAL /HPF
BASOPHILS # BLD AUTO: 0 10E3/UL (ref 0–0.2)
BASOPHILS NFR BLD AUTO: 0 %
BILIRUB SERPL-MCNC: 0.4 MG/DL
BILIRUB UR QL STRIP: NEGATIVE
BUN SERPL-MCNC: 16.7 MG/DL (ref 8–23)
CALCIUM SERPL-MCNC: 11.9 MG/DL (ref 8.8–10.2)
CHLORIDE SERPL-SCNC: 87 MMOL/L (ref 98–107)
COLOR UR AUTO: YELLOW
CREAT SERPL-MCNC: 0.96 MG/DL (ref 0.51–0.95)
CRP SERPL-MCNC: 33.18 MG/L
DEPRECATED HCO3 PLAS-SCNC: 25 MMOL/L (ref 22–29)
EOSINOPHIL # BLD AUTO: 0 10E3/UL (ref 0–0.7)
EOSINOPHIL NFR BLD AUTO: 0 %
ERYTHROCYTE [DISTWIDTH] IN BLOOD BY AUTOMATED COUNT: 13.7 % (ref 10–15)
GFR SERPL CREATININE-BSD FRML MDRD: 63 ML/MIN/1.73M2
GLUCOSE SERPL-MCNC: 152 MG/DL (ref 70–99)
GLUCOSE UR STRIP-MCNC: NEGATIVE MG/DL
HCT VFR BLD AUTO: 38 % (ref 35–47)
HGB BLD-MCNC: 12.1 G/DL (ref 11.7–15.7)
HGB UR QL STRIP: ABNORMAL
HOLD SPECIMEN: NORMAL
HOLD SPECIMEN: NORMAL
IMM GRANULOCYTES # BLD: 0.1 10E3/UL
IMM GRANULOCYTES NFR BLD: 1 %
KETONES UR STRIP-MCNC: ABNORMAL MG/DL
LACTATE SERPL-SCNC: 2.2 MMOL/L (ref 0.7–2)
LACTATE SERPL-SCNC: 3.5 MMOL/L (ref 0.7–2)
LEUKOCYTE ESTERASE UR QL STRIP: ABNORMAL
LIPASE SERPL-CCNC: 29 U/L (ref 13–60)
LYMPHOCYTES # BLD AUTO: 1.8 10E3/UL (ref 0.8–5.3)
LYMPHOCYTES NFR BLD AUTO: 10 %
MAGNESIUM SERPL-MCNC: 2.1 MG/DL (ref 1.7–2.3)
MCH RBC QN AUTO: 25.3 PG (ref 26.5–33)
MCHC RBC AUTO-ENTMCNC: 31.8 G/DL (ref 31.5–36.5)
MCV RBC AUTO: 79 FL (ref 78–100)
MONOCYTES # BLD AUTO: 0.9 10E3/UL (ref 0–1.3)
MONOCYTES NFR BLD AUTO: 5 %
MUCOUS THREADS #/AREA URNS LPF: PRESENT /LPF
NEUTROPHILS # BLD AUTO: 16.3 10E3/UL (ref 1.6–8.3)
NEUTROPHILS NFR BLD AUTO: 84 %
NITRATE UR QL: NEGATIVE
NRBC # BLD AUTO: 0 10E3/UL
NRBC BLD AUTO-RTO: 0 /100
PH UR STRIP: 6 [PH] (ref 5–9)
PHOSPHATE SERPL-MCNC: 3.8 MG/DL (ref 2.5–4.5)
PLATELET # BLD AUTO: 682 10E3/UL (ref 150–450)
POTASSIUM SERPL-SCNC: 3.7 MMOL/L (ref 3.4–5.3)
POTASSIUM SERPL-SCNC: 4.2 MMOL/L (ref 3.4–5.3)
POTASSIUM SERPL-SCNC: 6.2 MMOL/L (ref 3.4–5.3)
PROCALCITONIN SERPL IA-MCNC: 0.1 NG/ML
PROT SERPL-MCNC: 8.3 G/DL (ref 6.4–8.3)
RBC # BLD AUTO: 4.79 10E6/UL (ref 3.8–5.2)
RBC URINE: 6 /HPF
SODIUM SERPL-SCNC: 126 MMOL/L (ref 136–145)
SP GR UR STRIP: 1.01 (ref 1–1.03)
SQUAMOUS EPITHELIAL: 2 /HPF
T4 FREE SERPL-MCNC: 1.31 NG/DL (ref 0.9–1.7)
TROPONIN T SERPL HS-MCNC: 31 NG/L
TROPONIN T SERPL HS-MCNC: 32 NG/L
TSH SERPL DL<=0.005 MIU/L-ACNC: 4.27 UIU/ML (ref 0.3–4.2)
UROBILINOGEN UR STRIP-MCNC: NORMAL MG/DL
WBC # BLD AUTO: 19.1 10E3/UL (ref 4–11)
WBC URINE: 162 /HPF

## 2023-05-15 PROCEDURE — 258N000003 HC RX IP 258 OP 636: Performed by: PHYSICIAN ASSISTANT

## 2023-05-15 PROCEDURE — 74177 CT ABD & PELVIS W/CONTRAST: CPT | Mod: TC

## 2023-05-15 PROCEDURE — 250N000011 HC RX IP 250 OP 636: Performed by: PHYSICIAN ASSISTANT

## 2023-05-15 PROCEDURE — 93005 ELECTROCARDIOGRAM TRACING: CPT | Performed by: PHYSICIAN ASSISTANT

## 2023-05-15 PROCEDURE — 250N000009 HC RX 250: Performed by: PHYSICIAN ASSISTANT

## 2023-05-15 PROCEDURE — 93005 ELECTROCARDIOGRAM TRACING: CPT | Mod: 76 | Performed by: PHYSICIAN ASSISTANT

## 2023-05-15 PROCEDURE — 99285 EMERGENCY DEPT VISIT HI MDM: CPT | Performed by: PHYSICIAN ASSISTANT

## 2023-05-15 PROCEDURE — 83690 ASSAY OF LIPASE: CPT | Mod: ZL | Performed by: NURSE PRACTITIONER

## 2023-05-15 PROCEDURE — 84439 ASSAY OF FREE THYROXINE: CPT | Performed by: PHYSICIAN ASSISTANT

## 2023-05-15 PROCEDURE — 96366 THER/PROPH/DIAG IV INF ADDON: CPT | Performed by: PHYSICIAN ASSISTANT

## 2023-05-15 PROCEDURE — 82150 ASSAY OF AMYLASE: CPT | Mod: ZL | Performed by: NURSE PRACTITIONER

## 2023-05-15 PROCEDURE — 83735 ASSAY OF MAGNESIUM: CPT | Performed by: PHYSICIAN ASSISTANT

## 2023-05-15 PROCEDURE — 84443 ASSAY THYROID STIM HORMONE: CPT | Performed by: PHYSICIAN ASSISTANT

## 2023-05-15 PROCEDURE — 93010 ELECTROCARDIOGRAM REPORT: CPT | Performed by: STUDENT IN AN ORGANIZED HEALTH CARE EDUCATION/TRAINING PROGRAM

## 2023-05-15 PROCEDURE — 99207 PR NO CHARGE LOS: CPT | Performed by: NURSE PRACTITIONER

## 2023-05-15 PROCEDURE — 84132 ASSAY OF SERUM POTASSIUM: CPT | Performed by: PHYSICIAN ASSISTANT

## 2023-05-15 PROCEDURE — 250N000012 HC RX MED GY IP 250 OP 636 PS 637: Performed by: PHYSICIAN ASSISTANT

## 2023-05-15 PROCEDURE — 36415 COLL VENOUS BLD VENIPUNCTURE: CPT | Performed by: PHYSICIAN ASSISTANT

## 2023-05-15 PROCEDURE — 96375 TX/PRO/DX INJ NEW DRUG ADDON: CPT | Performed by: PHYSICIAN ASSISTANT

## 2023-05-15 PROCEDURE — 83605 ASSAY OF LACTIC ACID: CPT | Mod: ZL | Performed by: NURSE PRACTITIONER

## 2023-05-15 PROCEDURE — 84145 PROCALCITONIN (PCT): CPT | Mod: ZL | Performed by: NURSE PRACTITIONER

## 2023-05-15 PROCEDURE — 84100 ASSAY OF PHOSPHORUS: CPT | Performed by: PHYSICIAN ASSISTANT

## 2023-05-15 PROCEDURE — 258N000001 HC RX 258: Performed by: PHYSICIAN ASSISTANT

## 2023-05-15 PROCEDURE — 82542 COL CHROMOTOGRAPHY QUAL/QUAN: CPT | Performed by: PHYSICIAN ASSISTANT

## 2023-05-15 PROCEDURE — 81001 URINALYSIS AUTO W/SCOPE: CPT | Mod: ZL | Performed by: NURSE PRACTITIONER

## 2023-05-15 PROCEDURE — 258N000003 HC RX IP 258 OP 636: Performed by: FAMILY MEDICINE

## 2023-05-15 PROCEDURE — 94640 AIRWAY INHALATION TREATMENT: CPT

## 2023-05-15 PROCEDURE — 83605 ASSAY OF LACTIC ACID: CPT | Performed by: FAMILY MEDICINE

## 2023-05-15 PROCEDURE — 99222 1ST HOSP IP/OBS MODERATE 55: CPT | Mod: AI | Performed by: FAMILY MEDICINE

## 2023-05-15 PROCEDURE — 99292 CRITICAL CARE ADDL 30 MIN: CPT | Performed by: PHYSICIAN ASSISTANT

## 2023-05-15 PROCEDURE — 99291 CRITICAL CARE FIRST HOUR: CPT | Mod: 25 | Performed by: PHYSICIAN ASSISTANT

## 2023-05-15 PROCEDURE — 86140 C-REACTIVE PROTEIN: CPT | Mod: ZL | Performed by: NURSE PRACTITIONER

## 2023-05-15 PROCEDURE — 96365 THER/PROPH/DIAG IV INF INIT: CPT | Mod: XU | Performed by: PHYSICIAN ASSISTANT

## 2023-05-15 PROCEDURE — 36415 COLL VENOUS BLD VENIPUNCTURE: CPT | Performed by: FAMILY MEDICINE

## 2023-05-15 PROCEDURE — 84484 ASSAY OF TROPONIN QUANT: CPT | Performed by: PHYSICIAN ASSISTANT

## 2023-05-15 PROCEDURE — 36415 COLL VENOUS BLD VENIPUNCTURE: CPT | Mod: ZL | Performed by: NURSE PRACTITIONER

## 2023-05-15 PROCEDURE — 87040 BLOOD CULTURE FOR BACTERIA: CPT | Mod: ZL | Performed by: NURSE PRACTITIONER

## 2023-05-15 PROCEDURE — 87186 SC STD MICRODIL/AGAR DIL: CPT | Mod: ZL | Performed by: NURSE PRACTITIONER

## 2023-05-15 PROCEDURE — 120N000001 HC R&B MED SURG/OB

## 2023-05-15 PROCEDURE — 82565 ASSAY OF CREATININE: CPT | Performed by: FAMILY MEDICINE

## 2023-05-15 PROCEDURE — 999N000157 HC STATISTIC RCP TIME EA 10 MIN

## 2023-05-15 PROCEDURE — 85025 COMPLETE CBC W/AUTO DIFF WBC: CPT | Mod: ZL | Performed by: NURSE PRACTITIONER

## 2023-05-15 PROCEDURE — 80053 COMPREHEN METABOLIC PANEL: CPT | Mod: ZL | Performed by: NURSE PRACTITIONER

## 2023-05-15 PROCEDURE — 74019 RADEX ABDOMEN 2 VIEWS: CPT

## 2023-05-15 PROCEDURE — 250N000009 HC RX 250: Performed by: FAMILY MEDICINE

## 2023-05-15 PROCEDURE — G0463 HOSPITAL OUTPT CLINIC VISIT: HCPCS

## 2023-05-15 RX ORDER — METOPROLOL SUCCINATE 100 MG/1
200 TABLET, EXTENDED RELEASE ORAL DAILY
Status: DISCONTINUED | OUTPATIENT
Start: 2023-05-16 | End: 2023-05-16

## 2023-05-15 RX ORDER — NICOTINE POLACRILEX 4 MG
15-30 LOZENGE BUCCAL
Status: DISCONTINUED | OUTPATIENT
Start: 2023-05-15 | End: 2023-05-18 | Stop reason: HOSPADM

## 2023-05-15 RX ORDER — AMOXICILLIN 250 MG
1 CAPSULE ORAL 2 TIMES DAILY PRN
Status: DISCONTINUED | OUTPATIENT
Start: 2023-05-15 | End: 2023-05-18 | Stop reason: HOSPADM

## 2023-05-15 RX ORDER — BISACODYL 10 MG
10 SUPPOSITORY, RECTAL RECTAL DAILY PRN
Status: DISCONTINUED | OUTPATIENT
Start: 2023-05-15 | End: 2023-05-18 | Stop reason: HOSPADM

## 2023-05-15 RX ORDER — PANTOPRAZOLE SODIUM 40 MG/1
40 TABLET, DELAYED RELEASE ORAL
Status: DISCONTINUED | OUTPATIENT
Start: 2023-05-16 | End: 2023-05-18 | Stop reason: HOSPADM

## 2023-05-15 RX ORDER — ALBUTEROL SULFATE 5 MG/ML
10 SOLUTION RESPIRATORY (INHALATION) ONCE
Status: COMPLETED | OUTPATIENT
Start: 2023-05-15 | End: 2023-05-15

## 2023-05-15 RX ORDER — ENOXAPARIN SODIUM 100 MG/ML
40 INJECTION SUBCUTANEOUS EVERY 24 HOURS
Status: DISCONTINUED | OUTPATIENT
Start: 2023-05-15 | End: 2023-05-18 | Stop reason: HOSPADM

## 2023-05-15 RX ORDER — AMLODIPINE BESYLATE 5 MG/1
5 TABLET ORAL DAILY
Status: DISCONTINUED | OUTPATIENT
Start: 2023-05-16 | End: 2023-05-18 | Stop reason: HOSPADM

## 2023-05-15 RX ORDER — IOPAMIDOL 755 MG/ML
118 INJECTION, SOLUTION INTRAVASCULAR ONCE
Status: COMPLETED | OUTPATIENT
Start: 2023-05-15 | End: 2023-05-15

## 2023-05-15 RX ORDER — ARIPIPRAZOLE 5 MG/1
5 TABLET ORAL DAILY
Status: DISCONTINUED | OUTPATIENT
Start: 2023-05-16 | End: 2023-05-18 | Stop reason: HOSPADM

## 2023-05-15 RX ORDER — VITAMIN B COMPLEX
250 TABLET ORAL DAILY
Status: DISCONTINUED | OUTPATIENT
Start: 2023-05-16 | End: 2023-05-16

## 2023-05-15 RX ORDER — SODIUM CHLORIDE 9 MG/ML
INJECTION, SOLUTION INTRAVENOUS CONTINUOUS
Status: DISCONTINUED | OUTPATIENT
Start: 2023-05-15 | End: 2023-05-16

## 2023-05-15 RX ORDER — DEXTROSE MONOHYDRATE 25 G/50ML
25 INJECTION, SOLUTION INTRAVENOUS ONCE
Status: COMPLETED | OUTPATIENT
Start: 2023-05-15 | End: 2023-05-15

## 2023-05-15 RX ORDER — LIDOCAINE 40 MG/G
CREAM TOPICAL
Status: DISCONTINUED | OUTPATIENT
Start: 2023-05-15 | End: 2023-05-18 | Stop reason: HOSPADM

## 2023-05-15 RX ORDER — KETOROLAC TROMETHAMINE 15 MG/ML
15 INJECTION, SOLUTION INTRAMUSCULAR; INTRAVENOUS ONCE
Status: COMPLETED | OUTPATIENT
Start: 2023-05-15 | End: 2023-05-15

## 2023-05-15 RX ORDER — ACETAMINOPHEN 325 MG/1
TABLET ORAL
Status: ON HOLD | COMMUNITY
Start: 2023-01-20 | End: 2023-05-16

## 2023-05-15 RX ORDER — ONDANSETRON 2 MG/ML
4 INJECTION INTRAMUSCULAR; INTRAVENOUS EVERY 6 HOURS PRN
Status: DISCONTINUED | OUTPATIENT
Start: 2023-05-15 | End: 2023-05-18 | Stop reason: HOSPADM

## 2023-05-15 RX ORDER — AMOXICILLIN 250 MG
2 CAPSULE ORAL 2 TIMES DAILY PRN
Status: DISCONTINUED | OUTPATIENT
Start: 2023-05-15 | End: 2023-05-18 | Stop reason: HOSPADM

## 2023-05-15 RX ORDER — CEFTRIAXONE SODIUM 2 G/50ML
2 INJECTION, SOLUTION INTRAVENOUS EVERY 24 HOURS
Status: DISCONTINUED | OUTPATIENT
Start: 2023-05-16 | End: 2023-05-17

## 2023-05-15 RX ORDER — ACETAMINOPHEN 325 MG/1
650 TABLET ORAL EVERY 6 HOURS PRN
Status: DISCONTINUED | OUTPATIENT
Start: 2023-05-15 | End: 2023-05-18 | Stop reason: HOSPADM

## 2023-05-15 RX ORDER — SODIUM CHLORIDE 9 MG/ML
INJECTION, SOLUTION INTRAVENOUS CONTINUOUS
Status: DISCONTINUED | OUTPATIENT
Start: 2023-05-15 | End: 2023-05-15 | Stop reason: ALTCHOICE

## 2023-05-15 RX ORDER — CEFTRIAXONE SODIUM 2 G/50ML
2 INJECTION, SOLUTION INTRAVENOUS ONCE
Status: COMPLETED | OUTPATIENT
Start: 2023-05-15 | End: 2023-05-15

## 2023-05-15 RX ORDER — DEXTROSE MONOHYDRATE 25 G/50ML
25-50 INJECTION, SOLUTION INTRAVENOUS
Status: DISCONTINUED | OUTPATIENT
Start: 2023-05-15 | End: 2023-05-18 | Stop reason: HOSPADM

## 2023-05-15 RX ORDER — LEVOTHYROXINE SODIUM 25 UG/1
25 TABLET ORAL DAILY
Status: DISCONTINUED | OUTPATIENT
Start: 2023-05-16 | End: 2023-05-18 | Stop reason: HOSPADM

## 2023-05-15 RX ORDER — POLYETHYLENE GLYCOL 3350 17 G/17G
17 POWDER, FOR SOLUTION ORAL DAILY PRN
Status: DISCONTINUED | OUTPATIENT
Start: 2023-05-15 | End: 2023-05-18 | Stop reason: HOSPADM

## 2023-05-15 RX ORDER — LOSARTAN POTASSIUM 50 MG/1
100 TABLET ORAL DAILY
Status: DISCONTINUED | OUTPATIENT
Start: 2023-05-16 | End: 2023-05-16

## 2023-05-15 RX ORDER — ACETAMINOPHEN 650 MG/1
650 SUPPOSITORY RECTAL EVERY 6 HOURS PRN
Status: DISCONTINUED | OUTPATIENT
Start: 2023-05-15 | End: 2023-05-18 | Stop reason: HOSPADM

## 2023-05-15 RX ORDER — ONDANSETRON 4 MG/1
4 TABLET, ORALLY DISINTEGRATING ORAL EVERY 6 HOURS PRN
Status: DISCONTINUED | OUTPATIENT
Start: 2023-05-15 | End: 2023-05-18 | Stop reason: HOSPADM

## 2023-05-15 RX ORDER — METOPROLOL TARTRATE 1 MG/ML
5 INJECTION, SOLUTION INTRAVENOUS EVERY 5 MIN PRN
Status: DISCONTINUED | OUTPATIENT
Start: 2023-05-15 | End: 2023-05-18 | Stop reason: HOSPADM

## 2023-05-15 RX ORDER — DULOXETIN HYDROCHLORIDE 30 MG/1
30 CAPSULE, DELAYED RELEASE ORAL DAILY
Status: DISCONTINUED | OUTPATIENT
Start: 2023-05-16 | End: 2023-05-18 | Stop reason: HOSPADM

## 2023-05-15 RX ADMIN — CEFTRIAXONE SODIUM 2 G: 2 INJECTION, SOLUTION INTRAVENOUS at 20:25

## 2023-05-15 RX ADMIN — SODIUM CHLORIDE: 9 INJECTION, SOLUTION INTRAVENOUS at 23:50

## 2023-05-15 RX ADMIN — DEXTROSE MONOHYDRATE 300 ML: 10 INJECTION, SOLUTION INTRAVENOUS at 20:26

## 2023-05-15 RX ADMIN — METOPROLOL TARTRATE 5 MG: 1 INJECTION, SOLUTION INTRAVENOUS at 23:20

## 2023-05-15 RX ADMIN — METOPROLOL TARTRATE 5 MG: 1 INJECTION, SOLUTION INTRAVENOUS at 23:39

## 2023-05-15 RX ADMIN — ALBUTEROL SULFATE 10 MG: 5 SOLUTION RESPIRATORY (INHALATION) at 21:01

## 2023-05-15 RX ADMIN — METOPROLOL TARTRATE 5 MG: 1 INJECTION, SOLUTION INTRAVENOUS at 23:30

## 2023-05-15 RX ADMIN — INSULIN HUMAN 9 UNITS: 100 INJECTION, SOLUTION PARENTERAL at 20:25

## 2023-05-15 RX ADMIN — DEXTROSE MONOHYDRATE 25 G: 25 INJECTION, SOLUTION INTRAVENOUS at 20:25

## 2023-05-15 RX ADMIN — KETOROLAC TROMETHAMINE 15 MG: 15 INJECTION, SOLUTION INTRAMUSCULAR; INTRAVENOUS at 20:25

## 2023-05-15 RX ADMIN — SODIUM CHLORIDE 1000 ML: 9 INJECTION, SOLUTION INTRAVENOUS at 20:24

## 2023-05-15 RX ADMIN — IOPAMIDOL 118 ML: 755 INJECTION, SOLUTION INTRAVENOUS at 19:48

## 2023-05-15 ASSESSMENT — ACTIVITIES OF DAILY LIVING (ADL)
ADLS_ACUITY_SCORE: 35

## 2023-05-15 ASSESSMENT — PATIENT HEALTH QUESTIONNAIRE - PHQ9
SUM OF ALL RESPONSES TO PHQ QUESTIONS 1-9: 3
10. IF YOU CHECKED OFF ANY PROBLEMS, HOW DIFFICULT HAVE THESE PROBLEMS MADE IT FOR YOU TO DO YOUR WORK, TAKE CARE OF THINGS AT HOME, OR GET ALONG WITH OTHER PEOPLE: SOMEWHAT DIFFICULT
SUM OF ALL RESPONSES TO PHQ QUESTIONS 1-9: 3

## 2023-05-15 ASSESSMENT — PAIN SCALES - GENERAL: PAINLEVEL: MILD PAIN (3)

## 2023-05-15 NOTE — NURSING NOTE
"Pt presents to  with her  for abd pain x6 wks. Pt states she has been nauseous and very fatigued. She has been taking Miralax daily to help with constipation.    Chief Complaint   Patient presents with     Abdominal Pain     Fatigue     Nausea       FOOD SECURITY SCREENING QUESTIONS  Hunger Vital Signs:  Within the past 12 months we worried whether our food would run out before we got money to buy more. Never  Within the past 12 months the food we bought just didn't last and we didn't have money to get more. Never  Shawna Deanadeemon 5/15/2023 3:32 PM      Initial BP (!) 163/111 (BP Location: Left arm, Patient Position: Sitting, Cuff Size: Adult Regular)   Pulse 118   Temp 99  F (37.2  C) (Tympanic)   Resp 12   Ht 1.626 m (5' 4\")   Wt 93.2 kg (205 lb 8 oz)   LMP  (LMP Unknown)   SpO2 99%   BMI 35.27 kg/m   Estimated body mass index is 35.27 kg/m  as calculated from the following:    Height as of this encounter: 1.626 m (5' 4\").    Weight as of this encounter: 93.2 kg (205 lb 8 oz).  Medication Reconciliation: complete    Shawna Deaani  "

## 2023-05-15 NOTE — ED TRIAGE NOTES
[facility], pt has had abd pain x 5 weeks, [facility] did lab work and sent to ER, pt hyperkalemia, low sodium, UTI.      Triage Assessment     Row Name 05/15/23 [id number]       Triage Assessment (Adult)    Airway WDL WDL       Respiratory WDL    Respiratory WDL WDL       Skin Circulation/Temperature WDL    Skin Circulation/Temperature WDL WDL       Cardiac WDL    Cardiac WDL WDL       Peripheral/Neurovascular WDL    Peripheral Neurovascular WDL WDL       Cognitive/Neuro/Behavioral WDL    Cognitive/Neuro/Behavioral WDL WDL

## 2023-05-15 NOTE — PROGRESS NOTES
ASSESSMENT/PLAN:    I have reviewed the nursing notes.  I have reviewed the findings, diagnosis, plan and need for follow up with the patient.    1. Abdominal pain, left upper quadrant  2. Abdominal pain, right upper quadrant  3. Nausea without vomiting  - XR Abdomen 2 Views  - CBC and Differential  - Comprehensive Metabolic Panel  - Lipase  - Amylase  - UA Macroscopic with reflex to Microscopic and Culture  - Urine Culture  - Blood Culture Peripheral Blood  - Blood Culture Peripheral Blood  - Procalcitonin  - CRP inflammation  - Lactate for Sepsis Protocol    4. Pyelonephritis  5. Leukocytosis, unspecified type  At this time, concern for sepsis related to complicated UTI, pyelonephrits.  Today's laboratory workup reveals hyponatremia, hyperkalemia, elevated lactic acid, leukocytosis, CRP, and procalcitonin as well as tachycardia and low-grade fever present.  Consulted with ED physician, Dr. Foster, as well as Dr. Pierce to consider hospital inpatient admission versus ED for further evaluation.  Ultimately decided that it was important for patient to have an abdominal CT scan without contrast to assure that there is nonobstructing renal calculi prior to inpatient hospitalization as there is no urologist at our facility. If renal calculi identified, she may need higher level of care at an outside facility.  Patient verbalized understanding that she will go to the emergency department for further evaluation and treatment with IV fluids and initiation of antibiotic treatment.  Blood cultures were obtained prior to initiation of antibiotics and transferred to the ED.  X-ray ruled out bowel obstruction but does show moderate to large volume stool which may be contributing some of her abdominal discomfort.    Patient was transferred to the emergency department via wheelchair.    Triaged from: [facility]    DIAGNOSIS:   1. Abdominal pain, left upper quadrant    2. Abdominal pain, right upper quadrant    3. Nausea without  "vomiting    4. Pyelonephritis    5. Leukocytosis, unspecified type        Initial BP (!) 163/111 (BP Location: Left arm, Patient Position: Sitting, Cuff Size: Adult Regular)   Pulse 118   Temp 99  F (37.2  C) (Tympanic)   Resp 12   Ht 1.626 m (5' 4\")   Wt 93.2 kg (205 lb 8 oz)   LMP  (LMP Unknown)   SpO2 99%   BMI 35.27 kg/m   Estimated body mass index is 35.27 kg/m  as calculated from the following:    Height as of this encounter: 1.626 m (5' 4\").    Weight as of this encounter: 93.2 kg (205 lb 8 oz).    Patient will be transferred to higher level of care.    Misty Matthew NP          Discussed warning signs/symptoms indicative of need to f/u    Follow up if symptoms persist or worsen or concerns    I explained my diagnostic considerations and recommendations to the patient, who voiced understanding and agreement with the treatment plan. All questions were answered. We discussed potential side effects of any prescribed or recommended therapies, as well as expectations for response to treatments.    Misty Matthew NP  5/15/2023  3:35 PM    HPI:  Ness Bales is a 72 year old female who presents to [facility] today for concerns of abd pain x6 wks. Pt states she has been nauseous and very fatigued as well in the past few days. She has been taking Miralax daily to help with constipation which was the start of her symptoms.     She was having a normal BM every morning at 8 AM; always until about 6 weeks ago. She did not have a BM for about 5 days which was very unusual for her. Taking miralax just about every day for past 6 weeks. Had a BM yesterday, having BM daily with miralax, soft and runny. Worried about obstruction possibly.     Was hospitalized in January for knee replacement. No opioids or pain medicines since January. No diarrhea other than with miralax.     Has had low grade fever with chills in recent days. Occasionally feels nauseated but no vomiting.     Chills and hot sweats and shaky. "     Normal urination. No hematuria. No blood in the stool.   Upper half of abdomen is painful, tender.     Gastric bypass in 80s. No appendectomy. No other abdominal surgeries but has had several orthopedic surgeries in her history.     Normal cologuards. No prior colonoscopies.   Non-smoker.  No alcohol use.    PCP Dr. Maurice      Past Medical History:   Diagnosis Date     Benign essential hypertension      Depression      Hyperaldosteronism (H)      Hypothyroidism      Obesity     No Comments Provided     Personal history of other medical treatment (CODE)     3 vaginal childbirth uncomplicated     Personal history of other medical treatment (CODE)     1983,blood transfusion with stomach stapling     Vitamin B12 deficiency (non anemic)      Past Surgical History:   Procedure Laterality Date     ARTHROPLASTY KNEE Right 2008     BIOPSY Bilateral 10/18/2021    Adrenal Vein; Hyperaldosteronism     BIOPSY BREAST      Incisional x2 benign breast biopsies     FRACTURE SURGERY Left     open treatment of Humeral Fx w/ prosthesis     INJECT BOTOX N/A 09/22/2020    [facility]; [facility] ([place], [place]), Dr. Joe Cruz     LAPAROSCOPIC BYPASS GASTRIC      1983,Stomach stapling complicated by incision rupture, then repaired wi second surgery     OTHER SURGICAL HISTORY      1983,34121.0,SKIN/SUBCUTANEOUS SURGERY,repaired incision from stomach stapling     OTHER SURGICAL HISTORY      [id number],ANESTHESIA ALERT,No problems with anaesthesia. ?     Social History     Tobacco Use     Smoking status: Never     Smokeless tobacco: Never   Vaping Use     Vaping status: Never Used   Substance Use Topics     Alcohol use: No     Comment: very little      Current Outpatient Medications   Medication Sig Dispense Refill     acetaminophen (TYLENOL) 325 MG tablet TAKE 2 TABLETS BY MOUTH FOUR TIMES A DAY. LIMIT ACETAMINOPHEN TO 4000 MG PER DAY FROM ALL SOURCES.       amLODIPine (NORVASC) 5 MG tablet TAKE 1 TABLET BY MOUTH EVERY DAY 90 tablet 1      ARIPiprazole (ABILIFY) 5 MG tablet Take 1 tablet (5 mg) by mouth daily       cholecalciferol (VITAMIN D-1000 MAX ST) 25 MCG (1000 UT) TABS Take 10,000 Units by mouth daily       Coenzyme Q10 (CO Q 10) 100 MG CAPS Take by mouth daily        cyanocobalamin (CYANOCOBALAMIN) 1000 MCG/ML injection INJECT 1 ML (1,000 MCG) INTO THE MUSCLE EVERY 30 DAYS 3 mL 4     DULoxetine (CYMBALTA) 30 MG capsule Take 30 mg by mouth daily       ferrous sulfate (FEROSUL) 325 (65 Fe) MG tablet Take 1 tablet (325 mg) by mouth daily (with breakfast)       fish oil-omega-3 fatty acids 1000 MG capsule Take 1 capsule (1 g) by mouth daily       levothyroxine (SYNTHROID/LEVOTHROID) 25 MCG tablet TAKE 1 TABLET BY MOUTH EVERY DAY 90 tablet 4     losartan (COZAAR) 100 MG tablet TAKE 1 TABLET BY MOUTH EVERY DAY 90 tablet 4     metoprolol succinate ER (TOPROL XL) 100 MG 24 hr tablet TAKE 2 TABLETS BY MOUTH EVERY  tablet 3     modafinil (PROVIGIL) 200 MG tablet Take 2 tablets (400 mg) by mouth daily       spironolactone (ALDACTONE) 50 MG tablet Take 1 tablet by mouth 2 times daily       Syringe Luer Slip 3 ML MISC 1 each every 30 days 25 each 0     alendronate (FOSAMAX) 70 MG tablet Take 1 tablet (70 mg) by mouth every 7 days --not taking. (Patient not taking: Reported on 5/15/2023) 12 tablet 4     phentermine (ADIPEX-P) 37.5 MG tablet Take 37.5 mg by mouth every morning (before breakfast)       topiramate (TOPAMAX) 25 MG tablet TAKE 1 TABLET BY MOUTH TWO TIMES A DAY. DO NOT CRUSH. (Patient not taking: Reported on 5/15/2023) 180 tablet 1     Allergies   Allergen Reactions     Chlorthalidone Other (See Comments)     Nightmares/Caused hyponatremia     Past medical history, past surgical history, current medications and allergies reviewed and accurate to the best of my knowledge.      ROS:  Refer to [facility] [place]    BP (!) 163/111 (BP Location: Left arm, Patient Position: Sitting, Cuff Size: Adult Regular)   Pulse 118   Temp 99  F (37.2  C) (Tympanic)    "Resp 12   Ht 1.626 m (5' 4\")   Wt 93.2 kg (205 lb 8 oz)   LMP  (LMP Unknown)   SpO2 99%   BMI 35.27 kg/m      EXAM:  General Appearance: ill, pale appearing 72 year old female, appropriate appearance for age. No acute distress   Respiratory: normal chest wall and respirations.  Normal effort.  Clear to auscultation bilaterally, no wheezing, crackles or rhonchi.  No increased work of breathing.  No cough appreciated.  Cardiac: + Tachycardia   Abdomen: soft, + right upper quadrant and left upper quadrant abdominal tenderness on examination, no rigidity, no rebound tenderness or guarding, normal bowel sounds present  :  No suprapubic tenderness to palpation.  Absent CVA tenderness to palpation.    Musculoskeletal:  Equal movement of bilateral upper extremities.  Equal movement of bilateral lower extremities.  Normal gait.    Psychological: normal affect, alert, oriented, and pleasant.     Results for orders placed or performed during the hospital encounter of 05/15/23   CT Abdomen Pelvis w Contrast     Status: None    Narrative    CT ABDOMEN PELVIS W CONTRAST    CLINICAL HISTORY: Female, age 72 years,  Generalized abdominal pain,  positive UTI, sepsis criteria;    Comparison:  No relevant prior imaging.    TECHNIQUE:  CT was performed of the abdomen and pelvis with IV  contrast. Axial; sagittal and coronal reconstructed images were  reviewed.     FINDINGS:  The lung bases and visualized portions of the heart are unremarkable    Stomach and duodenum: Postoperative changes. There are inflammatory  changes and wall thickening involving the pylorus and proximal  duodenum. No apparent perforation.    Liver: Unremarkable.    Gallbladder: Unremarkable.    Spleen: Calcified granulomata. No acute abnormality.    Pancreas: Unremarkable.    Adrenal glands: Unremarkable    Kidneys: No acute abnormality. 4 cm cortical cyst and small a 1.5 cm  cortical cyst of the right kidney. Proximal ureters: Unremarkable.    Urinary " bladder: Air-fluid level.    Large and small bowel: Moderate volume of stool in the proximal colon.  No distinct evidence of acute abnormality.     The abdominal aorta and inferior vena cava demonstrate no acute  abnormality. There is no evidence of pathologic lymph node enlargement  within the retroperitoneum. The uterus is atrophic. Graph small  fat-containing umbilical hernia is present without acute abnormality.    Bony structures: No acute abnormality. Moderate to severe degenerative  changes are seen within the lumbar spine.      Impression    IMPRESSION:   Inflammatory changes involving the pylorus concerning for ulceration  versus an infected diverticulum. Differential diagnosis includes  localized gastritis and neoplasm.     Numerous nonacute findings as described above.    This facility minimizes radiation dose by adjusting the mA and/or kV  according to each patient size.    This CT scan was performed using one or more the following dose  reduction techniques:    -Automated exposure control,  -Adjustment of the mA and/or kV according to patient's size, and/or,  -Use of iterative reconstruction technique.    KIRILL EPPERSON MD         SYSTEM ID:  [id number]   Troponin T, High Sensitivity     Status: Abnormal   Result Value Ref Range    Troponin T, High Sensitivity 31 (H) <=14 ng/L   Magnesium     Status: Normal   Result Value Ref Range    Magnesium 2.1 1.7 - 2.3 mg/dL   Phosphorus     Status: Normal   Result Value Ref Range    Phosphorus 3.8 2.5 - 4.5 mg/dL   TSH with free T4 reflex     Status: Abnormal   Result Value Ref Range    TSH 4.27 (H) 0.30 - 4.20 uIU/mL   Extra Tube     Status: None ()    Narrative    The following orders were created for panel order Extra Tube.  Procedure                               Abnormality         Status                     ---------                               -----------         ------                     Extra Red Top Tube[[id number]]                                                           Extra Green Top (Lithium...[[id number]]                                                   Please view results for these tests on the individual orders.   Results for orders placed or performed in visit on 05/15/23   XR Abdomen 2 Views     Status: None    Narrative    XR ABDOMEN 2 VIEWS   5/15/2023 4:51 PM    History:Female,age  72 years, r/o obstruction; constipation and some  discomfort for 6 weeks.; Abdominal pain, left upper quadrant;  Abdominal pain, right upper quadrant; Nausea without vomiting    Comparison: No relevant prior imaging.    FINDINGS: Two views are submitted.     Moderate to large volume of stool is seen within the colon. There is  no evidence of free air or evidence of obstruction.      Impression    IMPRESSION:  Moderate to large volume of stool, no acute abnormality. No evidence  of free air or obstruction.    KIRILL EPPERSON MD         SYSTEM ID:  [id number]   Comprehensive Metabolic Panel     Status: Abnormal   Result Value Ref Range    Sodium 126 (L) 136 - 145 mmol/L    Potassium 6.2 (HH) 3.4 - 5.3 mmol/L    Chloride 87 (L) 98 - 107 mmol/L    Carbon Dioxide (CO2) 25 22 - 29 mmol/L    Anion Gap 14 7 - 15 mmol/L    Urea Nitrogen 16.7 8.0 - 23.0 mg/dL    Creatinine 0.96 (H) 0.51 - 0.95 mg/dL    Calcium 11.9 (H) 8.8 - 10.2 mg/dL    Glucose 152 (H) 70 - 99 mg/dL    Alkaline Phosphatase 103 35 - 104 U/L    AST 14 10 - 35 U/L    ALT 9 (L) 10 - 35 U/L    Protein Total 8.3 6.4 - 8.3 g/dL    Albumin 4.6 3.5 - 5.2 g/dL    Bilirubin Total 0.4 <=1.2 mg/dL    GFR Estimate 63 >60 mL/min/1.73m2   Lipase     Status: Normal   Result Value Ref Range    Lipase 29 13 - 60 U/L   Amylase     Status: Normal   Result Value Ref Range    Amylase 44 28 - 100 U/L   UA Macroscopic with reflex to Microscopic and Culture     Status: Abnormal    Specimen: Urine, Midstream   Result Value Ref Range    Color Urine Yellow Colorless, Straw, Light Yellow, Yellow    Appearance Urine Slightly Cloudy (A) Clear     Glucose Urine Negative Negative mg/dL    Bilirubin Urine Negative Negative    Ketones Urine Trace (A) Negative mg/dL    Specific Gravity Urine 1.015 1.000 - 1.030    Blood Urine Trace (A) Negative    pH Urine 6.0 5.0 - 9.0    Protein Albumin Urine 300 (A) Negative mg/dL    Urobilinogen Urine Normal Normal, 2.0 mg/dL    Nitrite Urine Negative Negative    Leukocyte Esterase Urine Large (A) Negative    Bacteria Urine Moderate (A) None Seen /HPF    Mucus Urine Present (A) None Seen /LPF    RBC Urine 6 (H) <=2 /HPF    WBC Urine 162 (H) <=5 /HPF    Squamous Epithelials Urine 2 (H) <=1 /HPF    Narrative    Urine Culture ordered based on laboratory criteria   CBC with platelets and differential     Status: Abnormal   Result Value Ref Range    WBC Count 19.1 (H) 4.0 - 11.0 10e3/uL    RBC Count 4.79 3.80 - 5.20 10e6/uL    Hemoglobin 12.1 11.7 - 15.7 g/dL    Hematocrit 38.0 35.0 - 47.0 %    MCV 79 78 - 100 fL    MCH 25.3 (L) 26.5 - 33.0 pg    MCHC 31.8 31.5 - 36.5 g/dL    RDW 13.7 10.0 - 15.0 %    Platelet Count 682 (H) 150 - 450 10e3/uL    % Neutrophils 84 %    % Lymphocytes 10 %    % Monocytes 5 %    % Eosinophils 0 %    % Basophils 0 %    % Immature Granulocytes 1 %    NRBCs per 100 WBC 0 <1 /100    Absolute Neutrophils 16.3 (H) 1.6 - 8.3 10e3/uL    Absolute Lymphocytes 1.8 0.8 - 5.3 10e3/uL    Absolute Monocytes 0.9 0.0 - 1.3 10e3/uL    Absolute Eosinophils 0.0 0.0 - 0.7 10e3/uL    Absolute Basophils 0.0 0.0 - 0.2 10e3/uL    Absolute Immature Granulocytes 0.1 <=0.4 10e3/uL    Absolute NRBCs 0.0 10e3/uL   Procalcitonin     Status: Abnormal   Result Value Ref Range    Procalcitonin 0.10 (H) <0.05 ng/mL   CRP inflammation     Status: Abnormal   Result Value Ref Range    CRP Inflammation 33.18 (H) <5.00 mg/L   Lactate for Sepsis Protocol     Status: Abnormal   Result Value Ref Range    Lactic Acid 3.5 (H) 0.7 - 2.0 mmol/L   CBC and Differential     Status: Abnormal    Narrative    The following orders were created for panel  order CBC and Differential.  Procedure                               Abnormality         Status                     ---------                               -----------         ------                     CBC with platelets and d...[[id number]]  Abnormal            Final result                 Please view results for these tests on the individual orders.

## 2023-05-16 PROBLEM — I48.91 ATRIAL FIBRILLATION WITH RVR (H): Status: ACTIVE | Noted: 2023-05-16

## 2023-05-16 LAB
ANION GAP SERPL CALCULATED.3IONS-SCNC: 10 MMOL/L (ref 7–15)
BUN SERPL-MCNC: 14.7 MG/DL (ref 8–23)
CALCIUM SERPL-MCNC: 9.7 MG/DL (ref 8.8–10.2)
CHLORIDE SERPL-SCNC: 93 MMOL/L (ref 98–107)
CREAT SERPL-MCNC: 0.77 MG/DL (ref 0.51–0.95)
CREAT SERPL-MCNC: 0.84 MG/DL (ref 0.51–0.95)
DEPRECATED HCO3 PLAS-SCNC: 23 MMOL/L (ref 22–29)
ERYTHROCYTE [DISTWIDTH] IN BLOOD BY AUTOMATED COUNT: 13.9 % (ref 10–15)
GFR SERPL CREATININE-BSD FRML MDRD: 74 ML/MIN/1.73M2
GFR SERPL CREATININE-BSD FRML MDRD: 82 ML/MIN/1.73M2
GLUCOSE BLDC GLUCOMTR-MCNC: 140 MG/DL (ref 70–99)
GLUCOSE SERPL-MCNC: 144 MG/DL (ref 70–99)
HCT VFR BLD AUTO: 30.1 % (ref 35–47)
HGB BLD-MCNC: 9.7 G/DL (ref 11.7–15.7)
HOLD SPECIMEN: NORMAL
LACTATE SERPL-SCNC: 1.4 MMOL/L (ref 0.7–2)
MCH RBC QN AUTO: 25.1 PG (ref 26.5–33)
MCHC RBC AUTO-ENTMCNC: 32.2 G/DL (ref 31.5–36.5)
MCV RBC AUTO: 78 FL (ref 78–100)
PLATELET # BLD AUTO: 429 10E3/UL (ref 150–450)
POTASSIUM SERPL-SCNC: 4.5 MMOL/L (ref 3.4–5.3)
RBC # BLD AUTO: 3.87 10E6/UL (ref 3.8–5.2)
SODIUM SERPL-SCNC: 126 MMOL/L (ref 136–145)
WBC # BLD AUTO: 13.1 10E3/UL (ref 4–11)

## 2023-05-16 PROCEDURE — 85027 COMPLETE CBC AUTOMATED: CPT | Performed by: FAMILY MEDICINE

## 2023-05-16 PROCEDURE — 99232 SBSQ HOSP IP/OBS MODERATE 35: CPT | Performed by: FAMILY MEDICINE

## 2023-05-16 PROCEDURE — 36415 COLL VENOUS BLD VENIPUNCTURE: CPT | Performed by: FAMILY MEDICINE

## 2023-05-16 PROCEDURE — 83605 ASSAY OF LACTIC ACID: CPT | Performed by: FAMILY MEDICINE

## 2023-05-16 PROCEDURE — 250N000013 HC RX MED GY IP 250 OP 250 PS 637: Performed by: FAMILY MEDICINE

## 2023-05-16 PROCEDURE — 80048 BASIC METABOLIC PNL TOTAL CA: CPT | Performed by: FAMILY MEDICINE

## 2023-05-16 PROCEDURE — 250N000011 HC RX IP 250 OP 636: Performed by: FAMILY MEDICINE

## 2023-05-16 PROCEDURE — 258N000003 HC RX IP 258 OP 636: Performed by: FAMILY MEDICINE

## 2023-05-16 PROCEDURE — 120N000001 HC R&B MED SURG/OB

## 2023-05-16 RX ORDER — COVID-19 ANTIGEN TEST
220 KIT MISCELLANEOUS 2 TIMES DAILY PRN
Status: ON HOLD | COMMUNITY
End: 2023-05-18

## 2023-05-16 RX ORDER — LOSARTAN POTASSIUM 50 MG/1
100 TABLET ORAL DAILY
Status: DISCONTINUED | OUTPATIENT
Start: 2023-05-16 | End: 2023-05-18 | Stop reason: HOSPADM

## 2023-05-16 RX ORDER — METOPROLOL SUCCINATE 100 MG/1
200 TABLET, EXTENDED RELEASE ORAL DAILY
Status: DISCONTINUED | OUTPATIENT
Start: 2023-05-16 | End: 2023-05-18 | Stop reason: HOSPADM

## 2023-05-16 RX ORDER — OXYCODONE HYDROCHLORIDE 5 MG/1
5 TABLET ORAL EVERY 4 HOURS PRN
Status: DISCONTINUED | OUTPATIENT
Start: 2023-05-16 | End: 2023-05-18 | Stop reason: HOSPADM

## 2023-05-16 RX ORDER — ACETAMINOPHEN 325 MG/1
325-650 TABLET ORAL EVERY 6 HOURS PRN
COMMUNITY

## 2023-05-16 RX ORDER — IBUPROFEN 200 MG
400 TABLET ORAL EVERY 6 HOURS PRN
Status: ON HOLD | COMMUNITY
End: 2023-05-18

## 2023-05-16 RX ORDER — KETOROLAC TROMETHAMINE 15 MG/ML
15 INJECTION, SOLUTION INTRAMUSCULAR; INTRAVENOUS ONCE
Status: COMPLETED | OUTPATIENT
Start: 2023-05-16 | End: 2023-05-16

## 2023-05-16 RX ADMIN — SODIUM CHLORIDE: 9 INJECTION, SOLUTION INTRAVENOUS at 09:49

## 2023-05-16 RX ADMIN — LOSARTAN POTASSIUM 100 MG: 50 TABLET, FILM COATED ORAL at 03:29

## 2023-05-16 RX ADMIN — ENOXAPARIN SODIUM 40 MG: 40 INJECTION SUBCUTANEOUS at 00:14

## 2023-05-16 RX ADMIN — PANTOPRAZOLE SODIUM 40 MG: 40 TABLET, DELAYED RELEASE ORAL at 07:43

## 2023-05-16 RX ADMIN — METOPROLOL SUCCINATE 200 MG: 100 TABLET, EXTENDED RELEASE ORAL at 03:28

## 2023-05-16 RX ADMIN — KETOROLAC TROMETHAMINE 15 MG: 15 INJECTION, SOLUTION INTRAMUSCULAR; INTRAVENOUS at 03:29

## 2023-05-16 RX ADMIN — DOCUSATE SODIUM 50 MG AND SENNOSIDES 8.6 MG 2 TABLET: 8.6; 5 TABLET, FILM COATED ORAL at 07:48

## 2023-05-16 RX ADMIN — OXYCODONE HYDROCHLORIDE 5 MG: 5 TABLET ORAL at 03:29

## 2023-05-16 RX ADMIN — DULOXETINE HYDROCHLORIDE 30 MG: 30 CAPSULE, DELAYED RELEASE ORAL at 10:22

## 2023-05-16 RX ADMIN — AMLODIPINE BESYLATE 5 MG: 5 TABLET ORAL at 10:23

## 2023-05-16 RX ADMIN — ENOXAPARIN SODIUM 40 MG: 40 INJECTION SUBCUTANEOUS at 21:18

## 2023-05-16 RX ADMIN — CEFTRIAXONE SODIUM 2 G: 2 INJECTION, SOLUTION INTRAVENOUS at 19:37

## 2023-05-16 RX ADMIN — LEVOTHYROXINE SODIUM 25 MCG: 0.03 TABLET ORAL at 10:23

## 2023-05-16 RX ADMIN — ARIPIPRAZOLE 5 MG: 5 TABLET ORAL at 10:23

## 2023-05-16 ASSESSMENT — ACTIVITIES OF DAILY LIVING (ADL)
ADLS_ACUITY_SCORE: 23

## 2023-05-16 NOTE — PROGRESS NOTES
" [facility] ADMISSION NOTE    Patient admitted to room 315 at approximately 2240 via cart from emergency room. Patient was accompanied by transport tech.     Verbal SBAR report received from DOMINGO Danielson prior to patient arrival.     Patient ambulated to bed with stand-by assist. Patient alert and oriented X 3. The patient is not having any pain.  . Admission vital signs: Blood pressure 137/87, pulse (!) 129, temperature 97.8  F (36.6  C), temperature source Tympanic, resp. rate 18, height 1.626 m (5' 4\"), weight 93 kg (205 lb), SpO2 99 %, not currently breastfeeding. Patient was oriented to plan of care, call light, bed controls, tv, telephone, bathroom and visiting hours.     Risk Assessment    The following safety risks were identified during admission: fall. Yellow risk band applied: YES.         Education    Patient has a Economy to Observation order: No  Observation education completed and documented: VASILE Montoya RN    "

## 2023-05-16 NOTE — PROGRESS NOTES
Pt stood to use standing scale, HR increased to 220 bpm, MD Pierce notified, stat EKG ordered and completed. New order for IV push metoprolol 5 mg every 5 min x 3 PRN obtained by DOMINGO Kumar from MD Pierce. Metoprolol given x 3, see MAR,  HR now 89 BPM NSR.

## 2023-05-16 NOTE — ED PROVIDER NOTES
"        EMERGENCY DEPARTMENT ENCOUNTER      NAME: Ness Bales  AGE: 72 year old female  YOB: 1950  MRN: [id number]  EVALUATION DATE & TIME: 5/15/2023  6:13 PM    PCP: Miah Black    ED PROVIDER: Junior Cullen PA-C       CHIEF COMPLAINT:  Chief Complaint   Patient presents with     Abdominal Pain     Abnormal Labs       FINAL IMPRESSION:  1. Sepsis without acute organ dysfunction, due to unspecified organism (H)    2. UTI (urinary tract infection)    3. Hyperkalemia    4. Hypercalcemia    5. Hyponatremia    6. Hx of gastric bypass        ED COURSE, MEDICAL DECISION MAKING, ASSESSMENT, AND PLAN:      The patient was interviewed and examined.  HPI and physical exam as below.  Differential diagnosis and MDM Key Documentation Elements as below.  Vitals and triage note were reviewed.  BP (!) 148/113   Pulse 114   Temp 98.2  F (36.8  C) (Tympanic)   Resp 18   Ht 1.626 m (5' 4\")   Wt 93 kg (205 lb)   LMP  (LMP Unknown)   SpO2 96%   BMI 35.19 kg/m      Overall Impression/Treatment Plan/Discharge Info/Follow-up/Medical Necessity for Admission:  Sepsis secondary to UTI  -Patient with positive UA for UTI.  Review of prior urine cultures show 2 recent infections including E. coli with intermediate sensitivity to Zosyn and Staphylococcus epidermidis resistant to Septra/Bactrim.  Patient with white blood cell count over 19,000.  Elevated lactic acid.  CT of the abdomen pelvis today did not show any signs of pyelonephritis or nephrolithiasis.  Normal renal function.  Patient tachycardic but not hypotensive.  No signs of endorgan dysfunction  -Exam today consistent with sepsis secondary to UTI.  Patient started on IV Rocephin 2 g and IV fluids per sepsis protocol.  Patient will be admitted for continued IV antibiotics and blood culture results.    Hyperkalemia  -Potassium 6.2.  Patient on spironolactone for hyperaldosteronism.  No EKG changes.  EKG showed sinus tachycardia, otherwise no ST segment " deviations or T wave changes.  Patient will be given insulin and albuterol here in the ER for treatment.  Holding calcium carbonate as patient did have hypercalcemia.    Hypercalcemia  -Calcium 11.9.  Mild hypercalcemia.  Patient on spironolactone which may increase calcium levels.  Patient endorses constipation issues.  TSH and PTH were ordered with results pending.  Patient will be admitted to hospitalist service.  No intervention currently needed.  Continue to monitor.    Hyponatremia  -Sodium 126.  IV fluids given.  Continue to monitor    Thrombophilia  -Platelets 682.  Unsure of the cause.  Currently no intervention required here in the ER.  Continue to monitor    Pylorus inflammation  -Patient with history of gastric bypass.  Patient endorses chronic intermittent acid reflux.  CT imaging of the abdomen pelvis IV contrast today revealed inflammation of pylorus with differential including ulceration versus gastritis versus neoplasm versus infection.      Reassessments, Medications, Interventions, & Response to Treatments:  Multiple reassessments.  Patient remained hemodynamically stable here in the ER.  Patient's were on IV fluids and IV antibiotics without adverse effect.  Patient not having significant abdominal pain.  Discussed laboratory and imaging results.  Discussed recommendation for admission.    Consultations:  Dr. Pierce - hospitalist service    Decision Rules, Medical Calculators, and Risk Stratification Tools:  None    MDM Key Documentation Elements for Patient's Evaluation:  1. Differential diagnosis to include high risk considerations: Differential includes but is not limited to UTI, pyelonephritis, nephrolithiasis, hydronephrosis/hydroureter, sepsis  2. Escalation to admission/observation considered: Admission/observation considered, patient was admitted  3. Discussions and management with other clinicians:    3a. Independent interpretation of testing performed by another health professional:   -No  3b. Discussion of management or test interpretation with another health professional: -Yes  4. Independent interpretation of tests:  Ordering and/or review of 3+ test(s); review of 3+ test result(s) ordered prior to this encounter  5. Discussion of test interpretations with radiology:  No  6. History obtained from source other than patient or assessment requiring an independent historian:  No  7. Review of non-ED/external records:  review of 3+ records  8. Diagnostic tests considered but not ultimately performed/deferred:  -Considered ultrasound of the upper abdomen patient not having signs of acute cholecystitis or pancreatitis.  9. Prescription medications considered but not prescribed:  -Patient was admitted  10. Chronic conditions affecting care:  -Primary hyperaldosteronism  11. Care affected by social determinants of health:  -None    The patient's management involved:   - Laboratory studies  - Imaging studies  - Parenteral controlled substance  - Decision regarding hospitalization    Pertinent Labs & Imaging studies reviewed. (See chart for details)  Results for orders placed or performed during the hospital encounter of 05/15/23   CT Abdomen Pelvis w Contrast    Impression    IMPRESSION:   Inflammatory changes involving the pylorus concerning for ulceration  versus an infected diverticulum. Differential diagnosis includes  localized gastritis and neoplasm.     Numerous nonacute findings as described above.    This facility minimizes radiation dose by adjusting the mA and/or kV  according to each patient size.    This CT scan was performed using one or more the following dose  reduction techniques:    -Automated exposure control,  -Adjustment of the mA and/or kV according to patient's size, and/or,  -Use of iterative reconstruction technique.    KIRILL EPPERSON MD         SYSTEM ID:  [id number]   Result Value Ref Range    Troponin T, High Sensitivity 31 (H) <=14 ng/L   Result Value Ref Range    Magnesium  2.1 1.7 - 2.3 mg/dL   Result Value Ref Range    Phosphorus 3.8 2.5 - 4.5 mg/dL     No results found for: ABORH    CBC with platelets and differential  Order: [id number] - Part of Panel Order [id number]   Status: Final result      Visible to patient: Yes (not seen)      Dx: Abdominal pain, left upper quadrant; ...      1 Result Note               Component Ref Range & Units  3:49 PM 4 mo ago 1 yr ago 2 yr ago 3 yr ago 4 yr ago    WBC Count 4.0 - 11.0 10e3/uL 19.1 High   9.0  11.4 High   8.2 R   7.2 R     RBC Count 3.80 - 5.20 10e6/uL 4.79  4.15  4.24  4.52 R   4.68 R     Hemoglobin 11.7 - 15.7 g/dL 12.1  11.9  12.5  13.1  14.8  13.6     Hematocrit 35.0 - 47.0 % 38.0  37.0  36.6  40.2   40.9     MCV 78 - 100 fL 79  89  86  89 R   87 R     MCH 26.5 - 33.0 pg 25.3 Low   28.7  29.5  29.0   29.1     MCHC 31.5 - 36.5 g/dL 31.8  32.2  34.2  32.6   33.3     RDW 10.0 - 15.0 % 13.7  12.5  12.6  12.7   12.4     Platelet Count 150 - 450 10e3/uL 682 High   355  372  285 R   252 R     % Neutrophils % 84   73  63.7   62.0     % Lymphocytes % 10   20  27.2   29.7     % Monocytes % 5   4  5.7   4.6     % Eosinophils % 0   2  2.8   3.2     % Basophils % 0   0  0.4   0.4     % Immature Granulocytes % 1   1        NRBCs per 100 WBC <1 /100 0   0        Absolute Neutrophils 1.6 - 8.3 10e3/uL 16.3 High    8.2        Absolute Lymphocytes 0.8 - 5.3 10e3/uL 1.8   2.3        Absolute Monocytes 0.0 - 1.3 10e3/uL 0.9   0.4        Absolute Eosinophils 0.0 - 0.7 10e3/uL 0.0   0.2        Absolute Basophils 0.0 - 0.2 10e3/uL 0.0   0.1        Absolute Immature Granulocytes <=0.4 10e3/uL 0.1   0.1        Absolute NRBCs 10e3/uL 0.0   0.0       Resulting Agency  [facility]              Specimen Collected: 05/15/23  3:49 PM Last Resulted: 05/15/23  4:08 PM                Comprehensive Metabolic Panel  Order: [id number]   Status: Final result      Visible to patient: Yes (not seen)      Dx: Abdominal pain, left upper  quadrant; ...      1 Result Note                Component Ref Range & Units  3:49 PM  (5/15/23) 4 mo ago  (1/2/23) 7 mo ago  (10/4/22) 1 yr ago  (4/22/22) 1 yr ago  (2/24/22) 1 yr ago  (1/28/22) 2 yr ago  (3/9/21)    Sodium 136 - 145 mmol/L 126 Low   135 Low   134 R  124 Low  R  126 Low  R  132 Low  R  130 Low  R     Potassium 3.4 - 5.3 mmol/L 6.2 High Panic   5.1          Chloride 98 - 107 mmol/L 87 Low   102          Carbon Dioxide (CO2) 22 - 29 mmol/L 25  25          Anion Gap 7 - 15 mmol/L 14  8  3 R  6 R  5 R  10 R  6 R     Urea Nitrogen 8.0 - 23.0 mg/dL 16.7  26.9 High           Creatinine 0.51 - 0.95 mg/dL 0.96 High   1.05 High   1.26 High  R  0.88 R  1.06 R  1.07 R  0.82 R     Calcium 8.8 - 10.2 mg/dL 11.9 High   10.6 High   10.8 High  R  11.0 High  R  10.5 High  R  11.1 High  R  10.3 R     Glucose 70 - 99 mg/dL 152 High   105 High           Alkaline Phosphatase 35 - 104 U/L 103     72 R  68 R      AST 10 - 35 U/L 14     19 R  13 R  30 R     ALT 10 - 35 U/L 9 Low      17 R  17 R  38 R     Protein Total 6.4 - 8.3 g/dL 8.3     6.7 R  6.2 Low  R  6.5 R     Albumin 3.5 - 5.2 g/dL 4.6           Bilirubin Total <=1.2 mg/dL 0.4     0.3 R  0.4 R  0.4 R     GFR Estimate >60 mL/min/1.73m2 63  57 Low  CM  46 Low  CM  70 CM  56 Low  CM  56 Low  CM  69 R    Comment: eGFR calculated using 2021 CKD-EPI equation.   Resulting Agency  [facility]              Specimen Collected: 05/15/23  3:49 PM Last Resulted: 05/15/23  4:39 PM                CRP inflammation  Order: [id number]   Status: Final result      Visible to patient: Yes (not seen)      Dx: Abdominal pain, left upper quadrant; ...      1 Result Note          Component Ref Range & Units  3:49 PM    CRP Inflammation <5.00 mg/L 33.18 High     Resulting Agency  [facility]        Procalcitonin  Order: [id number]   Status: Final result      Visible to patient: Yes (not seen)      Dx: Abdominal pain, left upper quadrant; ...      1  Result Note          Component Ref Range & Units  3:49 PM    Procalcitonin <0.05 ng/mL 0.10 High            UA Macroscopic with reflex to Microscopic and Culture  Order: [id number]   Status: Final result      Visible to patient: Yes (not seen)      Dx: Abdominal pain, left upper quadrant; ...     Specimen Information: Urine, Midstream    1 Result Note                Component Ref Range & Units  4:02 PM  (5/15/23) 4 mo ago  (1/12/23) 4 mo ago  (1/2/23) 1 yr ago  (8/1/21) 2 yr ago  (3/19/21) 2 yr ago  (11/27/20) 2 yr ago  (10/24/20)    Color Urine Colorless, Straw, Light Yellow, Yellow Yellow  Light Yellow  Yellow  Light Yellow  Light Yellow R  Yellow R  Yellow R     Appearance Urine Clear Slightly Cloudy Abnormal   Clear  Cloudy Abnormal   Slightly Cloudy Abnormal   Clear R  Slightly Cloudy R  Cloudy R     Glucose Urine Negative mg/dL Negative  Negative  Negative  Negative  Negative R  Negative R  Negative R     Bilirubin Urine Negative Negative  Negative  Negative  Negative  Negative R  Negative R  Negative R     Ketones Urine Negative mg/dL Trace Abnormal   Negative  Negative  Negative  Negative R  Negative R  Negative R     Specific Gravity Urine 1.000 - 1.030 1.015  1.018  1.015  1.014  1.013 R  1.013 R  1.009 R     Blood Urine Negative Trace Abnormal   Negative  Negative  Trace Abnormal   Negative R  Trace Abnormal  R  Trace Abnormal  R     pH Urine 5.0 - 9.0 6.0  5.5  6.5  6.0  6.5 R  6.0 R  6.0 R     Protein Albumin Urine Negative mg/dL 300 Abnormal   Negative  Negative  30 Abnormal   Negative R  Negative R  10 Abnormal  R     Urobilinogen Urine Normal, 2.0 mg/dL Normal  Normal  Normal  Normal  Normal R  Normal R  Normal R     Nitrite Urine Negative Negative  Negative  Negative  Negative  Negative R  Positive Abnormal  R  Positive Abnormal  R     Leukocyte Esterase Urine Negative Large Abnormal   Small Abnormal   Large Abnormal   Large Abnormal   Large Abnormal  R  Large Abnormal  R  Large Abnormal  R      Bacteria Urine None Seen /HPF Moderate Abnormal   Few Abnormal   Moderate Abnormal          Mucus Urine None Seen /LPF Present Abnormal    Present Abnormal          RBC Urine <=2 /HPF 6 High   2  19 High   10 High   1 R  3 High  R  5 High  R     WBC Urine <=5 / High   18 High   130 High   >182 High   107 High  R  >182 High  R  >182 High  R     Squamous Epithelials Urine <=1 /HPF 2 High    9 High         Resulting Agency  [facility]              Narrative  Performed by: [facility]  Urine Culture ordered based on laboratory criteria      Specimen Collected: 05/15/23  4:02 PM Last Resulted: 05/15/23  4:41 PM            Collected 1/12/2023 10:12 AM      Status: Final result      Visible to patient: Yes (seen)      Dx: Acute cystitis without hematuria     Specimen Information: Urine, Clean Catch    3 Result Notes  Culture 10,000-50,000 CFU/mL Staphylococcus epidermidis Abnormal             Resulting Agency: [facility]       Susceptibility     Staphylococcus epidermidis     GLENNY     Ampicillin/ Sulbactam Susceptible     Daptomycin Susceptible     Linezolid Susceptible     Nitrofurantoin Susceptible     Oxacillin Susceptible     Rifampin Susceptible     Tetracycline Susceptible     Trimethoprim/Sulfamethoxazole Resistant     Vancomycin Susceptible                  Specimen Collected: 01/12/23 10:12 AM Last Resulted: 01/14/23  1:44 PM                Collected 1/2/2023 11:41 AM      Status: Final result      Visible to patient: Yes (seen)      Dx: Preoperative examination     Specimen Information: Urine, Clean Catch    2 Result Notes  Culture >100,000 CFU/mL Escherichia coli Abnormal             Resulting Agency: [facility]     Susceptibility     Escherichia coli     GLENNY     Amoxicillin/Clavulanate Susceptible     Ampicillin Susceptible     Ampicillin/ Sulbactam Susceptible     Aztreonam Susceptible     Cefazolin Susceptible     Cefepime Susceptible     Cefoxitin Susceptible      Cefpodoxime Susceptible     Ceftazidime Susceptible     Ceftriaxone Susceptible     Cefuroxime Susceptible     Ciprofloxacin See below 1     Ertapenem Susceptible     Gentamicin Susceptible     Imipenem Susceptible     Levofloxacin See below 2     Nitrofurantoin Susceptible     Piperacillin/Tazobactam Intermediate     Tetracycline Susceptible     Tobramycin Susceptible     Trimethoprim Susceptible     Trimethoprim/Sulfamethoxazole Susceptible              1 Ciprofloxacin not resistant.  Due to test limitations, lab cannot provide GLENNY to determine susceptibility.   2 Levofloxacin not resistant.  Due to test limitations, lab cannot provide GLENNY to determine susceptibility.            Specimen Collected: 01/02/23 11:41 AM Last Resulted: 01/04/23  8:26 AM                 A shared decision making model was used. Plan and all results were discussed  Time was taken to answer all questions. Patient and/or associated parties understood and were agreeable to treatment plan.  Warning signs and close return precautions to return to the ED given. Copy of results given.  Patient admitted in stable condition      PPE worn during patient evaluation:  Mask: Yes, surgical  Eye Protection: No  Gown: No  Hair cover: No  Face Shield: No  Patient wearing a mask: yes      MEDICATIONS GIVEN IN THE EMERGENCY:  Medications   sodium chloride (PF) 0.9% PF flush 3 mL (has no administration in time range)   sodium chloride (PF) 0.9% PF flush 3 mL (has no administration in time range)   cefTRIAXone IN D5W (ROCEPHIN) intermittent infusion 2 g (2 g Intravenous $New Bag 5/15/23 2025)   sodium chloride 0.9% infusion (has no administration in time range)   0.9% sodium chloride BOLUS (1,000 mLs Intravenous $New Bag 5/15/23 2024)   glucose gel 15-30 g (has no administration in time range)     Or   dextrose 50 % injection 25-50 mL (has no administration in time range)     Or   glucagon injection 1 mg (has no administration in time range)   dextrose 10%  BOLUS (300 mLs Intravenous $New Bag 5/15/23 2026)   ketorolac (TORADOL) injection 15 mg (15 mg Intravenous $Given 5/15/23 2025)   dextrose 50 % injection 25 g (25 g Intravenous $Given 5/15/23 2025)   insulin (regular) (HumuLIN R/NovoLIN R) injection 9 Units (9 Units Intravenous $Given 5/15/23 2025)   iopamidol (ISOVUE-370) solution 118 mL (118 mLs Intravenous $Given 5/15/23 1948)       NEW PRESCRIPTIONS STARTED AT TODAY'S ER VISIT:  New Prescriptions    No medications on file          =================================================================    HPI  Ness Bales is a pleasant 72 year old female with history of primary hyperaldosteronism, hypothyroidism, depression, hypertension who presents to the ER today from the [facility] clinic for concerns of sepsis.  Patient states that she has been having abdominal pain for the past 6 to 8 weeks worse over the past 3 days.  Patient has been having issue with constipation.  Patient has a history of UTI with 2 recent infections back in January 2023.  Patient states that she has been fatigued, feeling nauseated, decreased appetite, feeling warm and shaky.  Denying any chest pain, cough, or shortness of breath.  No upper respiratory infections like runny nose, cough, sore throat.  Patient does have issues with constipation which has been ongoing for for a while.  Denies any diarrhea or bloody/melanotic stools.  Denying any recent urinary changes I did review records from [facility] which patient did have a positive UTI, leukocytosis with WBC greater than 19,000, and elevated lactic acid.  No significant renal dysfunction.      REVIEW OF SYSTEMS   Review of Systems  As above  Remainder of systems reviewed, unless noted in HPI all others negative.      PAST MEDICAL HISTORY:  Past Medical History:   Diagnosis Date     Benign essential hypertension      Depression      Hyperaldosteronism (H)      Hypothyroidism      Obesity     No Comments Provided     Personal  history of other medical treatment (CODE)     3 vaginal childbirth uncomplicated     Personal history of other medical treatment (CODE)     1983,blood transfusion with stomach stapling     Vitamin B12 deficiency (non anemic)        PAST SURGICAL HISTORY:  Past Surgical History:   Procedure Laterality Date     ARTHROPLASTY KNEE Right 2008     BIOPSY Bilateral 10/18/2021    Adrenal Vein; Hyperaldosteronism     BIOPSY BREAST      Incisional x2 benign breast biopsies     FRACTURE SURGERY Left     open treatment of Humeral Fx w/ prosthesis     INJECT BOTOX N/A 09/22/2020    [facility]; [facility] ([place], [place]), Dr. Joe Cruz     LAPAROSCOPIC BYPASS GASTRIC      1983,Stomach stapling complicated by incision rupture, then repaired [facility] second surgery     OTHER SURGICAL HISTORY      1983,45272.0,SKIN/SUBCUTANEOUS SURGERY,repaired incision from stomach stapling     OTHER SURGICAL HISTORY      208489,ANESTHESIA ALERT,No problems with anaesthesia. ?           CURRENT MEDICATIONS:    Current Outpatient Medications   Medication Instructions     acetaminophen (TYLENOL) 325 MG tablet TAKE 2 TABLETS BY MOUTH FOUR TIMES A DAY. LIMIT ACETAMINOPHEN TO 4000 MG PER DAY FROM ALL SOURCES.     alendronate (FOSAMAX) 70 mg, EVERY 7 DAYS     amLODIPine (NORVASC) 5 MG tablet TAKE 1 TABLET BY MOUTH EVERY DAY     ARIPiprazole (ABILIFY) 5 mg, Oral, DAILY     Coenzyme Q10 (CO Q 10) 100 MG CAPS Oral, DAILY     cyanocobalamin (CYANOCOBALAMIN) 1,000 mcg, Intramuscular, EVERY 30 DAYS     DULoxetine (CYMBALTA) 30 mg, Oral, DAILY     ferrous sulfate (FEROSUL) 325 mg, Oral, DAILY WITH BREAKFAST     fish oil-omega-3 fatty acids 1000 MG capsule 1 capsule, Oral, DAILY     levothyroxine (SYNTHROID/LEVOTHROID) 25 MCG tablet TAKE 1 TABLET BY MOUTH EVERY DAY     losartan (COZAAR) 100 MG tablet TAKE 1 TABLET BY MOUTH EVERY DAY     metoprolol succinate ER (TOPROL XL) 100 MG 24 hr tablet TAKE 2 TABLETS BY MOUTH EVERY DAY     modafinil (PROVIGIL) 400 mg, Oral, DAILY  "    phentermine (ADIPEX-P) 37.5 mg, Oral, EVERY MORNING BEFORE BREAKFAST     spironolactone (ALDACTONE) 50 MG tablet 1 tablet, Oral, 2 TIMES DAILY     Syringe Luer Slip 3 ML MISC 1 each, Does not apply, EVERY 30 DAYS     topiramate (TOPAMAX) 25 MG tablet TAKE 1 TABLET BY MOUTH TWO TIMES A DAY. DO NOT CRUSH.     Vitamin D-1000 Max St 10,000 Units, Oral, DAILY       ALLERGIES:  Allergies   Allergen Reactions     Chlorthalidone Other (See Comments)     Nightmares/Caused hyponatremia       FAMILY HISTORY:  Family History   Problem Relation Age of Onset     Other - See Comments Mother         macular degeneration/Psychiatric illness,takes antidepressant     Heart Disease Father 40        Heart Disease,MI     Diabetes Father         Diabetes     Other - See Comments Son         Psychiatric illness,psychotic episode this year smoking marijuana, ephedra use ? 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  [place] [facility].       PHYSICAL EXAM    VITAL SIGNS: BP (!) 148/113   Pulse 114   Temp 98.2  F (36.8  C) (Tympanic)   Resp 18   Ht 1.626 m (5' 4\")   Wt 93 kg (205 lb)   LMP  (LMP Unknown)   SpO2 96%   BMI 35.19 kg/m      Patient Vitals for the past 24 hrs:   BP Temp Temp src Pulse Resp SpO2 Height Weight   05/15/23 1815 (!) 148/113 98.2  F (36.8  C) Tympanic 114 18 96 % 1.626 m (5' 4\") 93 kg (205 lb)       Physical Exam  Vitals and nursing note reviewed. "   Constitutional:       Appearance: Normal appearance. She is obese. She is not ill-appearing.   HENT:      Nose: Nose normal.      Mouth/Throat:      Mouth: Mucous membranes are moist.      Pharynx: Oropharynx is clear.   Eyes:      Conjunctiva/sclera: Conjunctivae normal.   Cardiovascular:      Rate and Rhythm: Regular rhythm. Tachycardia present.      Pulses: Normal pulses.      Heart sounds: Normal heart sounds. No murmur heard.     No friction rub. No gallop.   Pulmonary:      Effort: Pulmonary effort is normal.      Breath sounds: Normal breath sounds. No wheezing, rhonchi or rales.   Abdominal:      General: Abdomen is flat. Bowel sounds are normal.      Palpations: Abdomen is soft.      Tenderness: There is generalized abdominal tenderness. There is no guarding or rebound.   Musculoskeletal:         General: Normal range of motion.      Cervical back: Normal range of motion and neck supple.   Skin:     General: Skin is warm and dry.      Capillary Refill: Capillary refill takes less than 2 seconds.   Neurological:      General: No focal deficit present.      Mental Status: She is alert and oriented to person, place, and time.   Psychiatric:         Mood and Affect: Mood normal.          LABS & RADIOLOGY:  All pertinent labs reviewed and interpreted. Reviewed all pertinent imaging. Please see official radiology report.  Results for orders placed or performed during the hospital encounter of 05/15/23   CT Abdomen Pelvis w Contrast    Impression    IMPRESSION:   Inflammatory changes involving the pylorus concerning for ulceration  versus an infected diverticulum. Differential diagnosis includes  localized gastritis and neoplasm.     Numerous nonacute findings as described above.    This facility minimizes radiation dose by adjusting the mA and/or kV  according to each patient size.    This CT scan was performed using one or more the following dose  reduction techniques:    -Automated exposure  control,  -Adjustment of the mA and/or kV according to patient's size, and/or,  -Use of iterative reconstruction technique.    KIRILL EPPERSON MD         SYSTEM ID:  [id number]   Result Value Ref Range    Troponin T, High Sensitivity 31 (H) <=14 ng/L   Result Value Ref Range    Magnesium 2.1 1.7 - 2.3 mg/dL   Result Value Ref Range    Phosphorus 3.8 2.5 - 4.5 mg/dL     CT Abdomen Pelvis w Contrast   Final Result   IMPRESSION:    Inflammatory changes involving the pylorus concerning for ulceration   versus an infected diverticulum. Differential diagnosis includes   localized gastritis and neoplasm.       Numerous nonacute findings as described above.      This facility minimizes radiation dose by adjusting the mA and/or kV   according to each patient size.      This CT scan was performed using one or more the following dose   reduction techniques:      -Automated exposure control,   -Adjustment of the mA and/or kV according to patient's size, and/or,   -Use of iterative reconstruction technique.      KIRILL EPPERSON MD            SYSTEM ID:  [id number]            [id number] EKG available for comparison. EKG reviewed at 1837.  1) Rhythm: Sinus tachycardia  2) Rate: ventricular rate 107 bpm.  3) QRS Axis: No axis deviation  4) P waves/ RI interval: Sinus. Nml POOJA. No atrial enlargement  5) QRS complex: Narrow. No BBB. No ventricular hypertrophy. No pathological Q waves.  6) ST Segment: No acute ST segment elevation or depression  7) T waves: No T wave inversions. No peaked or flattened T waves.     I have independently reviewed and interpreted today's EKG, pending cardiologist over read.       I, Xavier Cullen PA-C, personally performed the services described in this documentation, and it is both accurate and complete.     Junior Cullen PA-C  05/15/23 2056

## 2023-05-16 NOTE — ASSESSMENT & PLAN NOTE
Asymptomatic presenting with sepsis  Urine culture positive for gram-negative rods, continue on IV Rocephin pending ID and sensitivities  5/17/2023-UC pos for e.coli, rocephin for now, switch to oral ceftin tomorrow  5/18/2023- at discharge we will continue oral Ceftin for a total of 7 days antibiotics

## 2023-05-16 NOTE — ASSESSMENT & PLAN NOTE
Taking losartan, metoprolol, Norvasc and Aldactone at home  Due to hyperkalemia, will hold Aldactone  Follow pressures, continue home meds  5/18/2023-at discharge we will hold any diuretics, continue Norvasc and losartan and metoprolol

## 2023-05-16 NOTE — PLAN OF CARE
"Afebrile, b/p elevated to 195 systolic, MD Pierce notified. TORB to give scheduled morning dose of metoprolol and Norvasc early,see MAR. HR tachycardic up to 220 at admission and now wdl after PRN metoprolol IV 5 mg x 3 were given.  Across upper abdomen tender to touch, pain rated 5/10, MD Pierce notified, TORB for one time 15 mg Toradol IV and oxycodone 5 mg , po,every 4 hours PRN ordered and given.  Pt ambulates with SBA . Urine cloudy, odorous, will continue to monitor.    BP (!) 195/81   Pulse 89   Temp 98.5  F (36.9  C) (Tympanic)   Resp 20   Ht 1.626 m (5' 4\")   Wt 93 kg (205 lb)   LMP  (LMP Unknown)   SpO2 94%   BMI 35.19 kg/m    .      Goal Outcome Evaluation:      Plan of Care Reviewed With: patient    Overall Patient Progress: improving      Problem: Plan of Care - These are the overarching goals to be used throughout the patient stay.    Goal: Patient-Specific Goal (Individualized)  5/16/2023 0041 by Jelani Montoya RN  Flowsheets (Taken 5/16/2023 0041)  Individualized Care Needs: Irregular hr  Anxieties, Fears or Concerns: anxiety regarding heart rythym  Patient/Family-Specific Goals (Include Timeframe): monititor HR/Rythym and treat as needed.     Problem: Plan of Care - These are the overarching goals to be used throughout the patient stay.    Goal: Absence of Hospital-Acquired Illness or Injury  Intervention: Identify and Manage Fall Risk  Recent Flowsheet Documentation  Taken 5/16/2023 0000 by Jelani Montoya RN  Safety Promotion/Fall Prevention:   activity supervised   clutter free environment maintained   nonskid shoes/slippers when out of bed   safety round/check completed     Problem: Plan of Care - These are the overarching goals to be used throughout the patient stay.    Goal: Absence of Hospital-Acquired Illness or Injury  Intervention: Prevent and Manage VTE (Venous Thromboembolism) Risk  Recent Flowsheet Documentation  Taken 5/16/2023 0000 by Jelani Montoya RN  VTE " Prevention/Management: (lovenox) other (see comments)     Problem: Plan of Care - These are the overarching goals to be used throughout the patient stay.    Goal: Optimal Comfort and Wellbeing  Intervention: Monitor Pain and Promote Comfort  Recent Flowsheet Documentation  Taken 5/16/2023 0306 by Jelani Montoya RN  Pain Management Interventions: MD notified (comment)  Taken 5/15/2023 2255 by Jelani Montoya, RN  Pain Management Interventions: declines

## 2023-05-16 NOTE — PROVIDER NOTIFICATION
05/15/23 2344   Valuables   Patient Belongings remains with patient   Patient Belongings Remaining with Patient clothing;cell phone/electronics;shoes;ring;necklace  (3 rings , 1 necklace)   Did you bring any home meds/supplements to the hospital?  No     [facility] will make every effort per our policy to help keep your items safe while in the hospital.  If you choose to keep any items at the bedside, we cannot be held responsible for any items that are lost or broken.        I have reviewed my belongings list on admission and verify that it is correct.     Patient signature_______________________________  Date/Time_____________________    2nd Staff person if patient unable to sign __________________________  Date/Time ______________________      I have received all my belongings noted above at discharge.    Patient signature________________________________  Date/Time  __________________________

## 2023-05-16 NOTE — ASSESSMENT & PLAN NOTE
Presenting with abdominal pain associated with low-grade temperature, tachycardia with lab work concerning for white count of 19,000, elevated lactate at 3.5 with urine positive for probable infection  Cultures of blood and urine are pending.  Has been empirically started on treatment for UTI with Rocephin  5/16/2023-lactate normalized with fluids, clinically improving with improving white count.  Continue antibiotics cultures pending with urine culture initially positive for gram-negative rods  5/17/2023-Sepsis sxs have resolved, UC pos for e.coli, sens to rocephin, continue to monitor  5/18/2023-clinically feeling back to normal.  Has taken 3 days of IV Rocephin, will switch to oral Ceftin at discharge, finish out 7-day course

## 2023-05-16 NOTE — PROGRESS NOTES
Progress Note     Patient: Meet Larios               Sex: male           MRN: [id number]       YOB: 1983      Age:  37 year old               Abbreviated HD yesterday due to pt's refusal to continue.  Patient states that he had severe pruritus of his back as reason for stopping.    PMHx, FHx, SHx, and ROS reviewed and otherwise unchanged   Reviewed  2/3/2021    Objective:  Physical Exam:   Visit Vitals  BP (!) 144/75 (BP Location: LLE - Left lower extremity, Patient Position: Sitting)   Pulse 91   Temp 98.2 °F (36.8 °C) (Oral)   Resp 17   Ht 5' 5\" (1.651 m)   Wt 116.4 kg (256 lb 9.9 oz)   SpO2 100%   BMI 42.70 kg/m²       I/O last 3 completed shifts:  In: -   Out: 2000 [Urine:25; Other:1500; Stool:475]    Physical Exam:  General Appearance: Alert and oriented, no distress   HEENT:  EOMI, no jaundice/injection   Neck: Trachea midline, no adenopathy, No JVD   Pulmonary:   Clear to auscultation bilaterally, respirations unlabored   Cardiovascular:  Regular rate and rhythm, no murmur, gallop or rub.    Gastrointestinal:   Soft, ostomy. Decubitus ulcer   Musculoskeletal:  Hematologic No edema, pulses 2+ all 4 extremities   No purpura, No lymphadenopathy, pallor    Dermatologic: warm and dry, turgor normal   Neurologic:  Psychiatric: Non-focal  Mental status normal    HD Access: LUE AVF     Lab/Data Reviewed:    Recent Labs   Lab 02/02/21  1019 02/01/21  1151 02/01/21  0823 01/31/21  0410 01/30/21  0452 01/29/21  0444   SODIUM 128*  --  132* 133* 132* 131*   POTASSIUM 5.4* 5.3* 5.9* 4.8 5.1 4.4   CHLORIDE 95*  --  98 98 97* 96*   CO2 23  --  24 28 25 29   BUN 79*  --  62* 42* 52* 35*   CREATININE 7.38*  --  6.52* 4.77* 6.15* 4.48*   GLUCOSE 104*  --  81 86 89 83   CALCIUM 9.3  --  9.2 9.0 8.8 8.8   PHOS 8.5*  --  7.2* 5.6* 6.9* 4.8*   MG 2.0  --  1.9 1.8 2.2 1.5*     Recent Labs   Lab 02/02/21  1019 02/01/21  0823 01/31/21  0410   WBC 7.2 7.5 8.5   HGB 8.0* 8.6* 8.3*   HCT 25.7* 27.5* 27.5*    209  "Pt stood up on scale, tele alarming with . Pt alert and talkative. Reports some palpitations. MD notified. EGK. Labs. PRN metoprolol.     BP (!) 118/90   Pulse (!) 174   Temp 97.8  F (36.6  C) (Tympanic)   Resp 18   Ht 1.626 m (5' 4\")   Wt 93 kg (205 lb)   LMP  (LMP Unknown)   SpO2 99%   BMI 35.19 kg/m      " 246         Current and past Medications Reviewed.     Assessment/Plan:  1. ESRD - On HD 3x weekly  - Continue dialysis TTS     2. Hypercalcemia - Symptomatic with abdominal discomfort.  - Consistent with immobility and has previously received pamidronate.  - Dosed pamidronate 30 mg IV piggyback on 1/21    3. Hypotension-chronic continue midodrine with hold parameters  - UF as BP allows    4. Sepsis - attributed to sacral wounds  - Treated with Abx    5.  Anemia of chronic kidney disease   - Continue ANTHONY therapy    6. Hyponatremia - UF with HD  - Target at/above 130 meq/L    7. Hyperkalemia - keep at bay with dialysis.  Adjust dialysis potassium bath as needed.    8. Hyperphos -may contribute to pruritus.  Increased Sevelamer.  Patient will have to continue full dialysis treatments for improvement.    [facility]  2/3/2021 9:25 AM  Phone: [phone]  Fax: [phone]

## 2023-05-16 NOTE — PROGRESS NOTES
Admit strip done, at 2305 pts heart rate went up to 225, and also went into A-fib. talked with RN on medical unit.

## 2023-05-16 NOTE — ASSESSMENT & PLAN NOTE
Episode during the night of tachycardia with EKG showing atrial fibrillation with RVR  No previous history of such, she was asymptomatic  Resolved after 3 doses of IV metoprolol, currently in sinus rhythm  Likely due to electrolyte abnormalities which have now resolved  Continue on cardiac telemetry for now, no further work-up if remains in sinus rhythm  At discharge, no more episodes of tachycardia, no further treatment or evaluation

## 2023-05-16 NOTE — PROGRESS NOTES
[facility]    Medicine Progress Note - Hospitalist Service    Date of Admission:  5/15/2023    Assessment & Plan   Bacterial sepsis (H)  Presenting with abdominal pain associated with low-grade temperature, tachycardia with lab work concerning for white count of 19,000, elevated lactate at 3.5 with urine positive for probable infection  Cultures of blood and urine are pending.  Has been empirically started on treatment for UTI with Rocephin  5/16/2023-lactate normalized with fluids, clinically improving with improving white count.  Continue antibiotics cultures pending with urine culture initially positive for gram-negative rods      Acute cystitis without hematuria  Asymptomatic presenting with sepsis  Urine culture positive for gram-negative rods, continue on IV Rocephin pending ID and sensitivities    Essential hypertension, benign  Taking losartan, metoprolol, Norvasc and Aldactone at home  Due to hyperkalemia, will hold Aldactone  Follow pressures, continue home meds    Hypercalcemia  Elevated calcium, possibly due to sepsis and being dry  Calcium normalized with fluids    Hyperkalemia  Potassium elevated at 6.2  Possibly due to sepsis, also taking potassium sparing diuretic  Hold Aldactone, has been treated for hyperkalemia, recheck potassium pending  5/16/2023-potassium normalized with fluids, likely elevated due to sepsis and use of potassium sparing diuretic.  Continue to hold Aldactone    Hyponatremia  Sodium mildly low at 126  IV fluids with saline overnight, recheck tomorrow    Hypothyroidism  Being replaced with oral Synthroid  Continue home dosing    Thrombophilia (H)  Elevated platelets at 682,000, possibly related to sepsis  Recheck tomorrow    Hx of gastric bypass  History of gastric BiPAP some years ago  CT imaging concerning for some inflammatory changes in the pyloric area.  We will start on PPI, at some point benefit with endoscopy to evaluate the area    Atrial  "fibrillation with RVR (H)  Episode during the night of tachycardia with EKG showing atrial fibrillation with RVR  No previous history of such, she was asymptomatic  Resolved after 3 doses of IV metoprolol, currently in sinus rhythm  Likely due to electrolyte abnormalities which have now resolved  Continue on cardiac telemetry for now, no further work-up if remains in sinus rhythm           Diet: Combination Diet Regular Diet Adult    DVT Prophylaxis: Enoxaparin (Lovenox) SQ  Rubio Catheter: Not present  Lines: None     Cardiac Monitoring: ACTIVE order. Indication: Electrolyte Imbalance (24 hours)- Magnesium <1.3 mg/ml; Potassium < =2.8 or > 5.5 mg/ml  Code Status: Full Code      Clinically Significant Risk Factors Present on Admission        # Hyperkalemia: Highest K = 6.2 mmol/L in last 2 days, will monitor as appropriate  # Hyponatremia: Lowest Na = 126 mmol/L in last 2 days, will monitor as appropriate   # Hypercalcemia: Highest Ca = 11.9 mg/dL in last 2 days, will monitor as appropriate        # Hypertension: Noted on problem list      # Obesity: Estimated body mass index is 35.19 kg/m  as calculated from the following:    Height as of this encounter: 1.626 m (5' 4\").    Weight as of this encounter: 93 kg (205 lb).            Disposition Plan      Expected Discharge Date: 05/17/2023                  Sahil Pierce MD  Hospitalist Service  [facility]  Securely message with [employer] (more info)  Text page via [facility] Paging/Directory   ______________________________________________________________________    Interval History   Episode of atrial fibrillation with RVR overnight which resolved after metoprolol.  She had no symptoms with it, no previous history of such.  Otherwise feeling better, denies abdominal pain.  Eating normally.    Physical Exam   Vital Signs: Temp: 98.7  F (37.1  C) Temp src: Tympanic BP: 127/70 Pulse: 68   Resp: 18 SpO2: 97 % O2 Device: None (Room air)    Weight: 205 " lbs 0 oz    Constitutional: awake, alert, cooperative, no apparent distress, and appears stated age  Respiratory: No increased work of breathing, good air exchange, clear to auscultation bilaterally, no crackles or wheezing  Cardiovascular: regular rate and rhythm  GI: normal bowel sounds, soft, non-distended and non-tender    Medical Decision Making       45 MINUTES SPENT BY ME on the date of service doing chart review, history, exam, documentation & further activities per the note.      Data     I have personally reviewed the following data over the past 24 hrs:    13.1 (H)  \   9.7 (L)   / 429     126 (L) 93 (L) 14.7 /  144 (H)   4.5 23 0.77 \       ALT: 9 (L) AST: 14 AP: 103 TBILI: 0.4   ALB: 4.6 TOT PROTEIN: 8.3 LIPASE: 29       Trop: 32 (H) BNP: N/A       TSH: 4.27 (H) T4: 1.31 A1C: N/A       Procal: 0.10 (H) CRP: 33.18 (H) Lactic Acid: 1.4         Imaging results reviewed over the past 24 hrs:   Recent Results (from the past 24 hour(s))   XR Abdomen 2 Views    Narrative    XR ABDOMEN 2 VIEWS   5/15/2023 4:51 PM    History:Female,age  72 years, r/o obstruction; constipation and some  discomfort for 6 weeks.; Abdominal pain, left upper quadrant;  Abdominal pain, right upper quadrant; Nausea without vomiting    Comparison: No relevant prior imaging.    FINDINGS: Two views are submitted.     Moderate to large volume of stool is seen within the colon. There is  no evidence of free air or evidence of obstruction.      Impression    IMPRESSION:  Moderate to large volume of stool, no acute abnormality. No evidence  of free air or obstruction.    KIRILL EPPERSON MD         SYSTEM ID:  [id number]   CT Abdomen Pelvis w Contrast    Narrative    CT ABDOMEN PELVIS W CONTRAST    CLINICAL HISTORY: Female, age 72 years,  Generalized abdominal pain,  positive UTI, sepsis criteria;    Comparison:  No relevant prior imaging.    TECHNIQUE:  CT was performed of the abdomen and pelvis with IV  contrast. Axial; sagittal and coronal  reconstructed images were  reviewed.     FINDINGS:  The lung bases and visualized portions of the heart are unremarkable    Stomach and duodenum: Postoperative changes. There are inflammatory  changes and wall thickening involving the pylorus and proximal  duodenum. No apparent perforation.    Liver: Unremarkable.    Gallbladder: Unremarkable.    Spleen: Calcified granulomata. No acute abnormality.    Pancreas: Unremarkable.    Adrenal glands: Unremarkable    Kidneys: No acute abnormality. 4 cm cortical cyst and small a 1.5 cm  cortical cyst of the right kidney. Proximal ureters: Unremarkable.    Urinary bladder: Air-fluid level.    Large and small bowel: Moderate volume of stool in the proximal colon.  No distinct evidence of acute abnormality.     The abdominal aorta and inferior vena cava demonstrate no acute  abnormality. There is no evidence of pathologic lymph node enlargement  within the retroperitoneum. The uterus is atrophic. Graph small  fat-containing umbilical hernia is present without acute abnormality.    Bony structures: No acute abnormality. Moderate to severe degenerative  changes are seen within the lumbar spine.      Impression    IMPRESSION:   Inflammatory changes involving the pylorus concerning for ulceration  versus an infected diverticulum. Differential diagnosis includes  localized gastritis and neoplasm.     Numerous nonacute findings as described above.    This facility minimizes radiation dose by adjusting the mA and/or kV  according to each patient size.    This CT scan was performed using one or more the following dose  reduction techniques:    -Automated exposure control,  -Adjustment of the mA and/or kV according to patient's size, and/or,  -Use of iterative reconstruction technique.    KIRILL EPPERSON MD         SYSTEM ID:  [id number]

## 2023-05-16 NOTE — H&P
[facility]    History and Physical - Hospitalist Service       Date of Admission:  5/15/2023    Assessment & Plan      Ness Bales is a 72 year old female admitted on 5/15/2023. She presents to rapid clinic and then to the emergency department with upper abdominal discomfort that she has had over the past several days.  Has been associated with fatigue nausea and having problems with constipation.  In the rapid clinic she met criteria for sepsis with low-grade temperature, tachycardia with white count elevated 19,100 and UA concerning for UTI with large L ET and 162 white cells.  She was transferred to the ER with CT imaging not showing any signs of kidney problems specifically no signs of obstruction.  She has received fluids for sepsis, lactate was elevated 3.5.  She has been started on IV Rocephin with cultures of blood and urine pending.  She will be admitted for treatment of UTI with sepsis.  Of concern also has an elevated potassium of 6.2, currently receiving treatment.    Bacterial sepsis (H)  Presenting with abdominal pain associated with low-grade temperature, tachycardia with lab work concerning for white count of 19,000, elevated lactate at 3.5 with urine positive for probable infection  Cultures of blood and urine are pending.  Has been empirically started on treatment for UTI with Rocephin  IV fluids at 30 mL/kg have been administered, plan to recheck lactate  Admit inpatient status, continue IV antibiotics      Acute cystitis without hematuria  Asymptomatic presenting with sepsis  Cultures of urine pending  IV Rocephin started    Essential hypertension, benign  Taking losartan, metoprolol, Norvasc and Aldactone at home  Due to hyperkalemia, will hold Aldactone  Follow pressures, continue home meds    Hypercalcemia  Elevated calcium, possibly due to sepsis and being dry  IV fluids, recheck in a.m.    Hyperkalemia  Potassium elevated at 6.2  Possibly due to sepsis, also taking  "potassium sparing diuretic  Hold Aldactone, has been treated for hyperkalemia, recheck potassium pending    Hyponatremia  Sodium mildly low at 126  IV fluids with saline overnight, recheck tomorrow    Hypothyroidism  Being replaced with oral Synthroid  Continue home dosing    Thrombophilia (H)  Elevated platelets at 682,000, possibly related to sepsis  Recheck tomorrow    Hx of gastric bypass  History of gastric BiPAP some years ago  CT imaging concerning for some inflammatory changes in the pyloric area.  We will start on PPI, at some point benefit with endoscopy to evaluate the area         Diet:  Regular  DVT Prophylaxis: Enoxaparin (Lovenox) SQ  Rubio Catheter: Not present  Lines: None     Cardiac Monitoring: None  Code Status:  Full code    Clinically Significant Risk Factors Present on Admission        # Hyperkalemia: Highest K = 6.2 mmol/L in last 2 days, will monitor as appropriate  # Hyponatremia: Lowest Na = 126 mmol/L in last 2 days, will monitor as appropriate   # Hypercalcemia: Highest Ca = 11.9 mg/dL in last 2 days, will monitor as appropriate        # Hypertension: Noted on problem list      # Obesity: Estimated body mass index is 35.19 kg/m  as calculated from the following:    Height as of this encounter: 1.626 m (5' 4\").    Weight as of this encounter: 93 kg (205 lb).            Disposition Plan      Expected Discharge Date: 05/17/2023                  Sahil Pierce MD  Hospitalist Service  [facility]  Securely message with [employer] (more info)  Text page via [employer] Paging/Directory     ______________________________________________________________________    Chief Complaint   72-year-old female presenting with abdominal pain for several days    History is obtained from the patient, electronic health record and emergency department physician    History of Present Illness   Ness Bales is a 72 year old female who presented to the ER with abdominal pain that she has had " for several days.  To the epigastric region without radiation associate with nausea and some fatigue.  She been having problem for with constipation over the past 5 or 6 weeks taking laxative pretty much every day.  She denies any vomiting, there is been no blood in stool.  She denies any urinary symptoms to the no burning or dysuria.  Past history significant for gastric bypass many years ago.  Takes medication for hypertension, depression and hypothyroidism.      Past Medical History    Past Medical History:   Diagnosis Date     Benign essential hypertension      Depression      Hyperaldosteronism (H)      Hypothyroidism      Obesity     No Comments Provided     Personal history of other medical treatment (CODE)     3 vaginal childbirth uncomplicated     Personal history of other medical treatment (CODE)     1983,blood transfusion with stomach stapling     Vitamin B12 deficiency (non anemic)        Past Surgical History   Past Surgical History:   Procedure Laterality Date     ARTHROPLASTY KNEE Right 2008     BIOPSY Bilateral 10/18/2021    Adrenal Vein; Hyperaldosteronism     BIOPSY BREAST      Incisional x2 benign breast biopsies     FRACTURE SURGERY Left     open treatment of Humeral Fx w/ prosthesis     INJECT BOTOX N/A 09/22/2020    [facility]; [facility] ([place], [place]), Dr. Joe Cruz     LAPAROSCOPIC BYPASS GASTRIC      1983,Stomach stapling complicated by incision rupture, then repaired wih second surgery     OTHER SURGICAL HISTORY      1983,51260.0,SKIN/SUBCUTANEOUS SURGERY,repaired incision from stomach stapling     OTHER SURGICAL HISTORY      [id number],ANESTHESIA ALERT,No problems with anaesthesia. ?       Prior to Admission Medications   Prior to Admission Medications   Prescriptions Last Dose Informant Patient Reported? Taking?   ARIPiprazole (ABILIFY) 5 MG tablet   Yes No   Sig: Take 1 tablet (5 mg) by mouth daily   Coenzyme Q10 (CO Q 10) 100 MG CAPS   Yes No   Sig: Take by mouth daily    DULoxetine  (CYMBALTA) 30 MG capsule   Yes No   Sig: Take 30 mg by mouth daily   Syringe Luer Slip 3 ML MISC   No No   Si each every 30 days   acetaminophen (TYLENOL) 325 MG tablet   Yes No   Sig: TAKE 2 TABLETS BY MOUTH FOUR TIMES A DAY. LIMIT ACETAMINOPHEN TO 4000 MG PER DAY FROM ALL SOURCES.   alendronate (FOSAMAX) 70 MG tablet   Yes No   Sig: Take 1 tablet (70 mg) by mouth every 7 days --not taking.   Patient not taking: Reported on 5/15/2023   amLODIPine (NORVASC) 5 MG tablet   No No   Sig: TAKE 1 TABLET BY MOUTH EVERY DAY   cholecalciferol (VITAMIN D-1000 MAX ST) 25 MCG (1000 UT) TABS   Yes No   Sig: Take 10,000 Units by mouth daily   cyanocobalamin (CYANOCOBALAMIN) 1000 MCG/ML injection   No No   Sig: INJECT 1 ML (1,000 MCG) INTO THE MUSCLE EVERY 30 DAYS   ferrous sulfate (FEROSUL) 325 (65 Fe) MG tablet   Yes No   Sig: Take 1 tablet (325 mg) by mouth daily (with breakfast)   fish oil-omega-3 fatty acids 1000 MG capsule   Yes No   Sig: Take 1 capsule (1 g) by mouth daily   levothyroxine (SYNTHROID/LEVOTHROID) 25 MCG tablet   No No   Sig: TAKE 1 TABLET BY MOUTH EVERY DAY   losartan (COZAAR) 100 MG tablet   No No   Sig: TAKE 1 TABLET BY MOUTH EVERY DAY   metoprolol succinate ER (TOPROL XL) 100 MG 24 hr tablet   No No   Sig: TAKE 2 TABLETS BY MOUTH EVERY DAY   modafinil (PROVIGIL) 200 MG tablet   Yes No   Sig: Take 2 tablets (400 mg) by mouth daily   phentermine (ADIPEX-P) 37.5 MG tablet   Yes No   Sig: Take 37.5 mg by mouth every morning (before breakfast)   spironolactone (ALDACTONE) 50 MG tablet   Yes No   Sig: Take 1 tablet by mouth 2 times daily   topiramate (TOPAMAX) 25 MG tablet   No No   Sig: TAKE 1 TABLET BY MOUTH TWO TIMES A DAY. DO NOT CRUSH.   Patient not taking: Reported on 5/15/2023      Facility-Administered Medications: None           Physical Exam   Vital Signs: Temp: 98.2  F (36.8  C) Temp src: Tympanic BP: (!) 179/105 Pulse: 96   Resp: 16 SpO2: 98 % O2 Device: None (Room air)    Weight: 205 lbs 0  oz    General Appearance: Appropriate age woman, lying on the gurney in no obvious distress.  Eyes: Pupils are equal and reactive, full range of motion  HEENT: Normocephalic, nose mouth throat normal  Respiratory: Lungs are clear  Cardiovascular: Regular rate and rhythm, no murmur heard  GI: Soft, some mild tenderness in the epigastric region without guarding or rebound.  No masses  Lymph/Hematologic: Cervical nodes negative  Genitourinary: Not examined  Skin: No lesions, rashes or bruising  Musculoskeletal: Moving arms legs out difficulty  Neurologic: No focal deficits, cranial nerves normal  Psychiatric: Mood and affect normal    Medical Decision Making       60 MINUTES SPENT BY ME on the date of service doing chart review, history, exam, documentation & further activities per the note.      Data     I have personally reviewed the following data over the past 24 hrs:    19.1 (H)  \   12.1   / 682 (H)     126 (L) 87 (L) 16.7 /  152 (H)   6.2 (HH) 25 0.96 (H) \       ALT: 9 (L) AST: 14 AP: 103 TBILI: 0.4   ALB: 4.6 TOT PROTEIN: 8.3 LIPASE: 29       Trop: 31 (H) BNP: N/A       TSH: 4.27 (H) T4: N/A A1C: N/A       Procal: 0.10 (H) CRP: 33.18 (H) Lactic Acid: 3.5 (H)         Imaging results reviewed over the past 24 hrs:   Recent Results (from the past 24 hour(s))   XR Abdomen 2 Views    Narrative    XR ABDOMEN 2 VIEWS   5/15/2023 4:51 PM    History:Female,age  72 years, r/o obstruction; constipation and some  discomfort for 6 weeks.; Abdominal pain, left upper quadrant;  Abdominal pain, right upper quadrant; Nausea without vomiting    Comparison: No relevant prior imaging.    FINDINGS: Two views are submitted.     Moderate to large volume of stool is seen within the colon. There is  no evidence of free air or evidence of obstruction.      Impression    IMPRESSION:  Moderate to large volume of stool, no acute abnormality. No evidence  of free air or obstruction.    KIRILL EPPERSON MD         SYSTEM ID:  [id number]   CT  Abdomen Pelvis w Contrast    Narrative    CT ABDOMEN PELVIS W CONTRAST    CLINICAL HISTORY: Female, age 72 years,  Generalized abdominal pain,  positive UTI, sepsis criteria;    Comparison:  No relevant prior imaging.    TECHNIQUE:  CT was performed of the abdomen and pelvis with IV  contrast. Axial; sagittal and coronal reconstructed images were  reviewed.     FINDINGS:  The lung bases and visualized portions of the heart are unremarkable    Stomach and duodenum: Postoperative changes. There are inflammatory  changes and wall thickening involving the pylorus and proximal  duodenum. No apparent perforation.    Liver: Unremarkable.    Gallbladder: Unremarkable.    Spleen: Calcified granulomata. No acute abnormality.    Pancreas: Unremarkable.    Adrenal glands: Unremarkable    Kidneys: No acute abnormality. 4 cm cortical cyst and small a 1.5 cm  cortical cyst of the right kidney. Proximal ureters: Unremarkable.    Urinary bladder: Air-fluid level.    Large and small bowel: Moderate volume of stool in the proximal colon.  No distinct evidence of acute abnormality.     The abdominal aorta and inferior vena cava demonstrate no acute  abnormality. There is no evidence of pathologic lymph node enlargement  within the retroperitoneum. The uterus is atrophic. Graph small  fat-containing umbilical hernia is present without acute abnormality.    Bony structures: No acute abnormality. Moderate to severe degenerative  changes are seen within the lumbar spine.      Impression    IMPRESSION:   Inflammatory changes involving the pylorus concerning for ulceration  versus an infected diverticulum. Differential diagnosis includes  localized gastritis and neoplasm.     Numerous nonacute findings as described above.    This facility minimizes radiation dose by adjusting the mA and/or kV  according to each patient size.    This CT scan was performed using one or more the following dose  reduction techniques:    -Automated exposure  control,  -Adjustment of the mA and/or kV according to patient's size, and/or,  -Use of iterative reconstruction technique.    KIRILL EPPERSON MD         SYSTEM ID:  [id number]

## 2023-05-16 NOTE — ASSESSMENT & PLAN NOTE
Potassium elevated at 6.2  Possibly due to sepsis, also taking potassium sparing diuretic  Hold Aldactone, has been treated for hyperkalemia, recheck potassium pending  5/16/2023-potassium normalized with fluids, likely elevated due to sepsis and use of potassium sparing diuretic.  Continue to hold Aldactone

## 2023-05-16 NOTE — ASSESSMENT & PLAN NOTE
History of gastric BiPAP some years ago  CT imaging concerning for some inflammatory changes in the pyloric area.  We will start on PPI, at some point benefit with endoscopy to evaluate the area

## 2023-05-16 NOTE — PROVIDER NOTIFICATION
MD Pierce updated with results of EKG, lactic 2.2. recheck lactic in AM and continue with IVF running at 100 mls ns, will continue to monitor.

## 2023-05-16 NOTE — PHARMACY-ADMISSION MEDICATION HISTORY
Pharmacist Admission Medication History    Admission medication history is complete. The information provided in this note is only as accurate as the sources available at the time of the update.    Medication reconciliation/reorder completed by provider prior to medication history? Yes    Information Source(s): Patient via in-person, sure scripts, chart review    Pertinent Information: NA    Changes made to PTA medication list:    Added: aleve, ibuprofen (patient alternates between the two), directions to CoQ10    Deleted: phentermine, topiramate (was using for weight loss)    Changed: acetaminophen to prn, spironolactone to once daily    Medication Affordability:       Allergies reviewed with patient and updates made in EHR: yes    Medication History Completed By: Catrachita Cyr RPH 5/16/2023 9:14 AM    Prior to Admission medications    Medication Sig Last Dose Taking? Auth Provider Long Term End Date   acetaminophen (TYLENOL) 325 MG tablet Take 325-650 mg by mouth every 6 hours as needed for mild pain Past Month Yes Unknown, Entered By History     cyanocobalamin (CYANOCOBALAMIN) 1000 MCG/ML injection INJECT 1 ML (1,000 MCG) INTO THE MUSCLE EVERY 30 DAYS Past Month Yes Susannah Clarke MD     ibuprofen (ADVIL/MOTRIN) 200 MG tablet Take 400 mg by mouth every 6 hours as needed for pain Alternates with Aleve Past Week Yes Unknown, Entered By History     naproxen sodium 220 MG capsule Take 220 mg by mouth 2 times daily as needed (pain) Past Week Yes Unknown, Entered By History     alendronate (FOSAMAX) 70 MG tablet Take 70 mg by mouth every 7 days hasn't started  Miah Black MD Yes    amLODIPine (NORVASC) 5 MG tablet TAKE 1 TABLET BY MOUTH EVERY DAY 5/13/2023 at AM  Miah Black MD Yes    ARIPiprazole (ABILIFY) 5 MG tablet Take 1 tablet (5 mg) by mouth daily 5/13/2023 at AM  Jennifer Maurice DO Yes    cholecalciferol (VITAMIN D-1000 MAX ST) 25 MCG (1000 UT) TABS Take 10,000 Units by mouth daily  5/13/2023 at AM  Jennifer Maurice, DO     Coenzyme Q10 (CO Q 10) 100 MG CAPS Take 1 capsule by mouth daily 5/13/2023 at AM  Jennifer Maurice DO     DULoxetine (CYMBALTA) 30 MG capsule Take 30 mg by mouth daily 5/13/2023 at AM  Reported, Patient Yes    ferrous sulfate (FEROSUL) 325 (65 Fe) MG tablet Take 1 tablet (325 mg) by mouth daily (with breakfast) 5/13/2023 at AM  Miah Black MD     fish oil-omega-3 fatty acids 1000 MG capsule Take 1 capsule (1 g) by mouth daily 5/13/2023 at AM  Miah Black MD     levothyroxine (SYNTHROID/LEVOTHROID) 25 MCG tablet TAKE 1 TABLET BY MOUTH EVERY DAY 5/13/2023 at AM  Jennifer Maurice, DO Yes    losartan (COZAAR) 100 MG tablet TAKE 1 TABLET BY MOUTH EVERY DAY 5/13/2023 at AM  Jennifer Maurice, DO Yes    metoprolol succinate ER (TOPROL XL) 100 MG 24 hr tablet TAKE 2 TABLETS BY MOUTH EVERY DAY 5/13/2023 at AM  Miah Black MD Yes    modafinil (PROVIGIL) 200 MG tablet Take 2 tablets (400 mg) by mouth daily 5/13/2023 at AM  Jennifer Maurice DO     spironolactone (ALDACTONE) 50 MG tablet Take 1 tablet by mouth daily 5/13/2023 at AM  Reported, Patient Yes    Syringe Luer Slip 3 ML MISC 1 each every 30 days Jessica Garcia, NP

## 2023-05-16 NOTE — PLAN OF CARE
Goal Outcome Evaluation:      Plan of Care Reviewed With: patient    Overall Patient Progress: improving    Patient voiding without difficulty.  Has denied any pain or discomfort.  No reports from telemetry this shift.  Blood pressures have remained stable with no use of PRNs.  Patient is alert, oriented with steady gait.  VSS, afebrile.

## 2023-05-17 LAB
ANION GAP SERPL CALCULATED.3IONS-SCNC: 9 MMOL/L (ref 7–15)
BACTERIA UR CULT: ABNORMAL
BUN SERPL-MCNC: 17.2 MG/DL (ref 8–23)
CALCIUM SERPL-MCNC: 9.7 MG/DL (ref 8.8–10.2)
CHLORIDE SERPL-SCNC: 97 MMOL/L (ref 98–107)
CREAT SERPL-MCNC: 0.85 MG/DL (ref 0.51–0.95)
DEPRECATED HCO3 PLAS-SCNC: 22 MMOL/L (ref 22–29)
ERYTHROCYTE [DISTWIDTH] IN BLOOD BY AUTOMATED COUNT: 13.9 % (ref 10–15)
GFR SERPL CREATININE-BSD FRML MDRD: 73 ML/MIN/1.73M2
GLUCOSE SERPL-MCNC: 89 MG/DL (ref 70–99)
HCT VFR BLD AUTO: 29.5 % (ref 35–47)
HGB BLD-MCNC: 9.4 G/DL (ref 11.7–15.7)
MCH RBC QN AUTO: 25.8 PG (ref 26.5–33)
MCHC RBC AUTO-ENTMCNC: 31.9 G/DL (ref 31.5–36.5)
MCV RBC AUTO: 81 FL (ref 78–100)
PLATELET # BLD AUTO: 376 10E3/UL (ref 150–450)
POTASSIUM SERPL-SCNC: 4.4 MMOL/L (ref 3.4–5.3)
RBC # BLD AUTO: 3.65 10E6/UL (ref 3.8–5.2)
SODIUM SERPL-SCNC: 128 MMOL/L (ref 136–145)
WBC # BLD AUTO: 8.9 10E3/UL (ref 4–11)

## 2023-05-17 PROCEDURE — 85027 COMPLETE CBC AUTOMATED: CPT | Performed by: FAMILY MEDICINE

## 2023-05-17 PROCEDURE — 36415 COLL VENOUS BLD VENIPUNCTURE: CPT | Performed by: FAMILY MEDICINE

## 2023-05-17 PROCEDURE — 80048 BASIC METABOLIC PNL TOTAL CA: CPT | Performed by: FAMILY MEDICINE

## 2023-05-17 PROCEDURE — 250N000011 HC RX IP 250 OP 636: Performed by: FAMILY MEDICINE

## 2023-05-17 PROCEDURE — 120N000001 HC R&B MED SURG/OB

## 2023-05-17 PROCEDURE — 99232 SBSQ HOSP IP/OBS MODERATE 35: CPT | Performed by: FAMILY MEDICINE

## 2023-05-17 PROCEDURE — 250N000013 HC RX MED GY IP 250 OP 250 PS 637: Performed by: FAMILY MEDICINE

## 2023-05-17 RX ORDER — CEFTRIAXONE SODIUM 2 G/50ML
2 INJECTION, SOLUTION INTRAVENOUS EVERY 24 HOURS
Status: COMPLETED | OUTPATIENT
Start: 2023-05-17 | End: 2023-05-17

## 2023-05-17 RX ORDER — CEFUROXIME AXETIL 250 MG/1
500 TABLET ORAL EVERY 12 HOURS SCHEDULED
Status: DISCONTINUED | OUTPATIENT
Start: 2023-05-18 | End: 2023-05-18 | Stop reason: HOSPADM

## 2023-05-17 RX ADMIN — PANTOPRAZOLE SODIUM 40 MG: 40 TABLET, DELAYED RELEASE ORAL at 06:54

## 2023-05-17 RX ADMIN — LOSARTAN POTASSIUM 100 MG: 50 TABLET, FILM COATED ORAL at 09:05

## 2023-05-17 RX ADMIN — CEFTRIAXONE SODIUM 2 G: 2 INJECTION, SOLUTION INTRAVENOUS at 20:13

## 2023-05-17 RX ADMIN — AMLODIPINE BESYLATE 5 MG: 5 TABLET ORAL at 09:05

## 2023-05-17 RX ADMIN — LEVOTHYROXINE SODIUM 25 MCG: 0.03 TABLET ORAL at 09:05

## 2023-05-17 RX ADMIN — DOCUSATE SODIUM 50 MG AND SENNOSIDES 8.6 MG 2 TABLET: 8.6; 5 TABLET, FILM COATED ORAL at 20:13

## 2023-05-17 RX ADMIN — ARIPIPRAZOLE 5 MG: 5 TABLET ORAL at 09:05

## 2023-05-17 RX ADMIN — METOPROLOL SUCCINATE 200 MG: 100 TABLET, EXTENDED RELEASE ORAL at 09:05

## 2023-05-17 RX ADMIN — ENOXAPARIN SODIUM 40 MG: 40 INJECTION SUBCUTANEOUS at 21:20

## 2023-05-17 RX ADMIN — DULOXETINE HYDROCHLORIDE 30 MG: 30 CAPSULE, DELAYED RELEASE ORAL at 09:05

## 2023-05-17 ASSESSMENT — ACTIVITIES OF DAILY LIVING (ADL)
ADLS_ACUITY_SCORE: 23

## 2023-05-17 NOTE — PLAN OF CARE
Goal Outcome Evaluation:      Plan of Care Reviewed With: patient    Overall Patient Progress: improving    Patient has denied any pain or discomfort this shift.  Voiding without difficulty.  Alert, oriented and ambulating with steady gait.  VSS, afebrile.

## 2023-05-17 NOTE — PROGRESS NOTES
[facility]    Medicine Progress Note - Hospitalist Service    Date of Admission:  5/15/2023    Assessment & Plan   Bacterial sepsis (H)  Presenting with abdominal pain associated with low-grade temperature, tachycardia with lab work concerning for white count of 19,000, elevated lactate at 3.5 with urine positive for probable infection  Cultures of blood and urine are pending.  Has been empirically started on treatment for UTI with Rocephin  5/16/2023-lactate normalized with fluids, clinically improving with improving white count.  Continue antibiotics cultures pending with urine culture initially positive for gram-negative rods  5/17/2023-Sepsis sxs have resolved, UC pos for e.coli, sens to rocephin, continue to monitor      Acute cystitis without hematuria  Asymptomatic presenting with sepsis  Urine culture positive for gram-negative rods, continue on IV Rocephin pending ID and sensitivities  5/17/2023-UC pos for e.coli, rocephin for now, switch to oral ceftin tomorrow    Essential hypertension, benign  Taking losartan, metoprolol, Norvasc and Aldactone at home  Due to hyperkalemia, will hold Aldactone  Follow pressures, continue home meds    Hypercalcemia  Elevated calcium, possibly due to sepsis and being dry  Calcium normalized with fluids    Hyperkalemia  Potassium elevated at 6.2  Possibly due to sepsis, also taking potassium sparing diuretic  Hold Aldactone, has been treated for hyperkalemia, recheck potassium pending  5/16/2023-potassium normalized with fluids, likely elevated due to sepsis and use of potassium sparing diuretic.  Continue to hold Aldactone    Hyponatremia  Sodium mildly low at 126  IV fluids with saline overnight, recheck tomorrow    Hypothyroidism  Being replaced with oral Synthroid  Continue home dosing    Thrombophilia (H)  Elevated platelets at 682,000, possibly related to sepsis  Recheck tomorrow    Hx of gastric bypass  History of gastric BiPAP some years ago  CT  "imaging concerning for some inflammatory changes in the pyloric area.  We will start on PPI, at some point benefit with endoscopy to evaluate the area    Atrial fibrillation with RVR (H)  Episode during the night of tachycardia with EKG showing atrial fibrillation with RVR  No previous history of such, she was asymptomatic  Resolved after 3 doses of IV metoprolol, currently in sinus rhythm  Likely due to electrolyte abnormalities which have now resolved  Continue on cardiac telemetry for now, no further work-up if remains in sinus rhythm           Diet: Combination Diet Regular Diet Adult    DVT Prophylaxis: Enoxaparin (Lovenox) SQ  Rubio Catheter: Not present  Lines: None     Cardiac Monitoring: None  Code Status: Full Code      Clinically Significant Risk Factors        # Hyperkalemia: Highest K = 6.2 mmol/L in last 2 days, will monitor as appropriate  # Hyponatremia: Lowest Na = 126 mmol/L in last 2 days, will monitor as appropriate   # Hypercalcemia: Highest Ca = 11.9 mg/dL in last 2 days, will monitor as appropriate        # Hypertension: Noted on problem list        # Obesity: Estimated body mass index is 35.19 kg/m  as calculated from the following:    Height as of this encounter: 1.626 m (5' 4\").    Weight as of this encounter: 93 kg (205 lb)., PRESENT ON ADMISSION          Disposition Plan     Expected Discharge Date: 05/17/2023      Destination: home            Sahil Earl Pierce MD  Hospitalist Service  [facility]  Securely message with [employer] (more info)  Text page via [facility] Paging/Directory   ______________________________________________________________________    Interval History   Feeling much better, eating well, abdominal pain has resolved. Taking protonix, will continue at discharge, consider EGD as outpatient, would also would like colonoscopy    Physical Exam   Vital Signs: Temp: 97.6  F (36.4  C) Temp src: Tympanic BP: 107/69 Pulse: 73   Resp: 18 SpO2: 99 % O2 Device: " None (Room air)    Weight: 205 lbs 0 oz    Constitutional: awake, alert, cooperative, no apparent distress, and appears stated age  Respiratory: No increased work of breathing, good air exchange, clear to auscultation bilaterally, no crackles or wheezing  Cardiovascular: regular rate and rhythm  GI: normal bowel sounds, soft and non-distended    Medical Decision Making       45 MINUTES SPENT BY ME on the date of service doing chart review, history, exam, documentation & further activities per the note.      Data     I have personally reviewed the following data over the past 24 hrs:    8.9  \   9.4 (L)   / 376     128 (L) 97 (L) 17.2 /  89   4.4 22 0.85 \       Imaging results reviewed over the past 24 hrs:   No results found for this or any previous visit (from the past 24 hour(s)).

## 2023-05-17 NOTE — PROGRESS NOTES
SAFETY CHECKLIST  ID Bands and Risk clasps correct and in place (DNR, Fall risk, Allergy, Latex, Limb):  Yes  All Lines Reconciled and labeled correctly: Yes  Whiteboard updated:Yes  Environmental interventions: Yes  Verify Tele #: [id number]

## 2023-05-17 NOTE — PLAN OF CARE
Patient slept through the night. Denies pain. Voiding without difficulty. Output is cloudy yellow. Abdomen is soft and non-tender. BS audible. Patient denies nausea. Afebrile. VSS.

## 2023-05-18 VITALS
SYSTOLIC BLOOD PRESSURE: 134 MMHG | BODY MASS INDEX: 35 KG/M2 | TEMPERATURE: 98 F | HEIGHT: 64 IN | DIASTOLIC BLOOD PRESSURE: 83 MMHG | HEART RATE: 65 BPM | RESPIRATION RATE: 18 BRPM | OXYGEN SATURATION: 99 % | WEIGHT: 205 LBS

## 2023-05-18 LAB
HOLD SPECIMEN: NORMAL
PLATELET # BLD AUTO: 332 10E3/UL (ref 150–450)

## 2023-05-18 PROCEDURE — 250N000013 HC RX MED GY IP 250 OP 250 PS 637: Performed by: FAMILY MEDICINE

## 2023-05-18 PROCEDURE — 85049 AUTOMATED PLATELET COUNT: CPT | Performed by: FAMILY MEDICINE

## 2023-05-18 PROCEDURE — 36415 COLL VENOUS BLD VENIPUNCTURE: CPT | Performed by: FAMILY MEDICINE

## 2023-05-18 PROCEDURE — 99239 HOSP IP/OBS DSCHRG MGMT >30: CPT | Performed by: FAMILY MEDICINE

## 2023-05-18 RX ORDER — CEFUROXIME AXETIL 500 MG/1
500 TABLET ORAL EVERY 12 HOURS
Qty: 8 TABLET | Refills: 0 | Status: SHIPPED | OUTPATIENT
Start: 2023-05-18 | End: 2023-05-22

## 2023-05-18 RX ORDER — PANTOPRAZOLE SODIUM 40 MG/1
40 TABLET, DELAYED RELEASE ORAL
Qty: 30 TABLET | Refills: 0 | Status: SHIPPED | OUTPATIENT
Start: 2023-05-19 | End: 2023-06-14

## 2023-05-18 RX ADMIN — PANTOPRAZOLE SODIUM 40 MG: 40 TABLET, DELAYED RELEASE ORAL at 07:56

## 2023-05-18 RX ADMIN — METOPROLOL SUCCINATE 200 MG: 100 TABLET, EXTENDED RELEASE ORAL at 10:37

## 2023-05-18 RX ADMIN — ARIPIPRAZOLE 5 MG: 5 TABLET ORAL at 10:37

## 2023-05-18 RX ADMIN — AMLODIPINE BESYLATE 5 MG: 5 TABLET ORAL at 10:38

## 2023-05-18 RX ADMIN — LOSARTAN POTASSIUM 100 MG: 50 TABLET, FILM COATED ORAL at 10:37

## 2023-05-18 RX ADMIN — CEFUROXIME AXETIL 500 MG: 250 TABLET, FILM COATED ORAL at 10:37

## 2023-05-18 RX ADMIN — DULOXETINE HYDROCHLORIDE 30 MG: 30 CAPSULE, DELAYED RELEASE ORAL at 10:37

## 2023-05-18 RX ADMIN — LEVOTHYROXINE SODIUM 25 MCG: 0.03 TABLET ORAL at 10:37

## 2023-05-18 ASSESSMENT — ACTIVITIES OF DAILY LIVING (ADL)
ADLS_ACUITY_SCORE: 23

## 2023-05-18 NOTE — DISCHARGE SUMMARY
"[facility]  Hospitalist Discharge Summary      Date of Admission:  5/15/2023  Date of Discharge:  5/18/2023  Discharging Provider: Sahil Pierce MD  Discharge Service: Hospitalist Service    Discharge Diagnoses   Principal Problem:    Bacterial sepsis (H)  Active Problems:    Acute cystitis without hematuria    Essential hypertension, benign    Hypothyroidism    Hyperkalemia    Hyponatremia    Hypercalcemia    Thrombophilia (H)    Atrial fibrillation with RVR (H)    Hx of gastric bypass        Clinically Significant Risk Factors     # Obesity: Estimated body mass index is 35.19 kg/m  as calculated from the following:    Height as of this encounter: 1.626 m (5' 4\").    Weight as of this encounter: 93 kg (205 lb).       Follow-ups Needed After Discharge   Follow-up Appointments     Follow-up and recommended labs and tests       Follow up with primary care provider, Dr. Briones, within 7 days for   hospital follow- up.  The following labs/tests are recommended: With CT   imaging showing some issues with stomach, might benefit with OP EGD. Also   would like a colonoscopy.             Unresulted Labs Ordered in the Past 30 Days of this Admission     Date and Time Order Name Status Description    5/15/2023  8:27 PM PTH Related Peptide Test In process     5/15/2023  4:48 PM BLOOD CULTURE Preliminary     5/15/2023  4:48 PM BLOOD CULTURE Preliminary       These results will be followed up by Anali/Kaylene    Discharge Disposition   Discharged to home  Condition at discharge: Good    Hospital Course   Bacterial sepsis (H)  Presenting with abdominal pain associated with low-grade temperature, tachycardia with lab work concerning for white count of 19,000, elevated lactate at 3.5 with urine positive for probable infection  Cultures of blood and urine are pending.  Has been empirically started on treatment for UTI with Rocephin  5/16/2023-lactate normalized with fluids, clinically improving with " improving white count.  Continue antibiotics cultures pending with urine culture initially positive for gram-negative rods  5/17/2023-Sepsis sxs have resolved, UC pos for e.coli, sens to rocephin, continue to monitor  5/18/2023-clinically feeling back to normal.  Has taken 3 days of IV Rocephin, will switch to oral Ceftin at discharge, finish out 7-day course      Acute cystitis without hematuria  Asymptomatic presenting with sepsis  Urine culture positive for gram-negative rods, continue on IV Rocephin pending ID and sensitivities  5/17/2023-UC pos for e.coli, rocephin for now, switch to oral ceftin tomorrow  5/18/2023- at discharge we will continue oral Ceftin for a total of 7 days antibiotics    Essential hypertension, benign  Taking losartan, metoprolol, Norvasc and Aldactone at home  Due to hyperkalemia, will hold Aldactone  Follow pressures, continue home meds  5/18/2023-at discharge we will hold any diuretics, continue Norvasc and losartan and metoprolol    Hypercalcemia  Elevated calcium, possibly due to sepsis and being dry  Calcium normalized with fluids    Hyperkalemia  Potassium elevated at 6.2  Possibly due to sepsis, also taking potassium sparing diuretic  Hold Aldactone, has been treated for hyperkalemia, recheck potassium pending  5/16/2023-potassium normalized with fluids, likely elevated due to sepsis and use of potassium sparing diuretic.  Continue to hold Aldactone    Hyponatremia  Sodium mildly low at 126  IV fluids with saline overnight, recheck tomorrow    Hypothyroidism  Being replaced with oral Synthroid  Continue home dosing    Thrombophilia (H)  Elevated platelets at 682,000, possibly related to sepsis  Recheck tomorrow    Hx of gastric bypass  History of gastric BiPAP some years ago  CT imaging concerning for some inflammatory changes in the pyloric area.  We will start on PPI, at some point benefit with endoscopy to evaluate the area    Atrial fibrillation with RVR (H)  Episode during  the night of tachycardia with EKG showing atrial fibrillation with RVR  No previous history of such, she was asymptomatic  Resolved after 3 doses of IV metoprolol, currently in sinus rhythm  Likely due to electrolyte abnormalities which have now resolved  Continue on cardiac telemetry for now, no further work-up if remains in sinus rhythm  At discharge, no more episodes of tachycardia, no further treatment or evaluation        Consultations This Hospital Stay   None    Code Status   Full Code    Time Spent on this Encounter   I, Sahil Pierce MD, personally saw the patient today and spent greater than 30 minutes discharging this patient.       Sahil Pierce MD  [facility]  [address]  [place] [address]  Phone: [phone]  Fax: [phone]  ______________________________________________________________________    Physical Exam   Vital Signs: Temp: 98  F (36.7  C) Temp src: Tympanic BP: 134/83 Pulse: 65   Resp: 18 SpO2: 99 % O2 Device: None (Room air)    Weight: 205 lbs 0 oz  Constitutional: awake, alert, cooperative, no apparent distress, and appears stated age  Respiratory: No increased work of breathing, good air exchange, clear to auscultation bilaterally, no crackles or wheezing  Cardiovascular: regular rate and rhythm  GI: normal bowel sounds, soft and non-distended       Primary Care Physician   GELY CHAO    Discharge Orders      Reason for your hospital stay    Admitted with sepsis due to UTI     Follow-up and recommended labs and tests     Follow up with primary care provider, Dr. Briones, within 7 days for hospital follow- up.  The following labs/tests are recommended: With CT imaging showing some issues with stomach, might benefit with OP EGD. Also would like a colonoscopy.     Activity    Your activity upon discharge: activity as tolerated     Diet    Follow this diet upon discharge: Orders Placed This Encounter      Combination Diet Regular Diet  Adult       Significant Results and Procedures   Most Recent 3 CBC's:  Recent Labs   Lab Test 05/18/23  0536 05/17/23  0527 05/16/23  0617 05/15/23  1549   WBC  --  8.9 13.1* 19.1*   HGB  --  9.4* 9.7* 12.1   MCV  --  81 78 79    376 429 682*     Most Recent 3 BMP's:  Recent Labs   Lab Test 05/17/23  0527 05/16/23  0617 05/16/23  0304 05/15/23  2342 05/15/23  2146 05/15/23  2047 05/15/23  1549   * 126*  --   --   --   --  126*   POTASSIUM 4.4 4.5  --   --  3.7   < > 6.2*   CHLORIDE 97* 93*  --   --   --   --  87*   CO2 22 23  --   --   --   --  25   BUN 17.2 14.7  --   --   --   --  16.7   CR 0.85 0.77  --  0.84  --   --  0.96*   ANIONGAP 9 10  --   --   --   --  14   OPAL 9.7 9.7  --   --   --   --  11.9*   GLC 89 144* 140*  --   --   --  152*    < > = values in this interval not displayed.   ,   Results for orders placed or performed during the hospital encounter of 05/15/23   CT Abdomen Pelvis w Contrast    Narrative    CT ABDOMEN PELVIS W CONTRAST    CLINICAL HISTORY: Female, age 72 years,  Generalized abdominal pain,  positive UTI, sepsis criteria;    Comparison:  No relevant prior imaging.    TECHNIQUE:  CT was performed of the abdomen and pelvis with IV  contrast. Axial; sagittal and coronal reconstructed images were  reviewed.     FINDINGS:  The lung bases and visualized portions of the heart are unremarkable    Stomach and duodenum: Postoperative changes. There are inflammatory  changes and wall thickening involving the pylorus and proximal  duodenum. No apparent perforation.    Liver: Unremarkable.    Gallbladder: Unremarkable.    Spleen: Calcified granulomata. No acute abnormality.    Pancreas: Unremarkable.    Adrenal glands: Unremarkable    Kidneys: No acute abnormality. 4 cm cortical cyst and small a 1.5 cm  cortical cyst of the right kidney. Proximal ureters: Unremarkable.    Urinary bladder: Air-fluid level.    Large and small bowel: Moderate volume of stool in the proximal colon.  No  distinct evidence of acute abnormality.     The abdominal aorta and inferior vena cava demonstrate no acute  abnormality. There is no evidence of pathologic lymph node enlargement  within the retroperitoneum. The uterus is atrophic. Graph small  fat-containing umbilical hernia is present without acute abnormality.    Bony structures: No acute abnormality. Moderate to severe degenerative  changes are seen within the lumbar spine.      Impression    IMPRESSION:   Inflammatory changes involving the pylorus concerning for ulceration  versus an infected diverticulum. Differential diagnosis includes  localized gastritis and neoplasm.     Numerous nonacute findings as described above.    This facility minimizes radiation dose by adjusting the mA and/or kV  according to each patient size.    This CT scan was performed using one or more the following dose  reduction techniques:    -Automated exposure control,  -Adjustment of the mA and/or kV according to patient's size, and/or,  -Use of iterative reconstruction technique.    KIRILL EPPERSON MD         SYSTEM ID:  [id number]       Discharge Medications   Current Discharge Medication List      START taking these medications    Details   cefuroxime (CEFTIN) 500 MG tablet Take 1 tablet (500 mg) by mouth every 12 hours for 4 days  Qty: 8 tablet, Refills: 0    Associated Diagnoses: Bacterial sepsis (H); Acute cystitis without hematuria      pantoprazole (PROTONIX) 40 MG EC tablet Take 1 tablet (40 mg) by mouth every morning (before breakfast)  Qty: 30 tablet, Refills: 0    Associated Diagnoses: Hx of gastric bypass         CONTINUE these medications which have NOT CHANGED    Details   acetaminophen (TYLENOL) 325 MG tablet Take 325-650 mg by mouth every 6 hours as needed for mild pain      cyanocobalamin (CYANOCOBALAMIN) 1000 MCG/ML injection INJECT 1 ML (1,000 MCG) INTO THE MUSCLE EVERY 30 DAYS  Qty: 3 mL, Refills: 4    Associated Diagnoses: S/P gastric bypass      alendronate  (FOSAMAX) 70 MG tablet Take 70 mg by mouth every 7 days  Qty: 12 tablet, Refills: 4    Associated Diagnoses: Osteoporosis, unspecified osteoporosis type, unspecified pathological fracture presence      amLODIPine (NORVASC) 5 MG tablet TAKE 1 TABLET BY MOUTH EVERY DAY  Qty: 90 tablet, Refills: 1    Associated Diagnoses: Essential hypertension, benign      ARIPiprazole (ABILIFY) 5 MG tablet Take 1 tablet (5 mg) by mouth daily      cholecalciferol (VITAMIN D-1000 MAX ST) 25 MCG (1000 UT) TABS Take 10,000 Units by mouth daily    Associated Diagnoses: Vitamin D deficiency      Coenzyme Q10 (CO Q 10) 100 MG CAPS Take 1 capsule by mouth daily      DULoxetine (CYMBALTA) 30 MG capsule Take 30 mg by mouth daily      ferrous sulfate (FEROSUL) 325 (65 Fe) MG tablet Take 1 tablet (325 mg) by mouth daily (with breakfast)      fish oil-omega-3 fatty acids 1000 MG capsule Take 1 capsule (1 g) by mouth daily      levothyroxine (SYNTHROID/LEVOTHROID) 25 MCG tablet TAKE 1 TABLET BY MOUTH EVERY DAY  Qty: 90 tablet, Refills: 4    Associated Diagnoses: Hypothyroidism, unspecified type      losartan (COZAAR) 100 MG tablet TAKE 1 TABLET BY MOUTH EVERY DAY  Qty: 90 tablet, Refills: 4    Associated Diagnoses: Essential hypertension, benign      metoprolol succinate ER (TOPROL XL) 100 MG 24 hr tablet TAKE 2 TABLETS BY MOUTH EVERY DAY  Qty: 180 tablet, Refills: 3    Associated Diagnoses: Essential hypertension, benign      modafinil (PROVIGIL) 200 MG tablet Take 2 tablets (400 mg) by mouth daily      Syringe Luer Slip 3 ML MISC 1 each every 30 days  Qty: 25 each, Refills: 0    Comments: For administering 1 mL of B12 into the muscle once every 30 days.  Associated Diagnoses: Other vitamin B12 deficiency anemia         STOP taking these medications       ibuprofen (ADVIL/MOTRIN) 200 MG tablet Comments:   Reason for Stopping:         naproxen sodium 220 MG capsule Comments:   Reason for Stopping:         spironolactone (ALDACTONE) 50 MG tablet  Comments:   Reason for Stopping:             Allergies   Allergies   Allergen Reactions     Chlorthalidone Other (See Comments)     Nightmares/Caused hyponatremia

## 2023-05-18 NOTE — PROGRESS NOTES
[facility] DISCHARGE NOTE    Patient discharged to home at 2:53 PM via wheel chair. Accompanied by spouse and staff. Discharge instructions reviewed with patient and spouse, opportunity offered to ask questions. Prescriptions sent to patients preferred pharmacy. All belongings sent with patient.    Margi Bernabe RN

## 2023-05-18 NOTE — PLAN OF CARE
Goal Outcome Evaluation:      Plan of Care Reviewed With: patient    Overall Patient Progress: improving    Patient voiding without difficulty, adequate urine output.  Has had a bowel movement last evening.  Denies any pain or discomfort this shift.  Patient switched to oral antibiotics.  Patient has had stable vitals with no fevers.  Patient has met goals for discharge and discharging home with  to help as needed.

## 2023-05-18 NOTE — PHARMACY - DISCHARGE MEDICATION RECONCILIATION AND EDUCATION
Pharmacy:  Discharge Counseling and Medication Reconciliation    Ness LEOS Nii  [address]  [place] [facility] [address]  [phone] (home)   72 year old female  PCP: Miah Black    Allergies: Chlorthalidone    Discharge Counseling:    Pharmacist met with patient today to review the medication portion of the After Visit Summary (with an emphasis on NEW medications) and to address patient's questions/concerns.    Summary of Education: Discussed stopping NSAID use due to ulcer and using tylenol PRN for pain. Also discussed stopping spirinolactone due to hyperkalemia upon admission- patient expressed concern over swelling in feet coming back with discontinuation. Patient told to watch for swelling and to monitor BP at home and follow-up with endocrinologist (managed spironolactone) if concern. Patient confirmed she has an appointment scheduled shortly and will discuss concerns then. Discussed new meds of ceftin and protonix- indication for each and major side effects discussed.    Materials Provided:  MedCounselor sheets printed from [employer] on: Ceftin, Pantoprazole    Discharge Medication Reconciliation:    It has been determined that the patient has an adequate supply of medications available or which can be obtained from the patient's preferred pharmacy, which SHE has confirmed as: [facility] [An updated medication list will be faxed to the patient's pharmacy.]    Thank you for the consult.    Elba Posada RPH........May 18, 2023 9:51 AM

## 2023-05-18 NOTE — PHARMACY
[facility]  Part of [address] [address]    May 18, 2023    Dear Pharmacist,    Your customer, Ness Bales, born on 1950, was recently discharged from [facility].  We have updated her medication list and want to alert you to the following:       Review of your medicines      START taking      Dose / Directions   cefuroxime 500 MG tablet  Commonly known as: CEFTIN  Indication: Urinary Tract Infection      Dose: 500 mg  Take 1 tablet (500 mg) by mouth every 12 hours for 4 days  Quantity: 8 tablet  Refills: 0     pantoprazole 40 MG EC tablet  Commonly known as: PROTONIX      Dose: 40 mg  Start taking on: May 19, 2023  Take 1 tablet (40 mg) by mouth every morning (before breakfast)  Quantity: 30 tablet  Refills: 0        CONTINUE these medicines which have NOT CHANGED      Dose / Directions   acetaminophen 325 MG tablet  Commonly known as: TYLENOL      Dose: 325-650 mg  Take 325-650 mg by mouth every 6 hours as needed for mild pain  Refills: 0     alendronate 70 MG tablet  Commonly known as: FOSAMAX  Used for: Osteoporosis, unspecified osteoporosis type, unspecified pathological fracture presence      Dose: 70 mg  Take 70 mg by mouth every 7 days  Quantity: 12 tablet  Refills: 4     amLODIPine 5 MG tablet  Commonly known as: NORVASC  Used for: Essential hypertension, benign      TAKE 1 TABLET BY MOUTH EVERY DAY  Quantity: 90 tablet  Refills: 1     ARIPiprazole 5 MG tablet  Commonly known as: ABILIFY      Dose: 5 mg  Take 1 tablet (5 mg) by mouth daily  Refills: 0     Co Q 10 100 MG Caps      Dose: 1 capsule  Take 1 capsule by mouth daily  Refills: 0     cyanocobalamin 1000 MCG/ML injection  Commonly known as: CYANOCOBALAMIN  Used for: S/P gastric bypass      Dose: 1 mL  INJECT 1 ML (1,000 MCG) INTO THE MUSCLE EVERY 30 DAYS  Quantity: 3 mL  Refills: 4     DULoxetine 30 MG capsule  Commonly known as: CYMBALTA      Dose: 30  mg  Take 30 mg by mouth daily  Refills: 0     ferrous sulfate 325 (65 Fe) MG tablet  Commonly known as: FEROSUL      Dose: 325 mg  Take 1 tablet (325 mg) by mouth daily (with breakfast)  Refills: 0     fish oil-omega-3 fatty acids 1000 MG capsule      Dose: 1 capsule  Take 1 capsule (1 g) by mouth daily  Refills: 0     levothyroxine 25 MCG tablet  Commonly known as: SYNTHROID/LEVOTHROID      TAKE 1 TABLET BY MOUTH EVERY DAY  Quantity: 90 tablet  Refills: 4     losartan 100 MG tablet  Commonly known as: COZAAR  Used for: Essential hypertension, benign      TAKE 1 TABLET BY MOUTH EVERY DAY  Quantity: 90 tablet  Refills: 4     metoprolol succinate  MG 24 hr tablet  Commonly known as: TOPROL XL  Used for: Essential hypertension, benign      TAKE 2 TABLETS BY MOUTH EVERY DAY  Quantity: 180 tablet  Refills: 3     modafinil 200 MG tablet  Commonly known as: PROVIGIL      Dose: 400 mg  Take 2 tablets (400 mg) by mouth daily  Refills: 0     Syringe Luer Slip 3 ML Misc  Used for: Other vitamin B12 deficiency anemia      Dose: 1 each  1 each every 30 days  Quantity: 25 each  Refills: 0     Vitamin D-1000 [address] 25 mcg (1000 units) tablet  Used for: Vitamin D deficiency  Generic drug: Vitamin D3      Dose: 10,000 Units  Take 10,000 Units by mouth daily  Refills: 0        STOP taking    ibuprofen 200 MG tablet  Commonly known as: ADVIL/MOTRIN        naproxen sodium 220 MG capsule        spironolactone 50 MG tablet  Commonly known as: ALDACTONE              Where to get your medicines      These medications were sent to [place] [facility] [id number] [facility] - [place], [place] - [address]  [address] [facility] [address]    Phone: [phone]     cefuroxime 500 MG tablet    pantoprazole 40 MG EC tablet         We also reviewed Ness Bales's allergy list and updated it as needed:  Allergies: Chlorthalidone    Thank you for continuing to care for Ness Bales.  We look forward to working together with you in the  future.    Sincerely,  Elba Posada [facility]

## 2023-05-19 ENCOUNTER — PATIENT OUTREACH (OUTPATIENT)
Dept: INTERNAL MEDICINE | Facility: OTHER | Age: 73
End: 2023-05-19
Payer: MEDICARE

## 2023-05-19 LAB
ATRIAL RATE - MUSE: 107 BPM
ATRIAL RATE - MUSE: 115 BPM
DIASTOLIC BLOOD PRESSURE - MUSE: NORMAL MMHG
DIASTOLIC BLOOD PRESSURE - MUSE: NORMAL MMHG
INTERPRETATION ECG - MUSE: NORMAL
INTERPRETATION ECG - MUSE: NORMAL
P AXIS - MUSE: 65 DEGREES
P AXIS - MUSE: 67 DEGREES
PR INTERVAL - MUSE: 162 MS
PR INTERVAL - MUSE: 164 MS
QRS DURATION - MUSE: 68 MS
QRS DURATION - MUSE: 72 MS
QT - MUSE: 304 MS
QT - MUSE: 312 MS
QTC - MUSE: 405 MS
QTC - MUSE: 431 MS
R AXIS - MUSE: 38 DEGREES
R AXIS - MUSE: 52 DEGREES
SYSTOLIC BLOOD PRESSURE - MUSE: NORMAL MMHG
SYSTOLIC BLOOD PRESSURE - MUSE: NORMAL MMHG
T AXIS - MUSE: -17 DEGREES
T AXIS - MUSE: 44 DEGREES
VENTRICULAR RATE- MUSE: 107 BPM
VENTRICULAR RATE- MUSE: 115 BPM

## 2023-05-20 LAB
BACTERIA BLD CULT: NO GROWTH
BACTERIA BLD CULT: NO GROWTH

## 2023-05-21 LAB — PTH RELATED PROT SERPL-SCNC: 2.3 PMOL/L

## 2023-05-23 LAB
ATRIAL RATE - MUSE: 178 BPM
DIASTOLIC BLOOD PRESSURE - MUSE: NORMAL MMHG
INTERPRETATION ECG - MUSE: NORMAL
P AXIS - MUSE: NORMAL DEGREES
PR INTERVAL - MUSE: NORMAL MS
QRS DURATION - MUSE: 76 MS
QT - MUSE: 278 MS
QTC - MUSE: 448 MS
R AXIS - MUSE: 43 DEGREES
SYSTOLIC BLOOD PRESSURE - MUSE: NORMAL MMHG
T AXIS - MUSE: 244 DEGREES
VENTRICULAR RATE- MUSE: 156 BPM

## 2023-05-25 ENCOUNTER — OFFICE VISIT (OUTPATIENT)
Dept: INTERNAL MEDICINE | Facility: OTHER | Age: 73
End: 2023-05-25
Attending: NURSE PRACTITIONER
Payer: COMMERCIAL

## 2023-05-25 VITALS
HEART RATE: 66 BPM | OXYGEN SATURATION: 94 % | WEIGHT: 205 LBS | RESPIRATION RATE: 14 BRPM | DIASTOLIC BLOOD PRESSURE: 80 MMHG | SYSTOLIC BLOOD PRESSURE: 132 MMHG | BODY MASS INDEX: 35.19 KG/M2 | TEMPERATURE: 97.6 F

## 2023-05-25 DIAGNOSIS — Z09 ENCOUNTER FOR FOLLOW-UP EXAMINATION: ICD-10-CM

## 2023-05-25 DIAGNOSIS — A41.9 SEPSIS WITHOUT ACUTE ORGAN DYSFUNCTION, DUE TO UNSPECIFIED ORGANISM (H): Primary | ICD-10-CM

## 2023-05-25 DIAGNOSIS — E26.09 PRIMARY ALDOSTERONISM (H): ICD-10-CM

## 2023-05-25 DIAGNOSIS — A41.9 BACTERIAL SEPSIS (H): ICD-10-CM

## 2023-05-25 DIAGNOSIS — R10.13 ABDOMINAL PAIN, EPIGASTRIC: ICD-10-CM

## 2023-05-25 DIAGNOSIS — Z12.11 SCREENING FOR COLON CANCER: ICD-10-CM

## 2023-05-25 PROBLEM — Z96.652 S/P TOTAL KNEE REPLACEMENT, LEFT: Status: ACTIVE | Noted: 2023-01-20

## 2023-05-25 PROBLEM — E88.810 INSULIN RESISTANCE SYNDROME: Status: ACTIVE | Noted: 2022-02-21

## 2023-05-25 PROBLEM — R73.03 PREDIABETES: Status: ACTIVE | Noted: 2022-04-11

## 2023-05-25 PROBLEM — M17.12 PRIMARY OSTEOARTHRITIS OF LEFT KNEE: Status: ACTIVE | Noted: 2022-11-21

## 2023-05-25 PROCEDURE — 99496 TRANSJ CARE MGMT HIGH F2F 7D: CPT | Performed by: NURSE PRACTITIONER

## 2023-05-25 PROCEDURE — G0463 HOSPITAL OUTPT CLINIC VISIT: HCPCS

## 2023-05-25 PROCEDURE — 99496 TRANSJ CARE MGMT HIGH F2F 7D: CPT

## 2023-05-25 RX ORDER — DULOXETIN HYDROCHLORIDE 60 MG/1
CAPSULE, DELAYED RELEASE ORAL
COMMUNITY
Start: 2023-05-22

## 2023-05-25 RX ORDER — SPIRONOLACTONE 50 MG/1
50 TABLET, FILM COATED ORAL DAILY
Qty: 90 TABLET | Refills: 0 | Status: SHIPPED | OUTPATIENT
Start: 2023-05-25 | End: 2024-01-17

## 2023-05-25 RX ORDER — L.ACIDOPHIL/L.PLANTAR/BIFIDO 7 15B CELL
CAPSULE ORAL
COMMUNITY
End: 2024-01-17

## 2023-05-25 ASSESSMENT — PAIN SCALES - GENERAL: PAINLEVEL: NO PAIN (0)

## 2023-05-25 ASSESSMENT — PATIENT HEALTH QUESTIONNAIRE - PHQ9
SUM OF ALL RESPONSES TO PHQ QUESTIONS 1-9: 1
10. IF YOU CHECKED OFF ANY PROBLEMS, HOW DIFFICULT HAVE THESE PROBLEMS MADE IT FOR YOU TO DO YOUR WORK, TAKE CARE OF THINGS AT HOME, OR GET ALONG WITH OTHER PEOPLE: NOT DIFFICULT AT ALL
SUM OF ALL RESPONSES TO PHQ QUESTIONS 1-9: 1

## 2023-05-25 NOTE — PROGRESS NOTES
"  Assessment & Plan     Encounter for follow-up examination  Sepsis without acute organ dysfunction, due to unspecified organism (H)  Bacterial sepsis (H)  Greatly improved.  Patient feels she is back to normal.  - Adult GI  Referral - Procedure Only    Screening for colon cancer  - Adult GI  Referral - Procedure Only    Abdominal pain, epigastric  Patient would like to proceed with an EGD.  She questions whether she should continue on the Pepcid twice daily or if she can go back to the Protonix which managed her symptoms.  Instructed to wait until her EGD is completed and see what they find.    Primary aldosteronism (H)  She is scheduled to see endocrinology at [facility] in the near future.  She has 2+ pitting edema bilaterally in the lower extremities since she has not been taking her spironolactone which was stopped while she was in the hospital.  She is wondering if she can restart this.  I gave her the go ahead to restart and that she must notify the clinic should she start having any additional symptoms.  She is encouraged to have her potassium checked frequently.  - spironolactone (ALDACTONE) 50 MG tablet  Dispense: 90 tablet; Refill: 0    Review of the result(s) of each unique test - labs from hospital stay  Ordering of each unique test  Prescription drug management   MED REC REQUIRED  Post Medication Reconciliation Status:  Discharge medications reconciled and changed, see notes/orders    BMI:   Estimated body mass index is 35.19 kg/m  as calculated from the following:    Height as of 5/15/23: 1.626 m (5' 4\").    Weight as of this encounter: 93 kg (205 lb).     Return if symptoms worsen or fail to improve.    Malathi Briones NP  [facility]    Subjective   Ness is a 72 year old, presenting for the following health issues:  Hospital F/U (Bacterial sepsis, UTI/Admit: 5/15/23/Dischg., 5/18/23)     Darlyn SCHUMACHER LPN     History of Present Illness       Reason for visit:  " Hospital follow-up    She eats 2-3 servings of fruits and vegetables daily.She consumes 0 sweetened beverage(s) daily.She exercises with enough effort to increase her heart rate 9 or less minutes per day.  She exercises with enough effort to increase her heart rate 3 or less days per week.   She is taking medications regularly.    Today's PHQ-9         PHQ-9 Total Score: 1    PHQ-9 Q9 Thoughts of better off dead/self-harm past 2 weeks :   Not at all    How difficult have these problems made it for you to do your work, take care of things at home, or get along with other people: Not difficult at all     To get aHospital Follow-up Visit:    Hospital/Nursing Home/IP Rehab Facility: [facility]  Date of Admission: 5/15/23  Date of Discharge: 5/18/23  Reason(s) for Admission: Bacterial sepsis, ( patient had severe stomach ache)    Discharge Diagnoses  Principal Problem:    Bacterial sepsis (H)  Active Problems:    Acute cystitis without hematuria    Essential hypertension, benign    Hypothyroidism    Hyperkalemia    Hyponatremia    Hypercalcemia    Thrombophilia (H)    Atrial fibrillation with RVR (H)    Hx of gastric bypass    Was your hospitalization related to COVID-19? No   Problems taking medications regularly:  None  Medication changes since discharge: Protonix, Probiotics  Problems adhering to non-medication therapy:  None    Summary of hospitalization:  [facility] discharge summary reviewed  Hospital Course  Bacterial sepsis (H)  Presenting with abdominal pain associated with low-grade temperature, tachycardia with lab work concerning for white count of 19,000, elevated lactate at 3.5 with urine positive for probable infection  Cultures of blood and urine are pending.  Has been empirically started on treatment for UTI with Rocephin  5/16/2023-lactate normalized with fluids, clinically improving with improving white count.  Continue antibiotics cultures pending with urine culture  initially positive for gram-negative rods  5/17/2023-Sepsis sxs have resolved, UC pos for e.coli, sens to rocephin, continue to monitor  5/18/2023-clinically feeling back to normal.  Has taken 3 days of IV Rocephin, will switch to oral Ceftin at discharge, finish out 7-day course      Acute cystitis without hematuria  Asymptomatic presenting with sepsis  Urine culture positive for gram-negative rods, continue on IV Rocephin pending ID and sensitivities  5/17/2023-UC pos for e.coli, rocephin for now, switch to oral ceftin tomorrow  5/18/2023- at discharge we will continue oral Ceftin for a total of 7 days antibiotics     Essential hypertension, benign  Taking losartan, metoprolol, Norvasc and Aldactone at home  Due to hyperkalemia, will hold Aldactone  Follow pressures, continue home meds  5/18/2023-at discharge we will hold any diuretics, continue Norvasc and losartan and metoprolol     Hypercalcemia  Elevated calcium, possibly due to sepsis and being dry  Calcium normalized with fluids     Hyperkalemia  Potassium elevated at 6.2  Possibly due to sepsis, also taking potassium sparing diuretic  Hold Aldactone, has been treated for hyperkalemia, recheck potassium pending  5/16/2023-potassium normalized with fluids, likely elevated due to sepsis and use of potassium sparing diuretic.  Continue to hold Aldactone     Hyponatremia  Sodium mildly low at 126  IV fluids with saline overnight, recheck tomorrow     Hypothyroidism  Being replaced with oral Synthroid  Continue home dosing     Thrombophilia (H)  Elevated platelets at 682,000, possibly related to sepsis  Recheck tomorrow     Hx of gastric bypass  History of gastric BiPAP some years ago  CT imaging concerning for some inflammatory changes in the pyloric area.  We will start on PPI, at some point benefit with endoscopy to evaluate the area     Atrial fibrillation with RVR (H)  Episode during the night of tachycardia with EKG showing atrial fibrillation with RVR  No  previous history of such, she was asymptomatic  Resolved after 3 doses of IV metoprolol, currently in sinus rhythm  Likely due to electrolyte abnormalities which have now resolved  Continue on cardiac telemetry for now, no further work-up if remains in sinus rhythm  At discharge, no more episodes of tachycardia, no further treatment or evaluation    Discharge Orders      Reason for your hospital stay     Admitted with sepsis due to UTI      Follow-up and recommended labs and tests      Follow up with primary care provider, Dr. Briones, within 7 days for hospital follow- up.  The following labs/tests are recommended: With CT imaging showing some issues with stomach, might benefit with OP EGD. Also would like a colonoscopy.      Activity     Your activity upon discharge: activity as tolerated      Diet     Follow this diet upon discharge: Orders Placed This Encounter      Combination Diet Regular Diet Adult       Diagnostic Tests/Treatments reviewed.  Follow up needed:   The following labs/tests are recommended: With CT imaging showing some issues with stomach, might benefit with OP EGD. Also would like a colonoscopy.     Other Healthcare Providers Involved in Patient s Care:    Imaging   Update since discharge: improved.   Seizures plan of care communicated with patient     Review of Systems   CONSTITUTIONAL: NEGATIVE for fever, chills, change in weight  ENT/MOUTH: NEGATIVE for ear, mouth and throat problems  RESP: NEGATIVE for significant cough or SOB  CV: NEGATIVE for chest pain, palpitations and POSITIVE for lower extremity edema  ROS otherwise negative      Objective    /80 (BP Location: Right arm, Patient Position: Sitting, Cuff Size: Adult Regular)   Pulse 66   Temp 97.6  F (36.4  C) (Temporal)   Resp 14   Wt 93 kg (205 lb)   LMP  (LMP Unknown)   SpO2 94%   Breastfeeding No   BMI 35.19 kg/m    Body mass index is 35.19 kg/m .  Physical Exam   GENERAL: healthy, alert and no distress  EYES: Eyes  grossly normal to inspection, PERRL and conjunctivae and sclerae normal  RESP: lungs clear to auscultation - no rales, rhonchi or wheezes  CV: regular rates and rhythm  ABDOMEN: soft, nontender and bowel sounds normal  MS: extremities normal- no gross deformities noted  SKIN: no suspicious lesions or rashes  NEURO: Normal strength and tone, mentation intact and speech normal  PSYCH: mentation appears normal, affect normal/bright    No results found for any visits on 05/25/23.

## 2023-05-25 NOTE — NURSING NOTE
"Chief Complaint   Patient presents with     Hospital F/U     Bacterial sepsis, UTI  Admit: 5/15/23  Dischg., 5/18/23       Initial /80 (BP Location: Right arm, Patient Position: Sitting, Cuff Size: Adult Regular)   Pulse 66   Temp 97.6  F (36.4  C) (Temporal)   Resp 14   Wt 93 kg (205 lb)   LMP  (LMP Unknown)   SpO2 94%   Breastfeeding No   BMI 35.19 kg/m   Estimated body mass index is 35.19 kg/m  as calculated from the following:    Height as of 5/15/23: 1.626 m (5' 4\").    Weight as of this encounter: 93 kg (205 lb).     Medication Reconciliation: complete      FOOD SECURITY SCREENING QUESTIONS:    The next two questions are to help us understand your food security.  If you are feeling you need any assistance in this area, we have resources available to support you today.    Hunger Vital Signs:  Within the past 12 months we worried whether our food would run out before we got money to buy more. Never  Within the past 12 months the food we bought just didn't last and we didn't have money to get more. Never        Advance care plan reviewed      Darlyn Weeks LPN on 5/25/2023 at 10:13 AM      "

## 2023-05-26 ENCOUNTER — TELEPHONE (OUTPATIENT)
Dept: SURGERY | Facility: OTHER | Age: 73
End: 2023-05-26
Payer: MEDICARE

## 2023-05-26 NOTE — TELEPHONE ENCOUNTER
[facility] Diagnostic Referral    Patient has a referral for an EGD and C-Scope with a diagnosis of Encounter for follow-up examination, GERD, and Screening for colon cancer.    Please advise.    Thank you,    Mariposa Kenney on 5/26/2023 at 12:57 PM

## 2023-06-01 ENCOUNTER — HOSPITAL ENCOUNTER (OUTPATIENT)
Facility: OTHER | Age: 73
End: 2023-06-01
Attending: SURGERY | Admitting: SURGERY
Payer: COMMERCIAL

## 2023-06-01 ENCOUNTER — HOSPITAL ENCOUNTER (OUTPATIENT)
Dept: GENERAL RADIOLOGY | Facility: OTHER | Age: 73
Discharge: HOME | End: 2023-06-01
Payer: COMMERCIAL

## 2023-06-01 ENCOUNTER — OFFICE VISIT (OUTPATIENT)
Dept: FAMILY MEDICINE | Facility: OTHER | Age: 73
End: 2023-06-01
Payer: COMMERCIAL

## 2023-06-01 VITALS
OXYGEN SATURATION: 94 % | HEIGHT: 64 IN | DIASTOLIC BLOOD PRESSURE: 68 MMHG | BODY MASS INDEX: 36.77 KG/M2 | HEART RATE: 72 BPM | WEIGHT: 215.4 LBS | RESPIRATION RATE: 24 BRPM | SYSTOLIC BLOOD PRESSURE: 110 MMHG | TEMPERATURE: 99.7 F

## 2023-06-01 DIAGNOSIS — R06.02 SHORTNESS OF BREATH: ICD-10-CM

## 2023-06-01 DIAGNOSIS — R05.1 ACUTE COUGH: ICD-10-CM

## 2023-06-01 DIAGNOSIS — Z12.11 ENCOUNTER FOR SCREENING COLONOSCOPY: Primary | ICD-10-CM

## 2023-06-01 DIAGNOSIS — J06.9 VIRAL URI WITH COUGH: Primary | ICD-10-CM

## 2023-06-01 DIAGNOSIS — R06.2 WHEEZING: ICD-10-CM

## 2023-06-01 LAB
FLUAV RNA SPEC QL NAA+PROBE: NEGATIVE
FLUBV RNA RESP QL NAA+PROBE: NEGATIVE
RSV RNA SPEC NAA+PROBE: NEGATIVE
SARS-COV-2 RNA RESP QL NAA+PROBE: NEGATIVE

## 2023-06-01 PROCEDURE — G0463 HOSPITAL OUTPT CLINIC VISIT: HCPCS | Mod: 25

## 2023-06-01 PROCEDURE — 71046 X-RAY EXAM CHEST 2 VIEWS: CPT

## 2023-06-01 PROCEDURE — 87637 SARSCOV2&INF A&B&RSV AMP PRB: CPT | Mod: ZL

## 2023-06-01 PROCEDURE — 99213 OFFICE O/P EST LOW 20 MIN: CPT

## 2023-06-01 PROCEDURE — G0463 HOSPITAL OUTPT CLINIC VISIT: HCPCS

## 2023-06-01 PROCEDURE — C9803 HOPD COVID-19 SPEC COLLECT: HCPCS

## 2023-06-01 RX ORDER — BENZONATATE 100 MG/1
100 CAPSULE ORAL 3 TIMES DAILY PRN
Qty: 30 CAPSULE | Refills: 0 | Status: SHIPPED | OUTPATIENT
Start: 2023-06-01 | End: 2024-01-17

## 2023-06-01 RX ORDER — ALBUTEROL SULFATE 90 UG/1
2 AEROSOL, METERED RESPIRATORY (INHALATION) EVERY 6 HOURS PRN
Qty: 18 G | Refills: 0 | Status: SHIPPED | OUTPATIENT
Start: 2023-06-01 | End: 2023-10-17

## 2023-06-01 ASSESSMENT — PAIN SCALES - GENERAL: PAINLEVEL: MILD PAIN (3)

## 2023-06-01 ASSESSMENT — PATIENT HEALTH QUESTIONNAIRE - PHQ9
10. IF YOU CHECKED OFF ANY PROBLEMS, HOW DIFFICULT HAVE THESE PROBLEMS MADE IT FOR YOU TO DO YOUR WORK, TAKE CARE OF THINGS AT HOME, OR GET ALONG WITH OTHER PEOPLE: NOT DIFFICULT AT ALL
SUM OF ALL RESPONSES TO PHQ QUESTIONS 1-9: 0
SUM OF ALL RESPONSES TO PHQ QUESTIONS 1-9: 0

## 2023-06-01 NOTE — TELEPHONE ENCOUNTER
Screening Questions for the Scheduling of Screening Colonoscopies   (If Colonoscopy is diagnostic, Provider should review the chart before scheduling.)  Are you younger than 50 or older than 80?  NO  Do you take aspirin or fish oil?  FISH OIL (if yes, tell patient to stop 1 week prior to Colonoscopy)  Do you take warfarin (Coumadin), clopidogrel (Plavix), apixaban (Eliquis), dabigatram (Pradaxa), rivaroxaban (Xarelto) or any blood thinner? NO  Do you use oxygen at home?  NO  Do you have kidney disease? NO  Are you on dialysis? NO  Have you had a stroke or heart attack in the last year? NO  Have you had a stent in your heart or any blood vessel in the last year? NO  Have you had a transplant of any organ? NO  Have you had a colonoscopy or upper endoscopy (EGD) before? NO  Date of scheduled Colonoscopy. 09/12/2023  Provider GERMSCHEID  Pharmacy [facility]  Patient is scheduled to see endocrinologist regarding her hyperaldosterone next week.

## 2023-06-01 NOTE — NURSING NOTE
"Pt presents to  for cough x3 days.    Chief Complaint   Patient presents with     Cough     X3 days       FOOD SECURITY SCREENING QUESTIONS  Hunger Vital Signs:  Within the past 12 months we worried whether our food would run out before we got money to buy more. Never  Within the past 12 months the food we bought just didn't last and we didn't have money to get more. Never  Shawna Deanadeemon 6/1/2023 1:43 PM      Initial /68 (BP Location: Left arm, Patient Position: Sitting, Cuff Size: Adult Large)   Pulse 72   Temp 99.7  F (37.6  C) (Tympanic)   Resp 24   Ht 1.626 m (5' 4\")   Wt 97.7 kg (215 lb 6.4 oz)   LMP  (LMP Unknown)   SpO2 94%   BMI 36.97 kg/m   Estimated body mass index is 36.97 kg/m  as calculated from the following:    Height as of this encounter: 1.626 m (5' 4\").    Weight as of this encounter: 97.7 kg (215 lb 6.4 oz).  Medication Reconciliation: complete    Shawna Chin  "

## 2023-06-01 NOTE — PROGRESS NOTES
ASSESSMENT/PLAN:    I have reviewed the nursing notes.  I have reviewed the findings, diagnosis, plan and need for follow up with the patient.    1. Viral URI with cough  2. Wheezing  3. Acute cough  4. Shortness of breath  - XR Chest 2 Views  - Symptomatic Influenza A/B, RSV, & SARS-CoV2 PCR (COVID-19) Nose  - benzonatate (TESSALON) 100 MG capsule; Take 1 capsule (100 mg) by mouth 3 times daily as needed for cough  Dispense: 30 capsule; Refill: 0  - albuterol (PROAIR HFA/PROVENTIL HFA/VENTOLIN HFA) 108 (90 Base) MCG/ACT inhaler; Inhale 2 puffs into the lungs every 6 hours as needed for shortness of breath, wheezing or cough  Dispense: 18 g; Refill: 0    Patient presents with upper respiratory symptoms.  Patient's vitals are stable except for low-grade fever and patient appears nontoxic.  Multiplex test was negative and chest x-ray was negative for any signs of infection or other abnormalities.  Discussed results with patient in clinic. Discussed with patient viral vs bacterial respiratory illness, and evidence based practice and guidelines for cough without fever or infiltrate on xray are not indicative of pneumonia and should not be treated with antibiotics. Discussed with patient that symptoms and exam are consistent with viral illness.  Discussed that symptomatic treatment of cough is appropriate but not with antibiotics.  Will treat patient's cough with Tessalon Perles and an albuterol inhaler as needed. Discussed symptomatic treatment - Encouraged fluids, salt water gargles, honey (only if greater than 1 year in age due to risk of botulism), elevation, humidifier, sinus rinse/netti pot, lozenges, tea, topical vapor rub, popsicles, rest, etc. May use over-the-counter Tylenol or ibuprofen PRN.    Discussed warning signs/symptoms indicative of need to f/u    Follow up if symptoms persist or worsen or concerns    I explained my diagnostic considerations and recommendations to the patient, who voiced understanding  and agreement with the treatment plan. All questions were answered. We discussed potential side effects of any prescribed or recommended therapies, as well as expectations for response to treatments.    LIZBETH Sweeney CNP  6/1/2023  1:46 PM    HPI:    Ness Bales is a 72 year old female  who presents to [facility] today for concerns of URI symptoms    URI, x 3 days    Symptoms:  YES: + fevers or chills. Fever, highest reported temperature: 99.7 F  No sore throat/pharyngitis/tonsillitis.   YES: + allergy/URI Symptoms  No muffled sounds/change in hearing  No sensation of fullness in ear(s)  No ringing in ears/tinnitus  No balance changes  No dizziness  YES: + congestion (head/nasal/chest)  YES: + cough/productive cough  YES: + post nasal drip   No headache  No sinus pain/pressure  No myalgias  No otalgia  No rash  Activity Level Changes: Yes: increased fatigue  Appetite/Liquid Intake Changes: No  Changes to Bowel Habits: No  Changes to Bladder Habits: No  Additional Symptoms to Report: Yes: shortness of breath, wheezing  History of similar symptoms: No  Prior workup: No    Treatments tried: OTC Cough med, Fluids and Rest    Site of exposure: not known.  Type of exposure: not known    Cardiopulmonary History:  Recent Infections (Pneumonia, etc): Yes: was hospitalized for sepsis on 5/15/23  Smoker: No  Asthma: No  COPD: No  Cystic Fibrosis, Dystrophy (Myotonic, etc.), etc.: No  Cardiac History Including Stroke/Stent Placement/STEMI/STEMI, etc.: No    Other Pertinent History: none    Allergies: chlorthalidone    PCP: none    Past Medical History:   Diagnosis Date     Benign essential hypertension      Depression      Hyperaldosteronism (H)      Hypothyroidism      Obesity     No Comments Provided     Personal history of other medical treatment (CODE)     3 vaginal childbirth uncomplicated     Personal history of other medical treatment (CODE)     1983,blood transfusion with stomach stapling     Vitamin B12  deficiency (non anemic)      Past Surgical History:   Procedure Laterality Date     ARTHROPLASTY KNEE Right 2008     BIOPSY Bilateral 10/18/2021    Adrenal Vein; Hyperaldosteronism     BIOPSY BREAST      Incisional x2 benign breast biopsies     FRACTURE SURGERY Left     open treatment of Humeral Fx w/ prosthesis     INJECT BOTOX N/A 09/22/2020    [facility]; [facility] ([place], [place]), Dr. Joe Cruz     LAPAROSCOPIC BYPASS GASTRIC      1983,Stomach stapling complicated by incision rupture, then repaired [facility] second surgery     OTHER SURGICAL HISTORY      1983,26272.0,SKIN/SUBCUTANEOUS SURGERY,repaired incision from stomach stapling     OTHER SURGICAL HISTORY      208489,ANESTHESIA ALERT,No problems with anaesthesia. ?     Social History     Tobacco Use     Smoking status: Never     Smokeless tobacco: Never   Vaping Use     Vaping status: Never Used   Substance Use Topics     Alcohol use: No     Comment: very little      Current Outpatient Medications   Medication Sig Dispense Refill     acetaminophen (TYLENOL) 325 MG tablet Take 325-650 mg by mouth every 6 hours as needed for mild pain       alendronate (FOSAMAX) 70 MG tablet Take 70 mg by mouth every 7 days 12 tablet 4     amLODIPine (NORVASC) 5 MG tablet TAKE 1 TABLET BY MOUTH EVERY DAY 90 tablet 1     ARIPiprazole (ABILIFY) 5 MG tablet Take 1 tablet (5 mg) by mouth daily       cholecalciferol (VITAMIN D-1000 MAX ST) 25 MCG (1000 UT) TABS Take 10,000 Units by mouth daily       Coenzyme Q10 (CO Q 10) 100 MG CAPS Take 1 capsule by mouth daily       cyanocobalamin (CYANOCOBALAMIN) 1000 MCG/ML injection INJECT 1 ML (1,000 MCG) INTO THE MUSCLE EVERY 30 DAYS 3 mL 4     DULoxetine (CYMBALTA) 30 MG capsule Take 30 mg by mouth daily       DULoxetine (CYMBALTA) 60 MG capsule        ferrous sulfate (FEROSUL) 325 (65 Fe) MG tablet Take 1 tablet (325 mg) by mouth daily (with breakfast)       fish oil-omega-3 fatty acids 1000 MG capsule Take 1 capsule (1 g) by mouth daily        "levothyroxine (SYNTHROID/LEVOTHROID) 25 MCG tablet TAKE 1 TABLET BY MOUTH EVERY DAY 90 tablet 4     losartan (COZAAR) 100 MG tablet TAKE 1 TABLET BY MOUTH EVERY DAY 90 tablet 4     metoprolol succinate ER (TOPROL XL) 100 MG 24 hr tablet TAKE 2 TABLETS BY MOUTH EVERY  tablet 3     modafinil (PROVIGIL) 200 MG tablet Take 2 tablets (400 mg) by mouth daily       pantoprazole (PROTONIX) 40 MG EC tablet Take 1 tablet (40 mg) by mouth every morning (before breakfast) 30 tablet 0     Probiotic Product (UP4 PROBIOTICS ADULT) CAPS        spironolactone (ALDACTONE) 50 MG tablet Take 1 tablet (50 mg) by mouth daily 90 tablet 0     Syringe Luer Slip 3 ML MISC 1 each every 30 days 25 each 0     Allergies   Allergen Reactions     Chlorthalidone Other (See Comments)     Nightmares/Caused hyponatremia     Past medical history, past surgical history, current medications and allergies reviewed and accurate to the best of my knowledge.      ROS:  Refer to HPI    /68 (BP Location: Left arm, Patient Position: Sitting, Cuff Size: Adult Large)   Pulse 72   Temp 99.7  F (37.6  C) (Tympanic)   Resp 24   Ht 1.626 m (5' 4\")   Wt 97.7 kg (215 lb 6.4 oz)   LMP  (LMP Unknown)   SpO2 94%   BMI 36.97 kg/m      EXAM:  General Appearance: Well appearing 72 year old female, appropriate appearance for age. No acute distress   Ears: Left TM intact, translucent with bony landmarks appreciated, no erythema, no effusion, no bulging, no purulence.  Right TM intact, translucent with bony landmarks appreciated, no erythema, no effusion, no bulging, no purulence.  Left auditory canal clear.  Right auditory canal clear.  Normal external ears, non tender.  Eyes: conjunctivae normal without erythema or irritation, corneas clear, no drainage or crusting, no eyelid swelling, pupils equal   Oropharynx: moist mucous membranes, posterior pharynx without erythema, tonsils symmetric and 1+, no erythema, no exudates or petechiae, no post nasal drip " seen, no trismus, voice clear.    Sinuses:  No sinus tenderness upon palpation of the frontal or maxillary sinuses  Nose:  Bilateral nares: no erythema, no edema, no drainage or congestion   Neck: supple without adenopathy  Respiratory: normal chest wall and respirations.  Normal effort.  Clear to auscultation bilaterally, mild wheezing throughout all lung field, no crackles or rhonchi.  No increased work of breathing.  No cough appreciated.  Cardiac: RRR with no murmurs  Musculoskeletal:  Equal movement of bilateral upper extremities.  Equal movement of bilateral lower extremities.  Normal gait.    Dermatological: no rashes noted of exposed skin  Neuro: Alert and oriented to person, place, and time. Psychological: normal affect, alert, oriented, and pleasant.     Labs & Xray:  Results for orders placed or performed in visit on 06/01/23   XR Chest 2 Views     Status: None    Narrative    Exam:  XR CHEST 2 VIEWS    HISTORY: Wheezing; Acute cough; Shortness of breath.    COMPARISON:  None.    FINDINGS:     The cardiomediastinal contours are normal.      No focal consolidation, effusion, or pneumothorax.      No acute osseous abnormality.       Impression    IMPRESSION:      No acute cardiopulmonary process.      JOSE GONZALEZ MD         SYSTEM ID:  [id number]   Symptomatic Influenza A/B, RSV, & SARS-CoV2 PCR (COVID-19) Nose     Status: Normal    Specimen: Nose; Swab   Result Value Ref Range    Influenza A PCR Negative Negative    Influenza B PCR Negative Negative    RSV PCR Negative Negative    SARS CoV2 PCR Negative Negative    Narrative    Testing was performed using the Xpert Xpress CoV2/Flu/RSV Assay on the [employer] GeneXpert Instrument. This test should be ordered for the detection of SARS-CoV-2, influenza, and RSV viruses in individuals who meet clinical and/or epidemiological criteria. Test performance is unknown in asymptomatic patients. This test is for in vitro diagnostic use under the FDA EUA for  laboratories certified under CLIA to perform high or moderate complexity testing. This test has not been FDA cleared or approved. A negative result does not rule out the presence of PCR inhibitors in the specimen or target RNA in concentration below the limit of detection for the assay. If only one viral target is positive but coinfection with multiple targets is suspected, the sample should be re-tested with another FDA cleared, approved, or authorized test, if coinfection would change clinical management. This test was validated by the [facility]. These laboratories are certified under the Clinical Laboratory Improvement Amendments of 1988 (CLIA-88) as qualified to perform high complexity laboratory testing.

## 2023-06-09 DIAGNOSIS — Z98.84 HX OF GASTRIC BYPASS: ICD-10-CM

## 2023-06-12 RX ORDER — POLYETHYLENE GLYCOL 3350, SODIUM CHLORIDE, SODIUM BICARBONATE, POTASSIUM CHLORIDE 420; 11.2; 5.72; 1.48 G/4L; G/4L; G/4L; G/4L
4000 POWDER, FOR SOLUTION ORAL ONCE
Qty: 4000 ML | Refills: 0 | Status: SHIPPED | OUTPATIENT
Start: 2023-09-05 | End: 2023-09-05

## 2023-06-12 RX ORDER — BISACODYL 5 MG/1
TABLET, DELAYED RELEASE ORAL
Qty: 2 TABLET | Refills: 0 | Status: SHIPPED | OUTPATIENT
Start: 2023-09-05 | End: 2024-01-17

## 2023-06-14 RX ORDER — PANTOPRAZOLE SODIUM 40 MG/1
TABLET, DELAYED RELEASE ORAL
Qty: 90 TABLET | Refills: 1 | Status: SHIPPED | OUTPATIENT
Start: 2023-06-14 | End: 2023-12-13

## 2023-06-14 NOTE — TELEPHONE ENCOUNTER
"[id number] [place] sent Rx request for the following:      Requested Prescriptions   Pending Prescriptions Disp Refills     pantoprazole (PROTONIX) 40 MG EC tablet [Pharmacy Med Name: PANTOPRAZOLE SOD DR 40 MG TAB] 30 tablet 0     Sig: TAKE 1 TABLET BY MOUTH EVERY MORNING BEFORE BREAKFAST       PPI Protocol Failed - 6/14/2023  3:00 PM        Failed - No diagnosis of osteoporosis on record        Failed - Recent (12 mo) or future (30 days) visit within the authorizing provider's specialty     Patient has had an office visit with the authorizing provider or a provider within the authorizing providers department within the previous 12 mos or has a future within next 30 days. See \"Patient Info\" tab in inbasket, or \"Choose Columns\" in Meds & Orders section of the refill encounter.             Last Prescription Date:   5/18/23  Last Fill Qty/Refills:         30, R-0    Last Office Visit:              5/25/23   Future Office visit:           None    Routing to last visiting provider. Janel Jung RN on 6/14/2023 at 3:05 PM        "

## 2023-06-23 DIAGNOSIS — R06.2 WHEEZING: ICD-10-CM

## 2023-06-23 DIAGNOSIS — R05.1 ACUTE COUGH: ICD-10-CM

## 2023-06-23 DIAGNOSIS — R06.02 SHORTNESS OF BREATH: ICD-10-CM

## 2023-06-24 RX ORDER — ALBUTEROL SULFATE 90 UG/1
AEROSOL, METERED RESPIRATORY (INHALATION)
OUTPATIENT
Start: 2023-06-24

## 2023-06-30 ENCOUNTER — OFFICE VISIT (OUTPATIENT)
Dept: FAMILY MEDICINE | Facility: OTHER | Age: 73
End: 2023-06-30
Payer: COMMERCIAL

## 2023-06-30 VITALS
SYSTOLIC BLOOD PRESSURE: 106 MMHG | HEIGHT: 64 IN | OXYGEN SATURATION: 96 % | TEMPERATURE: 98.9 F | WEIGHT: 214.1 LBS | HEART RATE: 60 BPM | BODY MASS INDEX: 36.55 KG/M2 | DIASTOLIC BLOOD PRESSURE: 72 MMHG | RESPIRATION RATE: 18 BRPM

## 2023-06-30 DIAGNOSIS — N30.01 ACUTE CYSTITIS WITH HEMATURIA: ICD-10-CM

## 2023-06-30 DIAGNOSIS — R30.0 DYSURIA: Primary | ICD-10-CM

## 2023-06-30 DIAGNOSIS — N39.0 RECURRENT UTI (URINARY TRACT INFECTION): ICD-10-CM

## 2023-06-30 LAB
ALBUMIN UR-MCNC: 30 MG/DL
APPEARANCE UR: ABNORMAL
BACTERIA #/AREA URNS HPF: ABNORMAL /HPF
BILIRUB UR QL STRIP: NEGATIVE
COLOR UR AUTO: YELLOW
GLUCOSE UR STRIP-MCNC: NEGATIVE MG/DL
HGB UR QL STRIP: ABNORMAL
KETONES UR STRIP-MCNC: NEGATIVE MG/DL
LEUKOCYTE ESTERASE UR QL STRIP: ABNORMAL
NITRATE UR QL: POSITIVE
PH UR STRIP: 6 [PH] (ref 5–9)
RBC URINE: 7 /HPF
SP GR UR STRIP: 1.01 (ref 1–1.03)
UROBILINOGEN UR STRIP-MCNC: NORMAL MG/DL
WBC CLUMPS #/AREA URNS HPF: PRESENT /HPF
WBC URINE: >182 /HPF

## 2023-06-30 PROCEDURE — 81001 URINALYSIS AUTO W/SCOPE: CPT | Mod: ZL | Performed by: NURSE PRACTITIONER

## 2023-06-30 PROCEDURE — G0463 HOSPITAL OUTPT CLINIC VISIT: HCPCS

## 2023-06-30 PROCEDURE — 87088 URINE BACTERIA CULTURE: CPT | Mod: ZL | Performed by: NURSE PRACTITIONER

## 2023-06-30 PROCEDURE — 99214 OFFICE O/P EST MOD 30 MIN: CPT | Performed by: NURSE PRACTITIONER

## 2023-06-30 RX ORDER — LEVOTHYROXINE SODIUM 50 UG/1
50 TABLET ORAL
COMMUNITY
Start: 2023-06-06 | End: 2024-01-17

## 2023-06-30 RX ORDER — TOPIRAMATE 25 MG/1
25 TABLET, FILM COATED ORAL 2 TIMES DAILY
COMMUNITY
Start: 2023-06-10 | End: 2023-11-10

## 2023-06-30 ASSESSMENT — ENCOUNTER SYMPTOMS
CONSTITUTIONAL NEGATIVE: 1
DIFFICULTY URINATING: 1
CARDIOVASCULAR NEGATIVE: 1
BACK PAIN: 0
DYSURIA: 1
NEUROLOGICAL NEGATIVE: 1
ABDOMINAL PAIN: 0
GASTROINTESTINAL NEGATIVE: 1
RESPIRATORY NEGATIVE: 1
HEMATURIA: 0
ARTHRALGIAS: 0
MUSCULOSKELETAL NEGATIVE: 1
FREQUENCY: 1
COUGH: 0

## 2023-06-30 ASSESSMENT — PATIENT HEALTH QUESTIONNAIRE - PHQ9
SUM OF ALL RESPONSES TO PHQ QUESTIONS 1-9: 0
SUM OF ALL RESPONSES TO PHQ QUESTIONS 1-9: 0
10. IF YOU CHECKED OFF ANY PROBLEMS, HOW DIFFICULT HAVE THESE PROBLEMS MADE IT FOR YOU TO DO YOUR WORK, TAKE CARE OF THINGS AT HOME, OR GET ALONG WITH OTHER PEOPLE: NOT DIFFICULT AT ALL

## 2023-06-30 ASSESSMENT — PAIN SCALES - GENERAL: PAINLEVEL: NO PAIN (0)

## 2023-06-30 NOTE — PROGRESS NOTES
Ness Bales  1950    ASSESSMENT/PLAN:   1. Dysuria  Patient presents with days of urinary symptoms, recently hospitalized for urosepsis 5/15/2023 through 5/18/2023.  Urine culture grew E. coli, treated with 3 days of IV Rocephin and 7 days of oral Ceftin.  With recurrent symptoms recommend obtaining urinalysis.  Patient does not have any abdominal tenderness, no CVA tenderness.  Vital signs are stable, she has been afebrile.  She is eating and drinking normally.  - UA Macroscopic with reflex to Microscopic and Culture  - Urine Culture    2. Acute cystitis with hematuria  3. Recurrent UTI (urinary tract infection)  - amoxicillin-clavulanate (AUGMENTIN) 875-125 MG tablet; Take 1 tablet by mouth 2 times daily for 10 days  Dispense: 20 tablet; Refill: 0    Urinalysis returned showing trace amount of blood, large amount of leukocyte esterase, greater than 182 WBCs.  Reviewed with patient that urinalysis is consistent with urinary tract infection and I recommend starting antibiotic therapy.  Based on urine culture from 1 month ago growing E. coli, we will start patient on Augmentin twice a day for 10 days.  Previous urine culture shows susceptibility to Augmentin, she was previously on Ceftin and IV Rocephin.  She will monitor for any fever, chills, body aches, nausea, lower abdominal pain, back pain or blood in her urine.  Discussed use of Tylenol as needed for discomfort.  She knows to complete full antibiotic.  We discussed most common adverse side effects of Augmentin antibiotic.    Patient agrees with plan of care and verbalizes understating. AVS printed. Patient education provided verbally and written instructions provided as requested. Patient made aware of emergent sings and symptoms to monitor for and when to seek additional care/follow up.     SUBJECTIVE:   CHIEF COMPLAINT/ REASON FOR VISIT  Patient presents with:  UTI     HISTORY OF PRESENT ILLNESS  Ness Bales is a pleasant 72 year old female  "presents to [facility] today concerns for possible urinary tract infection.  Three days ago on Wednesday she developed urinary discomfort, urinary frequency, itching and feeling uncomfortable while urinating.  Denies any change in vaginal discharge.  She has been afebrile.  Denies any abdominal pain or back pain.  Denies any nausea or vomiting.    Patient was hospitalized with urosepsis on 5/15/2023 and discharged on 5/18/2023.  She received 3 days of IV Rocephin and was discharged home on 7 days of oral Ceftin.  She states she did complete full antibiotic.  Urine culture did grow E. coli bacteria.    Her last urinary tract infection prior to that was January 2023.    I have reviewed the nursing notes.  I have reviewed allergies, medication list, problem list, and past medical history.    REVIEW OF SYSTEMS  Review of Systems   Constitutional: Negative.    HENT: Negative.    Respiratory: Negative.  Negative for cough.    Cardiovascular: Negative.    Gastrointestinal: Negative.  Negative for abdominal pain.   Genitourinary: Positive for difficulty urinating, dysuria, frequency and urgency. Negative for hematuria, vaginal bleeding and vaginal discharge.   Musculoskeletal: Negative.  Negative for arthralgias and back pain.   Neurological: Negative.    All other systems reviewed and are negative.       VITAL SIGNS  Vitals:    06/30/23 1005   BP: 106/72   BP Location: Left arm   Patient Position: Sitting   Cuff Size: Adult Large   Pulse: 60   Resp: 18   Temp: 98.9  F (37.2  C)   TempSrc: Tympanic   SpO2: 96%   Weight: 97.1 kg (214 lb 1.6 oz)   Height: 1.626 m (5' 4\")      Body mass index is 36.75 kg/m .    OBJECTIVE:   PHYSICAL EXAM  Physical Exam  Vitals reviewed.   Constitutional:       Appearance: Normal appearance. She is not ill-appearing or toxic-appearing.   HENT:      Head: Normocephalic and atraumatic.      Nose: Nose normal.   Cardiovascular:      Rate and Rhythm: Normal rate and regular rhythm.      Pulses: " Normal pulses.      Heart sounds: Normal heart sounds. No murmur heard.  Pulmonary:      Effort: Pulmonary effort is normal.      Breath sounds: Normal breath sounds. No wheezing.   Abdominal:      Palpations: Abdomen is soft.      Tenderness: There is no abdominal tenderness. There is no right CVA tenderness, left CVA tenderness or guarding.   Neurological:      General: No focal deficit present.      Mental Status: She is alert and oriented to person, place, and time.   Psychiatric:         Mood and Affect: Mood normal.         Behavior: Behavior normal.         Thought Content: Thought content normal.         Judgment: Judgment normal.        DIAGNOSTICS  Results for orders placed or performed in visit on 06/30/23   UA Macroscopic with reflex to Microscopic and Culture     Status: Abnormal    Specimen: Urine, Midstream   Result Value Ref Range    Color Urine Yellow Colorless, Straw, Light Yellow, Yellow    Appearance Urine Cloudy (A) Clear    Glucose Urine Negative Negative mg/dL    Bilirubin Urine Negative Negative    Ketones Urine Negative Negative mg/dL    Specific Gravity Urine 1.012 1.000 - 1.030    Blood Urine Trace (A) Negative    pH Urine 6.0 5.0 - 9.0    Protein Albumin Urine 30 (A) Negative mg/dL    Urobilinogen Urine Normal Normal, 2.0 mg/dL    Nitrite Urine Positive (A) Negative    Leukocyte Esterase Urine Large (A) Negative    Bacteria Urine Few (A) None Seen /HPF    WBC Clumps Urine Present (A) None Seen /HPF    RBC Urine 7 (H) <=2 /HPF    WBC Urine >182 (H) <=5 /HPF    Narrative    Urine Culture ordered based on laboratory criteria        Alba Renee NP  [facility]

## 2023-06-30 NOTE — NURSING NOTE
"Pt presents to clinic today for UTI. Patient reports symptoms began on Wednesday and patient reports frequency and \"funny, itchy feeling\" with urination.       FOOD SECURITY SCREENING QUESTIONS:    The next two questions are to help us understand your food security.  If you are feeling you need any assistance in this area, we have resources available to support you today.    Hunger Vital Signs:  Within the past 12 months we worried whether our food would run out before we got money to buy more. Never  Within the past 12 months the food we bought just didn't last and we didn't have money to get more. Never      Medication Reconciliation: complete  Aicha Hurst RN,LPN on 6/30/2023 at 10:07 AM     "

## 2023-07-02 LAB
BACTERIA UR CULT: ABNORMAL
BACTERIA UR CULT: ABNORMAL

## 2023-10-05 ENCOUNTER — TRANSFERRED RECORDS (OUTPATIENT)
Dept: HEALTH INFORMATION MANAGEMENT | Facility: OTHER | Age: 73
End: 2023-10-05
Payer: MEDICARE

## 2023-10-16 DIAGNOSIS — R05.1 ACUTE COUGH: ICD-10-CM

## 2023-10-16 DIAGNOSIS — R06.2 WHEEZING: ICD-10-CM

## 2023-10-16 DIAGNOSIS — R06.02 SHORTNESS OF BREATH: ICD-10-CM

## 2023-10-17 RX ORDER — ALBUTEROL SULFATE 90 UG/1
AEROSOL, METERED RESPIRATORY (INHALATION)
Qty: 18 G | Refills: 1 | Status: SHIPPED | OUTPATIENT
Start: 2023-10-17 | End: 2024-01-17

## 2023-11-02 DIAGNOSIS — E66.9 OBESITY, UNSPECIFIED: ICD-10-CM

## 2023-11-08 NOTE — TELEPHONE ENCOUNTER
[facility] sent Rx request for the following:      Requested Prescriptions   Pending Prescriptions Disp Refills    topiramate (TOPAMAX) 25 MG tablet [Pharmacy Med Name: TOPIRAMATE 25 MG TABLET] 180 tablet 1     Sig: TAKE 1 TABLET BY MOUTH TWICE A DAY DO NOT CRUSH       Anti-Seizure Meds Protocol  Failed - 11/8/2023  3:09 PM        Failed - Review Authorizing provider's last note.      Refer to last progress notes: confirm request is for original authorizing provider (cannot be through other providers).          Failed - Normal CBC on file in past 26 months     Recent Labs   Lab Test 05/18/23  0536 05/17/23  0527   WBC  --  8.9   RBC  --  3.65*   HGB  --  9.4*   HCT  --  29.5*    376                   Last Prescription Date:   unsure medication noted as historical   Last Fill Qty/Refills:          Last Office Visit:              10/14/22   Future Office visit:           none    Patient due for medicare annual wellness.   Will route to unit 4 scheduling to call patient and help assist scheduling patient.   Routing refill request to provider for review/approval because:  Medication is reported/historical      Kena Bearden RN on 11/8/2023 at 3:15 PM

## 2023-11-10 RX ORDER — TOPIRAMATE 25 MG/1
TABLET, FILM COATED ORAL
Qty: 180 TABLET | Refills: 0 | Status: SHIPPED | OUTPATIENT
Start: 2023-11-10 | End: 2024-02-01

## 2023-11-14 NOTE — TELEPHONE ENCOUNTER
Patient says that Dr. Maurice is her primary, but I would like to make sure before scheduling annual medicare visit. Thanks!  Cairra Vidales on 11/14/2023 at 11:48 AM

## 2023-11-15 NOTE — TELEPHONE ENCOUNTER
[employer] message sent to patient informing her of refill sent     Misty Muro CMA on 11/15/2023 at 3:35 PM

## 2023-11-30 ENCOUNTER — MYC MEDICAL ADVICE (OUTPATIENT)
Dept: FAMILY MEDICINE | Facility: OTHER | Age: 73
End: 2023-11-30
Payer: MEDICARE

## 2023-12-04 NOTE — TELEPHONE ENCOUNTER
Last Cologuard in chart from 12/12/2018.    Patient update on MyChart.    Jennifer Machado RN on 12/4/2023 at 3:20 PM

## 2023-12-08 DIAGNOSIS — I10 ESSENTIAL HYPERTENSION, BENIGN: ICD-10-CM

## 2023-12-09 DIAGNOSIS — Z98.84 HX OF GASTRIC BYPASS: ICD-10-CM

## 2023-12-13 RX ORDER — PANTOPRAZOLE SODIUM 40 MG/1
40 TABLET, DELAYED RELEASE ORAL
Qty: 90 TABLET | Refills: 4 | Status: SHIPPED | OUTPATIENT
Start: 2023-12-13

## 2023-12-13 NOTE — TELEPHONE ENCOUNTER
"[facility] sent Rx request for the following:      Requested Prescriptions   Pending Prescriptions Disp Refills    amLODIPine (NORVASC) 5 MG tablet [Pharmacy Med Name: AMLODIPINE BESYLATE 5 MG TAB] 90 tablet 1     Sig: TAKE 1 TABLET BY MOUTH EVERY DAY       Calcium Channel Blockers Protocol  Failed - 12/8/2023  1:19 PM        Failed - Recent (12 mo) or future (30 days) visit within the authorizing provider's specialty     Patient has had an office visit with the authorizing provider or a provider within the authorizing providers department within the previous 12 mos or has a future within next 30 days. See \"Patient Info\" tab in inbasket, or \"Choose Columns\" in Meds & Orders section of the refill encounter.             Last Prescription Date:   2/1/23  Last Fill Qty/Refills:         90, R-1    Last Office Visit:              1/2/23   Future Office visit:           1/17/24    Routing refill request to provider for review/approval because:  A break in medication  Called and left message for patient to return call to verify if taking Amlodipine.    Maggie Biswas RN on 12/13/2023 at 8:59 AM      "

## 2023-12-13 NOTE — TELEPHONE ENCOUNTER
[facility] sent Rx request for the following:      Requested Prescriptions   Pending Prescriptions Disp Refills    pantoprazole (PROTONIX) 40 MG EC tablet [Pharmacy Med Name: PANTOPRAZOLE SOD DR 40 MG TAB] 90 tablet 1     Sig: TAKE 1 TABLET BY MOUTH EVERY DAY BEFORE BREAKFAST       PPI Protocol Failed - 12/13/2023 10:25 AM        Failed - No diagnosis of osteoporosis on record       Last Prescription Date:   6/14/23  Last Fill Qty/Refills:         90, R-1    Last Office Visit:              1/2/23   Future Office visit:           1/17/24    Routing refill request to provider for review/approval   Maggie Biswas RN on 12/13/2023 at 10:28 AM

## 2023-12-14 RX ORDER — AMLODIPINE BESYLATE 5 MG/1
TABLET ORAL
Qty: 90 TABLET | Refills: 4 | Status: SHIPPED | OUTPATIENT
Start: 2023-12-14

## 2023-12-14 NOTE — TELEPHONE ENCOUNTER
Patient returned call and she verified her last name and . She states she is taking the medication, and needs a refill.    Shawna Prince RN .............. 2023  10:21 AM

## 2023-12-17 DIAGNOSIS — I10 ESSENTIAL HYPERTENSION, BENIGN: ICD-10-CM

## 2023-12-20 RX ORDER — LOSARTAN POTASSIUM 100 MG/1
TABLET ORAL
Qty: 90 TABLET | Refills: 0 | Status: SHIPPED | OUTPATIENT
Start: 2023-12-20 | End: 2024-01-17

## 2023-12-20 NOTE — TELEPHONE ENCOUNTER
[id number] [place] sent Rx request for the following:      Requested Prescriptions   Pending Prescriptions Disp Refills    losartan (COZAAR) 100 MG tablet [Pharmacy Med Name: LOSARTAN POTASSIUM 100 MG TAB] 90 tablet 4     Sig: TAKE 1 TABLET BY MOUTH EVERY DAY   Last Prescription Date:   10/18/22  Last Fill Qty/Refills:         90, R-4    Last Office Visit:              10/4/22   Future Office visit:             Next 5 appointments (look out 90 days)      Jan 17, 2024  8:20 AM  PHYSICAL with Jennifer Maurice DO  [facility] ([facility] ) [address]  [place] [address]  [phone]          Prescription approved per [facility] Refill Protocol.        Overridden by Jennifer Maurice DO on Oct 18, 2022 5:15 PMDrug-Drug1. ANGIOTENSIN II RECEPTOR ANTAGONISTS / POTASSIUM-SPARING DIURETICS [Level: Major] [Reason: Tolerated medication/side effects in past]  Other Orders: spironolactone (ALDACTONE) 50 MG tablet     Shawna Prince RN .............. 12/20/2023  3:41 PM

## 2024-01-15 ASSESSMENT — ENCOUNTER SYMPTOMS
SORE THROAT: 0
NERVOUS/ANXIOUS: 0
HEMATOCHEZIA: 0
DIARRHEA: 0
CHILLS: 0
DIZZINESS: 0
HEADACHES: 0
WEAKNESS: 0
EYE PAIN: 0
BREAST MASS: 0
PARESTHESIAS: 0
MYALGIAS: 0
FREQUENCY: 1
DYSURIA: 0
HEARTBURN: 1
ABDOMINAL PAIN: 0
HEMATURIA: 0
PALPITATIONS: 0
JOINT SWELLING: 0
SHORTNESS OF BREATH: 0
NAUSEA: 0
COUGH: 0
ARTHRALGIAS: 1
CONSTIPATION: 0
FEVER: 0

## 2024-01-15 ASSESSMENT — ACTIVITIES OF DAILY LIVING (ADL): CURRENT_FUNCTION: NO ASSISTANCE NEEDED

## 2024-01-15 NOTE — PROGRESS NOTES
"SUBJECTIVE:   Ness is a 73 year old, presenting for the following:  Medicare Visit        1/17/2024     8:30 AM   Additional Questions   Roomed by RACHELLE Arango   Accompanied by self       Are you in the first 12 months of your Medicare coverage?  No    Healthy Habits:     In general, how would you rate your overall health?  Good    Frequency of exercise:  None    Do you usually eat at least 4 servings of fruit and vegetables a day, include whole grains    & fiber and avoid regularly eating high fat or \"junk\" foods?  No    Taking medications regularly:  Yes    Medication side effects:  None    Ability to successfully perform activities of daily living:  No assistance needed    Home Safety:  No safety concerns identified    Hearing Impairment:  Need to ask people to speak up or repeat themselves    In the past 6 months, have you been bothered by leaking of urine? Yes    In general, how would you rate your overall mental or emotional health?  Good    Additional concerns today:  Yes    Endocrinologist decreased her Vitamin D intake; due to slightly high calcium in her blood and Vitamin D on higher end of normal.  Doing well otherwise; remains on spironolactone due to hyperaldosteronism.    Needing refill of some meds today and needles for her B12 injections (monthly).    Would like to discuss weight.  Has tried diets including lower calorie diets in the past.  Not effective.  Blood sugar was slightly high with last labs; has not had A1c completed.  Wondering about GLP1 agonists.    Have you ever done Advance Care Planning? (For example, a Health Directive, POLST, or a discussion with a medical provider or your loved ones about your wishes): No, advance care planning information given to patient to review.  Advanced care planning was discussed at today's visit.     Fall risk  Fallen 2 or more times in the past year?: Yes  Any fall with injury in the past year?: No    Cognitive Screening   1) Repeat 3 items (Leader, " Season, Table)    2) Clock draw: NORMAL  3) 3 item recall: Recalls 3 objects  Results: 3 items recalled: COGNITIVE IMPAIRMENT LESS LIKELY    Mini-CogTM Copyright DARIA Franks. Licensed by the author for use in [facility]; reprinted with permission (staci@[facility]). All rights reserved.      Do you have sleep apnea, excessive snoring or daytime drowsiness? : yes    Reviewed and updated as needed this visit by clinical staff   Tobacco  Allergies  Meds  Problems  Med Hx  Surg Hx  Fam Hx  Soc   Hx    Reviewed and updated as needed this visit by Provider   Tobacco  Allergies  Meds  Problems  Med Hx  Surg Hx  Fam Hx          Social History     Tobacco Use     Smoking status: Never     Smokeless tobacco: Never   Substance Use Topics     Alcohol use: No     Comment: very little              1/15/2024     2:13 PM   Alcohol Use   Prescreen: >3 drinks/day or >7 drinks/week? Not Applicable     Do you have a current opioid prescription? No  Do you use any other controlled substances or medications that are not prescribed by a provider? None    Current providers sharing in care for this patient include:   Patient Care Team:  Jennifer Maurice DO as PCP - General (Family Medicine)  Jennifer Maurice DO as Assigned PCP    The following health maintenance items are reviewed in Epic and correct as of today:  Health Maintenance   Topic Date Due     Pneumococcal Vaccine: 65+ Years (1 of 2 - PCV) Never done     RSV VACCINE (Pregnancy & 60+) (1 - 1-dose 60+ series) Never done     DTAP/TDAP/TD IMMUNIZATION (2 - Td or Tdap) 12/12/2022     MEDICARE ANNUAL WELLNESS VISIT  01/28/2023     INFLUENZA VACCINE (1) 09/01/2023     COLORECTAL CANCER SCREENING  09/21/2023     MAMMO SCREENING  02/11/2024     TSH W/FREE T4 REFLEX  05/15/2024     PHQ-9  07/17/2024     FALL RISK ASSESSMENT  01/17/2025     LIPID  01/28/2027     ADVANCE CARE PLANNING  01/17/2029     DEXA  02/11/2037     HEPATITIS C SCREENING  Completed     DEPRESSION  ACTION PLAN  Completed     ZOSTER IMMUNIZATION  Completed     COVID-19 Vaccine  Completed     IPV IMMUNIZATION  Aged Out     HPV IMMUNIZATION  Aged Out     MENINGITIS IMMUNIZATION  Aged Out     RSV MONOCLONAL ANTIBODY  Aged Out     BP Readings from Last 3 Encounters:   01/17/24 126/86   06/30/23 106/72   06/01/23 110/68    Wt Readings from Last 3 Encounters:   01/17/24 103.9 kg (229 lb)   06/30/23 97.1 kg (214 lb 1.6 oz)   06/01/23 97.7 kg (215 lb 6.4 oz)                  Patient Active Problem List   Diagnosis     Other vitamin B12 deficiency anemia     Enthesopathy of hip region     Contusion of lower leg     Degeneration of lumbar or lumbosacral intervertebral disc     Dysthymic disorder     Esophageal reflux     Hypercholesterolemia     Essential hypertension, benign     Hypothyroidism     Urge incontinence of urine     Obesity     Disorder of bone and cartilage     Hypertonicity of bladder     Sacroiliac joint dysfunction     Sleep apnea     Spinal stenosis, lumbar     Morbid obesity (H)     Primary aldosteronism (H24)     Osteoporosis, unspecified osteoporosis type, unspecified pathological fracture presence     Acute cystitis without hematuria     Bacterial sepsis (H)     Hyperkalemia     Hyponatremia     Hypercalcemia     UTI (urinary tract infection)     Thrombophilia (H24)     Hx of gastric bypass     Sepsis without acute organ dysfunction, due to unspecified organism (H)     Atrial fibrillation with RVR (H)     S/P total knee replacement, left     Insulin resistance syndrome     Primary osteoarthritis of left knee     Prediabetes     Vitamin B12 deficiency     Vitamin D deficiency     Past Surgical History:   Procedure Laterality Date     ARTHROPLASTY KNEE Right 2008     BIOPSY Bilateral 10/18/2021    Adrenal Vein; Hyperaldosteronism     BIOPSY BREAST      Incisional x2 benign breast biopsies     FRACTURE SURGERY Left     open treatment of Humeral Fx w/ prosthesis     INJECT BOTOX N/A 09/22/2020    [facility];  [facility] ([place], [place]), Dr. Joe Cruz     LAPAROSCOPIC BYPASS GASTRIC      1983,Stomach stapling complicated by incision rupture, then repaired [facility] second surgery     OTHER SURGICAL HISTORY      1983,01426.0,SKIN/SUBCUTANEOUS SURGERY,repaired incision from stomach stapling     OTHER SURGICAL HISTORY      605221,ANESTHESIA ALERT,No problems with anaesthesia. ?       Social History     Tobacco Use     Smoking status: Never     Smokeless tobacco: Never   Substance Use Topics     Alcohol use: No     Comment: very little      Family History   Problem Relation Age of Onset     Other - See Comments Mother         macular degeneration/Psychiatric illness,takes antidepressant     Heart Disease Father 40        Heart Disease,MI     Diabetes Father         Diabetes     Other - See Comments Son         Psychiatric illness,psychotic episode this year smoking marijuana, ephedra use ? bipolar     Other - See Comments Sister         Psychiatric illness,depression     Other - See Comments Brother         Psychiatric illness,depression         Current Outpatient Medications   Medication Sig Dispense Refill     acetaminophen (TYLENOL) 325 MG tablet Take 325-650 mg by mouth every 6 hours as needed for mild pain       alendronate (FOSAMAX) 70 MG tablet Take 70 mg by mouth every 7 days 12 tablet 4     amLODIPine (NORVASC) 5 MG tablet TAKE 1 TABLET BY MOUTH EVERY DAY 90 tablet 4     ARIPiprazole (ABILIFY) 5 MG tablet Take 1 tablet (5 mg) by mouth daily       Coenzyme Q10 (CO Q 10) 100 MG CAPS Take 1 capsule by mouth daily       cyanocobalamin (CYANOCOBALAMIN) 1000 MCG/ML injection INJECT 1 ML (1,000 MCG) INTO THE MUSCLE EVERY 30 DAYS 3 mL 4     DULoxetine (CYMBALTA) 60 MG capsule        ferrous sulfate (FEROSUL) 325 (65 Fe) MG tablet Take 1 tablet (325 mg) by mouth daily (with breakfast)       fish oil-omega-3 fatty acids 1000 MG capsule Take 1 capsule (1 g) by mouth daily       levothyroxine (SYNTHROID/LEVOTHROID) 50 MCG tablet  Take 50 mcg by mouth daily before breakfast       losartan (COZAAR) 100 MG tablet TAKE 1 TABLET BY MOUTH EVERY DAY 90 tablet 0     metoprolol succinate ER (TOPROL XL) 100 MG 24 hr tablet TAKE 2 TABLETS BY MOUTH EVERY  tablet 3     modafinil (PROVIGIL) 200 MG tablet Take 2 tablets (400 mg) by mouth daily       pantoprazole (PROTONIX) 40 MG EC tablet TAKE 1 TABLET BY MOUTH EVERY DAY BEFORE BREAKFAST 90 tablet 4     spironolactone (ALDACTONE) 50 MG tablet Take 1 tablet (50 mg) by mouth daily 90 tablet 0     Syringe Luer Slip 3 ML MISC 1 each every 30 days 25 each 0     topiramate (TOPAMAX) 25 MG tablet TAKE 1 TABLET BY MOUTH TWICE A DAY DO NOT CRUSH 180 tablet 0     albuterol (VENTOLIN HFA) 108 (90 Base) MCG/ACT inhaler INHALE 2 PUFFS INTO THE LUNGS EVERY 6 HOURS AS NEEDED FOR SHORTNESS OF BREATH, WHEEZING OR COUGH 18 g 1     benzonatate (TESSALON) 100 MG capsule Take 1 capsule (100 mg) by mouth 3 times daily as needed for cough 30 capsule 0     bisacodyl (DULCOLAX) 5 MG EC tablet Take as directed by colonoscopy prep instructions 2 tablet 0     cholecalciferol (VITAMIN D-1000 MAX ST) 25 MCG (1000 UT) TABS Take 10,000 Units by mouth daily       DULoxetine (CYMBALTA) 30 MG capsule Take 30 mg by mouth daily       Probiotic Product (UP4 PROBIOTICS ADULT) CAPS        Allergies   Allergen Reactions     Chlorthalidone Other (See Comments)     Nightmares/Caused hyponatremia     Pap: NA  Mammo: 2/11/22, birads1.  DUE.  Dexa: 2/11/2022, osteoporosis.  DUE.  Colonoscopy: 10/5/2023, neg cologuard.  Due: 10/5/2026.  Tdap 12/12/12 - DUE at the pharmacy; flu declines; Shingrix done; PNA DUE - declines; Covid UTD.  Consider RSV.      Review of Systems   Constitutional:  Negative for chills and fever.   HENT:  Negative for congestion, ear pain, hearing loss and sore throat.    Eyes:  Negative for pain and visual disturbance.   Respiratory:  Negative for cough and shortness of breath.    Cardiovascular:  Positive for peripheral  "edema. Negative for chest pain and palpitations.   Gastrointestinal:  Positive for heartburn. Negative for abdominal pain, constipation, diarrhea, hematochezia and nausea.   Breasts:  Negative for tenderness, breast mass and discharge.   Genitourinary:  Positive for frequency and urgency. Negative for dysuria, genital sores, hematuria, pelvic pain, vaginal bleeding and vaginal discharge.   Musculoskeletal:  Positive for arthralgias. Negative for joint swelling and myalgias.   Skin:  Negative for rash.   Neurological:  Negative for dizziness, weakness, headaches and paresthesias.   Psychiatric/Behavioral:  Negative for mood changes. The patient is not nervous/anxious.      OBJECTIVE:   /86   Pulse (!) 49   Temp 97.6  F (36.4  C) (Tympanic)   Resp 16   Ht 1.626 m (5' 4\")   Wt 103.9 kg (229 lb)   LMP  (LMP Unknown)   SpO2 95%   BMI 39.31 kg/m   Estimated body mass index is 39.31 kg/m  as calculated from the following:    Height as of this encounter: 1.626 m (5' 4\").    Weight as of this encounter: 103.9 kg (229 lb).  Physical Exam  GENERAL APPEARANCE: healthy, alert and no distress  EYES: Eyes grossly normal to inspection, PERRL and conjunctivae and sclerae normal  HENT: ear canals and TM's normal, nose and mouth without ulcers or lesions, oropharynx clear and oral mucous membranes moist  NECK: no adenopathy, no asymmetry, masses, or scars and thyroid normal to palpation  RESP: lungs clear to auscultation - no rales, rhonchi or wheezes  CV: regular rate and rhythm, normal S1 S2, no S3 or S4, no murmur, click or rub, no peripheral edema and peripheral pulses strong  ABDOMEN: soft, nontender, no hepatosplenomegaly, no masses and bowel sounds normal  MS: no musculoskeletal defects are noted and gait is age appropriate without ataxia  SKIN: no suspicious lesions or rashes  NEURO: Normal strength and tone, sensory exam grossly normal, mentation intact and speech normal  PSYCH: mentation appears normal and " affect normal/bright    Diagnostic Test Results:  No results found for this or any previous visit (from the past 24 hour(s)).    ASSESSMENT / PLAN:     1. Encounter for Medicare annual wellness exam  - MA Screen Bilateral w/González; Future  - DX Hip/Pelvis/Spine; Future  - Tdap, tetanus-diptheria-acell pertussis, (BOOSTRIX) 5-2.5-18.5 LF-MCG/0.5 DONNA injection; Inject 0.5 mLs into the muscle once for 1 dose  Dispense: 0.5 mL; Refill: 0  - Basic Metabolic Panel; Future  - Lipid Panel; Future  - Hemoglobin A1c; Future  - Vitamin D Total; Future  - Basic Metabolic Panel  - Lipid Panel  - Hemoglobin A1c  - Vitamin D Total    2. Essential hypertension, benign  Chronic, stable with good control today.  Continue on current Metoprolol, amlodipine, spironolactone for now.  If weight loss is successful; may require reduction in dosing or even cessation of some anti-hypertensive medications.  Will continue to monitor.  Monitoring BMP, lipid panel collected today.  - metoprolol succinate ER (TOPROL XL) 100 MG 24 hr tablet; TAKE 2 TABLETS BY MOUTH EVERY DAY  Dispense: 180 tablet; Refill: 3  - losartan (COZAAR) 100 MG tablet; Take 1 tablet (100 mg) by mouth daily  Dispense: 90 tablet; Refill: 4  - tirzepatide-Weight Management (ZEPBOUND) 2.5 MG/0.5ML prefilled pen; Inject 0.5 mLs (2.5 mg) Subcutaneous every 7 days for 28 days  Dispense: 2 mL; Refill: 0  - tirzepatide-Weight Management (ZEPBOUND) 5 MG/0.5ML prefilled pen; Inject 0.5 mLs (5 mg) Subcutaneous every 7 days for 28 days  Dispense: 2 mL; Refill: 0  - tirzepatide-Weight Management (ZEPBOUND) 7.5 MG/0.5ML prefilled pen; Inject 0.5 mLs (7.5 mg) Subcutaneous every 7 days  Dispense: 6 mL; Refill: 1  - Basic Metabolic Panel; Future  - Lipid Panel; Future  - Basic Metabolic Panel  - Lipid Panel    3. Hx of gastric bypass  Chronic, has had a long struggle with weight.  BMI today is 39.3.  Discussed R/B/O of medications vs alternatively following with a weight management clinic.   Will trial Zepbound - Rx for first three months at ramp up dosing sent.  Denies personal or family history of pancreatitis, MEN syndrome or medullary thyroid cancer.  Follow up in 3 months for efficacy.  - tirzepatide-Weight Management (ZEPBOUND) 2.5 MG/0.5ML prefilled pen; Inject 0.5 mLs (2.5 mg) Subcutaneous every 7 days for 28 days  Dispense: 2 mL; Refill: 0  - tirzepatide-Weight Management (ZEPBOUND) 5 MG/0.5ML prefilled pen; Inject 0.5 mLs (5 mg) Subcutaneous every 7 days for 28 days  Dispense: 2 mL; Refill: 0  - tirzepatide-Weight Management (ZEPBOUND) 7.5 MG/0.5ML prefilled pen; Inject 0.5 mLs (7.5 mg) Subcutaneous every 7 days  Dispense: 6 mL; Refill: 1    4. Hypothyroidism, unspecified type  Chronic, stable.  Had thyroid labs with endocrinology on 12/6/23.  Continue same dosing; refilled x 1 year.  - levothyroxine (SYNTHROID/LEVOTHROID) 50 MCG tablet; Take 1 tablet (50 mcg) by mouth daily before breakfast Take 50 mcg by mouth daily before breakfast  Dispense: 90 tablet; Refill: 4    5. Primary hyperaldosteronism (H24)  Chronic, continues to follow with Endocrinology.    6. Osteoporosis, unspecified osteoporosis type, unspecified pathological fracture presence  Chronic, stable.  Continues to follow with Endocrinology. Due to update Dexa along with mammogram; will order together.  - DX Hip/Pelvis/Spine; Future    7. Visit for screening mammogram  - MA Screen Bilateral w/González; Future    8. Colon cancer screening  FIT testing completed on 10/5/23.    9. Primary aldosteronism (H24)  As above.  - spironolactone (ALDACTONE) 50 MG tablet; Take 1 tablet (50 mg) by mouth daily  Dispense: 90 tablet; Refill: 0    10. BMI 39.0-39.9,adult  As above.  - tirzepatide-Weight Management (ZEPBOUND) 2.5 MG/0.5ML prefilled pen; Inject 0.5 mLs (2.5 mg) Subcutaneous every 7 days for 28 days  Dispense: 2 mL; Refill: 0  - tirzepatide-Weight Management (ZEPBOUND) 5 MG/0.5ML prefilled pen; Inject 0.5 mLs (5 mg) Subcutaneous every 7  "days for 28 days  Dispense: 2 mL; Refill: 0  - tirzepatide-Weight Management (ZEPBOUND) 7.5 MG/0.5ML prefilled pen; Inject 0.5 mLs (7.5 mg) Subcutaneous every 7 days  Dispense: 6 mL; Refill: 1    11. Need for diphtheria-tetanus-pertussis (Tdap) vaccine  Will receive at our pharmacy today.  - Tdap, tetanus-diptheria-acell pertussis, (BOOSTRIX) 5-2.5-18.5 LF-MCG/0.5 DONNA injection; Inject 0.5 mLs into the muscle once for 1 dose  Dispense: 0.5 mL; Refill: 0    12. Elevated glucose  Add A1c onto labs being collected due to a slightly high glucose level as seen on 12/6/23 (Endocrinology labs).  - Hemoglobin A1c; Future  - Hemoglobin A1c    13. Encounter for therapeutic drug monitoring  Will monitor Vitamin D level after reduction of dose.  - Vitamin D Total; Future  - Vitamin D Total    14. Body mass index (BMI) 39.0-39.9, adult  See above.  - Vitamin D Total; Future  - Vitamin D Total    15. Other vitamin B12 deficiency anemia  Refilled syringe/needles for self administration of B12 injections (monthly).  Will obtain B12 level for monitoring.  - Syringe Luer Slip 3 ML MISC; 1 each every 30 days  Dispense: 25 each; Refill: 1      Patient has been advised of split billing requirements and indicates understanding: Yes      COUNSELING:  Reviewed preventive health counseling, as reflected in patient instructions      BMI:   Estimated body mass index is 39.31 kg/m  as calculated from the following:    Height as of this encounter: 1.626 m (5' 4\").    Weight as of this encounter: 103.9 kg (229 lb).   Weight management plan: see plan.      She reports that she has never smoked. She has never used smokeless tobacco.      Appropriate preventive services were discussed with this patient, including applicable screening as appropriate for fall prevention, nutrition, physical activity, Tobacco-use cessation, weight loss and cognition.  Checklist reviewing preventive services available has been given to the patient.    Reviewed " patients plan of care and provided an AVS. The Intermediate Care Plan ( asthma action plan, low back pain action plan, and migraine action plan) for Ness meets the Care Plan requirement. This Care Plan has been established and reviewed with the Patient.          Jennifer Maurice, [facility]    Identified Health Risks:  I have reviewed Opioid Use Disorder and Substance Use Disorder risk factors and made any needed referrals. She is at risk for lack of exercise and has been provided with information to increase physical activity for the benefit of her well-being.  The patient was counseled and encouraged to consider modifying their diet and eating habits. She was provided with information on recommended healthy diet options.  The patient was provided with written information regarding signs of hearing loss.  Information on urinary incontinence and treatment options given to patient.

## 2024-01-17 ENCOUNTER — OFFICE VISIT (OUTPATIENT)
Dept: FAMILY MEDICINE | Facility: OTHER | Age: 74
End: 2024-01-17
Attending: FAMILY MEDICINE
Payer: MEDICARE

## 2024-01-17 VITALS
OXYGEN SATURATION: 95 % | WEIGHT: 229 LBS | DIASTOLIC BLOOD PRESSURE: 86 MMHG | BODY MASS INDEX: 39.09 KG/M2 | HEART RATE: 49 BPM | RESPIRATION RATE: 16 BRPM | TEMPERATURE: 97.6 F | SYSTOLIC BLOOD PRESSURE: 126 MMHG | HEIGHT: 64 IN

## 2024-01-17 DIAGNOSIS — Z00.00 ENCOUNTER FOR MEDICARE ANNUAL WELLNESS EXAM: Primary | ICD-10-CM

## 2024-01-17 DIAGNOSIS — Z12.11 COLON CANCER SCREENING: ICD-10-CM

## 2024-01-17 DIAGNOSIS — R73.09 ELEVATED GLUCOSE: ICD-10-CM

## 2024-01-17 DIAGNOSIS — Z12.31 VISIT FOR SCREENING MAMMOGRAM: ICD-10-CM

## 2024-01-17 DIAGNOSIS — Z98.84 HX OF GASTRIC BYPASS: ICD-10-CM

## 2024-01-17 DIAGNOSIS — E26.09 PRIMARY HYPERALDOSTERONISM (H): ICD-10-CM

## 2024-01-17 DIAGNOSIS — Z23 NEED FOR DIPHTHERIA-TETANUS-PERTUSSIS (TDAP) VACCINE: ICD-10-CM

## 2024-01-17 DIAGNOSIS — M81.0 OSTEOPOROSIS, UNSPECIFIED OSTEOPOROSIS TYPE, UNSPECIFIED PATHOLOGICAL FRACTURE PRESENCE: ICD-10-CM

## 2024-01-17 DIAGNOSIS — E26.09 PRIMARY ALDOSTERONISM (H): ICD-10-CM

## 2024-01-17 DIAGNOSIS — D51.8 OTHER VITAMIN B12 DEFICIENCY ANEMIA: ICD-10-CM

## 2024-01-17 DIAGNOSIS — I10 ESSENTIAL HYPERTENSION, BENIGN: ICD-10-CM

## 2024-01-17 DIAGNOSIS — K91.2 POSTSURGICAL MALABSORPTION, NOT ELSEWHERE CLASSIFIED: ICD-10-CM

## 2024-01-17 DIAGNOSIS — Z51.81 ENCOUNTER FOR THERAPEUTIC DRUG MONITORING: ICD-10-CM

## 2024-01-17 DIAGNOSIS — E03.9 HYPOTHYROIDISM, UNSPECIFIED TYPE: ICD-10-CM

## 2024-01-17 LAB
ANION GAP SERPL CALCULATED.3IONS-SCNC: 10 MMOL/L (ref 7–15)
BUN SERPL-MCNC: 21.1 MG/DL (ref 8–23)
CALCIUM SERPL-MCNC: 10.7 MG/DL (ref 8.8–10.2)
CHLORIDE SERPL-SCNC: 101 MMOL/L (ref 98–107)
CHOLEST SERPL-MCNC: 197 MG/DL
CREAT SERPL-MCNC: 0.95 MG/DL (ref 0.51–0.95)
DEPRECATED HCO3 PLAS-SCNC: 26 MMOL/L (ref 22–29)
EGFRCR SERPLBLD CKD-EPI 2021: 63 ML/MIN/1.73M2
FASTING STATUS PATIENT QL REPORTED: NORMAL
GLUCOSE SERPL-MCNC: 105 MG/DL (ref 70–99)
HBA1C MFR BLD: 5.6 % (ref 4–6.2)
HDLC SERPL-MCNC: 93 MG/DL
HOLD SPECIMEN: NORMAL
IRON SERPL-MCNC: 37 UG/DL (ref 37–145)
LDLC SERPL CALC-MCNC: 85 MG/DL
NONHDLC SERPL-MCNC: 104 MG/DL
POTASSIUM SERPL-SCNC: 5 MMOL/L (ref 3.4–5.3)
SODIUM SERPL-SCNC: 137 MMOL/L (ref 135–145)
TRIGL SERPL-MCNC: 93 MG/DL
VIT B12 SERPL-MCNC: 645 PG/ML (ref 232–1245)

## 2024-01-17 PROCEDURE — 83036 HEMOGLOBIN GLYCOSYLATED A1C: CPT | Mod: ZL | Performed by: FAMILY MEDICINE

## 2024-01-17 PROCEDURE — 83540 ASSAY OF IRON: CPT | Mod: ZL | Performed by: FAMILY MEDICINE

## 2024-01-17 PROCEDURE — 82607 VITAMIN B-12: CPT | Mod: ZL | Performed by: FAMILY MEDICINE

## 2024-01-17 PROCEDURE — 80048 BASIC METABOLIC PNL TOTAL CA: CPT | Mod: ZL | Performed by: FAMILY MEDICINE

## 2024-01-17 PROCEDURE — 80061 LIPID PANEL: CPT | Mod: ZL | Performed by: FAMILY MEDICINE

## 2024-01-17 PROCEDURE — 82306 VITAMIN D 25 HYDROXY: CPT | Mod: ZL | Performed by: FAMILY MEDICINE

## 2024-01-17 PROCEDURE — G0439 PPPS, SUBSEQ VISIT: HCPCS | Performed by: FAMILY MEDICINE

## 2024-01-17 PROCEDURE — 36415 COLL VENOUS BLD VENIPUNCTURE: CPT | Mod: ZL | Performed by: FAMILY MEDICINE

## 2024-01-17 PROCEDURE — 99214 OFFICE O/P EST MOD 30 MIN: CPT | Mod: 25 | Performed by: FAMILY MEDICINE

## 2024-01-17 RX ORDER — SPIRONOLACTONE 50 MG/1
50 TABLET, FILM COATED ORAL DAILY
Qty: 90 TABLET | Refills: 0 | Status: SHIPPED | OUTPATIENT
Start: 2024-01-17 | End: 2024-07-02

## 2024-01-17 RX ORDER — LEVOTHYROXINE SODIUM 50 UG/1
50 TABLET ORAL
Qty: 90 TABLET | Refills: 4 | Status: SHIPPED | OUTPATIENT
Start: 2024-01-17

## 2024-01-17 RX ORDER — LOSARTAN POTASSIUM 100 MG/1
100 TABLET ORAL DAILY
Qty: 90 TABLET | Refills: 4 | Status: SHIPPED | OUTPATIENT
Start: 2024-01-17

## 2024-01-17 RX ORDER — METOPROLOL SUCCINATE 100 MG/1
TABLET, EXTENDED RELEASE ORAL
Qty: 180 TABLET | Refills: 3 | Status: SHIPPED | OUTPATIENT
Start: 2024-01-17

## 2024-01-17 ASSESSMENT — PATIENT HEALTH QUESTIONNAIRE - PHQ9
SUM OF ALL RESPONSES TO PHQ QUESTIONS 1-9: 1
SUM OF ALL RESPONSES TO PHQ QUESTIONS 1-9: 1
10. IF YOU CHECKED OFF ANY PROBLEMS, HOW DIFFICULT HAVE THESE PROBLEMS MADE IT FOR YOU TO DO YOUR WORK, TAKE CARE OF THINGS AT HOME, OR GET ALONG WITH OTHER PEOPLE: NOT DIFFICULT AT ALL

## 2024-01-17 ASSESSMENT — PAIN SCALES - GENERAL: PAINLEVEL: NO PAIN (0)

## 2024-01-17 NOTE — PATIENT INSTRUCTIONS
"Patient Education   Personalized Prevention Plan  You are due for the preventive services outlined below.  Your care team is available to assist you in scheduling these services.  If you have already completed any of these items, please share that information with your care team to update in your medical record.  Health Maintenance Due   Topic Date Due     Pneumococcal Vaccine (1 of 2 - PCV) Never done     RSV VACCINE (Pregnancy & 60+) (1 - 1-dose 60+ series) Never done     Diptheria Tetanus Pertussis (DTAP/TDAP/TD) Vaccine (2 - Td or Tdap) 12/12/2022     Annual Wellness Visit  01/28/2023     Flu Vaccine (1) 09/01/2023     Colorectal Cancer Screening  09/21/2023     Mammogram  02/11/2024     Learning About Being Physically Active  What is physical activity?     Being physically active means doing any kind of activity that gets your body moving.  The types of physical activity that can help you get fit and stay healthy include:  Aerobic or \"cardio\" activities. These make your heart beat faster and make you breathe harder, such as brisk walking, riding a bike, or running. They strengthen your heart and lungs and build up your endurance.  Strength training activities. These make your muscles work against, or \"resist,\" something. Examples include lifting weights or doing push-ups. These activities help tone and strengthen your muscles and bones.  Stretches. These let you move your joints and muscles through their full range of motion. Stretching helps you be more flexible.  Reaching a balance between these three types of physical activity is important because each one contributes to your overall fitness.  What are the benefits of being active?  Being active is one of the best things you can do for your health. It helps you to:  Feel stronger and have more energy to do all the things you like to do.  Focus better at school or work.  Feel, think, and sleep better.  Reach and stay at a healthy weight.  Lose fat and build " "lean muscle.  Lower your risk for serious health problems, including diabetes, heart attack, high blood pressure, and some cancers.  Keep your heart, lungs, bones, muscles, and joints strong and healthy.  How can you make being active part of your life?  Start slowly. Make it your long-term goal to get at least 30 minutes of exercise on most days of the week. Walking is a good choice. You also may want to do other activities, such as running, swimming, cycling, or playing tennis or team sports.  Pick activities that you like--ones that make your heart beat faster, your muscles stronger, and your muscles and joints more flexible. If you find more than one thing you like doing, do them all. You don't have to do the same thing every day.  Get your heart pumping every day. Any activity that makes your heart beat faster and keeps it at that rate for a while counts.  Here are some great ways to get your heart beating faster:  Go for a brisk walk, run, or hike.  Go for a swim or bike ride.  Take an online exercise class or dance.  Play a game of touch football, basketball, or soccer.  Play tennis, pickleball, or racquetball.  Climb stairs.  Even some household chores can be aerobic. Just do them at a faster pace. Raking or mowing the lawn, sweeping the garage, and vacuuming and cleaning your home all can help get your heart rate up.  Strengthen your muscles during the week. You don't have to lift heavy weights or grow big, bulky muscles to get stronger. Doing a few simple activities that make your muscles work against, or \"resist,\" something can help you get stronger. Aim for at least twice a week.  For example, you can:  Do push-ups or sit-ups, which use your own body weight as resistance.  Lift weights or dumbbells or use stretch bands at home or in a gym or community center.  Stretch your muscles often. Stretching will help you as you become more active. It can help you stay flexible and loosen tight muscles. It can " "also help improve your balance and posture and can be a great way to relax.  Be sure to stretch the muscles you'll be using when you work out. It's best to warm your muscles slightly before you stretch them. Walk or do some other light aerobic activity for a few minutes. Then start stretching.  When you stretch your muscles:  Do it slowly. Stretching is not about going fast or making sudden movements.  Don't push or bounce during a stretch.  Hold each stretch for at least 15 to 30 seconds, if you can. You should feel a stretch in the muscle, but not pain.  Breathe out as you do the stretch. Then breathe in as you hold the stretch. Don't hold your breath.  If you're worried about how more activity might affect your health, have a checkup before you start. Follow any special advice your doctor gives you for getting a smart start.  Where can you learn more?  Go to https://www.AppTrigger.Par8o/patiented  Enter W332 in the search box to learn more about \"Learning About Being Physically Active.\"  Current as of: June 6, 2023               Content Version: 13.8    7186-8031 [employer].   Care instructions adapted under license by your healthcare professional. If you have questions about a medical condition or this instruction, always ask your healthcare professional. [employer] disclaims any warranty or liability for your use of this information.      Learning About Dietary Guidelines  What are the Dietary Guidelines for Americans?     Dietary Guidelines for Americans provide tips for eating well and staying healthy. This helps reduce the risk for long-term (chronic) diseases.  These guidelines recommend that you:  Eat and drink the right amount for you. The U.S. government's food guide is called MyPlate. It can help you make your own well-balanced eating plan.  Try to balance your eating with your activity. This helps you stay at a healthy weight.  Drink alcohol in moderation, if at all.  Limit " "foods high in salt, saturated fat, trans fat, and added sugar.  These guidelines are from the U.S. Department of Agriculture and the U.S. Department of Health and Human Services. They are updated every 5 years.  What is MyPlate?  MyPlate is the U.S. government's food guide. It can help you make your own well-balanced eating plan. A balanced eating plan means that you eat enough, but not too much, and that your food gives you the nutrients you need to stay healthy.  MyPlate focuses on eating plenty of whole grains, fruits, and vegetables, and on limiting fat and sugar. It is available online at www.ChooseMyPlate.gov.  How can you get started?  If you're trying to eat healthier, you can slowly change your eating habits over time. You don't have to make big changes all at once. Start by adding one or two healthy foods to your meals each day.  Grains  Choose whole-grain breads and cereals and whole-wheat pasta and whole-grain crackers.  Vegetables  Eat a variety of vegetables every day. They have lots of nutrients and are part of a heart-healthy diet.  Fruits  Eat a variety of fruits every day. Fruits contain lots of nutrients. Choose fresh fruit instead of fruit juice.  Protein foods  Choose fish and lean poultry more often. Eat red meat and fried meats less often. Dried beans, tofu, and nuts are also good sources of protein.  Dairy  Choose low-fat or fat-free products from this food group. If you have problems digesting milk, try eating cheese or yogurt instead.  Fats and oils  Limit fats and oils if you're trying to cut calories. Choose healthy fats when you cook. These include canola oil and olive oil.  Where can you learn more?  Go to https://www.healthNetuitive.net/patiented  Enter D676 in the search box to learn more about \"Learning About Dietary Guidelines.\"  Current as of: February 28, 2023               Content Version: 13.8    5584-6709 [employer].   Care instructions adapted under license by your " healthcare professional. If you have questions about a medical condition or this instruction, always ask your healthcare professional. Healthwise, [facility] disclaims any warranty or liability for your use of this information.      Hearing Loss: Care Instructions  Overview     Hearing loss is a sudden or slow decrease in how well you hear. It can range from slight to profound. Permanent hearing loss can occur with aging. It also can happen when you are exposed long-term to loud noise. Examples include listening to loud music, riding motorcycles, or being around other loud machines.  Hearing loss can affect your work and home life. It can make you feel lonely or depressed. You may feel that you have lost your independence. But hearing aids and other devices can help you hear better and feel connected to others.  Follow-up care is a key part of your treatment and safety. Be sure to make and go to all appointments, and call your doctor if you are having problems. It's also a good idea to know your test results and keep a list of the medicines you take.  How can you care for yourself at home?  Avoid loud noises whenever possible. This helps keep your hearing from getting worse.  Always wear hearing protection around loud noises.  Wear a hearing aid as directed.  A professional can help you pick a hearing aid that will work best for you.  You can also get hearing aids over the counter for mild to moderate hearing loss.  Have hearing tests as your doctor suggests. They can show whether your hearing has changed. Your hearing aid may need to be adjusted.  Use other devices as needed. These may include:  Telephone amplifiers and hearing aids that can connect to a television, stereo, radio, or microphone.  Devices that use lights or vibrations. These alert you to the doorbell, a ringing telephone, or a baby monitor.  Television closed-captioning. This shows the words at the bottom of the screen. Most new TVs can do  "this.  TTY (text telephone). This lets you type messages back and forth on the telephone instead of talking or listening. These devices are also called TDD. When messages are typed on the keyboard, they are sent over the phone line to a receiving TTY. The message is shown on a monitor.  Use text messaging, social media, and email if it is hard for you to communicate by telephone.  Try to learn a listening technique called speechreading. It is not lipreading. You pay attention to people's gestures, expressions, posture, and tone of voice. These clues can help you understand what a person is saying. Face the person you are talking to, and have them face you. Make sure the lighting is good. You need to see the other person's face clearly.  Think about counseling if you need help to adjust to your hearing loss.  When should you call for help?  Watch closely for changes in your health, and be sure to contact your doctor if:    You think your hearing is getting worse.     You have new symptoms, such as dizziness or nausea.   Where can you learn more?  Go to https://www.Ivantis.net/patiented  Enter R798 in the search box to learn more about \"Hearing Loss: Care Instructions.\"  Current as of: February 28, 2023               Content Version: 13.8    4997-8517 mAPPn.   Care instructions adapted under license by your healthcare professional. If you have questions about a medical condition or this instruction, always ask your healthcare professional. [employer] disclaims any warranty or liability for your use of this information.      Bladder Training: Care Instructions  Your Care Instructions     Bladder training is used to treat urge incontinence and stress incontinence. Urge incontinence means that the need to urinate comes on so fast that you can't get to a toilet in time. Stress incontinence means that you leak urine because of pressure on your bladder. For example, it may happen when you " laugh, cough, or lift something heavy.  Bladder training can increase how long you can wait before you have to urinate. It can also help your bladder hold more urine. And it can give you better control over the urge to urinate.  It is important to remember that bladder training takes a few weeks to a few months to make a difference. You may not see results right away, but don't give up.  Follow-up care is a key part of your treatment and safety. Be sure to make and go to all appointments, and call your doctor if you are having problems. It's also a good idea to know your test results and keep a list of the medicines you take.  How can you care for yourself at home?  Work with your doctor to come up with a bladder training program that is right for you. You may use one or more of the following methods.  Delayed urination  In the beginning, try to keep from urinating for 5 minutes after you first feel the need to go.  While you wait, take deep, slow breaths to relax. Kegel exercises can also help you delay the need to go to the bathroom.  After some practice, when you can easily wait 5 minutes to urinate, try to wait 10 minutes before you urinate.  Slowly increase the waiting period until you are able to control when you have to urinate.  Scheduled urination  Empty your bladder when you first wake up in the morning.  Schedule times throughout the day when you will urinate.  Start by going to the bathroom every hour, even if you don't need to go.  Slowly increase the time between trips to the bathroom.  When you have found a schedule that works well for you, keep doing it.  If you wake up during the night and have to urinate, do it. Apply your schedule to waking hours only.  Kegel exercises  These tighten and strengthen pelvic muscles, which can help you control the flow of urine. (If doing these exercises causes pain, stop doing them and talk with your doctor.) To do Kegel exercises:  Squeeze your muscles as if you  "were trying not to pass gas. Or squeeze your muscles as if you were stopping the flow of urine. Your belly, legs, and buttocks shouldn't move.  Hold the squeeze for 3 seconds, then relax for 5 to 10 seconds.  Start with 3 seconds, then add 1 second each week until you are able to squeeze for 10 seconds.  Repeat the exercise 10 times a session. Do 3 to 8 sessions a day.  When should you call for help?  Watch closely for changes in your health, and be sure to contact your doctor if:    Your incontinence is getting worse.     You do not get better as expected.   Where can you learn more?  Go to https://www.CuPcAkE & other things you bake.net/patiented  Enter V684 in the search box to learn more about \"Bladder Training: Care Instructions.\"  Current as of: February 28, 2023               Content Version: 13.8    7693-5816 [employer].   Care instructions adapted under license by your healthcare professional. If you have questions about a medical condition or this instruction, always ask your healthcare professional. Healthwise, [employer] disclaims any warranty or liability for your use of this information.         "

## 2024-01-17 NOTE — NURSING NOTE
"Chief Complaint   Patient presents with    Medicare Visit       Initial /86   Pulse (!) 49   Temp 97.6  F (36.4  C) (Tympanic)   Resp 16   Ht 1.626 m (5' 4\")   Wt 103.9 kg (229 lb)   LMP  (LMP Unknown)   SpO2 95%   BMI 39.31 kg/m   Estimated body mass index is 39.31 kg/m  as calculated from the following:    Height as of this encounter: 1.626 m (5' 4\").    Weight as of this encounter: 103.9 kg (229 lb).  Medication Review: complete    The next two questions are to help us understand your food security.  If you are feeling you need any assistance in this area, we have resources available to support you today.          1/15/2024   SDOH- Food Insecurity   Within the past 12 months, did you worry that your food would run out before you got money to buy more? N   Within the past 12 months, did the food you bought just not last and you didn t have money to get more? N         Health Care Directive:  Patient does not have a Health Care Directive or Living Will: Discussed advance care planning with patient; information given to patient to review.    Conchita Bautista LPN      "

## 2024-01-18 ENCOUNTER — TELEPHONE (OUTPATIENT)
Dept: FAMILY MEDICINE | Facility: OTHER | Age: 74
End: 2024-01-18
Payer: MEDICARE

## 2024-01-18 LAB — VIT D+METAB SERPL-MCNC: 69 NG/ML (ref 20–50)

## 2024-01-18 NOTE — TELEPHONE ENCOUNTER
After verifying pts name and date of birth with pt, pt notified that Dr. Maurice did send the medication Zepbound for weight management yesterday. Writer notified  it just might take time to get processed thru her insurance. Pt state she will try contacting the pharmacy again tomorrow.   Lynda Owusu LPN

## 2024-01-18 NOTE — TELEPHONE ENCOUNTER
Please send in the RX - Monjaro  to [facility].          Ciarra Umanzor on 1/18/2024 at 3:44 PM

## 2024-01-22 ENCOUNTER — TELEPHONE (OUTPATIENT)
Dept: FAMILY MEDICINE | Facility: OTHER | Age: 74
End: 2024-01-22
Payer: MEDICARE

## 2024-01-22 NOTE — TELEPHONE ENCOUNTER
We received a fax from [employer] Denying the RX PA Request that was submitted for Zepbound Soln Auto-injectors 7.5MG/0.5ML stating that this drug is used for weight loss purposes, which is one of the classes not covered under the Part D coverage.  It also says that they do not offer supplemental benefit that would cover this drug.    For an Expedited (Fast) Appeal,   Call [phone]    For a Standard Appeal,  Call [phone]  OR  Fax to [phone]  OR  Plan Website: [email or web address]  OR  Mail to:  [facility]  Attn: Medicare Pharmacy Appeals  [address] [address]  [place] [address]    I faxed the Denial/Appeal information to HIM/[facility]IS to be scanned into the chart.      Danita Culp on 1/22/2024 at 2:27 PM

## 2024-01-23 NOTE — TELEPHONE ENCOUNTER
Noted; medication list changed to reflect NOT being on the medication.  Jennifer Maurice, DO on 1/23/2024 at 10:23 AM

## 2024-01-29 ENCOUNTER — DOCUMENTATION ONLY (OUTPATIENT)
Dept: OTHER | Facility: CLINIC | Age: 74
End: 2024-01-29
Payer: MEDICARE

## 2024-01-31 ENCOUNTER — MYC REFILL (OUTPATIENT)
Dept: FAMILY MEDICINE | Facility: OTHER | Age: 74
End: 2024-01-31
Payer: MEDICARE

## 2024-01-31 DIAGNOSIS — M81.0 OSTEOPOROSIS, UNSPECIFIED OSTEOPOROSIS TYPE, UNSPECIFIED PATHOLOGICAL FRACTURE PRESENCE: ICD-10-CM

## 2024-01-31 DIAGNOSIS — F34.1 DYSTHYMIC DISORDER: Primary | ICD-10-CM

## 2024-02-01 RX ORDER — ALENDRONATE SODIUM 70 MG/1
70 TABLET ORAL
Qty: 12 TABLET | Refills: 3 | Status: SHIPPED | OUTPATIENT
Start: 2024-02-01

## 2024-02-01 RX ORDER — ARIPIPRAZOLE 5 MG/1
5 TABLET ORAL DAILY
Qty: 30 TABLET | Refills: 5 | Status: SHIPPED | OUTPATIENT
Start: 2024-02-01

## 2024-02-01 RX ORDER — VITAMIN B COMPLEX
TABLET ORAL
COMMUNITY
Start: 2024-02-01

## 2024-02-01 NOTE — TELEPHONE ENCOUNTER
Requested Prescriptions   Pending Prescriptions Disp Refills    ARIPiprazole (ABILIFY) 5 MG tablet       Sig: Take 1 tablet (5 mg) by mouth daily   Historical      alendronate (FOSAMAX) 70 MG tablet 12 tablet 4     Sig: Take 1 tablet (70 mg) by mouth every 7 days   Historical    Last Office Visit:              1/17/24   Future Office visit:             Next 5 appointments (look out 90 days)      Apr 17, 2024  9:00 AM  (Arrive by 8:45 AM)  SHORT with Jennifer Maurice DO  [facility] ([facility] ) [address]  [place] [address]  [phone]          Per LOV note:    Return in about 53 weeks (around 1/22/2025) for Annual Wellness Visit.    Unable to complete prescription refill per RN Medication Refill Policy.     Shawna Prince RN .............. 2/1/2024  11:33 AM

## 2024-02-02 DIAGNOSIS — E03.9 HYPOTHYROIDISM, UNSPECIFIED TYPE: ICD-10-CM

## 2024-02-02 RX ORDER — LEVOTHYROXINE SODIUM 25 UG/1
TABLET ORAL
Qty: 90 TABLET | Refills: 4 | OUTPATIENT
Start: 2024-02-02

## 2024-02-02 NOTE — TELEPHONE ENCOUNTER
[id number] [place] sent Rx request for the following:      Requested Prescriptions   Pending Prescriptions Disp Refills    levothyroxine (SYNTHROID/LEVOTHROID) 25 MCG tablet [Pharmacy Med Name: LEVOTHYROXINE 25 MCG TABLET] 90 tablet 4     Sig: TAKE 1 TABLET BY MOUTH EVERY DAY       Thyroid Protocol Failed - 2/2/2024 10:47 AM        Failed - Normal TSH on file in past 12 months     Recent Labs   Lab Test 05/15/23  1929   TSH 4.27*        Last Prescription Date:     levothyroxine (SYNTHROID/LEVOTHROID) 50 MCG tablet 90 tablet 4 1/17/2024 -- No   Sig - Route: Take 1 tablet (50 mcg) by mouth daily before breakfast Take 50 mcg by mouth daily before breakfast - Oral     [id number] - [place], [place] - [address]     Pharmacy notified. Shawna Prince RN .............. 2/2/2024  10:50 AM

## 2024-02-03 DIAGNOSIS — Z98.84 S/P GASTRIC BYPASS: ICD-10-CM

## 2024-02-05 DIAGNOSIS — E66.9 OBESITY, UNSPECIFIED: ICD-10-CM

## 2024-02-05 NOTE — TELEPHONE ENCOUNTER
[facility] Rx request for the following:      Requested Prescriptions   Pending Prescriptions Disp Refills    cyanocobalamin (CYANOCOBALAMIN) 1000 MCG/ML injection [Pharmacy Med Name: CYANOCOBALAMIN 1,000 MCG/ML VL] 3 mL 4     Sig: INJECT 1 ML (1,000 MCG) INTO THE MUSCLE EVERY 30 DAYS       Vitamin Supplements Protocol Failed - 2/5/2024 11:59 AM        Failed - Medication indicated for associated diagnosis     The medication is prescribed for one or more of the following conditions:     Vitamin deficiency           Last Prescription Date:   1/29/23  Last Fill Qty/Refills:         3 mL, R-4     Last Office Visit:              1/17/24 Kaylene   Future Office visit:           4/17/24 Kaylene    Unable to complete prescription refill per RN Medication Refill Policy.     Stacey Niño RN on 2/5/2024 at 12:02 PM

## 2024-02-06 ENCOUNTER — TELEPHONE (OUTPATIENT)
Dept: FAMILY MEDICINE | Facility: OTHER | Age: 74
End: 2024-02-06
Payer: MEDICARE

## 2024-02-06 RX ORDER — CYANOCOBALAMIN 1000 UG/ML
1 INJECTION, SOLUTION INTRAMUSCULAR; SUBCUTANEOUS
Qty: 3 ML | Refills: 4 | Status: SHIPPED | OUTPATIENT
Start: 2024-02-06

## 2024-02-06 RX ORDER — TOPIRAMATE 25 MG/1
TABLET, FILM COATED ORAL
Qty: 180 TABLET | Refills: 0 | OUTPATIENT
Start: 2024-02-06

## 2024-02-06 NOTE — TELEPHONE ENCOUNTER
Talked to patient and she states at the last office visit, she was prescribed zepbound. She still has not been able to get it due to her insurance. She received a form that she can fill out for an appeal but it also requires the provider to sign. She will fill out her part and drop it off for Dr. Maurice to sign.  Conchita Bautista LPN  2/6/2024  11:18 AM

## 2024-02-06 NOTE — TELEPHONE ENCOUNTER
[id number] [place] sent Rx request for the followin/01/24 1155 Discontinue Jennifer Maurice, DO Reason:Med Rec(No AVS / No eCancel)   24 0732 Reorder Interface, Eprescribing To Order:[id number]     Outpatient Medication Detail     Disp Refills Start End ENID   topiramate (TOPAMAX) 25 MG tablet (Discontinued) 180 tablet 0 11/10/2023 2024 No   Sig: TAKE 1 TABLET BY MOUTH TWICE A DAY DO NOT CRUSH       Pharmacy notified. Shawna Prince RN .............. 2024  3:05 PM

## 2024-02-06 NOTE — TELEPHONE ENCOUNTER
Patient called to talk to Dr. Maurice or her nurse regarding Zepbound.    Romana Rao on 2/6/2024 at 10:05 AM

## 2024-02-12 ENCOUNTER — TELEPHONE (OUTPATIENT)
Dept: FAMILY MEDICINE | Facility: OTHER | Age: 74
End: 2024-02-12
Payer: MEDICARE

## 2024-02-12 ENCOUNTER — HOSPITAL ENCOUNTER (OUTPATIENT)
Dept: BONE DENSITY | Facility: OTHER | Age: 74
Discharge: HOME | End: 2024-02-12
Attending: FAMILY MEDICINE
Payer: MEDICARE

## 2024-02-12 ENCOUNTER — HOSPITAL ENCOUNTER (OUTPATIENT)
Dept: MAMMOGRAPHY | Facility: OTHER | Age: 74
Discharge: HOME | End: 2024-02-12
Attending: FAMILY MEDICINE
Payer: MEDICARE

## 2024-02-12 DIAGNOSIS — M81.0 OSTEOPOROSIS, UNSPECIFIED OSTEOPOROSIS TYPE, UNSPECIFIED PATHOLOGICAL FRACTURE PRESENCE: ICD-10-CM

## 2024-02-12 DIAGNOSIS — Z00.00 ENCOUNTER FOR MEDICARE ANNUAL WELLNESS EXAM: ICD-10-CM

## 2024-02-12 DIAGNOSIS — Z12.31 VISIT FOR SCREENING MAMMOGRAM: ICD-10-CM

## 2024-02-12 PROCEDURE — 77080 DXA BONE DENSITY AXIAL: CPT

## 2024-02-12 PROCEDURE — 77063 BREAST TOMOSYNTHESIS BI: CPT

## 2024-02-12 NOTE — TELEPHONE ENCOUNTER
Pt stopped by [address] desk to  a note from SMS regarding Zepbound and why she needs it? I could not find it in the patient .    Cheryl Garcia on 2/12/2024 at 2:29 PM

## 2024-02-13 NOTE — TELEPHONE ENCOUNTER
Left message to call back....................  2/13/2024   9:07 AM  Please see [employer] message that Dr. Maurice sent to her on 2/09/24. Patient has not read the message.   Conchita Bautista LPN  2/13/2024  9:07 AM

## 2024-03-07 DIAGNOSIS — I10 ESSENTIAL HYPERTENSION, BENIGN: ICD-10-CM

## 2024-03-12 RX ORDER — METOPROLOL SUCCINATE 100 MG/1
TABLET, EXTENDED RELEASE ORAL
Qty: 180 TABLET | Refills: 3 | OUTPATIENT
Start: 2024-03-12

## 2024-03-12 NOTE — TELEPHONE ENCOUNTER
[facility] requesting:    metoprolol succinate ER (TOPROL XL) 100 MG 24 hr tablet 180 tablet 3 1/17/2024 -- No   Sig: TAKE 2 TABLETS BY MOUTH EVERY DAY   Sent to pharmacy as: Metoprolol Succinate  MG Oral Tablet Extended Release 24 Hour (TOPROL XL)   Class: E-Prescribe   Order: [id number]   E-Prescribing Status: Receipt confirmed by pharmacy (1/17/2024  9:06 AM CST)     Spoke with pharmacy staff, they will transfer refills from [id number].    Tayler Beltran RN on 3/12/2024 at 4:05 PM

## 2024-03-18 ENCOUNTER — TELEPHONE (OUTPATIENT)
Dept: FAMILY MEDICINE | Facility: OTHER | Age: 74
End: 2024-03-18
Payer: MEDICARE

## 2024-03-18 DIAGNOSIS — Z98.84 S/P GASTRIC BYPASS: ICD-10-CM

## 2024-03-18 DIAGNOSIS — F34.1 DYSTHYMIC DISORDER: ICD-10-CM

## 2024-03-18 DIAGNOSIS — I10 ESSENTIAL HYPERTENSION, BENIGN: ICD-10-CM

## 2024-03-18 NOTE — TELEPHONE ENCOUNTER
Patient has never been prescribed Ozempic. Per chart review, Zepbound was denied by insurance on 1/22/24 and Mounjaro was denied 2/21/24 as drugs for weight loss purposes are not covered under the Part D coverage.    LOV:1/17/24    Per LOV note:Would like to discuss weight.  Has tried diets including lower calorie diets in the past.  Not effective.  Blood sugar was slightly high with last labs; has not had A1c completed.  Wondering about GLP1 agonists.    Does Pt need to be seen?    Unclear what to esther up as starting prescription.    Unable to complete prescription refill per RN Medication Refill Policy.     Shawna Prince RN .............. 3/18/2024  10:30 AM

## 2024-03-18 NOTE — TELEPHONE ENCOUNTER
Reason for call: Medication or medication refill    Name of medication requested: ozempic    How many days of medication do you have left? NEW    What pharmacy do you use? [facility]     Preferred method for responding to this message: Telephone Call    Phone number patient can be reached at: Cell number on file:    Telephone Information:   Mobile [phone]       If we cannot reach you directly, may we leave a detailed response at the number you provided? Yes                    Vi Penn on 3/18/2024 at 9:43 AM

## 2024-03-19 NOTE — TELEPHONE ENCOUNTER
Left a detailed message per request below that Ozempic was sent into her pharmacy.  Conchita Bautista LPN  3/19/2024  9:58 AM

## 2024-03-19 NOTE — TELEPHONE ENCOUNTER
Will attempt Rx for Ozempic as requested, but will likely not be covered by insurance.  Jennifer Maurice, DO on 3/19/2024 at 9:52 AM

## 2024-06-26 DIAGNOSIS — E26.09 PRIMARY ALDOSTERONISM (H): ICD-10-CM

## 2024-07-01 NOTE — TELEPHONE ENCOUNTER
[id number] sent Rx request for the following:      Requested Prescriptions   Pending Prescriptions Disp Refills    spironolactone (ALDACTONE) 50 MG tablet [Pharmacy Med Name: SPIRONOLACTONE 50MG TABLET] 90 tablet 0     Sig: TAKE 1 TABLET (50 MG) BY MOUTH DAILY     Last Prescription Date:   1/17/24  Last Fill Qty/Refills:         90, R-0    Last Office Visit:              1/17/24 (physical)   Future Office visit:           None    Per LOV note:  5. Primary hyperaldosteronism (H24)  Chronic, continues to follow with Endocrinology.    Return in about 53 weeks (around 1/22/2025) for Annual Wellness Visit.     Unable to complete prescription refill per RN Medication Refill Policy. Shawna Prince RN .............. 7/1/2024  3:58 PM

## 2024-07-02 RX ORDER — SPIRONOLACTONE 50 MG/1
50 TABLET, FILM COATED ORAL DAILY
Qty: 90 TABLET | Refills: 4 | Status: SHIPPED | OUTPATIENT
Start: 2024-07-02

## 2024-11-15 DIAGNOSIS — D51.8 OTHER VITAMIN B12 DEFICIENCY ANEMIA: ICD-10-CM

## 2024-11-15 NOTE — TELEPHONE ENCOUNTER
Reason for call: Medication or medication refill    Have you contacted your pharmacy regarding this refill? NO    Name of medication requested: syringe needles for B 12 shots    How many days of medication do you have left? ZERO    What pharmacy do you use? [facility] [place]    Preferred method for responding to this message: Telephone Call    Phone number patient can be reached at: Cell number on file:    Telephone Information:   Mobile [phone]       If we cannot reach you directly, may we leave a detailed response at the number you provided? Yes      Vi Penn on 11/15/2024 at 2:56 PM

## 2024-11-15 NOTE — TELEPHONE ENCOUNTER
Last Office Visit:              24 Kaylene   Future Office visit:           none    Requested Prescriptions   Pending Prescriptions Disp Refills    Syringe Luer Slip 3 ML MISC 25 each 1     Si each every 30 days.       There is no refill protocol information for this order      Last Prescription Date:   24  Last Fill Qty/Refills:

## 2025-01-18 DIAGNOSIS — E03.9 HYPOTHYROIDISM, UNSPECIFIED TYPE: ICD-10-CM

## 2025-01-21 RX ORDER — LEVOTHYROXINE SODIUM 50 UG/1
50 TABLET ORAL
Qty: 90 TABLET | Refills: 4 | Status: SHIPPED | OUTPATIENT
Start: 2025-01-21

## 2025-01-21 NOTE — TELEPHONE ENCOUNTER
[id number] sent Rx request for the following:      Requested Prescriptions   Pending Prescriptions Disp Refills    levothyroxine (SYNTHROID/LEVOTHROID) 50 MCG tablet [Pharmacy Med Name: LEVOTHYROXINE 50MCG TABLET] 90 tablet 4     Sig: TAKE 1 TABLET (50 MCG) BY MOUTH DAILY BEFORE BREAKFAST       Thyroid Protocol Failed - 1/21/2025 10:13 AM        Failed - Recent (12 mo) or future (90 days) visit within the authorizing provider's specialty     The patient must have completed an in-person or virtual visit within the past 12 months or has a future visit scheduled within the next 90 days with the authorizing provider s specialty.  Urgent care and e-visits do not qualify as an office visit for this protocol.          Failed - Normal TSH on file in past 12 months     Recent Labs   Lab Test 05/15/23  1929   TSH 4.27*             Last Prescription Date:   1/17/24  Last Fill Qty/Refills:         90, R-4    Last Office Visit:              1/17/24   Future Office visit:           none    Will route to unit schedulers, patient is due for Medicare wellness visit.     Routing refill request to provider for review/approval because:  Labs out of range:  TSH    Tayler Beltran RN on 1/21/2025 at 10:17 AM

## 2025-01-25 DIAGNOSIS — I10 ESSENTIAL HYPERTENSION, BENIGN: ICD-10-CM

## 2025-01-27 RX ORDER — METOPROLOL SUCCINATE 100 MG/1
TABLET, EXTENDED RELEASE ORAL
Qty: 180 TABLET | Refills: 0 | Status: SHIPPED | OUTPATIENT
Start: 2025-01-27

## 2025-01-27 NOTE — TELEPHONE ENCOUNTER
[id number] sent Rx request for the following:      Requested Prescriptions   Pending Prescriptions Disp Refills    metoprolol succinate ER (TOPROL XL) 100 MG 24 hr tablet [Pharmacy Med Name: METOPROLOL SUCC 100MG ER TAB] 180 tablet 1     Sig: TAKE 2 TABLETS BY MOUTH EVERY DAY       Beta-Blockers Protocol Failed - 1/27/2025  9:22 AM        Failed - Most recent blood pressure under 140/90 in past 12 months     BP Readings from Last 3 Encounters:   01/17/24 126/86   06/30/23 106/72   06/01/23 110/68       No data recorded            Failed - Recent (12 mo) or future (90 days) visit within the authorizing provider's specialty     The patient must have completed an in-person or virtual visit within the past 12 months or has a future visit scheduled within the next 90 days with the authorizing provider s specialty.  Urgent care and e-visits do not qualify as an office visit for this protocol.       Last Prescription Date:   1/17/24  Last Fill Qty/Refills:         180, R-3    Last Office Visit:              1/17/24 (annual)   Future Office visit:             Next 5 appointments (look out 90 days)      Apr 08, 2025 2:40 PM  (Arrive by 2:25 PM)  Annual Wellness Visit with Jennifer Maurice DO  [facility] ([facility] [address]  [place] [id number] [phone]           Unable to complete prescription refill per RN Medication Refill Policy.     Enio Rg RN on 1/27/2025 at 9:23 AM

## 2025-04-29 ENCOUNTER — HOSPITAL ENCOUNTER (EMERGENCY)
Facility: OTHER | Age: 75
Discharge: OTHER INSTITUTION | End: 2025-04-30
Attending: STUDENT IN AN ORGANIZED HEALTH CARE EDUCATION/TRAINING PROGRAM
Payer: MEDICARE

## 2025-04-29 ENCOUNTER — APPOINTMENT (OUTPATIENT)
Dept: CT IMAGING | Facility: OTHER | Age: 75
End: 2025-04-29
Attending: STUDENT IN AN ORGANIZED HEALTH CARE EDUCATION/TRAINING PROGRAM
Payer: MEDICARE

## 2025-04-29 DIAGNOSIS — I10 ESSENTIAL HYPERTENSION, BENIGN: ICD-10-CM

## 2025-04-29 DIAGNOSIS — R10.10 PAIN OF UPPER ABDOMEN: ICD-10-CM

## 2025-04-29 DIAGNOSIS — I21.4 NSTEMI (NON-ST ELEVATED MYOCARDIAL INFARCTION) (H): Primary | ICD-10-CM

## 2025-04-29 LAB
ALBUMIN SERPL BCG-MCNC: 4.2 G/DL (ref 3.5–5.2)
ALBUMIN UR-MCNC: NEGATIVE MG/DL
ALP SERPL-CCNC: 91 U/L (ref 40–150)
ALT SERPL W P-5'-P-CCNC: 19 U/L (ref 0–50)
ANION GAP SERPL CALCULATED.3IONS-SCNC: 19 MMOL/L (ref 7–15)
APPEARANCE UR: ABNORMAL
AST SERPL W P-5'-P-CCNC: 22 U/L (ref 0–45)
BACTERIA #/AREA URNS HPF: ABNORMAL /HPF
BASOPHILS # BLD AUTO: 0.1 10E3/UL (ref 0–0.2)
BASOPHILS NFR BLD AUTO: 0 %
BILIRUB SERPL-MCNC: 0.4 MG/DL
BILIRUB UR QL STRIP: NEGATIVE
BUN SERPL-MCNC: 15.4 MG/DL (ref 8–23)
CALCIUM SERPL-MCNC: 11.5 MG/DL (ref 8.8–10.4)
CHLORIDE SERPL-SCNC: 94 MMOL/L (ref 98–107)
COLOR UR AUTO: ABNORMAL
CREAT SERPL-MCNC: 0.77 MG/DL (ref 0.51–0.95)
EGFRCR SERPLBLD CKD-EPI 2021: 81 ML/MIN/1.73M2
EOSINOPHIL # BLD AUTO: 0 10E3/UL (ref 0–0.7)
EOSINOPHIL NFR BLD AUTO: 0 %
ERYTHROCYTE [DISTWIDTH] IN BLOOD BY AUTOMATED COUNT: 12.9 % (ref 10–15)
FLUAV RNA SPEC QL NAA+PROBE: NEGATIVE
FLUBV RNA RESP QL NAA+PROBE: NEGATIVE
GLUCOSE SERPL-MCNC: 171 MG/DL (ref 70–99)
GLUCOSE UR STRIP-MCNC: 30 MG/DL
HCO3 SERPL-SCNC: 18 MMOL/L (ref 22–29)
HCT VFR BLD AUTO: 38.3 % (ref 35–47)
HGB BLD-MCNC: 13 G/DL (ref 11.7–15.7)
HGB UR QL STRIP: NEGATIVE
HOLD SPECIMEN: NORMAL
HOLD SPECIMEN: NORMAL
IMM GRANULOCYTES # BLD: 0.1 10E3/UL
IMM GRANULOCYTES NFR BLD: 0 %
KETONES UR STRIP-MCNC: 40 MG/DL
LACTATE SERPL-SCNC: 2.1 MMOL/L (ref 0.7–2)
LACTATE SERPL-SCNC: 4.9 MMOL/L (ref 0.7–2)
LEUKOCYTE ESTERASE UR QL STRIP: ABNORMAL
LIPASE SERPL-CCNC: 110 U/L (ref 13–60)
LYMPHOCYTES # BLD AUTO: 1.1 10E3/UL (ref 0.8–5.3)
LYMPHOCYTES NFR BLD AUTO: 7 %
MCH RBC QN AUTO: 28.7 PG (ref 26.5–33)
MCHC RBC AUTO-ENTMCNC: 33.9 G/DL (ref 31.5–36.5)
MCV RBC AUTO: 85 FL (ref 78–100)
MONOCYTES # BLD AUTO: 0.4 10E3/UL (ref 0–1.3)
MONOCYTES NFR BLD AUTO: 2 %
MUCOUS THREADS #/AREA URNS LPF: PRESENT /LPF
NEUTROPHILS # BLD AUTO: 15.6 10E3/UL (ref 1.6–8.3)
NEUTROPHILS NFR BLD AUTO: 91 %
NITRATE UR QL: NEGATIVE
NRBC # BLD AUTO: 0 10E3/UL
NRBC BLD AUTO-RTO: 0 /100
PH UR STRIP: 6.5 [PH] (ref 5–9)
PLATELET # BLD AUTO: 429 10E3/UL (ref 150–450)
POTASSIUM SERPL-SCNC: 4.3 MMOL/L (ref 3.4–5.3)
PROCALCITONIN SERPL IA-MCNC: 0.03 NG/ML
PROT SERPL-MCNC: 7.7 G/DL (ref 6.4–8.3)
RBC # BLD AUTO: 4.53 10E6/UL (ref 3.8–5.2)
RBC URINE: 4 /HPF
RSV RNA SPEC NAA+PROBE: NEGATIVE
SARS-COV-2 RNA RESP QL NAA+PROBE: NEGATIVE
SODIUM SERPL-SCNC: 131 MMOL/L (ref 135–145)
SP GR UR STRIP: 1.01 (ref 1–1.03)
TROPONIN T SERPL HS-MCNC: 159 NG/L
TROPONIN T SERPL HS-MCNC: 69 NG/L
UROBILINOGEN UR STRIP-MCNC: NORMAL MG/DL
WBC # BLD AUTO: 17.2 10E3/UL (ref 4–11)
WBC URINE: 23 /HPF
YEAST #/AREA URNS HPF: ABNORMAL /HPF

## 2025-04-29 PROCEDURE — 96376 TX/PRO/DX INJ SAME DRUG ADON: CPT | Mod: XU | Performed by: STUDENT IN AN ORGANIZED HEALTH CARE EDUCATION/TRAINING PROGRAM

## 2025-04-29 PROCEDURE — 84484 ASSAY OF TROPONIN QUANT: CPT | Mod: 91 | Performed by: STUDENT IN AN ORGANIZED HEALTH CARE EDUCATION/TRAINING PROGRAM

## 2025-04-29 PROCEDURE — 36415 COLL VENOUS BLD VENIPUNCTURE: CPT | Performed by: STUDENT IN AN ORGANIZED HEALTH CARE EDUCATION/TRAINING PROGRAM

## 2025-04-29 PROCEDURE — 250N000011 HC RX IP 250 OP 636: Performed by: STUDENT IN AN ORGANIZED HEALTH CARE EDUCATION/TRAINING PROGRAM

## 2025-04-29 PROCEDURE — 96361 HYDRATE IV INFUSION ADD-ON: CPT | Performed by: STUDENT IN AN ORGANIZED HEALTH CARE EDUCATION/TRAINING PROGRAM

## 2025-04-29 PROCEDURE — 96375 TX/PRO/DX INJ NEW DRUG ADDON: CPT | Mod: XU | Performed by: STUDENT IN AN ORGANIZED HEALTH CARE EDUCATION/TRAINING PROGRAM

## 2025-04-29 PROCEDURE — 84145 PROCALCITONIN (PCT): CPT | Performed by: STUDENT IN AN ORGANIZED HEALTH CARE EDUCATION/TRAINING PROGRAM

## 2025-04-29 PROCEDURE — 85025 COMPLETE CBC W/AUTO DIFF WBC: CPT | Performed by: STUDENT IN AN ORGANIZED HEALTH CARE EDUCATION/TRAINING PROGRAM

## 2025-04-29 PROCEDURE — 250N000013 HC RX MED GY IP 250 OP 250 PS 637: Performed by: STUDENT IN AN ORGANIZED HEALTH CARE EDUCATION/TRAINING PROGRAM

## 2025-04-29 PROCEDURE — 83690 ASSAY OF LIPASE: CPT | Performed by: STUDENT IN AN ORGANIZED HEALTH CARE EDUCATION/TRAINING PROGRAM

## 2025-04-29 PROCEDURE — 99285 EMERGENCY DEPT VISIT HI MDM: CPT | Performed by: STUDENT IN AN ORGANIZED HEALTH CARE EDUCATION/TRAINING PROGRAM

## 2025-04-29 PROCEDURE — 80053 COMPREHEN METABOLIC PANEL: CPT | Performed by: STUDENT IN AN ORGANIZED HEALTH CARE EDUCATION/TRAINING PROGRAM

## 2025-04-29 PROCEDURE — 83605 ASSAY OF LACTIC ACID: CPT | Performed by: STUDENT IN AN ORGANIZED HEALTH CARE EDUCATION/TRAINING PROGRAM

## 2025-04-29 PROCEDURE — 81001 URINALYSIS AUTO W/SCOPE: CPT | Performed by: STUDENT IN AN ORGANIZED HEALTH CARE EDUCATION/TRAINING PROGRAM

## 2025-04-29 PROCEDURE — 74174 CTA ABD&PLVS W/CONTRAST: CPT | Mod: 26 | Performed by: RADIOLOGY

## 2025-04-29 PROCEDURE — 96366 THER/PROPH/DIAG IV INF ADDON: CPT | Performed by: STUDENT IN AN ORGANIZED HEALTH CARE EDUCATION/TRAINING PROGRAM

## 2025-04-29 PROCEDURE — 96365 THER/PROPH/DIAG IV INF INIT: CPT | Mod: XU | Performed by: STUDENT IN AN ORGANIZED HEALTH CARE EDUCATION/TRAINING PROGRAM

## 2025-04-29 PROCEDURE — 99285 EMERGENCY DEPT VISIT HI MDM: CPT | Mod: 25 | Performed by: STUDENT IN AN ORGANIZED HEALTH CARE EDUCATION/TRAINING PROGRAM

## 2025-04-29 PROCEDURE — 93005 ELECTROCARDIOGRAM TRACING: CPT | Performed by: STUDENT IN AN ORGANIZED HEALTH CARE EDUCATION/TRAINING PROGRAM

## 2025-04-29 PROCEDURE — 87186 SC STD MICRODIL/AGAR DIL: CPT | Performed by: STUDENT IN AN ORGANIZED HEALTH CARE EDUCATION/TRAINING PROGRAM

## 2025-04-29 PROCEDURE — 74174 CTA ABD&PLVS W/CONTRAST: CPT

## 2025-04-29 PROCEDURE — 258N000003 HC RX IP 258 OP 636: Performed by: STUDENT IN AN ORGANIZED HEALTH CARE EDUCATION/TRAINING PROGRAM

## 2025-04-29 PROCEDURE — 250N000009 HC RX 250: Performed by: STUDENT IN AN ORGANIZED HEALTH CARE EDUCATION/TRAINING PROGRAM

## 2025-04-29 PROCEDURE — 87637 SARSCOV2&INF A&B&RSV AMP PRB: CPT | Performed by: STUDENT IN AN ORGANIZED HEALTH CARE EDUCATION/TRAINING PROGRAM

## 2025-04-29 PROCEDURE — 93010 ELECTROCARDIOGRAM REPORT: CPT | Performed by: INTERNAL MEDICINE

## 2025-04-29 RX ORDER — LIDOCAINE HYDROCHLORIDE 20 MG/ML
5 SOLUTION OROPHARYNGEAL ONCE
Status: COMPLETED | OUTPATIENT
Start: 2025-04-29 | End: 2025-04-30

## 2025-04-29 RX ORDER — METOPROLOL SUCCINATE 100 MG/1
200 TABLET, EXTENDED RELEASE ORAL
Qty: 180 TABLET | Refills: 4 | Status: SHIPPED | OUTPATIENT
Start: 2025-04-29

## 2025-04-29 RX ORDER — HEPARIN SODIUM 10000 [USP'U]/100ML
0-5000 INJECTION, SOLUTION INTRAVENOUS CONTINUOUS
Status: DISCONTINUED | OUTPATIENT
Start: 2025-04-29 | End: 2025-04-30 | Stop reason: HOSPADM

## 2025-04-29 RX ORDER — CLOPIDOGREL BISULFATE 75 MG/1
600 TABLET ORAL ONCE
Status: COMPLETED | OUTPATIENT
Start: 2025-04-29 | End: 2025-04-29

## 2025-04-29 RX ORDER — NITROGLYCERIN 0.4 MG/1
0.4 TABLET SUBLINGUAL ONCE
Status: COMPLETED | OUTPATIENT
Start: 2025-04-29 | End: 2025-04-29

## 2025-04-29 RX ORDER — IOPAMIDOL 755 MG/ML
80 INJECTION, SOLUTION INTRAVASCULAR ONCE
Status: COMPLETED | OUTPATIENT
Start: 2025-04-29 | End: 2025-04-29

## 2025-04-29 RX ORDER — HYDROMORPHONE HYDROCHLORIDE 1 MG/ML
0.5 INJECTION, SOLUTION INTRAMUSCULAR; INTRAVENOUS; SUBCUTANEOUS
Status: COMPLETED | OUTPATIENT
Start: 2025-04-29 | End: 2025-04-29

## 2025-04-29 RX ORDER — HEPARIN SODIUM 10000 [USP'U]/100ML
0-5000 INJECTION, SOLUTION INTRAVENOUS CONTINUOUS
Status: DISCONTINUED | OUTPATIENT
Start: 2025-04-29 | End: 2025-04-29

## 2025-04-29 RX ORDER — ASPIRIN 81 MG/1
324 TABLET, CHEWABLE ORAL ONCE
Status: COMPLETED | OUTPATIENT
Start: 2025-04-29 | End: 2025-04-29

## 2025-04-29 RX ORDER — MAGNESIUM HYDROXIDE/ALUMINUM HYDROXICE/SIMETHICONE 120; 1200; 1200 MG/30ML; MG/30ML; MG/30ML
15 SUSPENSION ORAL ONCE
Status: COMPLETED | OUTPATIENT
Start: 2025-04-29 | End: 2025-04-30

## 2025-04-29 RX ORDER — ONDANSETRON 2 MG/ML
4 INJECTION INTRAMUSCULAR; INTRAVENOUS EVERY 30 MIN PRN
Status: DISCONTINUED | OUTPATIENT
Start: 2025-04-29 | End: 2025-04-30 | Stop reason: HOSPADM

## 2025-04-29 RX ORDER — HYDRALAZINE HYDROCHLORIDE 20 MG/ML
5 INJECTION INTRAMUSCULAR; INTRAVENOUS ONCE
Status: COMPLETED | OUTPATIENT
Start: 2025-04-29 | End: 2025-04-29

## 2025-04-29 RX ADMIN — CLOPIDOGREL BISULFATE 600 MG: 75 TABLET ORAL at 22:55

## 2025-04-29 RX ADMIN — SODIUM CHLORIDE 60 ML: 9 INJECTION, SOLUTION INTRAVENOUS at 21:08

## 2025-04-29 RX ADMIN — NITROGLYCERIN 0.4 MG: 0.4 TABLET SUBLINGUAL at 22:53

## 2025-04-29 RX ADMIN — HYDRALAZINE HYDROCHLORIDE 5 MG: 20 INJECTION INTRAMUSCULAR; INTRAVENOUS at 22:13

## 2025-04-29 RX ADMIN — ONDANSETRON 4 MG: 2 INJECTION INTRAMUSCULAR; INTRAVENOUS at 19:46

## 2025-04-29 RX ADMIN — ASPIRIN 324 MG: 81 TABLET, CHEWABLE ORAL at 22:55

## 2025-04-29 RX ADMIN — ONDANSETRON 4 MG: 2 INJECTION INTRAMUSCULAR; INTRAVENOUS at 21:50

## 2025-04-29 RX ADMIN — PROCHLORPERAZINE EDISYLATE 5 MG: 5 INJECTION INTRAMUSCULAR; INTRAVENOUS at 23:14

## 2025-04-29 RX ADMIN — SODIUM CHLORIDE 1000 ML: 9 INJECTION, SOLUTION INTRAVENOUS at 19:49

## 2025-04-29 RX ADMIN — IOPAMIDOL 80 ML: 755 INJECTION, SOLUTION INTRAVENOUS at 21:08

## 2025-04-29 RX ADMIN — HEPARIN SODIUM 1050 UNITS/HR: 10000 INJECTION, SOLUTION INTRAVENOUS at 22:59

## 2025-04-29 RX ADMIN — HYDROMORPHONE HYDROCHLORIDE 0.5 MG: 1 INJECTION, SOLUTION INTRAMUSCULAR; INTRAVENOUS; SUBCUTANEOUS at 19:46

## 2025-04-29 ASSESSMENT — ACTIVITIES OF DAILY LIVING (ADL)
ADLS_ACUITY_SCORE: 52

## 2025-04-29 ASSESSMENT — COLUMBIA-SUICIDE SEVERITY RATING SCALE - C-SSRS
2. HAVE YOU ACTUALLY HAD ANY THOUGHTS OF KILLING YOURSELF IN THE PAST MONTH?: NO
6. HAVE YOU EVER DONE ANYTHING, STARTED TO DO ANYTHING, OR PREPARED TO DO ANYTHING TO END YOUR LIFE?: NO
1. IN THE PAST MONTH, HAVE YOU WISHED YOU WERE DEAD OR WISHED YOU COULD GO TO SLEEP AND NOT WAKE UP?: NO

## 2025-04-29 NOTE — TELEPHONE ENCOUNTER
[id number] sent Rx request for the following:      Requested Prescriptions   Pending Prescriptions Disp Refills    metoprolol succinate ER (TOPROL XL) 100 MG 24 hr tablet [Pharmacy Med Name: METOPROLOL SUCC 100MG ER TAB] 180 tablet 0     Sig: TAKE 2 TABLETS BY MOUTH EVERY DAY       Beta-Blockers Protocol Failed - 4/29/2025  8:51 AM        Failed - Most recent blood pressure under 140/90 in past 12 months     BP Readings from Last 3 Encounters:   01/17/24 126/86   06/30/23 106/72   06/01/23 110/68       No data recorded            Failed - Recent (12 mo) or future (90 days) visit within the authorizing provider's specialty     The patient must have completed an in-person or virtual visit within the past 12 months or has a future visit scheduled within the next 90 days with the authorizing provider s specialty.  Urgent care and e-visits do not qualify as an office visit for this protocol.       Essential hypertension, benign [I10]        Last Prescription Date:   1/27/25  Last Fill Qty/Refills:         180, R-0    Last Office Visit:              today   Future Office visit:             Next 5 appointments (look out 90 days)      Apr 29, 2025 2:40 PM  (Arrive by 2:25 PM)  Annual Wellness Visit with Jennifer Maurice DO  [facility] ([facility]) [address]  [place] [id number] [phone]       Unable to complete prescription refill per RN Medication Refill Policy.     Enio Rg RN on 4/29/2025 at 8:53 AM

## 2025-04-30 ENCOUNTER — TRANSFERRED RECORDS (OUTPATIENT)
Dept: HEALTH INFORMATION MANAGEMENT | Facility: OTHER | Age: 75
End: 2025-04-30
Payer: MEDICARE

## 2025-04-30 VITALS
HEIGHT: 64 IN | HEART RATE: 90 BPM | DIASTOLIC BLOOD PRESSURE: 103 MMHG | OXYGEN SATURATION: 95 % | BODY MASS INDEX: 33.26 KG/M2 | TEMPERATURE: 98.7 F | WEIGHT: 194.8 LBS | SYSTOLIC BLOOD PRESSURE: 164 MMHG | RESPIRATION RATE: 23 BRPM

## 2025-04-30 LAB
APTT PPP: 111 SECONDS (ref 22–38)
APTT PPP: 41 SECONDS (ref 22–38)
BASOPHILS # BLD MANUAL: 0 10E3/UL (ref 0–0.2)
BASOPHILS NFR BLD MANUAL: 0 %
EOSINOPHIL # BLD MANUAL: 0 10E3/UL (ref 0–0.7)
EOSINOPHIL NFR BLD MANUAL: 0 %
ERYTHROCYTE [DISTWIDTH] IN BLOOD BY AUTOMATED COUNT: 12.9 % (ref 10–15)
ERYTHROCYTE [DISTWIDTH] IN BLOOD BY AUTOMATED COUNT: 13.1 % (ref 10–15)
HCT VFR BLD AUTO: 37.8 % (ref 35–47)
HCT VFR BLD AUTO: 38.1 % (ref 35–47)
HGB BLD-MCNC: 12.7 G/DL (ref 11.7–15.7)
HGB BLD-MCNC: 12.7 G/DL (ref 11.7–15.7)
LYMPHOCYTES # BLD MANUAL: 1.4 10E3/UL (ref 0.8–5.3)
LYMPHOCYTES NFR BLD MANUAL: 8 %
MCH RBC QN AUTO: 28.9 PG (ref 26.5–33)
MCH RBC QN AUTO: 29.5 PG (ref 26.5–33)
MCHC RBC AUTO-ENTMCNC: 33.3 G/DL (ref 31.5–36.5)
MCHC RBC AUTO-ENTMCNC: 33.6 G/DL (ref 31.5–36.5)
MCV RBC AUTO: 86 FL (ref 78–100)
MCV RBC AUTO: 88 FL (ref 78–100)
MONOCYTES # BLD MANUAL: 0 10E3/UL (ref 0–1.3)
MONOCYTES NFR BLD MANUAL: 0 %
NEUTROPHILS # BLD MANUAL: 16.7 10E3/UL (ref 1.6–8.3)
NEUTROPHILS NFR BLD MANUAL: 92 %
PLAT MORPH BLD: NORMAL
PLATELET # BLD AUTO: 395 10E3/UL (ref 150–450)
PLATELET # BLD AUTO: 410 10E3/UL (ref 150–450)
PROCALCITONIN SERPL IA-MCNC: 0.03 NG/ML
RBC # BLD AUTO: 4.31 10E6/UL (ref 3.8–5.2)
RBC # BLD AUTO: 4.39 10E6/UL (ref 3.8–5.2)
RBC MORPH BLD: NORMAL
TROPONIN T SERPL HS-MCNC: 306 NG/L
TROPONIN T SERPL HS-MCNC: 591 NG/L
WBC # BLD AUTO: 18.1 10E3/UL (ref 4–11)
WBC # BLD AUTO: 18.1 10E3/UL (ref 4–11)

## 2025-04-30 PROCEDURE — 93005 ELECTROCARDIOGRAM TRACING: CPT | Mod: 76 | Performed by: STUDENT IN AN ORGANIZED HEALTH CARE EDUCATION/TRAINING PROGRAM

## 2025-04-30 PROCEDURE — 85007 BL SMEAR W/DIFF WBC COUNT: CPT | Performed by: FAMILY MEDICINE

## 2025-04-30 PROCEDURE — 96375 TX/PRO/DX INJ NEW DRUG ADDON: CPT | Performed by: STUDENT IN AN ORGANIZED HEALTH CARE EDUCATION/TRAINING PROGRAM

## 2025-04-30 PROCEDURE — 36415 COLL VENOUS BLD VENIPUNCTURE: CPT | Performed by: STUDENT IN AN ORGANIZED HEALTH CARE EDUCATION/TRAINING PROGRAM

## 2025-04-30 PROCEDURE — 85730 THROMBOPLASTIN TIME PARTIAL: CPT | Performed by: STUDENT IN AN ORGANIZED HEALTH CARE EDUCATION/TRAINING PROGRAM

## 2025-04-30 PROCEDURE — 250N000009 HC RX 250: Performed by: STUDENT IN AN ORGANIZED HEALTH CARE EDUCATION/TRAINING PROGRAM

## 2025-04-30 PROCEDURE — 250N000013 HC RX MED GY IP 250 OP 250 PS 637: Performed by: STUDENT IN AN ORGANIZED HEALTH CARE EDUCATION/TRAINING PROGRAM

## 2025-04-30 PROCEDURE — 96365 THER/PROPH/DIAG IV INF INIT: CPT | Mod: XS | Performed by: STUDENT IN AN ORGANIZED HEALTH CARE EDUCATION/TRAINING PROGRAM

## 2025-04-30 PROCEDURE — 93010 ELECTROCARDIOGRAM REPORT: CPT | Performed by: INTERNAL MEDICINE

## 2025-04-30 PROCEDURE — 250N000013 HC RX MED GY IP 250 OP 250 PS 637: Performed by: FAMILY MEDICINE

## 2025-04-30 PROCEDURE — 96366 THER/PROPH/DIAG IV INF ADDON: CPT | Mod: XS | Performed by: STUDENT IN AN ORGANIZED HEALTH CARE EDUCATION/TRAINING PROGRAM

## 2025-04-30 PROCEDURE — 84484 ASSAY OF TROPONIN QUANT: CPT | Performed by: STUDENT IN AN ORGANIZED HEALTH CARE EDUCATION/TRAINING PROGRAM

## 2025-04-30 PROCEDURE — 87040 BLOOD CULTURE FOR BACTERIA: CPT | Performed by: FAMILY MEDICINE

## 2025-04-30 PROCEDURE — 36415 COLL VENOUS BLD VENIPUNCTURE: CPT | Performed by: FAMILY MEDICINE

## 2025-04-30 PROCEDURE — 93005 ELECTROCARDIOGRAM TRACING: CPT | Performed by: STUDENT IN AN ORGANIZED HEALTH CARE EDUCATION/TRAINING PROGRAM

## 2025-04-30 PROCEDURE — 85027 COMPLETE CBC AUTOMATED: CPT | Performed by: STUDENT IN AN ORGANIZED HEALTH CARE EDUCATION/TRAINING PROGRAM

## 2025-04-30 PROCEDURE — 250N000011 HC RX IP 250 OP 636: Performed by: STUDENT IN AN ORGANIZED HEALTH CARE EDUCATION/TRAINING PROGRAM

## 2025-04-30 PROCEDURE — 250N000011 HC RX IP 250 OP 636: Performed by: FAMILY MEDICINE

## 2025-04-30 RX ORDER — LOSARTAN POTASSIUM 25 MG/1
1 TABLET ORAL DAILY
COMMUNITY
Start: 2025-04-21

## 2025-04-30 RX ORDER — LOSARTAN POTASSIUM 25 MG/1
25 TABLET ORAL DAILY
Status: DISCONTINUED | OUTPATIENT
Start: 2025-04-30 | End: 2025-04-30 | Stop reason: HOSPADM

## 2025-04-30 RX ORDER — PANTOPRAZOLE SODIUM 40 MG/1
40 TABLET, DELAYED RELEASE ORAL DAILY
Status: DISCONTINUED | OUTPATIENT
Start: 2025-04-30 | End: 2025-04-30

## 2025-04-30 RX ORDER — SPIRONOLACTONE 25 MG/1
50 TABLET ORAL
Status: DISCONTINUED | OUTPATIENT
Start: 2025-04-30 | End: 2025-04-30 | Stop reason: HOSPADM

## 2025-04-30 RX ORDER — AMLODIPINE BESYLATE 5 MG/1
5 TABLET ORAL DAILY
Status: DISCONTINUED | OUTPATIENT
Start: 2025-04-30 | End: 2025-04-30

## 2025-04-30 RX ORDER — FUROSEMIDE 20 MG/1
1 TABLET ORAL 2 TIMES DAILY
COMMUNITY
Start: 2024-10-11

## 2025-04-30 RX ORDER — METOPROLOL SUCCINATE 100 MG/1
200 TABLET, EXTENDED RELEASE ORAL DAILY
Status: DISCONTINUED | OUTPATIENT
Start: 2025-04-30 | End: 2025-04-30 | Stop reason: HOSPADM

## 2025-04-30 RX ORDER — BIOTIN 5 MG
1 TABLET ORAL DAILY
COMMUNITY

## 2025-04-30 RX ORDER — LOSARTAN POTASSIUM 50 MG/1
100 TABLET ORAL DAILY
Status: DISCONTINUED | OUTPATIENT
Start: 2025-04-30 | End: 2025-04-30

## 2025-04-30 RX ORDER — SPIRONOLACTONE 50 MG/1
50 TABLET, FILM COATED ORAL 2 TIMES DAILY
COMMUNITY

## 2025-04-30 RX ORDER — LEVOTHYROXINE SODIUM 50 UG/1
50 TABLET ORAL DAILY
Status: DISCONTINUED | OUTPATIENT
Start: 2025-04-30 | End: 2025-04-30 | Stop reason: HOSPADM

## 2025-04-30 RX ORDER — MAGNESIUM GLUCONATE 27 MG(500)
500 TABLET ORAL 2 TIMES DAILY
COMMUNITY

## 2025-04-30 RX ORDER — FOLIC ACID 0.4 MG
400 TABLET ORAL DAILY
COMMUNITY

## 2025-04-30 RX ORDER — SPIRONOLACTONE 25 MG/1
50 TABLET ORAL DAILY
Status: DISCONTINUED | OUTPATIENT
Start: 2025-04-30 | End: 2025-04-30

## 2025-04-30 RX ORDER — ARIPIPRAZOLE 5 MG/1
5 TABLET ORAL DAILY
Status: DISCONTINUED | OUTPATIENT
Start: 2025-04-30 | End: 2025-04-30 | Stop reason: HOSPADM

## 2025-04-30 RX ORDER — PIPERACILLIN SODIUM, TAZOBACTAM SODIUM 3; .375 G/15ML; G/15ML
3.38 INJECTION, POWDER, LYOPHILIZED, FOR SOLUTION INTRAVENOUS EVERY 6 HOURS
Status: DISCONTINUED | OUTPATIENT
Start: 2025-04-30 | End: 2025-04-30 | Stop reason: HOSPADM

## 2025-04-30 RX ADMIN — ARIPIPRAZOLE 5 MG: 5 TABLET ORAL at 10:50

## 2025-04-30 RX ADMIN — PIPERACILLIN AND TAZOBACTAM 3.38 G: 3; .375 INJECTION, POWDER, LYOPHILIZED, FOR SOLUTION INTRAVENOUS at 14:19

## 2025-04-30 RX ADMIN — LEVOTHYROXINE SODIUM 50 MCG: 0.05 TABLET ORAL at 10:50

## 2025-04-30 RX ADMIN — METOPROLOL SUCCINATE ER TABLETS 200 MG: 100 TABLET, FILM COATED, EXTENDED RELEASE ORAL at 10:50

## 2025-04-30 RX ADMIN — PANTOPRAZOLE SODIUM 40 MG: 40 INJECTION, POWDER, FOR SOLUTION INTRAVENOUS at 00:04

## 2025-04-30 RX ADMIN — LIDOCAINE HYDROCHLORIDE 5 ML: 20 SOLUTION ORAL at 00:04

## 2025-04-30 RX ADMIN — SPIRONOLACTONE 50 MG: 25 TABLET ORAL at 10:49

## 2025-04-30 RX ADMIN — LOSARTAN POTASSIUM 25 MG: 25 TABLET, FILM COATED ORAL at 10:49

## 2025-04-30 RX ADMIN — ALUMINUM HYDROXIDE, MAGNESIUM HYDROXIDE, AND DIMETHICONE 15 ML: 200; 20; 200 SUSPENSION ORAL at 00:04

## 2025-04-30 RX ADMIN — PIPERACILLIN AND TAZOBACTAM 3.38 G: 3; .375 INJECTION, POWDER, LYOPHILIZED, FOR SOLUTION INTRAVENOUS at 09:44

## 2025-04-30 ASSESSMENT — ACTIVITIES OF DAILY LIVING (ADL)
ADLS_ACUITY_SCORE: 52

## 2025-04-30 NOTE — ED PROVIDER NOTES
History     Chief Complaint   Patient presents with    Flu Symptoms    Nausea     HPI  Ness Bales is a 74 year old female who I am assuming care of at shift change.  Patient presented with abdominal pain.  Labs and imaging show possible duodenitis versus gastritis.  However, troponins have been trending up and are elevated.  Plan is to transfer to [facility] when bed is available.    Allergies:  Allergies   Allergen Reactions    Chlorthalidone Other (See Comments)     Nightmares/Caused hyponatremia       Problem List:    Patient Active Problem List    Diagnosis Date Noted    Atrial fibrillation with RVR (H) 05/16/2023     Priority: Medium    Acute cystitis without hematuria 05/15/2023     Priority: Medium    Bacterial sepsis (H) 05/15/2023     Priority: Medium    Hyperkalemia 05/15/2023     Priority: Medium    Hyponatremia 05/15/2023     Priority: Medium    Hypercalcemia 05/15/2023     Priority: Medium    UTI (urinary tract infection) 05/15/2023     Priority: Medium    Thrombophilia 05/15/2023     Priority: Medium    Hx of gastric bypass 05/15/2023     Priority: Medium    Sepsis without acute organ dysfunction, due to unspecified organism (H) 05/15/2023     Priority: Medium    S/P total knee replacement, left 01/20/2023     Priority: Medium    Primary osteoarthritis of left knee 11/21/2022     Priority: Medium     Formatting of this note might be different from the original.  IMO Update 10/11      Osteoporosis, unspecified osteoporosis type, unspecified pathological fracture presence 10/06/2022     Priority: Medium    Prediabetes 04/11/2022     Priority: Medium    Insulin resistance syndrome 02/21/2022     Priority: Medium    Morbid obesity (H) 01/28/2022     Priority: Medium    Primary aldosteronism 11/19/2021     Priority: Medium    Dysthymic disorder 01/16/2018     Priority: Medium    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     Priority: Medium    Vitamin B12 deficiency 12/09/2013     Priority: Medium     Vitamin D deficiency 11/04/2011     Priority: Medium    Contusion of lower leg 02/25/2011     Priority: Medium    Hypothyroidism 11/12/2010     Priority: Medium    Degeneration of lumbar or lumbosacral intervertebral disc 11/05/2010     Priority: Medium    Spinal stenosis, lumbar 11/05/2010     Priority: Medium    Esophageal reflux 06/25/2010     Priority: Medium    Sacroiliac joint dysfunction 06/25/2010     Priority: Medium    Hypercholesterolemia 04/20/2009     Priority: Medium    Sleep apnea 02/12/2009     Priority: Medium    Disorder of bone and cartilage 10/29/2007     Priority: Medium    Enthesopathy of hip region 08/22/2006     Priority: Medium    Other vitamin B12 deficiency anemia 09/24/2001     Priority: Medium    Essential hypertension, benign 09/24/2001     Priority: Medium    Urge incontinence of urine 09/24/2001     Priority: Medium    Obesity 09/24/2001     Priority: Medium        Past Medical History:    Past Medical History:   Diagnosis Date    Benign essential hypertension     Depression     Hyperaldosteronism     Hypothyroidism     Obesity     Personal history of other medical treatment (CODE)     Personal history of other medical treatment (CODE)     Vitamin B12 deficiency (non anemic)        Past Surgical History:    Past Surgical History:   Procedure Laterality Date    ARTHROPLASTY KNEE Right 2008    BIOPSY Bilateral 10/18/2021    Adrenal Vein; Hyperaldosteronism    BIOPSY BREAST      Incisional x2 benign breast biopsies    FRACTURE SURGERY Left     open treatment of Humeral Fx w/ prosthesis    INJECT BOTOX N/A 09/22/2020    [facility]; [place], [place]), Dr. Joe Cruz    LAPAROSCOPIC BYPASS GASTRIC      1983,Stomach stapling complicated by incision rupture, then repaired wi second surgery    OTHER SURGICAL HISTORY      1983,63838.0,SKIN/SUBCUTANEOUS SURGERY,repaired incision from stomach stapling    OTHER SURGICAL HISTORY      [id number],ANESTHESIA ALERT,No problems with anaesthesia. ?  "      Family History:    Family History   Problem Relation Age of Onset    Other - See Comments Mother         macular degeneration/Psychiatric illness,takes antidepressant    Heart Disease Father 40        Heart Disease,MI    Diabetes Father         Diabetes    Other - See Comments Son         Psychiatric illness,psychotic episode this year smoking marijuana, ephedra use ? bipolar    Other - See Comments Sister         Psychiatric illness,depression    Other - See Comments Brother         Psychiatric illness,depression       Social History:  Marital Status:   [2]  Social History     Tobacco Use    Smoking status: Never    Smokeless tobacco: Never   Vaping Use    Vaping status: Never Used   Substance Use Topics    Alcohol use: No     Comment: very little     Drug use: No        Medications:    apixaban ANTICOAGULANT (ELIQUIS) 5 MG tablet  ARIPiprazole (ABILIFY) 5 MG tablet  Coenzyme Q10 (CO Q 10) 100 MG CAPS  cyanocobalamin (CYANOCOBALAMIN) 1000 MCG/ML injection  DULoxetine (CYMBALTA) 60 MG capsule  ferrous sulfate (FEROSUL) 325 (65 Fe) MG tablet  folic acid (FOLVITE) 400 MCG tablet  furosemide (LASIX) 20 MG tablet  Krill Oil 1000 MG CAPS  levothyroxine (SYNTHROID/LEVOTHROID) 50 MCG tablet  losartan (COZAAR) 25 MG tablet  MAG-G 500 (27 Mg) MG tablet  metoprolol succinate ER (TOPROL XL) 100 MG 24 hr tablet  modafinil (PROVIGIL) 200 MG tablet  spironolactone (ALDACTONE) 50 MG tablet  Syringe Luer Slip 3 ML MISC  acetaminophen (TYLENOL) 325 MG tablet          Review of Systems    Physical Exam   BP: (!) 199/86  Pulse: 62  Temp: 97.1  F (36.2  C)  Resp: 20  Height: 162.6 cm (5' 4\")  Weight: 86.2 kg (190 lb)  SpO2: 100 %      Physical Exam    ED Course     ED Course as of 04/30/25 1357   Tue Apr 29, 2025 1951 Lactic Acid(!!): 4.9  CTA A/P ordered due to severe out of proportion pain and significantly elevated LA and concern for alternative etiology beyond covid.   2032 EKG personally interpreted as: Sinus rhythm " rate 60 with PACs, no evidence of acute ischemia with no ST abnormalities, LBBB, I/II/V4-6 TWI, hyperacute T waves.    2307 No improvement of nausea s/p nitroglycerin   2346 Spoke with Dr Jose E Yung [facility] hospitalist. No beds available and placed on wait list.    Wed Apr 30, 2025   0145 Repeat EKG performed due to significantly uptrending troponin.  EKG personally interpreted showing new subtle ST deviation in leads V3-5.  Still consistent with NSTEMI.  Will plan to continue to trend EKG.   0631 Troponin T, High Sensitivity(!!): 591  69>159>306<591   0633 EKG with worsening STD leads V3-5   0646 Reviewed EKG and troponins with [facility] cardiology who feels this is unlikely nstemi type1 and is comfortable keeping patient on transfer wait list as cath lab not indicated currently.   0855 Cannot go to [place]. Still on Waitlist for [facility].    0941 Patient asking for home AM meds. Will order. Still awaiting bed for transfer     Procedures              Critical Care time:  none     IV Antibiotics given and/or elevated Lactate of 4.9 and no sepsis note found - Delete this reminder and enter the sepsis note or '.edcms' before signing chart.>>>None         Results for orders placed or performed during the hospital encounter of 04/29/25 (from the past 24 hours)   Influenza A/B, RSV and SARS-CoV2 PCR (COVID-19) Nose    Specimen: Nose; Swab   Result Value Ref Range    Influenza A PCR Negative Negative    Influenza B PCR Negative Negative    RSV PCR Negative Negative    SARS CoV2 PCR Negative Negative    Narrative    Testing was performed using the Xpert Xpress CoV2/Flu/RSV Assay on the [employer] GeneXpert Instrument. This test should be ordered for the detection of SARS-CoV2, influenza, and RSV viruses in individuals with signs and symptoms of respiratory tract infection. This test is for in vitro diagnostic use under the [place] FDA for laboratories certified under CLIA to perform high or moderate complexity testing. This  test has been [place] FDA cleared. A negative result does not rule out the presence of PCR inhibitors in the specimen or target RNA in concentration below the limit of detection for the assay. If only one viral target is positive but coinfection with multiple targets is suspected, the sample should be re-tested with another FDA cleared, approved, or authorized test, if coninfection would change clinical management. This test was validated by the [facility]. These laboratories are certified under the Clinical Laboratory Improvement Amendments of 1988 (CLIA-88) as qualified to perfom high complexity laboratory testing.   CBC with platelets differential    Narrative    The following orders were created for panel order CBC with platelets differential.  Procedure                               Abnormality         Status                     ---------                               -----------         ------                     CBC with platelets and ...[[id number]]  Abnormal            Final result                 Please view results for these tests on the individual orders.   Comprehensive metabolic panel   Result Value Ref Range    Sodium 131 (L) 135 - 145 mmol/L    Potassium 4.3 3.4 - 5.3 mmol/L    Carbon Dioxide (CO2) 18 (L) 22 - 29 mmol/L    Anion Gap 19 (H) 7 - 15 mmol/L    Urea Nitrogen 15.4 8.0 - 23.0 mg/dL    Creatinine 0.77 0.51 - 0.95 mg/dL    GFR Estimate 81 >60 mL/min/1.73m2    Calcium 11.5 (H) 8.8 - 10.4 mg/dL    Chloride 94 (L) 98 - 107 mmol/L    Glucose 171 (H) 70 - 99 mg/dL    Alkaline Phosphatase 91 40 - 150 U/L    AST 22 0 - 45 U/L    ALT 19 0 - 50 U/L    Protein Total 7.7 6.4 - 8.3 g/dL    Albumin 4.2 3.5 - 5.2 g/dL    Bilirubin Total 0.4 <=1.2 mg/dL   Lipase   Result Value Ref Range    Lipase 110 (H) 13 - 60 U/L   Lactic acid whole blood with 1x repeat in 2 hr when >2   Result Value Ref Range    Lactic Acid, Initial 4.9 (HH) 0.7 - 2.0 mmol/L   Troponin T, High Sensitivity   Result Value  Ref Range    Troponin T, High Sensitivity 69 (H) <=14 ng/L   [place] Draw    Narrative    The following orders were created for panel order [place] Draw.  Procedure                               Abnormality         Status                     ---------                               -----------         ------                     Extra Blue Top Tube[[id number]]                             Final result               Extra Red Top Tube[[id number]]                              Final result                 Please view results for these tests on the individual orders.   CBC with platelets and differential   Result Value Ref Range    WBC Count 17.2 (H) 4.0 - 11.0 10e3/uL    RBC Count 4.53 3.80 - 5.20 10e6/uL    Hemoglobin 13.0 11.7 - 15.7 g/dL    Hematocrit 38.3 35.0 - 47.0 %    MCV 85 78 - 100 fL    MCH 28.7 26.5 - 33.0 pg    MCHC 33.9 31.5 - 36.5 g/dL    RDW 12.9 10.0 - 15.0 %    Platelet Count 429 150 - 450 10e3/uL    % Neutrophils 91 %    % Lymphocytes 7 %    % Monocytes 2 %    % Eosinophils 0 %    % Basophils 0 %    % Immature Granulocytes 0 %    NRBCs per 100 WBC 0 <1 /100    Absolute Neutrophils 15.6 (H) 1.6 - 8.3 10e3/uL    Absolute Lymphocytes 1.1 0.8 - 5.3 10e3/uL    Absolute Monocytes 0.4 0.0 - 1.3 10e3/uL    Absolute Eosinophils 0.0 0.0 - 0.7 10e3/uL    Absolute Basophils 0.1 0.0 - 0.2 10e3/uL    Absolute Immature Granulocytes 0.1 <=0.4 10e3/uL    Absolute NRBCs 0.0 10e3/uL   Extra Blue Top Tube   Result Value Ref Range    Hold Specimen x    Extra Red Top Tube   Result Value Ref Range    Hold Specimen x    Procalcitonin   Result Value Ref Range    Procalcitonin 0.03 <0.50 ng/mL   Procalcitonin   Result Value Ref Range    Procalcitonin 0.03 <0.50 ng/mL   CTA Abdomen Pelvis with Contrast    Narrative    EXAM: CTA ABDOMEN PELVIS WITH CONTRAST  LOCATION: [facility]  DATE: 4/29/2025    INDICATION: Diffuse upper abdominal pain. Severe and out of proportion. Elevated lactate.  COMPARISON: CT  abdomen pelvis 5/15/2023.  TECHNIQUE: CT angiogram abdomen pelvis during arterial phase of injection of IV contrast. 2D and 3D MIP reconstructions were performed by the CT technologist. Dose reduction techniques were used.  CONTRAST: 80mL Isovue 370    FINDINGS:    ANGIOGRAM ABDOMEN/PELVIS: No evidence of mesenteric ischemia. No abdominal aortic aneurysm. Celiac artery and its branches are patent. Superior mesenteric artery and its visualized branches are patent. Dual right renal arteries and single left renal   artery are patent. Inferior mesenteric artery and its visualized branches are patent. Bilateral iliofemoral arteries are patent.    LOWER CHEST: Bibasilar scarring and/or atelectasis. Mild wall thickening of the distal esophagus.    HEPATOBILIARY: No calcified gallstones or biliary ductal dilation. No liver lesions.    PANCREAS: Normal.    SPLEEN: Multiple punctate calcified granulomas. No splenomegaly.    ADRENAL GLANDS: Normal.    KIDNEYS/BLADDER: Multifocal left renal cortical scars. Benign right renal cysts, which do not require follow-up. No urinary calculi or hydronephrosis. Normal bladder.    BOWEL: Status post Cherry-en-Y gastric bypass. Small amount of high density debris within the gastric pouch. No evidence of bowel obstruction. Mild wall thickening of the pylorus and proximal duodenal bulb with slight haziness of the surrounding fat. No   definite discrete peptic ulcer is identified. No fluid collections or free intraperitoneal air. Remainder of the bowel is normal, without evidence of ischemia. Normal appendix. Few scattered noninflamed sigmoid colonic diverticuli.    LYMPH NODES: No enlarged lymph nodes.    PELVIC ORGANS: Normal.    MUSCULOSKELETAL: Indwelling sacral stimulator with attached battery pack within the left gluteal subcutaneous fat. Multiple small subcutaneous gluteal calcifications. Small fat-containing periumbilical hernia. Diffusely demineralized bones. Multilevel   degenerative  changes of the spine. Grade 1 anterolisthesis of L5 on S1. No acute bony abnormality.      Impression    IMPRESSION:    1.  No evidence of mesenteric ischemia.    2.  Thickening of the pylorus and proximal duodenal bulb, which could reflect gastritis, duodenitis, and/or underlying peptic ulcer disease. No evidence of perforation.    3.  Mild wall thickening of the distal esophagus, possibly reflecting esophagitis.    4.  Status post Cherry-en-Y gastric bypass, without evidence of bowel obstruction.    5.  Sigmoid colonic diverticulosis. No inflamed diverticuli.   UA with Microscopic reflex to Culture    Specimen: Urine, Clean Catch   Result Value Ref Range    Color Urine Light Yellow Colorless, Straw, Light Yellow, Yellow    Appearance Urine Slightly Cloudy (A) Clear    Glucose Urine 30 (A) Negative mg/dL    Bilirubin Urine Negative Negative    Ketones Urine 40 (A) Negative mg/dL    Specific Gravity Urine 1.013 1.000 - 1.030    Blood Urine Negative Negative    pH Urine 6.5 5.0 - 9.0    Protein Albumin Urine Negative Negative mg/dL    Urobilinogen Urine Normal Normal mg/dL    Nitrite Urine Negative Negative    Leukocyte Esterase Urine Moderate (A) Negative    Bacteria Urine Few (A) None Seen /HPF    Budding Yeast Urine Few (A) None Seen /HPF    Mucus Urine Present (A) None Seen /LPF    RBC Urine 4 (H) <=2 /HPF    WBC Urine 23 (H) <=5 /HPF    Narrative    Urine Culture ordered based on laboratory criteria   Troponin T, High Sensitivity   Result Value Ref Range    Troponin T, High Sensitivity 159 (HH) <=14 ng/L   Lactic acid whole blood   Result Value Ref Range    Lactic Acid 2.1 (H) 0.7 - 2.0 mmol/L   CBC with platelets   Result Value Ref Range    WBC Count 18.1 (H) 4.0 - 11.0 10e3/uL    RBC Count 4.39 3.80 - 5.20 10e6/uL    Hemoglobin 12.7 11.7 - 15.7 g/dL    Hematocrit 37.8 35.0 - 47.0 %    MCV 86 78 - 100 fL    MCH 28.9 26.5 - 33.0 pg    MCHC 33.6 31.5 - 36.5 g/dL    RDW 12.9 10.0 - 15.0 %    Platelet Count 395 150 -  450 10e3/uL   Troponin T, High Sensitivity   Result Value Ref Range    Troponin T, High Sensitivity 306 (HH) <=14 ng/L   CBC with platelets differential    Narrative    The following orders were created for panel order CBC with platelets differential.  Procedure                               Abnormality         Status                     ---------                               -----------         ------                     CBC with platelets and ...[[id number]]  Abnormal            Final result               RBC and Platelet Morpho...[[id number]]                      Final result               Manual Differential[[id number]]         Abnormal            Final result                 Please view results for these tests on the individual orders.   CBC with platelets and differential   Result Value Ref Range    WBC Count 18.1 (H) 4.0 - 11.0 10e3/uL    RBC Count 4.31 3.80 - 5.20 10e6/uL    Hemoglobin 12.7 11.7 - 15.7 g/dL    Hematocrit 38.1 35.0 - 47.0 %    MCV 88 78 - 100 fL    MCH 29.5 26.5 - 33.0 pg    MCHC 33.3 31.5 - 36.5 g/dL    RDW 13.1 10.0 - 15.0 %    Platelet Count 410 150 - 450 10e3/uL   Manual Differential   Result Value Ref Range    % Neutrophils 92 %    % Lymphocytes 8 %    % Monocytes 0 %    % Eosinophils 0 %    % Basophils 0 %    Absolute Neutrophils 16.7 (H) 1.6 - 8.3 10e3/uL    Absolute Lymphocytes 1.4 0.8 - 5.3 10e3/uL    Absolute Monocytes 0.0 0.0 - 1.3 10e3/uL    Absolute Eosinophils 0.0 0.0 - 0.7 10e3/uL    Absolute Basophils 0.0 0.0 - 0.2 10e3/uL   RBC and Platelet Morphology   Result Value Ref Range    RBC Morphology Confirmed RBC Indices     Platelet Assessment  Automated Count Confirmed. Platelet morphology is normal.     Automated Count Confirmed. Platelet morphology is normal.   Partial thromboplastin time   Result Value Ref Range    aPTT 111 (H) 22 - 38 Seconds   Troponin T, High Sensitivity   Result Value Ref Range    Troponin T, High Sensitivity 591 (HH) <=14 ng/L       Medications    ondansetron (ZOFRAN) injection 4 mg (4 mg Intravenous $Given 4/29/25 2150)   sodium chloride 0.9 % bag for CT scan flush (60 mLs As instructed $Given 4/29/25 2108)   heparin 25,000 units in 0.45% NaCl 250 mL ANTICOAGULANT infusion (750 Units/hr Intravenous Restarted 4/30/25 0715)   piperacillin-tazobactam (ZOSYN) 3.375 g vial to attach to  mL bag (0 g Intravenous Stopped 4/30/25 1020)   ARIPiprazole (ABILIFY) tablet 5 mg (5 mg Oral $Given 4/30/25 1050)   levothyroxine (SYNTHROID/LEVOTHROID) tablet 50 mcg (50 mcg Oral $Given 4/30/25 1050)   metoprolol succinate ER (TOPROL XL) 24 hr tablet 200 mg (200 mg Oral $Given 4/30/25 1050)   losartan (COZAAR) tablet 25 mg (25 mg Oral $Given 4/30/25 1049)   spironolactone (ALDACTONE) tablet 50 mg (50 mg Oral $Given 4/30/25 1049)   HYDROmorphone (PF) (DILAUDID) injection 0.5 mg (0.5 mg Intravenous $Given 4/29/25 1946)   sodium chloride 0.9% BOLUS 1,000 mL (0 mLs Intravenous Stopped 4/29/25 2125)   iopamidol (ISOVUE-370) solution 80 mL (80 mLs Intravenous $Given 4/29/25 2108)   hydrALAZINE (APRESOLINE) injection 5 mg (5 mg Intravenous $Given 4/29/25 2213)   aspirin (ASA) chewable tablet 324 mg (324 mg Oral $Given 4/29/25 2255)   clopidogrel (PLAVIX) tablet 600 mg (600 mg Oral $Given 4/29/25 2255)   nitroGLYcerin (NITROSTAT) sublingual tablet 0.4 mg (0.4 mg Sublingual $Given 4/29/25 2253)   heparin ANTICOAGULANT loading dose for LOW INTENSITY TREATMENT * Give BEFORE starting heparin infusion (5,150 Units Intravenous $Given 4/29/25 2255)   prochlorperazine (COMPAZINE) injection 5 mg (5 mg Intravenous $Given 4/29/25 2314)   pantoprazole (PROTONIX) IV push injection 40 mg (40 mg Intravenous $Given 4/30/25 0004)   alum & mag hydroxide-simethicone (MAALOX) suspension 15 mL (15 mLs Oral $Given 4/30/25 0004)   lidocaine (viscous) (XYLOCAINE) 2 % solution 5 mL (5 mLs Mouth/Throat $Given 4/30/25 0004)       Assessments & Plan (with Medical Decision Making)     I have reviewed the  nursing notes.    I have reviewed the findings, diagnosis, plan and need for follow up with the patient.           Medical Decision Making  The patient's presentation was of moderate complexity (an undiagnosed new problem with uncertain prognosis).    The patient's evaluation involved:  ordering and/or review of 3+ test(s) in this encounter (see separate area of note for details)    The patient's management necessitated moderate risk (prescription drug management including medications given in the ED).        New Prescriptions    No medications on file       Final diagnoses:   Pain of upper abdomen   NSTEMI (non-ST elevated myocardial infarction) (H)   Patient presented with chest wall pain.  No concerning acute findings on EKG, however troponins are trending up.  Additionally CTA was done and showed duodenitis versus gastritis of the stomach.  She is status post gastric bypass surgery.  She is transferring to higher level of care at this time.  She is on a heparin drip.    4/29/2025   [facility]       Shahrzad Lawson DO  04/30/25 4835

## 2025-04-30 NOTE — PHARMACY-ADMISSION MEDICATION HISTORY
Pharmacist Admission Medication History    Admission medication history is complete. The information provided in this note is only as accurate as the sources available at the time of the update.    Information Source(s): Patient, Family member, Prescription bottles, and CareEverywhere/SureScripts via in-person and chart review    Pertinent Information: Patient last took her home meds yesterday AM.  Patient states she has only been taking her furosemide once daily because otherwise she is up to the bathroom all night.  Discussed taking second dose no later than 2pm to avoid this.  Patient also did not know that spironolactone is a diuretic and states she has been taking this in the evenings as well and is usually up a few times during the night to urinate.    Changes made to PTA medication list:  Added: Eliquis, folic acid, furosemide (see note), magnesium  Deleted: alendronate, amlodipine, pantoprazole  Changed: directions/dose added to duloxetine, fish oil changed to krill oil per patient, losartan changed from 100 mg to 25 mg, spironolactone changed from daily to BID    Allergies reviewed with patient and updates made in EHR: yes    Medication History Completed By: Ami Novak RP 4/30/2025 10:16 AM    PTA Med List   Medication Sig Last Dose/Taking    apixaban ANTICOAGULANT (ELIQUIS) 5 MG tablet Take 1 tablet by mouth 2 times daily. 4/29/2025 Morning    ARIPiprazole (ABILIFY) 5 MG tablet Take 1 tablet (5 mg) by mouth daily 4/29/2025 Morning    Coenzyme Q10 (CO Q 10) 100 MG CAPS Take 1 capsule by mouth daily 4/29/2025 Morning    cyanocobalamin (CYANOCOBALAMIN) 1000 MCG/ML injection INJECT 1 ML (1,000 MCG) INTO THE MUSCLE EVERY 30 DAYS Past Month    DULoxetine (CYMBALTA) 60 MG capsule Take 60 mg by mouth daily. 4/29/2025 Morning    ferrous sulfate (FEROSUL) 325 (65 Fe) MG tablet Take 1 tablet (325 mg) by mouth daily (with breakfast) 4/29/2025 Morning    folic acid (FOLVITE) 400 MCG tablet Take 400 mcg by mouth  daily. 4/29/2025 Morning    furosemide (LASIX) 20 MG tablet Take 1 tablet by mouth 2 times daily. 4/29/2025 Morning    Krill Oil 1000 MG CAPS Take 1 capsule by mouth daily. 4/29/2025 Morning    levothyroxine (SYNTHROID/LEVOTHROID) 50 MCG tablet TAKE 1 TABLET (50 MCG) BY MOUTH DAILY BEFORE BREAKFAST 4/29/2025 Morning    losartan (COZAAR) 25 MG tablet Take 1 tablet by mouth daily. 4/29/2025 Morning    MAG-G 500 (27 Mg) MG tablet Take 500 mg by mouth 2 times daily. 4/29/2025 Morning    metoprolol succinate ER (TOPROL XL) 100 MG 24 hr tablet TAKE 2 TABLETS BY MOUTH EVERY DAY 4/29/2025 Morning    modafinil (PROVIGIL) 200 MG tablet Take 2 tablets (400 mg) by mouth daily 4/29/2025 Morning    spironolactone (ALDACTONE) 50 MG tablet Take 50 mg by mouth 2 times daily. 4/29/2025 Morning    Syringe Luer Slip 3 ML MISC 1 each every 30 days. Past Month

## 2025-04-30 NOTE — ED PROVIDER NOTES
History     Chief Complaint   Patient presents with    Flu Symptoms    Nausea       HPI     Ness Bales is a 74 year old female presenting with abdominal pain and nausea. Gradual onset of diffuse abdominal pain 7.5 hours ago at noon with progressive worsening. No emesis, but feels like she has to. Took home Covid test which was positive. She has had Covid in the past with mild symptoms and has received all Covid vaccinations. No sick contacts including  who is present. Mild associated dyspnea. No fever, chills, cough, dysuria, vaginal bleeding or discharge, diarrhea, blood in stool. Last bowel movement in past day. No regular use of nsaids, steroids, alcohol.     Allergies   Allergen Reactions    Chlorthalidone Other (See Comments)     Nightmares/Caused hyponatremia       Patient Active Problem List    Diagnosis Date Noted    Atrial fibrillation with RVR (H) 05/16/2023     Priority: Medium    Acute cystitis without hematuria 05/15/2023     Priority: Medium    Bacterial sepsis (H) 05/15/2023     Priority: Medium    Hyperkalemia 05/15/2023     Priority: Medium    Hyponatremia 05/15/2023     Priority: Medium    Hypercalcemia 05/15/2023     Priority: Medium    UTI (urinary tract infection) 05/15/2023     Priority: Medium    Thrombophilia 05/15/2023     Priority: Medium    Hx of gastric bypass 05/15/2023     Priority: Medium    Sepsis without acute organ dysfunction, due to unspecified organism (H) 05/15/2023     Priority: Medium    S/P total knee replacement, left 01/20/2023     Priority: Medium    Primary osteoarthritis of left knee 11/21/2022     Priority: Medium     Formatting of this note might be different from the original.  IMO Update 10/11      Osteoporosis, unspecified osteoporosis type, unspecified pathological fracture presence 10/06/2022     Priority: Medium    Prediabetes 04/11/2022     Priority: Medium    Insulin resistance syndrome 02/21/2022     Priority: Medium    Morbid obesity (H)  01/28/2022     Priority: Medium    Primary aldosteronism 11/19/2021     Priority: Medium    Dysthymic disorder 01/16/2018     Priority: Medium    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     Priority: Medium    Vitamin B12 deficiency 12/09/2013     Priority: Medium    Vitamin D deficiency 11/04/2011     Priority: Medium    Contusion of lower leg 02/25/2011     Priority: Medium    Hypothyroidism 11/12/2010     Priority: Medium    Degeneration of lumbar or lumbosacral intervertebral disc 11/05/2010     Priority: Medium    Spinal stenosis, lumbar 11/05/2010     Priority: Medium    Esophageal reflux 06/25/2010     Priority: Medium    Sacroiliac joint dysfunction 06/25/2010     Priority: Medium    Hypercholesterolemia 04/20/2009     Priority: Medium    Sleep apnea 02/12/2009     Priority: Medium    Disorder of bone and cartilage 10/29/2007     Priority: Medium    Enthesopathy of hip region 08/22/2006     Priority: Medium    Other vitamin B12 deficiency anemia 09/24/2001     Priority: Medium    Essential hypertension, benign 09/24/2001     Priority: Medium    Urge incontinence of urine 09/24/2001     Priority: Medium    Obesity 09/24/2001     Priority: Medium       Past Medical History:   Diagnosis Date    Benign essential hypertension     Depression     Hyperaldosteronism     Hypothyroidism     Obesity     Personal history of other medical treatment (CODE)     Personal history of other medical treatment (CODE)     Vitamin B12 deficiency (non anemic)        Past Surgical History:   Procedure Laterality Date    ARTHROPLASTY KNEE Right 2008    BIOPSY Bilateral 10/18/2021    Adrenal Vein; Hyperaldosteronism    BIOPSY BREAST      Incisional x2 benign breast biopsies    FRACTURE SURGERY Left     open treatment of Humeral Fx w/ prosthesis    INJECT BOTOX N/A 09/22/2020    OAB; [facility] ([place], [place]), Dr. Joe Cruz    LAPAROSCOPIC BYPASS GASTRIC      1983,Stomach stapling complicated by incision rupture, then repaired [facility]  "second surgery    OTHER SURGICAL HISTORY      1983,57522.0,SKIN/SUBCUTANEOUS SURGERY,repaired incision from stomach stapling    OTHER SURGICAL HISTORY      [id number],ANESTHESIA ALERT,No problems with anaesthesia. ?       Family History   Problem Relation Age of Onset    Other - See Comments Mother         macular degeneration/Psychiatric illness,takes antidepressant    Heart Disease Father 40        Heart Disease,MI    Diabetes Father         Diabetes    Other - See Comments Son         Psychiatric illness,psychotic episode this year smoking marijuana, ephedra use ? bipolar    Other - See Comments Sister         Psychiatric illness,depression    Other - See Comments Brother         Psychiatric illness,depression       Social History     Tobacco Use    Smoking status: Never    Smokeless tobacco: Never   Vaping Use    Vaping status: Never Used   Substance Use Topics    Alcohol use: No     Comment: very little     Drug use: No       Medications:    apixaban ANTICOAGULANT (ELIQUIS) 5 MG tablet  ARIPiprazole (ABILIFY) 5 MG tablet  Coenzyme Q10 (CO Q 10) 100 MG CAPS  cyanocobalamin (CYANOCOBALAMIN) 1000 MCG/ML injection  DULoxetine (CYMBALTA) 60 MG capsule  ferrous sulfate (FEROSUL) 325 (65 Fe) MG tablet  folic acid (FOLVITE) 400 MCG tablet  furosemide (LASIX) 20 MG tablet  Krill Oil 1000 MG CAPS  levothyroxine (SYNTHROID/LEVOTHROID) 50 MCG tablet  losartan (COZAAR) 25 MG tablet  MAG-G 500 (27 Mg) MG tablet  metoprolol succinate ER (TOPROL XL) 100 MG 24 hr tablet  modafinil (PROVIGIL) 200 MG tablet  spironolactone (ALDACTONE) 50 MG tablet  Syringe Luer Slip 3 ML MISC  acetaminophen (TYLENOL) 325 MG tablet        Review of Systems: See HPI for pertinent negatives and positives. All other systems reviewed and found to be negative.    Physical Exam   BP (!) 164/103   Pulse 90   Temp 98.7  F (37.1  C) (Tympanic)   Resp 23   Ht 1.626 m (5' 4\")   Wt 88.4 kg (194 lb 12.8 oz)   LMP  (LMP Unknown)   SpO2 95%   BMI 33.44 kg/m  "      General: awake, uncomfortable, moaning in pain  HEENT: atraumatic  Respiratory: normal effort, clear to auscultation bilaterally  Cardiovascular: regular rate, irregular rhythm, no murmurs  Abdomen: BS+, soft, nondistended, tender in upper quadrants, no guarding  Extremities: no deformities, edema, or tenderness  Skin: warm, dry, no rashes  Neuro: alert, no focal deficits    ED Course      Results for orders placed or performed during the hospital encounter of 04/29/25 (from the past 24 hours)   CBC with platelets differential    Narrative    The following orders were created for panel order CBC with platelets differential.  Procedure                               Abnormality         Status                     ---------                               -----------         ------                     CBC with platelets and ...[[id number]]  Abnormal            Final result                 Please view results for these tests on the individual orders.   Comprehensive metabolic panel   Result Value Ref Range    Sodium 131 (L) 135 - 145 mmol/L    Potassium 4.3 3.4 - 5.3 mmol/L    Carbon Dioxide (CO2) 18 (L) 22 - 29 mmol/L    Anion Gap 19 (H) 7 - 15 mmol/L    Urea Nitrogen 15.4 8.0 - 23.0 mg/dL    Creatinine 0.77 0.51 - 0.95 mg/dL    GFR Estimate 81 >60 mL/min/1.73m2    Calcium 11.5 (H) 8.8 - 10.4 mg/dL    Chloride 94 (L) 98 - 107 mmol/L    Glucose 171 (H) 70 - 99 mg/dL    Alkaline Phosphatase 91 40 - 150 U/L    AST 22 0 - 45 U/L    ALT 19 0 - 50 U/L    Protein Total 7.7 6.4 - 8.3 g/dL    Albumin 4.2 3.5 - 5.2 g/dL    Bilirubin Total 0.4 <=1.2 mg/dL   Lipase   Result Value Ref Range    Lipase 110 (H) 13 - 60 U/L   Lactic acid whole blood with 1x repeat in 2 hr when >2   Result Value Ref Range    Lactic Acid, Initial 4.9 (HH) 0.7 - 2.0 mmol/L   Troponin T, High Sensitivity   Result Value Ref Range    Troponin T, High Sensitivity 69 (H) <=14 ng/L   [place] Draw    Narrative    The following orders were created for panel  order [place] Draw.  Procedure                               Abnormality         Status                     ---------                               -----------         ------                     Extra Blue Top Tube[[id number]]                             Final result               Extra Red Top Tube[[id number]]                              Final result                 Please view results for these tests on the individual orders.   CBC with platelets and differential   Result Value Ref Range    WBC Count 17.2 (H) 4.0 - 11.0 10e3/uL    RBC Count 4.53 3.80 - 5.20 10e6/uL    Hemoglobin 13.0 11.7 - 15.7 g/dL    Hematocrit 38.3 35.0 - 47.0 %    MCV 85 78 - 100 fL    MCH 28.7 26.5 - 33.0 pg    MCHC 33.9 31.5 - 36.5 g/dL    RDW 12.9 10.0 - 15.0 %    Platelet Count 429 150 - 450 10e3/uL    % Neutrophils 91 %    % Lymphocytes 7 %    % Monocytes 2 %    % Eosinophils 0 %    % Basophils 0 %    % Immature Granulocytes 0 %    NRBCs per 100 WBC 0 <1 /100    Absolute Neutrophils 15.6 (H) 1.6 - 8.3 10e3/uL    Absolute Lymphocytes 1.1 0.8 - 5.3 10e3/uL    Absolute Monocytes 0.4 0.0 - 1.3 10e3/uL    Absolute Eosinophils 0.0 0.0 - 0.7 10e3/uL    Absolute Basophils 0.1 0.0 - 0.2 10e3/uL    Absolute Immature Granulocytes 0.1 <=0.4 10e3/uL    Absolute NRBCs 0.0 10e3/uL   Extra Blue Top Tube   Result Value Ref Range    Hold Specimen x    Extra Red Top Tube   Result Value Ref Range    Hold Specimen x    Procalcitonin   Result Value Ref Range    Procalcitonin 0.03 <0.50 ng/mL   Procalcitonin   Result Value Ref Range    Procalcitonin 0.03 <0.50 ng/mL   CTA Abdomen Pelvis with Contrast    Narrative    EXAM: CTA ABDOMEN PELVIS WITH CONTRAST  LOCATION: [facility]  DATE: 4/29/2025    INDICATION: Diffuse upper abdominal pain. Severe and out of proportion. Elevated lactate.  COMPARISON: CT abdomen pelvis 5/15/2023.  TECHNIQUE: CT angiogram abdomen pelvis during arterial phase of injection of IV contrast. 2D and 3D MIP  reconstructions were performed by the CT technologist. Dose reduction techniques were used.  CONTRAST: 80mL Isovue 370    FINDINGS:    ANGIOGRAM ABDOMEN/PELVIS: No evidence of mesenteric ischemia. No abdominal aortic aneurysm. Celiac artery and its branches are patent. Superior mesenteric artery and its visualized branches are patent. Dual right renal arteries and single left renal   artery are patent. Inferior mesenteric artery and its visualized branches are patent. Bilateral iliofemoral arteries are patent.    LOWER CHEST: Bibasilar scarring and/or atelectasis. Mild wall thickening of the distal esophagus.    HEPATOBILIARY: No calcified gallstones or biliary ductal dilation. No liver lesions.    PANCREAS: Normal.    SPLEEN: Multiple punctate calcified granulomas. No splenomegaly.    ADRENAL GLANDS: Normal.    KIDNEYS/BLADDER: Multifocal left renal cortical scars. Benign right renal cysts, which do not require follow-up. No urinary calculi or hydronephrosis. Normal bladder.    BOWEL: Status post Cherry-en-Y gastric bypass. Small amount of high density debris within the gastric pouch. No evidence of bowel obstruction. Mild wall thickening of the pylorus and proximal duodenal bulb with slight haziness of the surrounding fat. No   definite discrete peptic ulcer is identified. No fluid collections or free intraperitoneal air. Remainder of the bowel is normal, without evidence of ischemia. Normal appendix. Few scattered noninflamed sigmoid colonic diverticuli.    LYMPH NODES: No enlarged lymph nodes.    PELVIC ORGANS: Normal.    MUSCULOSKELETAL: Indwelling sacral stimulator with attached battery pack within the left gluteal subcutaneous fat. Multiple small subcutaneous gluteal calcifications. Small fat-containing periumbilical hernia. Diffusely demineralized bones. Multilevel   degenerative changes of the spine. Grade 1 anterolisthesis of L5 on S1. No acute bony abnormality.      Impression    IMPRESSION:    1.  No  evidence of mesenteric ischemia.    2.  Thickening of the pylorus and proximal duodenal bulb, which could reflect gastritis, duodenitis, and/or underlying peptic ulcer disease. No evidence of perforation.    3.  Mild wall thickening of the distal esophagus, possibly reflecting esophagitis.    4.  Status post Cherry-en-Y gastric bypass, without evidence of bowel obstruction.    5.  Sigmoid colonic diverticulosis. No inflamed diverticuli.   UA with Microscopic reflex to Culture    Specimen: Urine, Clean Catch   Result Value Ref Range    Color Urine Light Yellow Colorless, Straw, Light Yellow, Yellow    Appearance Urine Slightly Cloudy (A) Clear    Glucose Urine 30 (A) Negative mg/dL    Bilirubin Urine Negative Negative    Ketones Urine 40 (A) Negative mg/dL    Specific Gravity Urine 1.013 1.000 - 1.030    Blood Urine Negative Negative    pH Urine 6.5 5.0 - 9.0    Protein Albumin Urine Negative Negative mg/dL    Urobilinogen Urine Normal Normal mg/dL    Nitrite Urine Negative Negative    Leukocyte Esterase Urine Moderate (A) Negative    Bacteria Urine Few (A) None Seen /HPF    Budding Yeast Urine Few (A) None Seen /HPF    Mucus Urine Present (A) None Seen /LPF    RBC Urine 4 (H) <=2 /HPF    WBC Urine 23 (H) <=5 /HPF    Narrative    Urine Culture ordered based on laboratory criteria   Troponin T, High Sensitivity   Result Value Ref Range    Troponin T, High Sensitivity 159 (HH) <=14 ng/L   Lactic acid whole blood   Result Value Ref Range    Lactic Acid 2.1 (H) 0.7 - 2.0 mmol/L   CBC with platelets   Result Value Ref Range    WBC Count 18.1 (H) 4.0 - 11.0 10e3/uL    RBC Count 4.39 3.80 - 5.20 10e6/uL    Hemoglobin 12.7 11.7 - 15.7 g/dL    Hematocrit 37.8 35.0 - 47.0 %    MCV 86 78 - 100 fL    MCH 28.9 26.5 - 33.0 pg    MCHC 33.6 31.5 - 36.5 g/dL    RDW 12.9 10.0 - 15.0 %    Platelet Count 395 150 - 450 10e3/uL   Troponin T, High Sensitivity   Result Value Ref Range    Troponin T, High Sensitivity 306 (HH) <=14 ng/L   CBC  with platelets differential    Narrative    The following orders were created for panel order CBC with platelets differential.  Procedure                               Abnormality         Status                     ---------                               -----------         ------                     CBC with platelets and ...[[id number]]  Abnormal            Final result               RBC and Platelet Morpho...[[id number]]                      Final result               Manual Differential[[id number]]         Abnormal            Final result                 Please view results for these tests on the individual orders.   CBC with platelets and differential   Result Value Ref Range    WBC Count 18.1 (H) 4.0 - 11.0 10e3/uL    RBC Count 4.31 3.80 - 5.20 10e6/uL    Hemoglobin 12.7 11.7 - 15.7 g/dL    Hematocrit 38.1 35.0 - 47.0 %    MCV 88 78 - 100 fL    MCH 29.5 26.5 - 33.0 pg    MCHC 33.3 31.5 - 36.5 g/dL    RDW 13.1 10.0 - 15.0 %    Platelet Count 410 150 - 450 10e3/uL   Manual Differential   Result Value Ref Range    % Neutrophils 92 %    % Lymphocytes 8 %    % Monocytes 0 %    % Eosinophils 0 %    % Basophils 0 %    Absolute Neutrophils 16.7 (H) 1.6 - 8.3 10e3/uL    Absolute Lymphocytes 1.4 0.8 - 5.3 10e3/uL    Absolute Monocytes 0.0 0.0 - 1.3 10e3/uL    Absolute Eosinophils 0.0 0.0 - 0.7 10e3/uL    Absolute Basophils 0.0 0.0 - 0.2 10e3/uL   RBC and Platelet Morphology   Result Value Ref Range    RBC Morphology Confirmed RBC Indices     Platelet Assessment  Automated Count Confirmed. Platelet morphology is normal.     Automated Count Confirmed. Platelet morphology is normal.   Partial thromboplastin time   Result Value Ref Range    aPTT 111 (H) 22 - 38 Seconds   Troponin T, High Sensitivity   Result Value Ref Range    Troponin T, High Sensitivity 591 (HH) <=14 ng/L   Partial thromboplastin time   Result Value Ref Range    aPTT 41 (H) 22 - 38 Seconds       Medications   sodium chloride 0.9 % bag for CT scan flush  (60 mLs As instructed $Given 4/29/25 2108)   HYDROmorphone (PF) (DILAUDID) injection 0.5 mg (0.5 mg Intravenous $Given 4/29/25 1946)   sodium chloride 0.9% BOLUS 1,000 mL (0 mLs Intravenous Stopped 4/29/25 2125)   iopamidol (ISOVUE-370) solution 80 mL (80 mLs Intravenous $Given 4/29/25 2108)   hydrALAZINE (APRESOLINE) injection 5 mg (5 mg Intravenous $Given 4/29/25 2213)   aspirin (ASA) chewable tablet 324 mg (324 mg Oral $Given 4/29/25 2255)   clopidogrel (PLAVIX) tablet 600 mg (600 mg Oral $Given 4/29/25 2255)   nitroGLYcerin (NITROSTAT) sublingual tablet 0.4 mg (0.4 mg Sublingual $Given 4/29/25 2253)   heparin ANTICOAGULANT loading dose for LOW INTENSITY TREATMENT * Give BEFORE starting heparin infusion (5,150 Units Intravenous $Given 4/29/25 2255)   prochlorperazine (COMPAZINE) injection 5 mg (5 mg Intravenous $Given 4/29/25 2314)   pantoprazole (PROTONIX) IV push injection 40 mg (40 mg Intravenous $Given 4/30/25 0004)   alum & mag hydroxide-simethicone (MAALOX) suspension 15 mL (15 mLs Oral $Given 4/30/25 0004)   lidocaine (viscous) (XYLOCAINE) 2 % solution 5 mL (5 mLs Mouth/Throat $Given 4/30/25 0004)       Assessments & Plan (with Medical Decision Making)     I have reviewed the nursing notes.    ED Course as of 04/30/25 1928 Tue Apr 29, 2025 1951 Lactic Acid(!!): 4.9  CTA A/P ordered due to severe out of proportion pain and significantly elevated LA and concern for alternative etiology beyond covid.   2032 EKG personally interpreted as: Sinus rhythm rate 60 with PACs, no evidence of acute ischemia with no ST abnormalities, LBBB, I/II/V4-6 TWI, hyperacute T waves.    2307 No improvement of nausea s/p nitroglycerin   2346 [facility] and [facility] on wait [facility].    Wed Apr 30, 2025   0145 Repeat EKG performed due to significantly uptrending troponin.  EKG personally interpreted showing new subtle ST deviation in leads V3-5.  Still consistent with NSTEMI.   Will plan to continue to trend EKG.   0631 Troponin T, High Sensitivity(!!): 591  69>159>306<591   0633 EKG with worsening STD leads V3-5   0646 Reviewed EKG and troponins with [facility] cardiology who feels this is unlikely nstemi type1 and is comfortable keeping patient on transfer wait list as cath lab not indicated currently.   0855 Cannot go to [place]. Still on Waitlist for [facility].    0941 Patient asking for home AM meds. Will order. Still awaiting bed for transfer        74 year old female evaluated for abdominal pain that is severe on the proportion with exam.  On exam she has mild upper abdominal pain.  She reports home positive COVID test but negative here.  Labs remarkable for significant elevated lactate 4.7 which improved down to 2.1.  Her troponin was initially in the 60s which is above her baseline of 30 I did increase all the way up to 591.  Covid negative here. EKG initially nonischemic but she did develop some mild V3-5 ST depression.  This was run by cardiology who did not feel like this met Cath Lab indication.  She was started on heparin earlier after first significant troponin increase.  Treated per above also with clopidogrel, aspirin, Dilaudid and antinausea and gi cocktail.  Leukocytosis suspect stress demargination with low procalcitonin.  CTA without concerning findings but did show likely gastritis/duodenitis.  Differential includes NSTEMI type I versus type II plus/minus PUD. No preceding postprandial symptoms leading up to encounter. Signed out to Dr Lawson awaiting transfer with patient on wait list at [facility] (transfer accepted by Dr Jose E Yung [facility] hospitalist).    Discharge Medication List as of 4/30/2025  2:43 PM          Final diagnoses:   Pain of upper abdomen   NSTEMI (non-ST elevated myocardial infarction) (H)       4/29/2025   [facility]       Kannan Holland MD  04/30/25 0732       Shahrzad Lawson DO  04/30/25 1928

## 2025-04-30 NOTE — ED NOTES
Called [facility] for transport from here to [place] in [place]. Will call back with SHEKHAR

## 2025-04-30 NOTE — ED TRIAGE NOTES
Patient arrives today with c/o abdominal pain that started around 1200 and has worsened throughout the day. States that she took a home covid test which was positive. Endorses vomiting x1 today.     Triage Assessment (Adult)       Row Name 04/29/25 1918          Triage Assessment    Airway WDL WDL        Respiratory WDL    Respiratory WDL WDL        Skin Circulation/Temperature WDL    Skin Circulation/Temperature WDL WDL        Cardiac WDL    Cardiac WDL WDL        Peripheral/Neurovascular WDL    Peripheral Neurovascular WDL WDL        Cognitive/Neuro/Behavioral WDL    Cognitive/Neuro/Behavioral WDL WDL

## 2025-05-01 ENCOUNTER — TRANSFERRED RECORDS (OUTPATIENT)
Dept: MULTI SPECIALTY CLINIC | Facility: CLINIC | Age: 75
End: 2025-05-01

## 2025-05-01 LAB
ATRIAL RATE - MUSE: 60 BPM
ATRIAL RATE - MUSE: 85 BPM
ATRIAL RATE - MUSE: 97 BPM
BACTERIA BLD CULT: NORMAL
BACTERIA BLD CULT: NORMAL
BACTERIA UR CULT: ABNORMAL
CHOLESTEROL (EXTERNAL): 198 MG/DL (ref 0–200)
DIASTOLIC BLOOD PRESSURE - MUSE: NORMAL MMHG
HDLC SERPL-MCNC: 79 MG/DL
INTERPRETATION ECG - MUSE: NORMAL
LDL CHOLESTEROL CALCULATED (EXTERNAL): 97 MG/DL (ref 0–100)
NON HDL CHOLESTEROL (EXTERNAL): 119 MG/DL (ref 0–130)
P AXIS - MUSE: 50 DEGREES
P AXIS - MUSE: 64 DEGREES
P AXIS - MUSE: 81 DEGREES
PR INTERVAL - MUSE: 118 MS
PR INTERVAL - MUSE: 166 MS
PR INTERVAL - MUSE: 180 MS
QRS DURATION - MUSE: 82 MS
QRS DURATION - MUSE: 84 MS
QRS DURATION - MUSE: 86 MS
QT - MUSE: 342 MS
QT - MUSE: 354 MS
QT - MUSE: 414 MS
QTC - MUSE: 414 MS
QTC - MUSE: 421 MS
QTC - MUSE: 434 MS
R AXIS - MUSE: 35 DEGREES
R AXIS - MUSE: 37 DEGREES
R AXIS - MUSE: 46 DEGREES
SYSTOLIC BLOOD PRESSURE - MUSE: NORMAL MMHG
T AXIS - MUSE: 40 DEGREES
T AXIS - MUSE: 65 DEGREES
T AXIS - MUSE: 68 DEGREES
TRIGLYCERIDES (EXTERNAL): 111 MG/DL
VENTRICULAR RATE- MUSE: 60 BPM
VENTRICULAR RATE- MUSE: 85 BPM
VENTRICULAR RATE- MUSE: 97 BPM

## 2025-05-05 LAB
BACTERIA BLD CULT: NO GROWTH
BACTERIA BLD CULT: NO GROWTH

## 2025-05-10 DIAGNOSIS — Z98.84 S/P GASTRIC BYPASS: ICD-10-CM

## 2025-05-13 RX ORDER — CYANOCOBALAMIN 1000 UG/ML
1 INJECTION, SOLUTION INTRAMUSCULAR; SUBCUTANEOUS
Qty: 3 ML | Refills: 4 | Status: SHIPPED | OUTPATIENT
Start: 2025-05-13

## 2025-05-13 NOTE — TELEPHONE ENCOUNTER
[facility] sent Rx request for the following:      Requested Prescriptions   Pending Prescriptions Disp Refills    cyanocobalamin (CYANOCOBALAMIN) 1000 mcg/mL injection [Pharmacy Med Name: CYANOCOBALAMIN 1,000 MCG/ML VL] 3 mL 4     Sig: INJECT 1 ML (1,000 MCG) INTO THE MUSCLE EVERY 30 DAYS       Vitamin Supplements Protocol Failed - 5/13/2025 12:22 PM        Failed - Recent (12 mo) or future (90 days) visit within the authorizing provider's specialty     The patient must have completed an in-person or virtual visit within the past 12 months or has a future visit scheduled within the next 90 days with the authorizing provider s specialty.  Urgent care and e-visits do not qualify as an office visit for this protocol.         Last Prescription Date:   2/6/24  Last Fill Qty/Refills:         3 mL, R-4    Last Office Visit:              1/17/24   Future Office visit:           None      Unable to complete prescription refill per RN Medication Refill Policy. Pt due for annual [facility] wellness visit. Routing to provider for refill consideration. Routing to Unit scheduling pool, to assist Pt in scheduling appointment.      Alcira Mejia RN on 5/13/2025 at 12:26 PM

## 2025-05-15 NOTE — TELEPHONE ENCOUNTER
LOBITO scheduled for 08/21/25.    Patient is also requesting Jennifer Maurice to order more B12 syringes.    Collette R. Korva on 5/15/2025 at 4:47 PM

## 2025-05-16 ENCOUNTER — HOSPITAL ENCOUNTER (INPATIENT)
Facility: OTHER | Age: 75
LOS: 3 days | Discharge: HOME | End: 2025-05-19
Attending: STUDENT IN AN ORGANIZED HEALTH CARE EDUCATION/TRAINING PROGRAM | Admitting: INTERNAL MEDICINE
Payer: MEDICARE

## 2025-05-16 DIAGNOSIS — I48.0 PAROXYSMAL ATRIAL FIBRILLATION (H): ICD-10-CM

## 2025-05-16 DIAGNOSIS — I48.91 ATRIAL FIBRILLATION WITH RVR (H): ICD-10-CM

## 2025-05-16 DIAGNOSIS — I10 ESSENTIAL HYPERTENSION, BENIGN: ICD-10-CM

## 2025-05-16 DIAGNOSIS — K92.2 UPPER GI BLEED: ICD-10-CM

## 2025-05-16 DIAGNOSIS — I50.21 ACUTE SYSTOLIC HEART FAILURE (H): ICD-10-CM

## 2025-05-16 DIAGNOSIS — E87.5 HYPERKALEMIA: ICD-10-CM

## 2025-05-16 DIAGNOSIS — D62 ANEMIA DUE TO BLOOD LOSS, ACUTE: ICD-10-CM

## 2025-05-16 DIAGNOSIS — Z98.84 HX OF GASTRIC BYPASS: Primary | ICD-10-CM

## 2025-05-16 DIAGNOSIS — E26.09 PRIMARY ALDOSTERONISM: ICD-10-CM

## 2025-05-16 DIAGNOSIS — I50.32 CHRONIC DIASTOLIC HEART FAILURE (H): ICD-10-CM

## 2025-05-16 DIAGNOSIS — N17.9 AKI (ACUTE KIDNEY INJURY): ICD-10-CM

## 2025-05-16 DIAGNOSIS — I95.1 ORTHOSTATIC HYPOTENSION: ICD-10-CM

## 2025-05-16 DIAGNOSIS — K92.1 GASTROINTESTINAL HEMORRHAGE WITH MELENA: ICD-10-CM

## 2025-05-16 LAB
ALBUMIN UR-MCNC: NEGATIVE MG/DL
ANION GAP SERPL CALCULATED.3IONS-SCNC: 13 MMOL/L (ref 7–15)
APPEARANCE UR: ABNORMAL
ATRIAL RATE - MUSE: 90 BPM
BACTERIA #/AREA URNS HPF: ABNORMAL /HPF
BASOPHILS # BLD AUTO: 0.1 10E3/UL (ref 0–0.2)
BASOPHILS NFR BLD AUTO: 0 %
BILIRUB UR QL STRIP: NEGATIVE
BUN SERPL-MCNC: 56.4 MG/DL (ref 8–23)
CALCIUM SERPL-MCNC: 10.5 MG/DL (ref 8.8–10.4)
CHLORIDE SERPL-SCNC: 98 MMOL/L (ref 98–107)
COLOR UR AUTO: ABNORMAL
CREAT SERPL-MCNC: 1.29 MG/DL (ref 0.51–0.95)
DIASTOLIC BLOOD PRESSURE - MUSE: NORMAL MMHG
EGFRCR SERPLBLD CKD-EPI 2021: 44 ML/MIN/1.73M2
EOSINOPHIL # BLD AUTO: 0 10E3/UL (ref 0–0.7)
EOSINOPHIL NFR BLD AUTO: 0 %
ERYTHROCYTE [DISTWIDTH] IN BLOOD BY AUTOMATED COUNT: 13.2 % (ref 10–15)
GLUCOSE SERPL-MCNC: 114 MG/DL (ref 70–99)
GLUCOSE UR STRIP-MCNC: NEGATIVE MG/DL
HCO3 SERPL-SCNC: 24 MMOL/L (ref 22–29)
HCT VFR BLD AUTO: 27.4 % (ref 35–47)
HGB BLD-MCNC: 9.2 G/DL (ref 11.7–15.7)
HGB UR QL STRIP: NEGATIVE
HOLD SPECIMEN: NORMAL
IMM GRANULOCYTES # BLD: 0.1 10E3/UL
IMM GRANULOCYTES NFR BLD: 1 %
INTERPRETATION ECG - MUSE: NORMAL
KETONES UR STRIP-MCNC: NEGATIVE MG/DL
LEUKOCYTE ESTERASE UR QL STRIP: ABNORMAL
LYMPHOCYTES # BLD AUTO: 1.8 10E3/UL (ref 0.8–5.3)
LYMPHOCYTES NFR BLD AUTO: 15 %
MCH RBC QN AUTO: 28.9 PG (ref 26.5–33)
MCHC RBC AUTO-ENTMCNC: 33.6 G/DL (ref 31.5–36.5)
MCV RBC AUTO: 86 FL (ref 78–100)
MONOCYTES # BLD AUTO: 0.5 10E3/UL (ref 0–1.3)
MONOCYTES NFR BLD AUTO: 4 %
MUCOUS THREADS #/AREA URNS LPF: PRESENT /LPF
NEUTROPHILS # BLD AUTO: 9.7 10E3/UL (ref 1.6–8.3)
NEUTROPHILS NFR BLD AUTO: 80 %
NITRATE UR QL: NEGATIVE
NRBC # BLD AUTO: 0 10E3/UL
NRBC BLD AUTO-RTO: 0 /100
NT-PROBNP SERPL-MCNC: 1249 PG/ML (ref 0–353)
P AXIS - MUSE: 89 DEGREES
PH UR STRIP: 5.5 [PH] (ref 5–9)
PLATELET # BLD AUTO: 316 10E3/UL (ref 150–450)
POTASSIUM SERPL-SCNC: 5.5 MMOL/L (ref 3.4–5.3)
PR INTERVAL - MUSE: 164 MS
QRS DURATION - MUSE: 70 MS
QT - MUSE: 358 MS
QTC - MUSE: 437 MS
R AXIS - MUSE: 31 DEGREES
RBC # BLD AUTO: 3.18 10E6/UL (ref 3.8–5.2)
RBC URINE: 4 /HPF
SODIUM SERPL-SCNC: 135 MMOL/L (ref 135–145)
SP GR UR STRIP: 1.01 (ref 1–1.03)
SQUAMOUS EPITHELIAL: 6 /HPF
SYSTOLIC BLOOD PRESSURE - MUSE: NORMAL MMHG
T AXIS - MUSE: 89 DEGREES
UROBILINOGEN UR STRIP-MCNC: NORMAL MG/DL
VENTRICULAR RATE- MUSE: 90 BPM
WBC # BLD AUTO: 12.1 10E3/UL (ref 4–11)
WBC URINE: 161 /HPF

## 2025-05-16 PROCEDURE — 81003 URINALYSIS AUTO W/O SCOPE: CPT | Performed by: STUDENT IN AN ORGANIZED HEALTH CARE EDUCATION/TRAINING PROGRAM

## 2025-05-16 PROCEDURE — 99291 CRITICAL CARE FIRST HOUR: CPT | Mod: 25 | Performed by: STUDENT IN AN ORGANIZED HEALTH CARE EDUCATION/TRAINING PROGRAM

## 2025-05-16 PROCEDURE — 93308 TTE F-UP OR LMTD: CPT | Performed by: STUDENT IN AN ORGANIZED HEALTH CARE EDUCATION/TRAINING PROGRAM

## 2025-05-16 PROCEDURE — 258N000003 HC RX IP 258 OP 636: Performed by: STUDENT IN AN ORGANIZED HEALTH CARE EDUCATION/TRAINING PROGRAM

## 2025-05-16 PROCEDURE — 36415 COLL VENOUS BLD VENIPUNCTURE: CPT | Performed by: STUDENT IN AN ORGANIZED HEALTH CARE EDUCATION/TRAINING PROGRAM

## 2025-05-16 PROCEDURE — 120N000001 HC R&B MED SURG/OB

## 2025-05-16 PROCEDURE — 85004 AUTOMATED DIFF WBC COUNT: CPT | Performed by: STUDENT IN AN ORGANIZED HEALTH CARE EDUCATION/TRAINING PROGRAM

## 2025-05-16 PROCEDURE — 250N000013 HC RX MED GY IP 250 OP 250 PS 637: Performed by: INTERNAL MEDICINE

## 2025-05-16 PROCEDURE — 258N000003 HC RX IP 258 OP 636: Performed by: INTERNAL MEDICINE

## 2025-05-16 PROCEDURE — 93010 ELECTROCARDIOGRAM REPORT: CPT | Performed by: INTERNAL MEDICINE

## 2025-05-16 PROCEDURE — 93308 TTE F-UP OR LMTD: CPT | Mod: 26 | Performed by: STUDENT IN AN ORGANIZED HEALTH CARE EDUCATION/TRAINING PROGRAM

## 2025-05-16 PROCEDURE — 96360 HYDRATION IV INFUSION INIT: CPT | Performed by: STUDENT IN AN ORGANIZED HEALTH CARE EDUCATION/TRAINING PROGRAM

## 2025-05-16 PROCEDURE — 99222 1ST HOSP IP/OBS MODERATE 55: CPT | Mod: AI | Performed by: INTERNAL MEDICINE

## 2025-05-16 PROCEDURE — 87186 SC STD MICRODIL/AGAR DIL: CPT | Performed by: STUDENT IN AN ORGANIZED HEALTH CARE EDUCATION/TRAINING PROGRAM

## 2025-05-16 PROCEDURE — 83880 ASSAY OF NATRIURETIC PEPTIDE: CPT | Performed by: INTERNAL MEDICINE

## 2025-05-16 PROCEDURE — 93005 ELECTROCARDIOGRAM TRACING: CPT | Performed by: STUDENT IN AN ORGANIZED HEALTH CARE EDUCATION/TRAINING PROGRAM

## 2025-05-16 PROCEDURE — 80048 BASIC METABOLIC PNL TOTAL CA: CPT | Performed by: STUDENT IN AN ORGANIZED HEALTH CARE EDUCATION/TRAINING PROGRAM

## 2025-05-16 RX ORDER — DULOXETIN HYDROCHLORIDE 30 MG/1
60 CAPSULE, DELAYED RELEASE ORAL DAILY
Status: DISCONTINUED | OUTPATIENT
Start: 2025-05-16 | End: 2025-05-19 | Stop reason: HOSPADM

## 2025-05-16 RX ORDER — LEVOTHYROXINE SODIUM 50 UG/1
50 TABLET ORAL DAILY
Status: DISCONTINUED | OUTPATIENT
Start: 2025-05-16 | End: 2025-05-19 | Stop reason: HOSPADM

## 2025-05-16 RX ORDER — ARIPIPRAZOLE 5 MG/1
5 TABLET ORAL DAILY
Status: DISCONTINUED | OUTPATIENT
Start: 2025-05-16 | End: 2025-05-19 | Stop reason: HOSPADM

## 2025-05-16 RX ORDER — FERROUS SULFATE 325(65) MG
325 TABLET, DELAYED RELEASE (ENTERIC COATED) ORAL
Status: DISCONTINUED | OUTPATIENT
Start: 2025-05-17 | End: 2025-05-19 | Stop reason: HOSPADM

## 2025-05-16 RX ORDER — ACETAMINOPHEN 325 MG/1
650 TABLET ORAL EVERY 6 HOURS PRN
Status: DISCONTINUED | OUTPATIENT
Start: 2025-05-16 | End: 2025-05-19 | Stop reason: HOSPADM

## 2025-05-16 RX ORDER — FUROSEMIDE 20 MG/1
20 TABLET ORAL 2 TIMES DAILY
Status: DISCONTINUED | OUTPATIENT
Start: 2025-05-16 | End: 2025-05-19

## 2025-05-16 RX ORDER — CALCIUM CARBONATE 500 MG/1
1000 TABLET, CHEWABLE ORAL 4 TIMES DAILY PRN
Status: DISCONTINUED | OUTPATIENT
Start: 2025-05-16 | End: 2025-05-19 | Stop reason: HOSPADM

## 2025-05-16 RX ORDER — LIDOCAINE 40 MG/G
CREAM TOPICAL
Status: DISCONTINUED | OUTPATIENT
Start: 2025-05-16 | End: 2025-05-19 | Stop reason: HOSPADM

## 2025-05-16 RX ORDER — SPIRONOLACTONE 25 MG/1
50 TABLET ORAL 2 TIMES DAILY
Status: DISCONTINUED | OUTPATIENT
Start: 2025-05-16 | End: 2025-05-19

## 2025-05-16 RX ORDER — LOSARTAN POTASSIUM 25 MG/1
25 TABLET ORAL DAILY
Status: DISCONTINUED | OUTPATIENT
Start: 2025-05-16 | End: 2025-05-19 | Stop reason: HOSPADM

## 2025-05-16 RX ORDER — AMOXICILLIN 250 MG
2 CAPSULE ORAL 2 TIMES DAILY PRN
Status: DISCONTINUED | OUTPATIENT
Start: 2025-05-16 | End: 2025-05-19 | Stop reason: HOSPADM

## 2025-05-16 RX ORDER — MODAFINIL 200 MG/1
400 TABLET ORAL DAILY
Status: DISCONTINUED | OUTPATIENT
Start: 2025-05-16 | End: 2025-05-19 | Stop reason: HOSPADM

## 2025-05-16 RX ORDER — AMOXICILLIN 250 MG
1 CAPSULE ORAL 2 TIMES DAILY PRN
Status: DISCONTINUED | OUTPATIENT
Start: 2025-05-16 | End: 2025-05-19 | Stop reason: HOSPADM

## 2025-05-16 RX ORDER — METOPROLOL SUCCINATE 100 MG/1
200 TABLET, EXTENDED RELEASE ORAL DAILY
Status: DISCONTINUED | OUTPATIENT
Start: 2025-05-16 | End: 2025-05-19

## 2025-05-16 RX ORDER — FOLIC ACID 0.4 MG
400 TABLET ORAL DAILY
Status: DISCONTINUED | OUTPATIENT
Start: 2025-05-16 | End: 2025-05-17

## 2025-05-16 RX ADMIN — Medication 3 MG: at 23:23

## 2025-05-16 RX ADMIN — SODIUM CHLORIDE 500 ML: 0.9 INJECTION, SOLUTION INTRAVENOUS at 22:36

## 2025-05-16 RX ADMIN — APIXABAN 5 MG: 5 TABLET, FILM COATED ORAL at 22:28

## 2025-05-16 RX ADMIN — SODIUM CHLORIDE 1000 ML: 0.9 INJECTION, SOLUTION INTRAVENOUS at 16:49

## 2025-05-16 RX ADMIN — SODIUM ZIRCONIUM CYCLOSILICATE 10 G: 10 POWDER, FOR SUSPENSION ORAL at 22:29

## 2025-05-16 ASSESSMENT — ACTIVITIES OF DAILY LIVING (ADL)
ADLS_ACUITY_SCORE: 52
ADLS_ACUITY_SCORE: 31
ADLS_ACUITY_SCORE: 52
ADLS_ACUITY_SCORE: 52
ADLS_ACUITY_SCORE: 33
ADLS_ACUITY_SCORE: 52
ADLS_ACUITY_SCORE: 35

## 2025-05-16 ASSESSMENT — COLUMBIA-SUICIDE SEVERITY RATING SCALE - C-SSRS
1. IN THE PAST MONTH, HAVE YOU WISHED YOU WERE DEAD OR WISHED YOU COULD GO TO SLEEP AND NOT WAKE UP?: NO
6. HAVE YOU EVER DONE ANYTHING, STARTED TO DO ANYTHING, OR PREPARED TO DO ANYTHING TO END YOUR LIFE?: NO
2. HAVE YOU ACTUALLY HAD ANY THOUGHTS OF KILLING YOURSELF IN THE PAST MONTH?: NO

## 2025-05-16 NOTE — ED NOTES
05/16/25 1817   Lying Orthostatic BP   Lying Orthostatic /58  (No s/sx)   Lying Orthostatic Pulse 86 bpm   Sitting Orthostatic BP   Sitting Orthostatic /62  (Slightly dizzy and SOB)   Sitting Orthostatic Pulse 110 bpm   Standing Orthostatic BP   Standing Orthostatic BP 80/44  (Significant SOB; moderately dizzy; worsens the longer she stands.)   Standing Orthostatic Pulse 128 bpm

## 2025-05-16 NOTE — ED NOTES
05/16/25 1536   Lying Orthostatic BP   Lying Orthostatic /66   Lying Orthostatic Pulse 87 bpm   Sitting Orthostatic BP   Sitting Orthostatic /70   Sitting Orthostatic Pulse 105 bpm   Standing Orthostatic BP   Standing Orthostatic /73   Standing Orthostatic Pulse 130 bpm       SX: Lying: none, Sitting: none, Standing: complaints of dizziness and SOB.  Patient required staff assistance to stand after a few seconds due to symptoms.

## 2025-05-16 NOTE — ED TRIAGE NOTES
Patient comes in with complaints of SOB, dizziness, A-fib felt at home, and low BP (74/63).  She was sent over from [facility] cardiology by her cardiologist due to symptoms mentioned above.

## 2025-05-16 NOTE — ED PROVIDER NOTES
History     Chief Complaint   Patient presents with    Shortness of Breath    Dizziness    Irregular Heart Beat    Hypotension       Ness Bales is a 74 year old female who presents with hypotension.  BP 73/54 at clinic today standing and blood pressure was 103/71 seated.  Patient reports orthostatic lightheadedness over the past 2 days.  Denies any recent change in medications.  She is on a diuretic.  Recent admission for NSTEMI thought to be Takotsubo 2 weeks ago with nonobstructive heart cath.  Echo during that admission did show reduction in LVEF to 40%, previously 50-55%.  Denies fever, cough, dyspnea, chest discomfort, peripheral swelling.    Allergies   Allergen Reactions    Chlorthalidone Other (See Comments)     Nightmares/Caused hyponatremia       Patient Active Problem List    Diagnosis Date Noted    Atrial fibrillation with RVR (H) 05/16/2023     Priority: Medium    Acute cystitis without hematuria 05/15/2023     Priority: Medium    Bacterial sepsis (H) 05/15/2023     Priority: Medium    Hyperkalemia 05/15/2023     Priority: Medium    Hyponatremia 05/15/2023     Priority: Medium    Hypercalcemia 05/15/2023     Priority: Medium    UTI (urinary tract infection) 05/15/2023     Priority: Medium    Thrombophilia 05/15/2023     Priority: Medium    Hx of gastric bypass 05/15/2023     Priority: Medium    Sepsis without acute organ dysfunction, due to unspecified organism (H) 05/15/2023     Priority: Medium    S/P total knee replacement, left 01/20/2023     Priority: Medium    Primary osteoarthritis of left knee 11/21/2022     Priority: Medium     Formatting of this note might be different from the original.  IMO Update 10/11      Osteoporosis, unspecified osteoporosis type, unspecified pathological fracture presence 10/06/2022     Priority: Medium    Prediabetes 04/11/2022     Priority: Medium    Insulin resistance syndrome 02/21/2022     Priority: Medium    Morbid obesity (H) 01/28/2022     Priority:  Medium    Primary aldosteronism 11/19/2021     Priority: Medium    Dysthymic disorder 01/16/2018     Priority: Medium    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     Priority: Medium    Vitamin B12 deficiency 12/09/2013     Priority: Medium    Vitamin D deficiency 11/04/2011     Priority: Medium    Contusion of lower leg 02/25/2011     Priority: Medium    Hypothyroidism 11/12/2010     Priority: Medium    Degeneration of lumbar or lumbosacral intervertebral disc 11/05/2010     Priority: Medium    Spinal stenosis, lumbar 11/05/2010     Priority: Medium    Esophageal reflux 06/25/2010     Priority: Medium    Sacroiliac joint dysfunction 06/25/2010     Priority: Medium    Hypercholesterolemia 04/20/2009     Priority: Medium    Sleep apnea 02/12/2009     Priority: Medium    Disorder of bone and cartilage 10/29/2007     Priority: Medium    Enthesopathy of hip region 08/22/2006     Priority: Medium    Other vitamin B12 deficiency anemia 09/24/2001     Priority: Medium    Essential hypertension, benign 09/24/2001     Priority: Medium    Urge incontinence of urine 09/24/2001     Priority: Medium    Obesity 09/24/2001     Priority: Medium       Past Medical History:   Diagnosis Date    Benign essential hypertension     Depression     Hyperaldosteronism     Hypothyroidism     Obesity     Personal history of other medical treatment (CODE)     Personal history of other medical treatment (CODE)     Vitamin B12 deficiency (non anemic)        Past Surgical History:   Procedure Laterality Date    ARTHROPLASTY KNEE Right 2008    BIOPSY Bilateral 10/18/2021    Adrenal Vein; Hyperaldosteronism    BIOPSY BREAST      Incisional x2 benign breast biopsies    FRACTURE SURGERY Left     open treatment of Humeral Fx w/ prosthesis    INJECT BOTOX N/A 09/22/2020    OAB; [facility] ([place], [place]), Dr. Joe Cruz    LAPAROSCOPIC BYPASS GASTRIC      1983,Stomach stapling complicated by incision rupture, then repaired wi second surgery    OTHER  "SURGICAL HISTORY      1983,82261.0,SKIN/SUBCUTANEOUS SURGERY,repaired incision from stomach stapling    OTHER SURGICAL HISTORY      029639,ANESTHESIA ALERT,No problems with anaesthesia. ?       Family History   Problem Relation Age of Onset    Other - See Comments Mother         macular degeneration/Psychiatric illness,takes antidepressant    Heart Disease Father 40        Heart Disease,MI    Diabetes Father         Diabetes    Other - See Comments Son         Psychiatric illness,psychotic episode this year smoking marijuana, ephedra use ? bipolar    Other - See Comments Sister         Psychiatric illness,depression    Other - See Comments Brother         Psychiatric illness,depression       Social History     Tobacco Use    Smoking status: Never    Smokeless tobacco: Never   Vaping Use    Vaping status: Never Used   Substance Use Topics    Alcohol use: No     Comment: very little     Drug use: No       Medications:    acetaminophen (TYLENOL) 325 MG tablet  apixaban ANTICOAGULANT (ELIQUIS) 5 MG tablet  ARIPiprazole (ABILIFY) 5 MG tablet  Coenzyme Q10 (CO Q 10) 100 MG CAPS  cyanocobalamin (CYANOCOBALAMIN) 1000 mcg/mL injection  DULoxetine (CYMBALTA) 60 MG capsule  ferrous sulfate (FEROSUL) 325 (65 Fe) MG tablet  folic acid (FOLVITE) 400 MCG tablet  furosemide (LASIX) 20 MG tablet  Krill Oil 1000 MG CAPS  levothyroxine (SYNTHROID/LEVOTHROID) 50 MCG tablet  losartan (COZAAR) 25 MG tablet  MAG-G 500 (27 Mg) MG tablet  metoprolol succinate ER (TOPROL XL) 100 MG 24 hr tablet  modafinil (PROVIGIL) 200 MG tablet  spironolactone (ALDACTONE) 50 MG tablet  Syringe Luer Slip 3 ML MISC        Review of Systems: See HPI for pertinent negatives and positives. All other systems reviewed and found to be negative.    Physical Exam   /63   Pulse 102   Temp 98.4  F (36.9  C) (Tympanic)   Resp 16   Ht 1.626 m (5' 4\")   Wt 83.5 kg (184 lb)   LMP  (LMP Unknown)   SpO2 (!) 91%   BMI 31.58 kg/m       General: awake, " comfortable  HEENT: atraumatic  Respiratory: normal effort, clear to auscultation bilaterally  Cardiovascular: regular rate and rhythm, no murmurs  Abdomen: soft, nondistended, nontender  Extremities: no deformities, tenderness, tracing pitting edema bilaterally  Skin: warm, dry, no rashes  Neuro: alert, no focal deficits  Psych: appropriate mood and affect    ED Course      Results for orders placed or performed during the hospital encounter of 05/16/25 (from the past 24 hours)   EKG 12 lead   Result Value Ref Range    Systolic Blood Pressure  mmHg    Diastolic Blood Pressure  mmHg    Ventricular Rate 90 BPM    Atrial Rate 90 BPM    DE Interval 164 ms    QRS Duration 70 ms     ms    QTc 437 ms    P Axis 89 degrees    R AXIS 31 degrees    T Axis 89 degrees    Interpretation ECG       Sinus rhythm  Normal ECG  When compared with ECG of 30-Apr-2025 06:34,  ST no longer depressed in Anterior leads  Confirmed by MD WILBER, GARRET ([id number]) on 5/16/2025 6:39:53 PM     CBC with platelets differential    Narrative    The following orders were created for panel order CBC with platelets differential.  Procedure                               Abnormality         Status                     ---------                               -----------         ------                     CBC with platelets and ...[[id number]]  Abnormal            Final result                 Please view results for these tests on the individual orders.   Basic metabolic panel   Result Value Ref Range    Sodium 135 135 - 145 mmol/L    Potassium 5.5 (H) 3.4 - 5.3 mmol/L    Chloride 98 98 - 107 mmol/L    Carbon Dioxide (CO2) 24 22 - 29 mmol/L    Anion Gap 13 7 - 15 mmol/L    Urea Nitrogen 56.4 (H) 8.0 - 23.0 mg/dL    Creatinine 1.29 (H) 0.51 - 0.95 mg/dL    GFR Estimate 44 (L) >60 mL/min/1.73m2    Calcium 10.5 (H) 8.8 - 10.4 mg/dL    Glucose 114 (H) 70 - 99 mg/dL   Extra Tube    Narrative    The following orders were created for panel order Extra  Tube.  Procedure                               Abnormality         Status                     ---------                               -----------         ------                     Extra Blue Top Tube[[id number]]                             Final result               Extra Serum Separator T...[[id number]]                      Final result                 Please view results for these tests on the individual orders.   Extra Tube    Narrative    The following orders were created for panel order Extra Tube.  Procedure                               Abnormality         Status                     ---------                               -----------         ------                     Extra Green Top (Lithiu...[[id number]]                      Final result                 Please view results for these tests on the individual orders.   Extra Blue Top Tube   Result Value Ref Range    Hold Specimen x    Extra Serum Separator Tube (SST)   Result Value Ref Range    Hold Specimen x    Extra Green Top (Lithium Heparin) Tube   Result Value Ref Range    Hold Specimen x    CBC with platelets and differential   Result Value Ref Range    WBC Count 12.1 (H) 4.0 - 11.0 10e3/uL    RBC Count 3.18 (L) 3.80 - 5.20 10e6/uL    Hemoglobin 9.2 (L) 11.7 - 15.7 g/dL    Hematocrit 27.4 (L) 35.0 - 47.0 %    MCV 86 78 - 100 fL    MCH 28.9 26.5 - 33.0 pg    MCHC 33.6 31.5 - 36.5 g/dL    RDW 13.2 10.0 - 15.0 %    Platelet Count 316 150 - 450 10e3/uL    % Neutrophils 80 %    % Lymphocytes 15 %    % Monocytes 4 %    % Eosinophils 0 %    % Basophils 0 %    % Immature Granulocytes 1 %    NRBCs per 100 WBC 0 <1 /100    Absolute Neutrophils 9.7 (H) 1.6 - 8.3 10e3/uL    Absolute Lymphocytes 1.8 0.8 - 5.3 10e3/uL    Absolute Monocytes 0.5 0.0 - 1.3 10e3/uL    Absolute Eosinophils 0.0 0.0 - 0.7 10e3/uL    Absolute Basophils 0.1 0.0 - 0.2 10e3/uL    Absolute Immature Granulocytes 0.1 <=0.4 10e3/uL    Absolute NRBCs 0.0 10e3/uL   POC US ECHO LIMITED    Impression     Indication: presyncope    Location: Apical, parasternal, substernal    Probe: Cardiac phased array probe    Findings: Left ventricle with questioanble mildly reduced ejection fracture, questionable apical hyokinesis in context of recent likely takotsubo. Concentric contractions. No anechoic strip between ventricles and pericardium. IVC without any significant collapse with inspiration. IVC <2.1 cm. B lines <3 per lung field.    Impression: Left ventricular systolic function with questioanble mild reduced ejection fracture without pericardial effusion or pulmonary edema. Non-collapsible IVC suggests increased RA pressure. Questionable mild apical hypokinesis.     Kannan Holland MD          UA with Microscopic reflex to Culture    Specimen: Urine, Clean Catch   Result Value Ref Range    Color Urine Light Yellow Colorless, Straw, Light Yellow, Yellow    Appearance Urine Slightly Cloudy (A) Clear    Glucose Urine Negative Negative mg/dL    Bilirubin Urine Negative Negative    Ketones Urine Negative Negative mg/dL    Specific Gravity Urine 1.014 1.000 - 1.030    Blood Urine Negative Negative    pH Urine 5.5 5.0 - 9.0    Protein Albumin Urine Negative Negative mg/dL    Urobilinogen Urine Normal Normal mg/dL    Nitrite Urine Negative Negative    Leukocyte Esterase Urine Large (A) Negative    Bacteria Urine Few (A) None Seen /HPF    Mucus Urine Present (A) None Seen /LPF    RBC Urine 4 (H) <=2 /HPF    WBC Urine 161 (H) <=5 /HPF    Squamous Epithelials Urine 6 (H) <=1 /HPF    Narrative    Urine Culture ordered based on laboratory criteria       Medications   sodium chloride 0.9% BOLUS 1,000 mL (0 mLs Intravenous Stopped 5/16/25 1817)       Assessments & Plan (with Medical Decision Making)     I have reviewed the nursing notes.    ED Course as of 05/16/25 1904   Fri May 16, 2025   1442 EKG personally interpreted as: NSR, no evidence of acute ischemia with no ST abnormalities, LBBB, I/II/V4-6 TWI, hyperacute T waves.     1541 Becoming profoundly tachycardic and symptomatic with standing: lying 87 bpm, sitting 105 bpm, satanding 130 bpm. SBP remained stable 118>120>112.   1816 Orthostatics after 1 L NS without improvement.  standing now with hypotension SBP 80.   1821 [facility] cardiology Dr Yung consult: feels she may be over-medicated. Recommends trial off spironolactone/ARB/lasix with overnight admission.   1823 UA with Microscopic reflex to Culture(!)  Denies any urinary symptoms.   1836 Spoke with Dr Hogan. He is good with admission, and I will contact nocturnist since it is approaching end of his shift.        74 year old female evaluated for a couple days of symptomatic orthostatic hypotension including dyspnea and dizziness.  Profound tachycardia approximately 130 before and after 1 L bolus.  Blood pressure also with significant hypotension after 1 L standing at SBP 80.  Labs demonstrating LISBET and mild hyperkalemia.  Case discussed with [facility] cardiology who recommends admission and holding her cardiac medications including Lasix, ARB, spironolactone.  Cardiology felt that local admission was appropriate this time.  UA was checked but patient has no symptoms and therefore will follow urine culture.  Admission accepted by nocturnist Dr Richter.    I have reviewed the findings, diagnosis, plan, with the patient.    Critical care time:  35 minutes of critical care time was spent with this patient, exclusive of all other separately billable services, including EKG/labs/imaging review, managing acute condition, and communications with specialists/hospitalists.     New Prescriptions    No medications on file       Final diagnoses:   Orthostatic hypotension - with orthostatic tachycardia   LISBET (acute kidney injury)   Hyperkalemia       5/16/2025   [facility]     Kannan Holland MD  05/16/25 1931

## 2025-05-17 ENCOUNTER — ANESTHESIA EVENT (OUTPATIENT)
Dept: MEDSURG UNIT | Facility: OTHER | Age: 75
End: 2025-05-17
Payer: MEDICARE

## 2025-05-17 ENCOUNTER — APPOINTMENT (OUTPATIENT)
Dept: PHYSICAL THERAPY | Facility: OTHER | Age: 75
DRG: 393 | End: 2025-05-17
Attending: INTERNAL MEDICINE
Payer: MEDICARE

## 2025-05-17 ENCOUNTER — APPOINTMENT (OUTPATIENT)
Dept: OCCUPATIONAL THERAPY | Facility: OTHER | Age: 75
DRG: 393 | End: 2025-05-17
Payer: MEDICARE

## 2025-05-17 ENCOUNTER — APPOINTMENT (OUTPATIENT)
Dept: CT IMAGING | Facility: OTHER | Age: 75
DRG: 393 | End: 2025-05-17
Attending: INTERNAL MEDICINE
Payer: MEDICARE

## 2025-05-17 ENCOUNTER — ANESTHESIA (OUTPATIENT)
Dept: MEDSURG UNIT | Facility: OTHER | Age: 75
End: 2025-05-17
Payer: MEDICARE

## 2025-05-17 PROBLEM — I95.9 SYMPTOMATIC HYPOTENSION: Status: ACTIVE | Noted: 2025-05-17

## 2025-05-17 PROBLEM — K92.2 UPPER GI BLEED: Status: ACTIVE | Noted: 2025-05-17

## 2025-05-17 PROBLEM — I5A MYOCARDIAL INJURY: Status: ACTIVE | Noted: 2025-05-17

## 2025-05-17 PROBLEM — I50.32 CHRONIC DIASTOLIC HEART FAILURE (H): Status: ACTIVE | Noted: 2025-05-17

## 2025-05-17 PROBLEM — E26.09 PRIMARY ALDOSTERONISM: Status: ACTIVE | Noted: 2021-11-19

## 2025-05-17 PROBLEM — I51.81 TAKOTSUBO CARDIOMYOPATHY: Status: ACTIVE | Noted: 2025-05-17

## 2025-05-17 PROBLEM — I48.0 PAROXYSMAL ATRIAL FIBRILLATION (H): Status: ACTIVE | Noted: 2025-05-17

## 2025-05-17 PROBLEM — D68.69 HYPERCOAGULABLE STATE DUE TO PAROXYSMAL ATRIAL FIBRILLATION (H): Status: ACTIVE | Noted: 2025-05-17

## 2025-05-17 PROBLEM — D64.9 ANEMIA: Status: ACTIVE | Noted: 2025-05-17

## 2025-05-17 PROBLEM — I48.0 HYPERCOAGULABLE STATE DUE TO PAROXYSMAL ATRIAL FIBRILLATION (H): Status: ACTIVE | Noted: 2025-05-17

## 2025-05-17 PROBLEM — R73.03 PREDIABETES: Status: ACTIVE | Noted: 2022-04-11

## 2025-05-17 PROBLEM — D62 ANEMIA DUE TO BLOOD LOSS, ACUTE: Status: ACTIVE | Noted: 2025-05-17

## 2025-05-17 PROBLEM — M81.0 OSTEOPOROSIS, UNSPECIFIED OSTEOPOROSIS TYPE, UNSPECIFIED PATHOLOGICAL FRACTURE PRESENCE: Status: ACTIVE | Noted: 2022-10-06

## 2025-05-17 PROBLEM — K85.90 ACUTE PANCREATITIS: Status: ACTIVE | Noted: 2025-05-17

## 2025-05-17 PROBLEM — E22.2 SIADH (SYNDROME OF INAPPROPRIATE ADH PRODUCTION): Status: ACTIVE | Noted: 2025-05-17

## 2025-05-17 PROBLEM — I50.21 ACUTE SYSTOLIC HEART FAILURE (H): Status: ACTIVE | Noted: 2025-05-17

## 2025-05-17 PROBLEM — R41.9 NEUROCOGNITIVE DISORDER: Status: ACTIVE | Noted: 2025-05-17

## 2025-05-17 LAB
ABO + RH BLD: NORMAL
ANION GAP SERPL CALCULATED.3IONS-SCNC: 11 MMOL/L (ref 7–15)
BLD GP AB SCN SERPL QL: NEGATIVE
BLD PROD TYP BPU: NORMAL
BLD PROD TYP BPU: NORMAL
BLOOD COMPONENT TYPE: NORMAL
BLOOD COMPONENT TYPE: NORMAL
BUN SERPL-MCNC: 61 MG/DL (ref 8–23)
CALCIUM SERPL-MCNC: 9.1 MG/DL (ref 8.8–10.4)
CHLORIDE SERPL-SCNC: 100 MMOL/L (ref 98–107)
CODING SYSTEM: NORMAL
CODING SYSTEM: NORMAL
CREAT SERPL-MCNC: 1.07 MG/DL (ref 0.51–0.95)
CROSSMATCH: NORMAL
CROSSMATCH: NORMAL
EGFRCR SERPLBLD CKD-EPI 2021: 55 ML/MIN/1.73M2
ERYTHROCYTE [DISTWIDTH] IN BLOOD BY AUTOMATED COUNT: 13.4 % (ref 10–15)
ERYTHROCYTE [DISTWIDTH] IN BLOOD BY AUTOMATED COUNT: 13.4 % (ref 10–15)
GLUCOSE SERPL-MCNC: 139 MG/DL (ref 70–99)
HCO3 SERPL-SCNC: 21 MMOL/L (ref 22–29)
HCT VFR BLD AUTO: 16.3 % (ref 35–47)
HCT VFR BLD AUTO: 24.5 % (ref 35–47)
HGB BLD-MCNC: 5.6 G/DL (ref 11.7–15.7)
HGB BLD-MCNC: 5.7 G/DL (ref 11.7–15.7)
HGB BLD-MCNC: 7.5 G/DL (ref 11.7–15.7)
HGB BLD-MCNC: 8.6 G/DL (ref 11.7–15.7)
HOLD SPECIMEN: NORMAL
ISSUE DATE AND TIME: NORMAL
ISSUE DATE AND TIME: NORMAL
MCH RBC QN AUTO: 29.6 PG (ref 26.5–33)
MCH RBC QN AUTO: 29.8 PG (ref 26.5–33)
MCHC RBC AUTO-ENTMCNC: 34.4 G/DL (ref 31.5–36.5)
MCHC RBC AUTO-ENTMCNC: 35.1 G/DL (ref 31.5–36.5)
MCV RBC AUTO: 85 FL (ref 78–100)
MCV RBC AUTO: 85 FL (ref 78–100)
MCV RBC AUTO: 86 FL (ref 78–100)
MCV RBC AUTO: 87 FL (ref 78–100)
PLATELET # BLD AUTO: 241 10E3/UL (ref 150–450)
PLATELET # BLD AUTO: 251 10E3/UL (ref 150–450)
POTASSIUM SERPL-SCNC: 4.4 MMOL/L (ref 3.4–5.3)
RBC # BLD AUTO: 1.89 10E6/UL (ref 3.8–5.2)
RBC # BLD AUTO: 2.89 10E6/UL (ref 3.8–5.2)
SODIUM SERPL-SCNC: 132 MMOL/L (ref 135–145)
SPECIMEN EXP DATE BLD: NORMAL
UNIT ABO/RH: NORMAL
UNIT ABO/RH: NORMAL
UNIT NUMBER: NORMAL
UNIT NUMBER: NORMAL
UNIT STATUS: NORMAL
UNIT STATUS: NORMAL
UNIT TYPE ISBT: 9500
UNIT TYPE ISBT: 9500
WBC # BLD AUTO: 13.4 10E3/UL (ref 4–11)
WBC # BLD AUTO: 9.3 10E3/UL (ref 4–11)

## 2025-05-17 PROCEDURE — 36415 COLL VENOUS BLD VENIPUNCTURE: CPT | Performed by: INTERNAL MEDICINE

## 2025-05-17 PROCEDURE — 99233 SBSQ HOSP IP/OBS HIGH 50: CPT | Performed by: INTERNAL MEDICINE

## 2025-05-17 PROCEDURE — 74174 CTA ABD&PLVS W/CONTRAST: CPT

## 2025-05-17 PROCEDURE — 86923 COMPATIBILITY TEST ELECTRIC: CPT | Performed by: INTERNAL MEDICINE

## 2025-05-17 PROCEDURE — 36410 VNPNXR 3YR/> PHY/QHP DX/THER: CPT | Performed by: NURSE ANESTHETIST, CERTIFIED REGISTERED

## 2025-05-17 PROCEDURE — 85018 HEMOGLOBIN: CPT | Performed by: INTERNAL MEDICINE

## 2025-05-17 PROCEDURE — 250N000009 HC RX 250: Performed by: NURSE ANESTHETIST, CERTIFIED REGISTERED

## 2025-05-17 PROCEDURE — 250N000009 HC RX 250: Performed by: INTERNAL MEDICINE

## 2025-05-17 PROCEDURE — 80048 BASIC METABOLIC PNL TOTAL CA: CPT | Performed by: INTERNAL MEDICINE

## 2025-05-17 PROCEDURE — 250N000011 HC RX IP 250 OP 636: Performed by: INTERNAL MEDICINE

## 2025-05-17 PROCEDURE — 250N000013 HC RX MED GY IP 250 OP 250 PS 637: Performed by: INTERNAL MEDICINE

## 2025-05-17 PROCEDURE — 74174 CTA ABD&PLVS W/CONTRAST: CPT | Mod: 26 | Performed by: RADIOLOGY

## 2025-05-17 PROCEDURE — P9016 RBC LEUKOCYTES REDUCED: HCPCS | Performed by: INTERNAL MEDICINE

## 2025-05-17 PROCEDURE — 85027 COMPLETE CBC AUTOMATED: CPT | Performed by: INTERNAL MEDICINE

## 2025-05-17 PROCEDURE — 36430 TRANSFUSION BLD/BLD COMPNT: CPT | Performed by: STUDENT IN AN ORGANIZED HEALTH CARE EDUCATION/TRAINING PROGRAM

## 2025-05-17 PROCEDURE — 99223 1ST HOSP IP/OBS HIGH 75: CPT | Mod: 25 | Performed by: SURGERY

## 2025-05-17 PROCEDURE — 86900 BLOOD TYPING SEROLOGIC ABO: CPT | Performed by: INTERNAL MEDICINE

## 2025-05-17 PROCEDURE — 120N000001 HC R&B MED SURG/OB

## 2025-05-17 RX ORDER — LORAZEPAM 0.5 MG/1
0.25 TABLET ORAL EVERY 4 HOURS PRN
Status: DISCONTINUED | OUTPATIENT
Start: 2025-05-17 | End: 2025-05-17

## 2025-05-17 RX ORDER — CARBOXYMETHYLCELLULOSE SODIUM 5 MG/ML
1 SOLUTION/ DROPS OPHTHALMIC 3 TIMES DAILY PRN
COMMUNITY

## 2025-05-17 RX ORDER — LIDOCAINE HYDROCHLORIDE 10 MG/ML
INJECTION, SOLUTION INFILTRATION; PERINEURAL PRN
Status: DISCONTINUED | OUTPATIENT
Start: 2025-05-17 | End: 2025-05-17

## 2025-05-17 RX ORDER — IOPAMIDOL 755 MG/ML
107 INJECTION, SOLUTION INTRAVASCULAR ONCE
Status: COMPLETED | OUTPATIENT
Start: 2025-05-17 | End: 2025-05-17

## 2025-05-17 RX ORDER — FUROSEMIDE 10 MG/ML
20 INJECTION INTRAMUSCULAR; INTRAVENOUS ONCE
Status: COMPLETED | OUTPATIENT
Start: 2025-05-17 | End: 2025-05-17

## 2025-05-17 RX ADMIN — Medication 3 MG: at 21:38

## 2025-05-17 RX ADMIN — LEVOTHYROXINE SODIUM 50 MCG: 0.05 TABLET ORAL at 09:36

## 2025-05-17 RX ADMIN — PANTOPRAZOLE SODIUM 80 MG: 40 INJECTION, POWDER, FOR SOLUTION INTRAVENOUS at 07:59

## 2025-05-17 RX ADMIN — ARIPIPRAZOLE 5 MG: 5 TABLET ORAL at 09:35

## 2025-05-17 RX ADMIN — IOPAMIDOL 107 ML: 755 INJECTION, SOLUTION INTRAVENOUS at 11:16

## 2025-05-17 RX ADMIN — LIDOCAINE HYDROCHLORIDE 0.1 ML: 10 INJECTION, SOLUTION INFILTRATION; PERINEURAL at 09:47

## 2025-05-17 RX ADMIN — DULOXETINE 60 MG: 30 CAPSULE, DELAYED RELEASE ORAL at 09:36

## 2025-05-17 RX ADMIN — PANTOPRAZOLE SODIUM 40 MG: 40 INJECTION, POWDER, FOR SOLUTION INTRAVENOUS at 19:24

## 2025-05-17 RX ADMIN — SODIUM CHLORIDE 60 ML: 9 INJECTION, SOLUTION INTRAVENOUS at 11:17

## 2025-05-17 RX ADMIN — FUROSEMIDE 20 MG: 10 INJECTION, SOLUTION INTRAMUSCULAR; INTRAVENOUS at 10:04

## 2025-05-17 RX ADMIN — FERROUS SULFATE TAB EC 325 MG (65 MG FE EQUIVALENT) 325 MG: 325 (65 FE) TABLET DELAYED RESPONSE at 09:35

## 2025-05-17 ASSESSMENT — ACTIVITIES OF DAILY LIVING (ADL)
ADLS_ACUITY_SCORE: 41
ADLS_ACUITY_SCORE: 39
ADLS_ACUITY_SCORE: 43
ADLS_ACUITY_SCORE: 43
ADLS_ACUITY_SCORE: 41
ADLS_ACUITY_SCORE: 43
ADLS_ACUITY_SCORE: 39
ADLS_ACUITY_SCORE: 41
ADLS_ACUITY_SCORE: 39
ADLS_ACUITY_SCORE: 39
ADLS_ACUITY_SCORE: 43
ADLS_ACUITY_SCORE: 38
ADLS_ACUITY_SCORE: 43
ADLS_ACUITY_SCORE: 40
ADLS_ACUITY_SCORE: 42
ADLS_ACUITY_SCORE: 38
ADLS_ACUITY_SCORE: 41
ADLS_ACUITY_SCORE: 38
ADLS_ACUITY_SCORE: 41
ADLS_ACUITY_SCORE: 39
ADLS_ACUITY_SCORE: 41

## 2025-05-17 NOTE — PROGRESS NOTES
SAFETY CHECKLIST  ID Bands and Risk clasps correct and in place (DNR, Fall risk, Allergy, Latex, Limb):  Yes  All Lines Reconciled and labeled correctly: Yes  Whiteboard updated:Yes  Environmental interventions: Yes  Verify Tele #:  [id number]    Lynne Villatoro RN 5/17/2025 5:29 PM

## 2025-05-17 NOTE — PROGRESS NOTES
Preventing Falls in the Hospital (02:42)  Your health professional recommends that you watch this short online health video.  Find out why you're at risk for falling in the hospital and how to prevent falls.   Purpose: Understand what increases a person's risk of falling in the hospital and how to prevent falls.  Goal: Understand what increases a person's risk of falling in the hospital and how to prevent falls.    Watch: Scan the QR code or visit the link to view video       https://hwi.[email or web address]  Current as of: July 17, 2023  Content Version: 14.2 2024 [facility] [employer].   Care instructions adapted under license by your healthcare professional. If you have questions about a medical condition or this instruction, always ask your healthcare professional. [employer] disclaims any warranty or liability for your use of this information.  Preventing Falls in the Hospital

## 2025-05-17 NOTE — PROGRESS NOTES
Accessed chart pending admission to [facility].     Darlyn Lincoln RN on 5/16/2025 at 7:38 PM

## 2025-05-17 NOTE — PROGRESS NOTES
Patient voiced feeling better after receiving blood transfusion. Second unit to be administered shortly.

## 2025-05-17 NOTE — PHARMACY-ADMISSION MEDICATION HISTORY
Pharmacist Admission Medication History    Admission medication history is complete. The information provided in this note is only as accurate as the sources available at the time of the update.    Information Source(s): Patient and CareEverywhere/SureScripts via in-person    Pertinent Information: Patient was very good historian needing minimal prompting during interview. Attests she last took B12 INJ early April and forgot to take this month.     Changes made to PTA medication list:  Added: Refresh  Deleted: None  Changed: None    Allergies reviewed with patient: yes    Medication History Completed By: Dashawn Cotto [facility] 5/17/2025 4:15 PM    PTA Med List   Medication Sig Last Dose/Taking    acetaminophen (TYLENOL) 325 MG tablet Take 325-650 mg by mouth every 6 hours as needed for mild pain Past Month    apixaban ANTICOAGULANT (ELIQUIS) 5 MG tablet Take 1 tablet by mouth 2 times daily. 5/16/2025 Morning    ARIPiprazole (ABILIFY) 5 MG tablet Take 1 tablet (5 mg) by mouth daily 5/16/2025 Morning    carboxymethylcellulose PF (REFRESH PLUS) 0.5 % ophthalmic solution Place 1 drop into both eyes 3 times daily as needed for dry eyes. Past Week    Coenzyme Q10 (CO Q 10) 100 MG CAPS Take 1 capsule by mouth daily 5/16/2025 Morning    cyanocobalamin (CYANOCOBALAMIN) 1000 mcg/mL injection INJECT 1 ML (1,000 MCG) INTO THE MUSCLE EVERY 30 DAYS More than a month    DULoxetine (CYMBALTA) 60 MG capsule Take 60 mg by mouth daily. 5/16/2025 Morning    ferrous sulfate (FEROSUL) 325 (65 Fe) MG tablet Take 1 tablet (325 mg) by mouth daily (with breakfast) 5/16/2025 Morning    folic acid (FOLVITE) 400 MCG tablet Take 400 mcg by mouth daily. 5/16/2025 Morning    furosemide (LASIX) 20 MG tablet Take 1 tablet by mouth 2 times daily. 5/16/2025 Morning    Krill Oil 1000 MG CAPS Take 1 capsule by mouth daily. 5/16/2025 Morning    levothyroxine (SYNTHROID/LEVOTHROID) 50 MCG tablet TAKE 1 TABLET (50 MCG) BY MOUTH DAILY BEFORE BREAKFAST 5/16/2025  Morning    losartan (COZAAR) 25 MG tablet Take 1 tablet by mouth daily. 5/16/2025 Morning    MAG-G 500 (27 Mg) MG tablet Take 500 mg by mouth 2 times daily. 5/16/2025 Morning    metoprolol succinate ER (TOPROL XL) 100 MG 24 hr tablet TAKE 2 TABLETS BY MOUTH EVERY DAY 5/16/2025 Morning    modafinil (PROVIGIL) 200 MG tablet Take 2 tablets (400 mg) by mouth daily 5/16/2025 Morning    spironolactone (ALDACTONE) 50 MG tablet Take 50 mg by mouth 2 times daily. 5/16/2025 Morning

## 2025-05-17 NOTE — PROGRESS NOTES
[facility]    Medicine Progress Note - Hospitalist Service    Date of Admission:  5/16/2025    Assessment & Plan   Ness Bales is a 74 year-old female with a history of HFrEF (acute systolic heart failure recently with known chronic diastolic heart failure), non-obstructive CAD, paroxysmal atrial fibrillation s/p DCCV on apixaban, HTN with primary hyperaldosteronism, ALICE, anemia, vit B12 deficiency, GERD, severe obesity, history of gastric bypass surgery, hypomagnesemia, recent acute pancreatitis with Mounjaro discontinued, recent diagnosis SIADH on fluid restriction and neurocognitive disorder who presented to the ED on 5/16/2025 from Cardiology Clinic at [facility] due to symptomatic hypotension. She had recently been admitted to [facility] 4/30-5/3/2025 with acute pancreatitis, troponin elevation due to acute myocardial injury, SIADH, possible acute cystitis and found to have Takotsubo cardiomyopathy with LVEF newly reduced to 40%, down from 50-55% with LHC showing non-obstructive CAD. She had been discharged on a fluid restriction of 1500 mL. She was newly started on aspirin 81 mg daily (though appears intent was to discontinue prior to discharge) and continued on Lasix, spironolactone, metoprolol succinate, and losartan & Mounjaro was stopped since it likely triggered acute pancreatitis; she completed treated for a UTI. Patient recalls having watery black diarrhea for a couple days just after discharge and continued to have black stools she has attributed to her oral iron repletion. Thinks she may have transiently had some upper abdominal pain, though she admits difficulty remembering. She was noticing increased shortness of breath/LYNNE after discharge and lightheadedness that increases with sitting/standing. She was aware that her blood pressures were significantly low at home and that she was having trouble with thirst on the fluid restriction. She has overall felt unwell. When she arrived to  Cardiology Clinic, her BP was 73/54 while standing with /71 sitting and she was sent to the ED due to concern for symptomatic orthostatic hypotension. While in the ED, SBP was in the low 100's to 110's lying down. EKG with NSR, initially was reported to be tachycardic to 130's and received 1 L IVF bolus with SBP still dropping to the 80's after this. She was noted to have an LISBET with Cr 1.29 and hyperkalemia (K 5.5) on lab evaluation with WBC 12.1, Hgb 9.2. Her case was discussed with Cardiology who recommended holding her cardiac meds (Lasix, ARB, spironolactone, ?BB) and felt local admission was appropriate. Following admission, she was given an additional 500 mL IVF. Follow-up labs 5/17 then demonstrated a Hgb downtrend to 5.6 (confirmed this low on repeat) with Na 132, Cr improved to 1.07. She did not have signs of obvious bleeding since admission. Repeat CT abd/pelvis 4/29 noted to have thickening of the pylorus and proximal duodenal bulb which could reflect gastritis, duodenitis, or PUD along with mild wall thickening of the esophagus possibly from esophagitis. CTA GI bleed protocol 5/17 without evidence of acute bleeding. 2 units PRBCs administered 5/17, holding apixaban, initiated IV PPI and consulted General Surgery with plan for EGD on 5/18. Continuing to follow Hgb trends and will transfuse for Hgb >8 given recent diagnosis of Takotsubo.     Principal Problem:    Symptomatic anemia, acute on chronic, due to acute blood loss anemia with concern for upper GI bleeding (black diarrhea stools recently) given recent CT findings concerning for esophagitis/gastritis/duodenitis, patient on anticoagulation and now with hypotension increasing risk of ischemic colitis, etc  -Admitted to inpatient  -Hold apixaban   -Transfuse 2 units PRBCs 5/17/25 with Lasix 20 mg IV x1 after first dose  -Follow serial Hgb/CBCs, transfuse if Hgb <8 due to recent new Cardiomyopathy diagnosis  -Initiated on IV PPI  -CTA GI bleed  study without evidence of acute bleeding  -General surgery consulted, appreciate recommendations, planning EGD on 5/17  -Diet per Gen Surg       LISBET (acute kidney injury), mild - Cr 1.29 at admission and baseline likely ~0.8  -Improving/downtrending Cr, continue to monitor renal function closely   -Holding ARB, diuretic therapy for the most part at first       Hyperkalemia - mild with K 5.5, in the setting of spironolactone, losartan and LISBET  -Lokelma and IVF given with resolution       Symptomatic orthostatic hypotension - suspect combination of iatrogenic from cardiac medications as well as due to   -Continue to hold cardiac meds  -Receiving blood as above   -Ok to defer fluid restriction for now      SIADH (syndrome of inappropriate ADH production) - recently diagnosed and recommended 1500 mL fluid restriction at discharge  -Will hold off on restricting fluids for now due to hypovolemia suspected  -Monitor Na trends and add back fluid restriction if needed       Paroxysmal atrial fibrillation (H)    Hypercoagulable state due to paroxysmal atrial fibrillation (H)  -Holding apixaban and metoprolol succinate for now       Acute systolic heart failure (H) -Takotsubo cardiomyopathy - recently diagnosed 5/2025, LVEF newly 40% on TTE down from 50-55%    Chronic diastolic heart failure (H)  -Transfusion goal >8 mg/dL for Hgb given this recent diagnosis, monitor for signs of acute hypervolemia during transfusion   -On telemetry  -Holding loop diuretic, BB, ARB, MRA  -Monitor daily weights, I&Os    Active Problems:      Myocardial injury, acute  - recently when at [facility]; initially concern for NSTEMI with elevated troponin, though [facility] only with non-obstructive CAD      Vitamin B12 deficiency -Receives monthly injections       Esophageal reflux - known diagnosis, with CT changes seen  -EGD 5/18      Recent acute pancreatitis - during recent admission, believed to have been provoked by Mounjaro which has been discontinued       " Essential hypertension, benign    Primary aldosteronism  -Holding all meds as above due to symptomatic orthostatic hypotension. Monitor BP trends and restart when appropriate       Sleep apnea - not using CPAP       Hx of gastric bypass - noted       Hypothyroidism - on levothyroxine       Prediabetes - noted, last HgbA1c 5.6% 1/17/2024; no need for diet restriction and follow-up with PCP to recheck A1c since it will be inaccurate in the setting of acute anemia       Osteoporosis - follow-up with PCP      Neurocognitive disorder - memory deficits noted and patient is aware of them; believes that memory is worse recently       Spinal stenosis, lumbar - noted, treat pain PRN       Morbid obesity (H) - admission BMI Body mass index is 32.01 kg/m .            Diet: NPO for Procedure/Surgery per Anesthesia Guidelines Except for: Ice Chips; Clear liquids before procedure/surgery: ADULT (Age GREATER than or Equal to 18 years) - Clear liquids 2 hours before procedure/surgery  Regular Diet Adult    DVT Prophylaxis: VTE Prophylaxis contraindicated due to GI hemorrhage, holding home DOAC  Rubio Catheter: Not present  Lines: None     Cardiac Monitoring: ACTIVE order. Indication: hypotension  Code Status: Full Code      Clinically Significant Risk Factors        # Hyperkalemia: Highest K = 5.5 mmol/L in last 2 days, will monitor as appropriate  # Hyponatremia: Lowest Na = 132 mmol/L in last 2 days, will monitor as appropriate             # Hypertension: Noted on problem list            # Obesity: Estimated body mass index is 32.01 kg/m  as calculated from the following:    Height as of this encounter: 1.626 m (5' 4\").    Weight as of this encounter: 84.6 kg (186 lb 8 oz)., PRESENT ON ADMISSION            Social Drivers of Health    Physical Activity: Insufficiently Active (4/11/2022)    Received from [facility]    Exercise Vital Sign     Days of Exercise per Week: 3 days     Minutes of Exercise per Session: 30 min        "   Disposition Plan     Medically Ready for Discharge: Anticipated in 2-4 Days             Yolande Cameron MD  Hospitalist Service  [facility]  Securely message with [employer] (more info)  Text page via [employer] Paging/Directory   ______________________________________________________________________    Interval History   Admitted overnight. Found to have a Hgb in the 5's this AM. Has been having black stools she attributed to iron, though admitted to diarrhea stools just after discharge from [facility] recently. Has been very lightheaded with low BPs    Physical Exam   Vital Signs: Temp: 98.4  F (36.9  C) Temp src: Tympanic BP: 136/82 Pulse: 66   Resp: 16 SpO2: 93 % O2 Device: None (Room air) Oxygen Delivery: 1 LPM  Weight: 186 lbs 8 oz    General: Well-nourished, well-developed, in no acute distress though appears fatigued  HEENT: Anicteric, no conjunctivitis   Cardiovascular: Regular rate and rhythm, normal S1/S2, no appreciable murmurs   Lungs: Clear to auscultation bilaterally anteriorly  Abdomen: Soft, NT/ND   Extremities: 2+ distal LE edema   Neuro: Sensory and motor grossly intact, no focal deficits, alert and answering questions appropriately though with cognitive impairment/memory deficits  Psych: Appropriate mood   Skin: No rashes/lesions on visible skin      Medical Decision Making       75 MINUTES SPENT BY ME on the date of service doing chart review, history, exam, documentation & further activities per the note.      Data     I have personally reviewed the following data over the past 24 hrs:    13.4 (H)  \   8.6 (L)   / 251     132 (L) 100 61.0 (H) /  139 (H)   4.4 21 (L) 1.07 (H) \     Trop: N/A BNP: N/A       Imaging results reviewed over the past 24 hrs:   Recent Results (from the past 24 hours)   CTA GI Bleed    Narrative    EXAM: CTA GI BLEED  LOCATION: [facility]  DATE: 5/17/2025    INDICATION: Concern for GI bleeding, acute drop in Hgb with recent  hypotension, on DOAC, known upper GI pathology from recent.  COMPARISON: CT 4/29/2025  TECHNIQUE: CT angiogram abdomen pelvis during arterial phase of injection of IV contrast. 2D and 3D MIP reconstructions were performed by the CT technologist. Dose reduction techniques were used.  CONTRAST: Isovue 370, 107 mL    FINDINGS:  ANGIOGRAM ABDOMEN/PELVIS: No evidence of active GI bleed. The celiac, SMA, DAMIAN, 2 right renal arteries and single left renal artery are patent without significant stenosis. No aortoiliac aneurysm nor significant stenosis.    LOWER CHEST: Bibasilar subsegmental atelectasis versus scar. Small sliding-type hiatal hernia.    HEPATOBILIARY: Diffuse hepatic steatosis. Normal gallbladder and biliary system.    PANCREAS: Normal.    SPLEEN: Calcified splenic granulomata.    ADRENAL GLANDS: Normal.    KIDNEYS/BLADDER: Chronic left renal cortical scarring redemonstrated. Simple right renal cortical cysts do not require imaging follow-up. No urinary tract calculus nor hydronephrosis. The bladder is negative.    BOWEL: No evidence of active GI bleed. No obstruction or inflammatory change. Colonic diverticulosis. Normal appendix. Moderate amount of stool in rectum with otherwise mild colonic stool burden. Previous Cherry-en-Y gastric bypass.    LYMPH NODES: No lymphadenopathy.    PELVIC ORGANS: Normal.    MUSCULOSKELETAL: No suspicious osseous lesions.      Impression    IMPRESSION:  1.  No evidence of active GI bleed. No acute inflammatory bowel process. Patient has had previous Cherry-en-Y gastric bypass.  2.  Colonic diverticulosis with no diverticulitis.  3.  Chronic left renal cortical scarring

## 2025-05-17 NOTE — PROGRESS NOTES
Hgb set after 2 true results at 5.6 and 5.7. Orders for 2 liters blood transfusion. Old IV accidentally removed by patient. New IV 18 gauge placed in left forearm. CT ordered for abdominal views to RO abdominal bleeding. No reactions noted after 1st administration.

## 2025-05-17 NOTE — H&P (VIEW-ONLY)
[facility]    Surgical Consultation    Date of Admission:  5/16/2025    Assessment & Plan   Ness Bales is a 74 year old female who was admitted on 5/16/2025. I was asked to see the patient for anemia and melena.    Active Problems:    Esophageal reflux    Assessment: chronic    Plan: PPI    Essential hypertension, benign    Hypothyroidism    Sleep apnea    Spinal stenosis, lumbar    Morbid obesity (H)    Primary aldosteronism    Osteoporosis, unspecified osteoporosis type, unspecified pathological fracture presence    Hyperkalemia    Hx of gastric bypass    Prediabetes    Vitamin B12 deficiency    Orthostatic hypotension    LISBET (acute kidney injury)     Anemia    Assessment: acute blood loss    Plan: will plan on EGD in am 5/18. Discussed with patient. No active bleeding on CTA today. Will not proceed with colonoscopy at this time. Patient expressed understanding.    Paroxysmal atrial fibrillation (H)-on Eliquis and Aspirin 81 mg.     Acute systolic heart failure (H) -Takotsubo cardiomyopathy    Chronic diastolic heart failure (H)-Echo showed EF 40%-had angiogram done 5/1/25-minimal coronary athrosclerosis.     Symptomatic hypotension    Hypercoagulable state due to paroxysmal atrial fibrillation (H)    SIADH (syndrome of inappropriate ADH production)    Acute pancreatitis-recent was at [facility] has discontinued Mounjaro.     DVT Prophylaxis: Defer to primary service  Code Status: Full Code    Sagrario Bhat MD    Reason for Consult   Reason for consult: I was asked by Dr. Cameron to evaluate this patient for melena and anemia.    Primary Care Physician   Jennifer Maurice    Chief Complaint   Black stools and weakness    History is obtained from the patient and chart review    History of Present Illness   Ness Bales is a 74 year old female who presents with black stools and weakness. She was found to be anemic. She is receiving blood today.   She denies abdominal pain or nausea. Hasn't  had diarrhea. Has been having black stools but is on iron and thought that was the reason.   Hasn't had recurrent abdominal pain or vomiting. No chest pain.   Is on Eliquis and aspirin. No stomach protection.    Past Medical History    I have reviewed this patient's medical history and updated it with pertinent information if needed.   Past Medical History:   Diagnosis Date    Benign essential hypertension     Depression     Hyperaldosteronism     Hypothyroidism     Obesity     No Comments Provided    Personal history of other medical treatment (CODE)     3 vaginal childbirth uncomplicated    Personal history of other medical treatment (CODE)     1983,blood transfusion with stomach stapling    Vitamin B12 deficiency (non anemic)        Past Surgical History   I have reviewed this patient's surgical history and updated it with pertinent information if needed.  Past Surgical History:   Procedure Laterality Date    ARTHROPLASTY KNEE Right 2008    BIOPSY Bilateral 10/18/2021    Adrenal Vein; Hyperaldosteronism    BIOPSY BREAST      Incisional x2 benign breast biopsies    FRACTURE SURGERY Left     open treatment of Humeral Fx w/ prosthesis    INJECT BOTOX N/A 09/22/2020    [facility]; [facility] ([place], [place]), Dr. Joe Cruz    LAPAROSCOPIC BYPASS GASTRIC      1983,Stomach stapling complicated by incision rupture, then repaired [facility] second surgery    OTHER SURGICAL HISTORY      1983,11383.0,SKIN/SUBCUTANEOUS SURGERY,repaired incision from stomach stapling    OTHER SURGICAL HISTORY      [id number],ANESTHESIA ALERT,No problems with anaesthesia. ?       Prior to Admission Medications   Prior to Admission Medications   Prescriptions Last Dose Informant Patient Reported? Taking?   ARIPiprazole (ABILIFY) 5 MG tablet  Self No No   Sig: Take 1 tablet (5 mg) by mouth daily   Coenzyme Q10 (CO Q 10) 100 MG CAPS  Self Yes No   Sig: Take 1 capsule by mouth daily   DULoxetine (CYMBALTA) 60 MG capsule  Self Yes No   Sig: Take 60 mg by mouth  daily.   Krill Oil 1000 MG CAPS  Self Yes No   Sig: Take 1 capsule by mouth daily.   MAG-G 500 (27 Mg) MG tablet  Self Yes No   Sig: Take 500 mg by mouth 2 times daily.   Syringe Luer Slip 3 ML MISC   No No   Si each every 30 days.   acetaminophen (TYLENOL) 325 MG tablet  Self Yes No   Sig: Take 325-650 mg by mouth every 6 hours as needed for mild pain   apixaban ANTICOAGULANT (ELIQUIS) 5 MG tablet  Self Yes No   Sig: Take 1 tablet by mouth 2 times daily.   cyanocobalamin (CYANOCOBALAMIN) 1000 mcg/mL injection   No No   Sig: INJECT 1 ML (1,000 MCG) INTO THE MUSCLE EVERY 30 DAYS   ferrous sulfate (FEROSUL) 325 (65 Fe) MG tablet  Self Yes No   Sig: Take 1 tablet (325 mg) by mouth daily (with breakfast)   folic acid (FOLVITE) 400 MCG tablet  Self Yes No   Sig: Take 400 mcg by mouth daily.   furosemide (LASIX) 20 MG tablet  Self Yes No   Sig: Take 1 tablet by mouth 2 times daily.   levothyroxine (SYNTHROID/LEVOTHROID) 50 MCG tablet  Self No No   Sig: TAKE 1 TABLET (50 MCG) BY MOUTH DAILY BEFORE BREAKFAST   losartan (COZAAR) 25 MG tablet  Self Yes No   Sig: Take 1 tablet by mouth daily.   metoprolol succinate ER (TOPROL XL) 100 MG 24 hr tablet  Self No No   Sig: TAKE 2 TABLETS BY MOUTH EVERY DAY   modafinil (PROVIGIL) 200 MG tablet  Self Yes No   Sig: Take 2 tablets (400 mg) by mouth daily   spironolactone (ALDACTONE) 50 MG tablet  Self Yes No   Sig: Take 50 mg by mouth 2 times daily.      Facility-Administered Medications: None     Allergies   Allergies   Allergen Reactions    Chlorthalidone Other (See Comments)     Nightmares/Caused hyponatremia       Social History   I have reviewed this patient's social history and updated it with pertinent information if needed. Ness LEOS Nii  reports that she has never smoked. She has never used smokeless tobacco. She reports that she does not drink alcohol and does not use drugs.    Family History   I have reviewed this patient's family history and updated it with pertinent  information if needed.   Family History   Problem Relation Age of Onset    Other - See Comments Mother         macular degeneration/Psychiatric illness,takes antidepressant    Heart Disease Father 40        Heart Disease,MI    Diabetes Father         Diabetes    Other - See Comments Son         Psychiatric illness,psychotic episode this year smoking marijuana, ephedra use ? bipolar    Other - See Comments Sister         Psychiatric illness,depression    Other - See Comments Brother         Psychiatric illness,depression       REVIEW OF SYSTEMS  GENERAL: No fevers or chills. Denies fatigue, recent weight loss.  HEENT: No sinus drainage. No changes with vision or hearing. No difficulty swallowing.   LYMPHATICS:  No swollen nodes in axilla, neck or groin.  CARDIOVASCULAR: Denies chest pain, palpitations and dyspnea on exertion.  PULMONARY: No shortness of breath or cough. No increase in sputum production.  GI:Denies melena, bright red blood in stools. No hematemesis. No constipation or diarrhea. Has had gastric bypass  : No dysuria or hematuria.  SKIN: No recent rashes or ulcers.   HEMATOLOGY:  No history of easy bruising or bleeding.  ENDOCRINE: has history of diabetes and thyroid problems. Has aldosteronism.   NEUROLOGY:  No history of seizures or headaches. No motor or sensory changes. Has memory issues    Physical Exam   Temp: 98.3  F (36.8  C) Temp src: Tympanic BP: 132/55 Pulse: 81   Resp: 16 SpO2: 94 % O2 Device: None (Room air) Oxygen Delivery: 1 LPM  Vital Signs with Ranges  Temp:  [96.9  F (36.1  C)-98.9  F (37.2  C)] 98.3  F (36.8  C)  Pulse:  [] 81  Resp:  [9-25] 16  BP: ()/(50-81) 132/55  SpO2:  [89 %-100 %] 94 %  186 lbs 8 oz    Constitutional: NAD, pleasant and cooperative  HEENT: normocephalic, no icterus, no nasal drainage, no oral lesions, mucus membranes pink and moist  Respiratory: lungs clear to ausculation bilaterally, no respiratory distress  Cardiovascular: heart regular rate and  rhythm, no murmur  Abdomen: bowel sounds +, soft and non-distended, no tenderness, no peritoneal signs  Lymph/Hematologic: No axillary or cervical adenopathy  Skin: pink, warm and dry. No rash or ulceration  Musculoskeletal: calves soft and non-tender  Neurologic: grossly intact, AAO x 3  Psychiatric: appropriate affect    Data   -Data reviewed today: All pertinent laboratory and imaging results from this encounter were reviewed. I personally reviewed the CTA  image(s) showing no active bleeding.  Recent Labs   Lab 05/17/25  0906 05/17/25  0734 05/17/25  0637 05/16/25  1521   WBC  --  9.3  --  12.1*   HGB 5.7* 5.6*  --  9.2*   MCV 87 86  --  86   PLT  --  241  --  316   NA  --   --  132* 135   POTASSIUM  --   --  4.4 5.5*   CHLORIDE  --   --  100 98   CO2  --   --  21* 24   BUN  --   --  61.0* 56.4*   CR  --   --  1.07* 1.29*   ANIONGAP  --   --  11 13   OPAL  --   --  9.1 10.5*   GLC  --   --  139* 114*       Recent Results (from the past 24 hours)   POC US ECHO LIMITED    Impression    Indication: presyncope    Location: Apical, parasternal, substernal    Probe: Cardiac phased array probe    Findings: Left ventricle with questioanble mildly reduced ejection fracture, questionable apical hyokinesis in context of recent likely takotsubo. Concentric contractions. No anechoic strip between ventricles and pericardium. IVC without any significant collapse with inspiration. IVC <2.1 cm. B lines <3 per lung field.    Impression: Left ventricular systolic function with questioanble mild reduced ejection fracture without pericardial effusion or pulmonary edema. Non-collapsible IVC suggests increased RA pressure. Questionable mild apical hypokinesis.     Kannan Holland MD          CTA GI Bleed    Narrative    EXAM: CTA GI BLEED  LOCATION: [facility]  DATE: 5/17/2025    INDICATION: Concern for GI bleeding, acute drop in Hgb with recent hypotension, on DOAC, known upper GI pathology from  recent.  COMPARISON: CT 4/29/2025  TECHNIQUE: CT angiogram abdomen pelvis during arterial phase of injection of IV contrast. 2D and 3D MIP reconstructions were performed by the CT technologist. Dose reduction techniques were used.  CONTRAST: Isovue 370, 107 mL    FINDINGS:  ANGIOGRAM ABDOMEN/PELVIS: No evidence of active GI bleed. The celiac, SMA, DAMIAN, 2 right renal arteries and single left renal artery are patent without significant stenosis. No aortoiliac aneurysm nor significant stenosis.    LOWER CHEST: Bibasilar subsegmental atelectasis versus scar. Small sliding-type hiatal hernia.    HEPATOBILIARY: Diffuse hepatic steatosis. Normal gallbladder and biliary system.    PANCREAS: Normal.    SPLEEN: Calcified splenic granulomata.    ADRENAL GLANDS: Normal.    KIDNEYS/BLADDER: Chronic left renal cortical scarring redemonstrated. Simple right renal cortical cysts do not require imaging follow-up. No urinary tract calculus nor hydronephrosis. The bladder is negative.    BOWEL: No evidence of active GI bleed. No obstruction or inflammatory change. Colonic diverticulosis. Normal appendix. Moderate amount of stool in rectum with otherwise mild colonic stool burden. Previous Cherry-en-Y gastric bypass.    LYMPH NODES: No lymphadenopathy.    PELVIC ORGANS: Normal.    MUSCULOSKELETAL: No suspicious osseous lesions.      Impression    IMPRESSION:  1.  No evidence of active GI bleed. No acute inflammatory bowel process. Patient has had previous Cherry-en-Y gastric bypass.  2.  Colonic diverticulosis with no diverticulitis.  3.  Chronic left renal cortical scarring

## 2025-05-17 NOTE — PLAN OF CARE
Goal Outcome Evaluation:      Plan of Care Reviewed With: patient    Overall Patient Progress: no changeOverall Patient Progress: no change           Patient continues to have SOB and dizzyness upon standing. Using bedside commode for toileting needs. Tele NSR, LS are clear. Skin is clammy and pale. No complaints of chest pain.       Patient having frequency with small amounts of urine, bladder scanned for 263.

## 2025-05-17 NOTE — PROVIDER NOTIFICATION
05/16/25 2040   Initial Information   Patient Belongings remains with patient   Patient Belongings Remaining with Patient clothing;cell phone/electronics;purse/wallet;jewelry;vision aids  (necklace, bracelet, earrings, watch)   Did you bring any home meds/supplements to the hospital?  No     A               Admission:  I am responsible for any personal items that are not sent to the safe or pharmacy.  [facility] is not responsible for loss, theft or damage of any property in my possession.    Signature:  _________________________________ Date: _______  Time: _____                                              Staff Signature:  ____________________________ Date: ________  Time: _____      2nd Staff person, if patient is unable/unwilling to sign:    Signature: ________________________________ Date: ________  Time: _____     Discharge:  [facility] has returned all of my personal belongings:    Signature: _________________________________ Date: ________  Time: _____                                          Staff Signature:  ____________________________ Date: ________  Time: _____

## 2025-05-17 NOTE — PROGRESS NOTES
05/17/25 1300   Appointment Info   Appointment Cancel Comments (OT) Patient getting a blood transfusion and requesting PT/OT to come back tomorrow.

## 2025-05-17 NOTE — CONSULTS
[facility]    Surgical Consultation    Date of Admission:  5/16/2025    Assessment & Plan   Ness Bales is a 74 year old female who was admitted on 5/16/2025. I was asked to see the patient for anemia and melena.    Active Problems:    Esophageal reflux    Assessment: chronic    Plan: PPI    Essential hypertension, benign    Hypothyroidism    Sleep apnea    Spinal stenosis, lumbar    Morbid obesity (H)    Primary aldosteronism    Osteoporosis, unspecified osteoporosis type, unspecified pathological fracture presence    Hyperkalemia    Hx of gastric bypass    Prediabetes    Vitamin B12 deficiency    Orthostatic hypotension    LISBET (acute kidney injury)     Anemia    Assessment: acute blood loss    Plan: will plan on EGD in am 5/18. Discussed with patient. No active bleeding on CTA today. Will not proceed with colonoscopy at this time. Patient expressed understanding.    Paroxysmal atrial fibrillation (H)-on Eliquis and Aspirin 81 mg.     Acute systolic heart failure (H) -Takotsubo cardiomyopathy    Chronic diastolic heart failure (H)-Echo showed EF 40%-had angiogram done 5/1/25-minimal coronary athrosclerosis.     Symptomatic hypotension    Hypercoagulable state due to paroxysmal atrial fibrillation (H)    SIADH (syndrome of inappropriate ADH production)    Acute pancreatitis-recent was at [facility] has discontinued Mounjaro.     DVT Prophylaxis: Defer to primary service  Code Status: Full Code    Sagrario Bhat MD    Reason for Consult   Reason for consult: I was asked by Dr. Cameron to evaluate this patient for melena and anemia.    Primary Care Physician   Jennifer Maurice    Chief Complaint   Black stools and weakness    History is obtained from the patient and chart review    History of Present Illness   Ness Bales is a 74 year old female who presents with black stools and weakness. She was found to be anemic. She is receiving blood today.   She denies abdominal pain or nausea. Hasn't  had diarrhea. Has been having black stools but is on iron and thought that was the reason.   Hasn't had recurrent abdominal pain or vomiting. No chest pain.   Is on Eliquis and aspirin. No stomach protection.    Past Medical History    I have reviewed this patient's medical history and updated it with pertinent information if needed.   Past Medical History:   Diagnosis Date    Benign essential hypertension     Depression     Hyperaldosteronism     Hypothyroidism     Obesity     No Comments Provided    Personal history of other medical treatment (CODE)     3 vaginal childbirth uncomplicated    Personal history of other medical treatment (CODE)     1983,blood transfusion with stomach stapling    Vitamin B12 deficiency (non anemic)        Past Surgical History   I have reviewed this patient's surgical history and updated it with pertinent information if needed.  Past Surgical History:   Procedure Laterality Date    ARTHROPLASTY KNEE Right 2008    BIOPSY Bilateral 10/18/2021    Adrenal Vein; Hyperaldosteronism    BIOPSY BREAST      Incisional x2 benign breast biopsies    FRACTURE SURGERY Left     open treatment of Humeral Fx w/ prosthesis    INJECT BOTOX N/A 09/22/2020    [facility]; [facility] ([place], [place]), Dr. Joe Cruz    LAPAROSCOPIC BYPASS GASTRIC      1983,Stomach stapling complicated by incision rupture, then repaired [facility] second surgery    OTHER SURGICAL HISTORY      1983,86068.0,SKIN/SUBCUTANEOUS SURGERY,repaired incision from stomach stapling    OTHER SURGICAL HISTORY      [id number],ANESTHESIA ALERT,No problems with anaesthesia. ?       Prior to Admission Medications   Prior to Admission Medications   Prescriptions Last Dose Informant Patient Reported? Taking?   ARIPiprazole (ABILIFY) 5 MG tablet  Self No No   Sig: Take 1 tablet (5 mg) by mouth daily   Coenzyme Q10 (CO Q 10) 100 MG CAPS  Self Yes No   Sig: Take 1 capsule by mouth daily   DULoxetine (CYMBALTA) 60 MG capsule  Self Yes No   Sig: Take 60 mg by mouth  daily.   Krill Oil 1000 MG CAPS  Self Yes No   Sig: Take 1 capsule by mouth daily.   MAG-G 500 (27 Mg) MG tablet  Self Yes No   Sig: Take 500 mg by mouth 2 times daily.   Syringe Luer Slip 3 ML MISC   No No   Si each every 30 days.   acetaminophen (TYLENOL) 325 MG tablet  Self Yes No   Sig: Take 325-650 mg by mouth every 6 hours as needed for mild pain   apixaban ANTICOAGULANT (ELIQUIS) 5 MG tablet  Self Yes No   Sig: Take 1 tablet by mouth 2 times daily.   cyanocobalamin (CYANOCOBALAMIN) 1000 mcg/mL injection   No No   Sig: INJECT 1 ML (1,000 MCG) INTO THE MUSCLE EVERY 30 DAYS   ferrous sulfate (FEROSUL) 325 (65 Fe) MG tablet  Self Yes No   Sig: Take 1 tablet (325 mg) by mouth daily (with breakfast)   folic acid (FOLVITE) 400 MCG tablet  Self Yes No   Sig: Take 400 mcg by mouth daily.   furosemide (LASIX) 20 MG tablet  Self Yes No   Sig: Take 1 tablet by mouth 2 times daily.   levothyroxine (SYNTHROID/LEVOTHROID) 50 MCG tablet  Self No No   Sig: TAKE 1 TABLET (50 MCG) BY MOUTH DAILY BEFORE BREAKFAST   losartan (COZAAR) 25 MG tablet  Self Yes No   Sig: Take 1 tablet by mouth daily.   metoprolol succinate ER (TOPROL XL) 100 MG 24 hr tablet  Self No No   Sig: TAKE 2 TABLETS BY MOUTH EVERY DAY   modafinil (PROVIGIL) 200 MG tablet  Self Yes No   Sig: Take 2 tablets (400 mg) by mouth daily   spironolactone (ALDACTONE) 50 MG tablet  Self Yes No   Sig: Take 50 mg by mouth 2 times daily.      Facility-Administered Medications: None     Allergies   Allergies   Allergen Reactions    Chlorthalidone Other (See Comments)     Nightmares/Caused hyponatremia       Social History   I have reviewed this patient's social history and updated it with pertinent information if needed. Ness LEOS Nii  reports that she has never smoked. She has never used smokeless tobacco. She reports that she does not drink alcohol and does not use drugs.    Family History   I have reviewed this patient's family history and updated it with pertinent  information if needed.   Family History   Problem Relation Age of Onset    Other - See Comments Mother         macular degeneration/Psychiatric illness,takes antidepressant    Heart Disease Father 40        Heart Disease,MI    Diabetes Father         Diabetes    Other - See Comments Son         Psychiatric illness,psychotic episode this year smoking marijuana, ephedra use ? bipolar    Other - See Comments Sister         Psychiatric illness,depression    Other - See Comments Brother         Psychiatric illness,depression       REVIEW OF SYSTEMS  GENERAL: No fevers or chills. Denies fatigue, recent weight loss.  HEENT: No sinus drainage. No changes with vision or hearing. No difficulty swallowing.   LYMPHATICS:  No swollen nodes in axilla, neck or groin.  CARDIOVASCULAR: Denies chest pain, palpitations and dyspnea on exertion.  PULMONARY: No shortness of breath or cough. No increase in sputum production.  GI:Denies melena, bright red blood in stools. No hematemesis. No constipation or diarrhea. Has had gastric bypass  : No dysuria or hematuria.  SKIN: No recent rashes or ulcers.   HEMATOLOGY:  No history of easy bruising or bleeding.  ENDOCRINE: has history of diabetes and thyroid problems. Has aldosteronism.   NEUROLOGY:  No history of seizures or headaches. No motor or sensory changes. Has memory issues    Physical Exam   Temp: 98.3  F (36.8  C) Temp src: Tympanic BP: 132/55 Pulse: 81   Resp: 16 SpO2: 94 % O2 Device: None (Room air) Oxygen Delivery: 1 LPM  Vital Signs with Ranges  Temp:  [96.9  F (36.1  C)-98.9  F (37.2  C)] 98.3  F (36.8  C)  Pulse:  [] 81  Resp:  [9-25] 16  BP: ()/(50-81) 132/55  SpO2:  [89 %-100 %] 94 %  186 lbs 8 oz    Constitutional: NAD, pleasant and cooperative  HEENT: normocephalic, no icterus, no nasal drainage, no oral lesions, mucus membranes pink and moist  Respiratory: lungs clear to ausculation bilaterally, no respiratory distress  Cardiovascular: heart regular rate and  rhythm, no murmur  Abdomen: bowel sounds +, soft and non-distended, no tenderness, no peritoneal signs  Lymph/Hematologic: No axillary or cervical adenopathy  Skin: pink, warm and dry. No rash or ulceration  Musculoskeletal: calves soft and non-tender  Neurologic: grossly intact, AAO x 3  Psychiatric: appropriate affect    Data   -Data reviewed today: All pertinent laboratory and imaging results from this encounter were reviewed. I personally reviewed the CTA  image(s) showing no active bleeding.  Recent Labs   Lab 05/17/25  0906 05/17/25  0734 05/17/25  0637 05/16/25  1521   WBC  --  9.3  --  12.1*   HGB 5.7* 5.6*  --  9.2*   MCV 87 86  --  86   PLT  --  241  --  316   NA  --   --  132* 135   POTASSIUM  --   --  4.4 5.5*   CHLORIDE  --   --  100 98   CO2  --   --  21* 24   BUN  --   --  61.0* 56.4*   CR  --   --  1.07* 1.29*   ANIONGAP  --   --  11 13   OPAL  --   --  9.1 10.5*   GLC  --   --  139* 114*       Recent Results (from the past 24 hours)   POC US ECHO LIMITED    Impression    Indication: presyncope    Location: Apical, parasternal, substernal    Probe: Cardiac phased array probe    Findings: Left ventricle with questioanble mildly reduced ejection fracture, questionable apical hyokinesis in context of recent likely takotsubo. Concentric contractions. No anechoic strip between ventricles and pericardium. IVC without any significant collapse with inspiration. IVC <2.1 cm. B lines <3 per lung field.    Impression: Left ventricular systolic function with questioanble mild reduced ejection fracture without pericardial effusion or pulmonary edema. Non-collapsible IVC suggests increased RA pressure. Questionable mild apical hypokinesis.     Kannan Holland MD          CTA GI Bleed    Narrative    EXAM: CTA GI BLEED  LOCATION: [facility]  DATE: 5/17/2025    INDICATION: Concern for GI bleeding, acute drop in Hgb with recent hypotension, on DOAC, known upper GI pathology from  recent.  COMPARISON: CT 4/29/2025  TECHNIQUE: CT angiogram abdomen pelvis during arterial phase of injection of IV contrast. 2D and 3D MIP reconstructions were performed by the CT technologist. Dose reduction techniques were used.  CONTRAST: Isovue 370, 107 mL    FINDINGS:  ANGIOGRAM ABDOMEN/PELVIS: No evidence of active GI bleed. The celiac, SMA, DAMIAN, 2 right renal arteries and single left renal artery are patent without significant stenosis. No aortoiliac aneurysm nor significant stenosis.    LOWER CHEST: Bibasilar subsegmental atelectasis versus scar. Small sliding-type hiatal hernia.    HEPATOBILIARY: Diffuse hepatic steatosis. Normal gallbladder and biliary system.    PANCREAS: Normal.    SPLEEN: Calcified splenic granulomata.    ADRENAL GLANDS: Normal.    KIDNEYS/BLADDER: Chronic left renal cortical scarring redemonstrated. Simple right renal cortical cysts do not require imaging follow-up. No urinary tract calculus nor hydronephrosis. The bladder is negative.    BOWEL: No evidence of active GI bleed. No obstruction or inflammatory change. Colonic diverticulosis. Normal appendix. Moderate amount of stool in rectum with otherwise mild colonic stool burden. Previous Cherry-en-Y gastric bypass.    LYMPH NODES: No lymphadenopathy.    PELVIC ORGANS: Normal.    MUSCULOSKELETAL: No suspicious osseous lesions.      Impression    IMPRESSION:  1.  No evidence of active GI bleed. No acute inflammatory bowel process. Patient has had previous Cherry-en-Y gastric bypass.  2.  Colonic diverticulosis with no diverticulitis.  3.  Chronic left renal cortical scarring

## 2025-05-17 NOTE — PLAN OF CARE
"Goal Outcome Evaluation:    Temp: 98.3  F (36.8  C) Temp src: Tympanic BP: 132/55 Pulse: 81   Resp: 16 SpO2: 94 % O2 Device: None (Room air) Oxygen Delivery: 1 LPM    POC for tomorrow; EDG planned for 10AM.       Problem: Adult Inpatient Plan of Care  Goal: Plan of Care Review  Description: The Plan of Care Review/Shift note should be completed every shift.  The Outcome Evaluation is a brief statement about your assessment that the patient is improving, declining, or no change.  This information will be displayed automatically on your shiftnote.  Outcome: Not Progressing  Goal: Patient-Specific Goal (Individualized)  Description: You can add care plan individualizations to a care plan. Examples of Individualization might be:  \"Parent requests to be called daily at 9am for status\", \"I have a hard time hearing out of my right ear\", or \"Do not touch me to wake me up as it startlesme\".  Outcome: Not Progressing  Goal: Absence of Hospital-Acquired Illness or Injury  Outcome: Not Progressing  Goal: Optimal Comfort and Wellbeing  Outcome: Not Progressing  Goal: Readiness for Transition of Care  Outcome: Not Progressing     Problem: Delirium  Goal: Optimal Coping  Outcome: Not Progressing  Goal: Improved Behavioral Control  Outcome: Not Progressing  Goal: Improved Attention and Thought Clarity  Outcome: Not Progressing  Goal: Improved Sleep  Outcome: Not Progressing     Problem: Fall Injury Risk  Goal: Absence of Fall and Fall-Related Injury  Outcome: Not Progressing     Problem: Electrolyte Imbalance  Goal: Electrolyte Balance  Outcome: Not Progressing                               "

## 2025-05-17 NOTE — H&P
HOSPITALIST TELEMED ADMISSION H&P    Service Date : 5/16/2025  Dr. Janel BELLO am located in [place], [place]  Ness Bales is located in [place] at [facility]     RN, Lizbeth, on Med/Surg unit is assisting me today with this patient.    chief complaint:: symptomatic hypotension    History of Present Illness:  Ness Bales is a 74 year old female with  history of paroxysmal Atrial fibrillation s/p cardioversion, on Eliquis, HFrEF, NSTEMI (04/30/25), primary hyperaldosteronism,  hypomagnesemia,hypothyroidism, essential hypertension, vitamin B12 deficiency,  vitamin D deficiency,dysthymic disorder, obstructive sleep apnea,  thrombophilia, and GERD presented to the ED with symptomatic hypotension. Patient   Was at her cardiology follow-up from her previous hospitalization.  Since her hospital's discharge she has not been doing well, in clinic her SBP was noted to be in the 70s.  She has lightheadedness that is constant and that is made worse by changing positions.  She denied passing out.  She denied any shortness of breath with lying.  The cardiologist recommended that the patient should come to the emergency room for further evaluation.  The patient denied any fever, chills, chest pain however she stated she did experience palpitations   And shortness of breath.  She denied any nausea, vomiting, abdominal pain or diarrhea, and no burning with urination.    Emergency Room Course - in the emergency room, The EKG is in normal sinus rhythm without any acute ST-T wave elevations or depressions.  The limited US is remarkable for Left ventricular systolic function with questioanble mild reduced ejection fracture without pericardial effusion or pulmonary edema. Non-collapsible IVC suggests increased RA pressure. Questionable mild apical hypokinesis .          Review of the CBC with platelet and diff is significant for a   WBC count of 12.1, hemoglobin 9.2, hematocrit 27.4 and an  absolute neutrophil 9.7     Review of the basic metabolic panel is significant for a    Slightly elevated potassium at 5.5, BUN 56.4 creatinine 1.29 calcium 10.5 glucose 114     proBNP is elevated at 1249     Urinalysis is slightly cloudy in appearance, leukocyte esterase is large, few bacteria, RBCs greater than 4, WBCs greater than 161, squamous epithelial cells greater than 6    Past Medical History  Past Medical History:   Diagnosis Date    Benign essential hypertension     Depression     Hyperaldosteronism     Hypothyroidism     Obesity     No Comments Provided    Personal history of other medical treatment (CODE)     3 vaginal childbirth uncomplicated    Personal history of other medical treatment (CODE)     1983,blood transfusion with stomach stapling    Vitamin B12 deficiency (non anemic)       Patient Active Problem List   Diagnosis    Other vitamin B12 deficiency anemia    Enthesopathy of hip region    Contusion of lower leg    Degeneration of lumbar or lumbosacral intervertebral disc    Dysthymic disorder    Esophageal reflux    Hypercholesterolemia    Essential hypertension, benign    Hypothyroidism    Urge incontinence of urine    Obesity    Disorder of bone and cartilage    Hypertonicity of bladder    Sacroiliac joint dysfunction    Sleep apnea    Spinal stenosis, lumbar    Morbid obesity (H)    Primary aldosteronism    Osteoporosis, unspecified osteoporosis type, unspecified pathological fracture presence    Acute cystitis without hematuria    Bacterial sepsis (H)    Hyperkalemia    Hyponatremia    Hypercalcemia    UTI (urinary tract infection)    Thrombophilia    Hx of gastric bypass    Sepsis without acute organ dysfunction, due to unspecified organism (H)    Atrial fibrillation with RVR (H)    S/P total knee replacement, left    Insulin resistance syndrome    Primary osteoarthritis of left knee    Prediabetes    Vitamin B12 deficiency    Vitamin D deficiency    Orthostatic hypotension    LISBET (acute  kidney injury)         Past Surgical History  Past Surgical History:   Procedure Laterality Date    ARTHROPLASTY KNEE Right 2008    BIOPSY Bilateral 10/18/2021    Adrenal Vein; Hyperaldosteronism    BIOPSY BREAST      Incisional x2 benign breast biopsies    FRACTURE SURGERY Left     open treatment of Humeral Fx w/ prosthesis    INJECT BOTOX N/A 09/22/2020    OAB; [facility] ([place], [place]), Dr. Joe Cruz    LAPAROSCOPIC BYPASS GASTRIC      1983,Stomach stapling complicated by incision rupture, then repaired wi second surgery    OTHER SURGICAL HISTORY      1983,55524.0,SKIN/SUBCUTANEOUS SURGERY,repaired incision from stomach stapling    OTHER SURGICAL HISTORY      [id number],ANESTHESIA ALERT,No problems with anaesthesia. ?      Social History  Ness  reports that she has never smoked. She has never used smokeless tobacco. She reports that she does not drink alcohol and does not use drugs.    Family History  Ness's family history includes Diabetes in her father; Heart Disease (age of onset: 40) in her father; Other - See Comments in her brother, mother, sister, and son.    Home Medications  Current Outpatient Medications   Medication Instructions    acetaminophen (TYLENOL) 325-650 mg, Oral, EVERY 6 HOURS PRN    apixaban ANTICOAGULANT (ELIQUIS) 5 MG tablet 1 tablet, Oral, 2 TIMES DAILY    ARIPiprazole (ABILIFY) 5 mg, Oral, DAILY    Coenzyme Q10 (CO Q 10) 100 MG CAPS 1 capsule, Oral, DAILY    cyanocobalamin (CYANOCOBALAMIN) 1,000 mcg, Intramuscular, EVERY 30 DAYS    DULoxetine (CYMBALTA) 60 mg, Oral, DAILY    ferrous sulfate (FEROSUL) 325 mg, Oral, DAILY WITH BREAKFAST    folic acid (FOLVITE) 400 mcg, DAILY    furosemide (LASIX) 20 MG tablet 1 tablet, Oral, 2 TIMES DAILY    Krill Oil 1000 MG CAPS 1 capsule, Oral, DAILY    levothyroxine (SYNTHROID/LEVOTHROID) 50 mcg, Oral    losartan (COZAAR) 25 MG tablet 1 tablet, Oral, DAILY    Mag-G 500 mg, 2 TIMES DAILY    metoprolol succinate ER (TOPROL XL) 200 mg, Oral, DAILY  "AT 2 PM    modafinil (PROVIGIL) 400 mg, Oral, DAILY    spironolactone (ALDACTONE) 50 mg, 2 TIMES DAILY    Syringe Luer Slip 3 ML MISC 1 each, Does not apply, EVERY 30 DAYS       Allergies  Allergies   Allergen Reactions    Chlorthalidone Other (See Comments)     Nightmares/Caused hyponatremia        10 pt ROS neg except as noted in HPI    Physical Exam:  I performed all aspects of the physical examination via Telemedicine associated with two way audio and video communication.    Vital Signs: Blood pressure 115/69, pulse 97, temperature 98.4  F (36.9  C), temperature source Tympanic, resp. rate 16, height 1.626 m (5' 4\"), weight 83.5 kg (184 lb), SpO2 (!) 90%,       Physical Exam    Gen:  Well-developed, well-nourished, in no acute distress, lying semi-supine in her hospital bed.  HEENT:  NC/AT,  hearing intact to voice  Resp:  No accessory muscle use, breath sounds clear; no wheezes no rales no rhonchi  Card:  No murmur, normal S1, S2   Abd:  Soft per RN exam, no TTP, non-distended, normoactive bowel sounds are present,  Skin: No rashes or skin breakdown.   Ext: No clubbing, cyanosis or edema was noted  Psych:  Not anxious, not agitated      DATA:    I&O: No intake/output data recorded.     Labs:  I have personally reviewed the following studies:  Recent Labs   Lab 05/16/25  1521   POTASSIUM 5.5*   CHLORIDE 98   CO2 24   BUN 56.4*      Recent Labs   Lab 05/16/25  1521   WBC 12.1*   HGB 9.2*   HCT 27.4*             Imaging: ER imaging reviewed      Misc  DVT prophylaxis: Eliquis  Code Status: Full code   Cardiac Monitoring: Yes active cardiac telemetry  Rubio:None  Lines:  peripheral IV  Diet:  regular      ASSESSMENT/PLAN:   74-year-old female with symptomatic hypotension  and LISBET will be admitted for medical management and stabilization.      This patient's current admission to an acute care setting is essential for the treatment of    LISBET and symptomatic hypotension.    The patient's recovery is dependent " on the following treatments of   -  Gentle IV fluids  -  cardiac monitor  -  hold all antihypertensive medications  -Correcting any electrolyte abnormalities  to stabilize this condition.       The patient is at risk of developing further complications of end organ failures if not treated in an acute care setting.     I expect the patient's hospital stay to require  2 - 4 days      Our patient will be admitted under the hospitalist services and further management to be based on patient's clinical progress.       #ACTIVE PROBLEM LIST:    #HFrEF  - 05/01/25 last echo Left ventricle size is normal. Normal wall thickness. Regional wall motion abnormalities present. See diagram for wall motion findings. The ejection fraction by visual approximation is 40%. LV biplane EF is 41%   - proBNP = 1229  - Spironolactone (HOLD)  - lasix (Hold)   - GLP-1 were stopped      # Chronic Atrial fibrillation:    - Eliquis 5 mg bid  - Metoprolol Succinate 200mg q day (HOLD)     -Takotsubo cardiomyopathy    - will monitor  - ativan, prn    #Hypothyroidism                                   TSH   Date Value Ref Range Status   05/15/2023 4.27 (H) 0.30 - 4.20 uIU/mL Final   01/28/2022 3.14 0.40 - 4.00 mU/L Final    - Levothyroxine 50mcg q day    # Hypertension:    115/69  Stable.  - Monitor with daily vitals   - Metoprolol Succinate 200mg q day (HOLD d/t symptomatic hypotension)  - Losartan 50mg q day (Hold d/t LISBET)    #Hyperkalemia  - K  = 5.5  - Lokelma 10g x 1  - will monitor    # Hyperlipidemia:   Cholesterol   Date Value Ref Range Status   01/17/2024 197 <200 mg/dL Final   03/09/2021 196 <200 mg/dL Final    Stable                                 - Continue medication management with     #Dysthmic disorder  - Abilify 5mg q day  -Cymbalta 60mg q day  - Provigil 400mg q day      Clinically Significant Risk Factors Present on Admission        # Hyperkalemia: Highest K = 5.5 mmol/L in last 2 days, will monitor as appropriate         # Drug  "Induced Coagulation Defect: home medication list includes an anticoagulant medication       # Anemia: based on hgb <11       # Obesity: Estimated body mass index is 31.58 kg/m  as calculated from the following:    Height as of this encounter: 1.626 m (5' 4\").    Weight as of this encounter: 83.5 kg (184 lb).                          As the provider for the telehealth service, I attest that I introduced myself to the patient and provided my credentials. Based on review of the patient s chart and/or a discussion with members of the patient s treatment team, I determined that telemedicine via a real-time, two-way, interactive audio and video platform is an appropriate means of providing this service.     The encounter was approximately 20minutes. The nurse was present for the duration of the encounter.    Chart reviewed,labs and available imaging reviewed,consultant notes reviewed. Previous available medical records have been reviewed  Care plan discussed with care team    I have seen and examined the patient today. Documentation for this visit was completed using a template. Everything documented was personally performed today with the necessary additions, deletions and changes made as appropriate with the diagnoses being listed in no particular order of clinical relevance.    [facility]  Securely message with the Vocera Web Console (learn more here)  Text page via [employer] Paging/Directory   "

## 2025-05-17 NOTE — PROGRESS NOTES
"[facility] ADMISSION NOTE    Patient admitted to room 312 at approximately 2030 via bed from emergency room. Patient was accompanied by spouse.     Verbal SBAR report received from TANJA LANE prior to patient arrival.     Patient ambulated to bed with one assist. Patient alert and oriented X 3. The patient is not having any pain.  . Admission vital signs: Blood pressure 103/60, pulse 93, temperature 98.4  F (36.9  C), temperature source Tympanic, resp. rate 18, height 1.626 m (5' 4\"), weight 83.5 kg (184 lb), SpO2 93%, not currently breastfeeding. Patient was oriented to plan of care, call light, bed controls, tv, telephone, bathroom, and visiting hours.     Risk Assessment    The following safety risks were identified during admission: fall. Yellow risk band applied: YES.     Skin Initial Assessment    This writer admitted this patient and completed a full skin assessment and Ascencion score in the Adult PCS flowsheet.   Photo documentation of skin problem and/or wound competed via Prifloat application (located under Media):  N/A    Appropriate interventions initiated as needed.         Ascencion Risk Assessment  Sensory Perception: 3-->slightly limited  Moisture: 4-->rarely moist  Activity: 3-->walks occasionally  Mobility: 3-->slightly limited  Nutrition: 3-->adequate  Friction and Shear: 3-->no apparent problem  Ascencion Score: 19  Mattress: Standard gel/foam mattress (IsoFlex, Atmos Air, etc.)  Bed Frame: Standard width and length    Education    Patient has a [place] to Observation order: No  Observation education completed and documented: N/A      Lizbeth Kang RN      "

## 2025-05-18 ENCOUNTER — APPOINTMENT (OUTPATIENT)
Dept: PHYSICAL THERAPY | Facility: OTHER | Age: 75
DRG: 393 | End: 2025-05-18
Payer: MEDICARE

## 2025-05-18 ENCOUNTER — ANESTHESIA EVENT (OUTPATIENT)
Dept: SURGERY | Facility: OTHER | Age: 75
End: 2025-05-18
Payer: MEDICARE

## 2025-05-18 ENCOUNTER — ANESTHESIA (OUTPATIENT)
Dept: SURGERY | Facility: OTHER | Age: 75
End: 2025-05-18
Payer: MEDICARE

## 2025-05-18 ENCOUNTER — APPOINTMENT (OUTPATIENT)
Dept: OCCUPATIONAL THERAPY | Facility: OTHER | Age: 75
DRG: 393 | End: 2025-05-18
Payer: MEDICARE

## 2025-05-18 PROBLEM — K29.60 BILE REFLUX GASTRITIS: Status: ACTIVE | Noted: 2025-05-18

## 2025-05-18 LAB
ANION GAP SERPL CALCULATED.3IONS-SCNC: 8 MMOL/L (ref 7–15)
BACTERIA UR CULT: ABNORMAL
BLD PROD TYP BPU: NORMAL
BLD PROD TYP BPU: NORMAL
BLOOD COMPONENT TYPE: NORMAL
BLOOD COMPONENT TYPE: NORMAL
BUN SERPL-MCNC: 38.4 MG/DL (ref 8–23)
CALCIUM SERPL-MCNC: 9.2 MG/DL (ref 8.8–10.4)
CHLORIDE SERPL-SCNC: 101 MMOL/L (ref 98–107)
CODING SYSTEM: NORMAL
CODING SYSTEM: NORMAL
CREAT SERPL-MCNC: 1.04 MG/DL (ref 0.51–0.95)
CROSSMATCH: NORMAL
CROSSMATCH: NORMAL
EGFRCR SERPLBLD CKD-EPI 2021: 56 ML/MIN/1.73M2
ERYTHROCYTE [DISTWIDTH] IN BLOOD BY AUTOMATED COUNT: 14.2 % (ref 10–15)
GLUCOSE BLDC GLUCOMTR-MCNC: 109 MG/DL (ref 70–99)
GLUCOSE SERPL-MCNC: 115 MG/DL (ref 70–99)
HCO3 SERPL-SCNC: 24 MMOL/L (ref 22–29)
HCT VFR BLD AUTO: 24.6 % (ref 35–47)
HGB BLD-MCNC: 7.9 G/DL (ref 11.7–15.7)
HGB BLD-MCNC: 8.7 G/DL (ref 11.7–15.7)
ISSUE DATE AND TIME: NORMAL
ISSUE DATE AND TIME: NORMAL
MAGNESIUM SERPL-MCNC: 1.8 MG/DL (ref 1.7–2.3)
MCH RBC QN AUTO: 29.7 PG (ref 26.5–33)
MCHC RBC AUTO-ENTMCNC: 35.4 G/DL (ref 31.5–36.5)
MCV RBC AUTO: 84 FL (ref 78–100)
MCV RBC AUTO: 85 FL (ref 78–100)
PLATELET # BLD AUTO: 205 10E3/UL (ref 150–450)
POTASSIUM SERPL-SCNC: 3.8 MMOL/L (ref 3.4–5.3)
RBC # BLD AUTO: 2.93 10E6/UL (ref 3.8–5.2)
SODIUM SERPL-SCNC: 133 MMOL/L (ref 135–145)
UNIT ABO/RH: NORMAL
UNIT ABO/RH: NORMAL
UNIT NUMBER: NORMAL
UNIT NUMBER: NORMAL
UNIT STATUS: NORMAL
UNIT STATUS: NORMAL
UNIT TYPE ISBT: 9500
UNIT TYPE ISBT: 9500
WBC # BLD AUTO: 8.2 10E3/UL (ref 4–11)

## 2025-05-18 PROCEDURE — 85018 HEMOGLOBIN: CPT | Performed by: INTERNAL MEDICINE

## 2025-05-18 PROCEDURE — 97161 PT EVAL LOW COMPLEX 20 MIN: CPT | Mod: GP

## 2025-05-18 PROCEDURE — 80048 BASIC METABOLIC PNL TOTAL CA: CPT | Performed by: INTERNAL MEDICINE

## 2025-05-18 PROCEDURE — 258N000003 HC RX IP 258 OP 636: Performed by: NURSE ANESTHETIST, CERTIFIED REGISTERED

## 2025-05-18 PROCEDURE — 99233 SBSQ HOSP IP/OBS HIGH 50: CPT | Performed by: INTERNAL MEDICINE

## 2025-05-18 PROCEDURE — 99100 ANES PT EXTEME AGE<1 YR&>70: CPT | Performed by: NURSE ANESTHETIST, CERTIFIED REGISTERED

## 2025-05-18 PROCEDURE — 43239 EGD BIOPSY SINGLE/MULTIPLE: CPT | Performed by: NURSE ANESTHETIST, CERTIFIED REGISTERED

## 2025-05-18 PROCEDURE — 250N000013 HC RX MED GY IP 250 OP 250 PS 637: Performed by: SURGERY

## 2025-05-18 PROCEDURE — 43239 EGD BIOPSY SINGLE/MULTIPLE: CPT | Performed by: SURGERY

## 2025-05-18 PROCEDURE — 83735 ASSAY OF MAGNESIUM: CPT | Performed by: INTERNAL MEDICINE

## 2025-05-18 PROCEDURE — 120N000001 HC R&B MED SURG/OB

## 2025-05-18 PROCEDURE — P9016 RBC LEUKOCYTES REDUCED: HCPCS | Performed by: INTERNAL MEDICINE

## 2025-05-18 PROCEDURE — 250N000011 HC RX IP 250 OP 636: Performed by: INTERNAL MEDICINE

## 2025-05-18 PROCEDURE — 85014 HEMATOCRIT: CPT | Performed by: INTERNAL MEDICINE

## 2025-05-18 PROCEDURE — 97116 GAIT TRAINING THERAPY: CPT | Mod: GP

## 2025-05-18 PROCEDURE — 36415 COLL VENOUS BLD VENIPUNCTURE: CPT | Performed by: INTERNAL MEDICINE

## 2025-05-18 PROCEDURE — 97530 THERAPEUTIC ACTIVITIES: CPT | Mod: GO

## 2025-05-18 PROCEDURE — 250N000009 HC RX 250: Performed by: NURSE ANESTHETIST, CERTIFIED REGISTERED

## 2025-05-18 PROCEDURE — 250N000011 HC RX IP 250 OP 636: Performed by: NURSE ANESTHETIST, CERTIFIED REGISTERED

## 2025-05-18 PROCEDURE — 999N000010 HC STATISTIC ANES STAT CODE-CRNA PER MINUTE: Performed by: SURGERY

## 2025-05-18 PROCEDURE — 250N000011 HC RX IP 250 OP 636: Performed by: SURGERY

## 2025-05-18 PROCEDURE — 97165 OT EVAL LOW COMPLEX 30 MIN: CPT | Mod: GO

## 2025-05-18 RX ORDER — ONDANSETRON 4 MG/1
4 TABLET, ORALLY DISINTEGRATING ORAL EVERY 30 MIN PRN
Status: DISCONTINUED | OUTPATIENT
Start: 2025-05-18 | End: 2025-05-18 | Stop reason: HOSPADM

## 2025-05-18 RX ORDER — HYDROMORPHONE HCL IN WATER/PF 6 MG/30 ML
0.2 PATIENT CONTROLLED ANALGESIA SYRINGE INTRAVENOUS EVERY 5 MIN PRN
Status: DISCONTINUED | OUTPATIENT
Start: 2025-05-18 | End: 2025-05-18 | Stop reason: HOSPADM

## 2025-05-18 RX ORDER — FLUMAZENIL 0.1 MG/ML
0.2 INJECTION, SOLUTION INTRAVENOUS
Status: ACTIVE | OUTPATIENT
Start: 2025-05-18 | End: 2025-05-18

## 2025-05-18 RX ORDER — ONDANSETRON 2 MG/ML
4 INJECTION INTRAMUSCULAR; INTRAVENOUS EVERY 30 MIN PRN
Status: DISCONTINUED | OUTPATIENT
Start: 2025-05-18 | End: 2025-05-18 | Stop reason: HOSPADM

## 2025-05-18 RX ORDER — SODIUM CHLORIDE, SODIUM LACTATE, POTASSIUM CHLORIDE, CALCIUM CHLORIDE 600; 310; 30; 20 MG/100ML; MG/100ML; MG/100ML; MG/100ML
INJECTION, SOLUTION INTRAVENOUS CONTINUOUS
Status: DISCONTINUED | OUTPATIENT
Start: 2025-05-18 | End: 2025-05-18

## 2025-05-18 RX ORDER — SODIUM CHLORIDE, SODIUM LACTATE, POTASSIUM CHLORIDE, CALCIUM CHLORIDE 600; 310; 30; 20 MG/100ML; MG/100ML; MG/100ML; MG/100ML
INJECTION, SOLUTION INTRAVENOUS CONTINUOUS
Status: DISCONTINUED | OUTPATIENT
Start: 2025-05-18 | End: 2025-05-18 | Stop reason: HOSPADM

## 2025-05-18 RX ORDER — SODIUM CHLORIDE, SODIUM LACTATE, POTASSIUM CHLORIDE, CALCIUM CHLORIDE 600; 310; 30; 20 MG/100ML; MG/100ML; MG/100ML; MG/100ML
INJECTION, SOLUTION INTRAVENOUS CONTINUOUS PRN
Status: DISCONTINUED | OUTPATIENT
Start: 2025-05-18 | End: 2025-05-18

## 2025-05-18 RX ORDER — FENTANYL CITRATE 50 UG/ML
50 INJECTION, SOLUTION INTRAMUSCULAR; INTRAVENOUS EVERY 5 MIN PRN
Status: DISCONTINUED | OUTPATIENT
Start: 2025-05-18 | End: 2025-05-18 | Stop reason: HOSPADM

## 2025-05-18 RX ORDER — FENTANYL CITRATE 50 UG/ML
25 INJECTION, SOLUTION INTRAMUSCULAR; INTRAVENOUS EVERY 5 MIN PRN
Status: DISCONTINUED | OUTPATIENT
Start: 2025-05-18 | End: 2025-05-18 | Stop reason: HOSPADM

## 2025-05-18 RX ORDER — SUCRALFATE 1 G/1
1 TABLET ORAL
Status: DISCONTINUED | OUTPATIENT
Start: 2025-05-18 | End: 2025-05-19 | Stop reason: HOSPADM

## 2025-05-18 RX ORDER — LIDOCAINE 40 MG/G
CREAM TOPICAL
Status: DISCONTINUED | OUTPATIENT
Start: 2025-05-18 | End: 2025-05-19 | Stop reason: HOSPADM

## 2025-05-18 RX ORDER — SUCRALFATE 1 G/1
1 TABLET ORAL
Qty: 360 TABLET | Refills: 0 | Status: SHIPPED | OUTPATIENT
Start: 2025-05-18

## 2025-05-18 RX ORDER — PANTOPRAZOLE SODIUM 40 MG/1
40 TABLET, DELAYED RELEASE ORAL
Qty: 90 TABLET | Refills: 0 | Status: SHIPPED | OUTPATIENT
Start: 2025-05-18

## 2025-05-18 RX ORDER — LIDOCAINE HYDROCHLORIDE 20 MG/ML
INJECTION, SOLUTION INFILTRATION; PERINEURAL PRN
Status: DISCONTINUED | OUTPATIENT
Start: 2025-05-18 | End: 2025-05-18

## 2025-05-18 RX ORDER — PROPOFOL 10 MG/ML
INJECTION, EMULSION INTRAVENOUS PRN
Status: DISCONTINUED | OUTPATIENT
Start: 2025-05-18 | End: 2025-05-18

## 2025-05-18 RX ORDER — NALOXONE HYDROCHLORIDE 0.4 MG/ML
0.1 INJECTION, SOLUTION INTRAMUSCULAR; INTRAVENOUS; SUBCUTANEOUS
Status: DISCONTINUED | OUTPATIENT
Start: 2025-05-18 | End: 2025-05-18 | Stop reason: HOSPADM

## 2025-05-18 RX ORDER — LIDOCAINE 40 MG/G
CREAM TOPICAL
Status: DISCONTINUED | OUTPATIENT
Start: 2025-05-18 | End: 2025-05-18 | Stop reason: HOSPADM

## 2025-05-18 RX ORDER — HYDROMORPHONE HCL IN WATER/PF 6 MG/30 ML
0.4 PATIENT CONTROLLED ANALGESIA SYRINGE INTRAVENOUS EVERY 5 MIN PRN
Status: DISCONTINUED | OUTPATIENT
Start: 2025-05-18 | End: 2025-05-18 | Stop reason: HOSPADM

## 2025-05-18 RX ORDER — DEXAMETHASONE SODIUM PHOSPHATE 10 MG/ML
4 INJECTION, SOLUTION INTRAMUSCULAR; INTRAVENOUS
Status: DISCONTINUED | OUTPATIENT
Start: 2025-05-18 | End: 2025-05-18 | Stop reason: HOSPADM

## 2025-05-18 RX ORDER — PROPOFOL 10 MG/ML
INJECTION, EMULSION INTRAVENOUS CONTINUOUS PRN
Status: DISCONTINUED | OUTPATIENT
Start: 2025-05-18 | End: 2025-05-18

## 2025-05-18 RX ADMIN — SUCRALFATE 1 G: 1 TABLET ORAL at 20:31

## 2025-05-18 RX ADMIN — LIDOCAINE HYDROCHLORIDE 40 MG: 20 INJECTION, SOLUTION INFILTRATION; PERINEURAL at 10:23

## 2025-05-18 RX ADMIN — PANTOPRAZOLE SODIUM 40 MG: 40 INJECTION, POWDER, FOR SOLUTION INTRAVENOUS at 09:07

## 2025-05-18 RX ADMIN — SODIUM CHLORIDE, SODIUM LACTATE, POTASSIUM CHLORIDE, AND CALCIUM CHLORIDE: .6; .31; .03; .02 INJECTION, SOLUTION INTRAVENOUS at 10:17

## 2025-05-18 RX ADMIN — PROPOFOL 150 MCG/KG/MIN: 10 INJECTION, EMULSION INTRAVENOUS at 10:23

## 2025-05-18 RX ADMIN — PANTOPRAZOLE SODIUM 40 MG: 40 INJECTION, POWDER, FOR SOLUTION INTRAVENOUS at 21:43

## 2025-05-18 RX ADMIN — Medication 3 MG: at 20:31

## 2025-05-18 RX ADMIN — SUCRALFATE 1 G: 1 TABLET ORAL at 12:02

## 2025-05-18 RX ADMIN — FERROUS SULFATE TAB EC 325 MG (65 MG FE EQUIVALENT) 325 MG: 325 (65 FE) TABLET DELAYED RESPONSE at 12:02

## 2025-05-18 RX ADMIN — DULOXETINE 60 MG: 30 CAPSULE, DELAYED RELEASE ORAL at 12:02

## 2025-05-18 RX ADMIN — PROPOFOL 100 MG: 10 INJECTION, EMULSION INTRAVENOUS at 10:23

## 2025-05-18 RX ADMIN — ARIPIPRAZOLE 5 MG: 5 TABLET ORAL at 12:02

## 2025-05-18 RX ADMIN — SUCRALFATE 1 G: 1 TABLET ORAL at 16:03

## 2025-05-18 RX ADMIN — LEVOTHYROXINE SODIUM 50 MCG: 0.05 TABLET ORAL at 12:02

## 2025-05-18 ASSESSMENT — ACTIVITIES OF DAILY LIVING (ADL)
PREVIOUS_RESPONSIBILITIES: MEAL PREP;HOUSEKEEPING;LAUNDRY;SHOPPING;YARDWORK;MEDICATION MANAGEMENT;FINANCES;DRIVING
ADLS_ACUITY_SCORE: 40
ADLS_ACUITY_SCORE: 41
ADLS_ACUITY_SCORE: 40
ADLS_ACUITY_SCORE: 42
ADLS_ACUITY_SCORE: 40
ADLS_ACUITY_SCORE: 41
ADLS_ACUITY_SCORE: 37
ADLS_ACUITY_SCORE: 40
ADLS_ACUITY_SCORE: 41
ADLS_ACUITY_SCORE: 40
ADLS_ACUITY_SCORE: 37
ADLS_ACUITY_SCORE: 40
ADLS_ACUITY_SCORE: 41
ADLS_ACUITY_SCORE: 42
ADLS_ACUITY_SCORE: 41
ADLS_ACUITY_SCORE: 40
ADLS_ACUITY_SCORE: 40

## 2025-05-18 ASSESSMENT — ENCOUNTER SYMPTOMS: DYSRHYTHMIAS: 1

## 2025-05-18 NOTE — PROGRESS NOTES
05/18/25 1300   Appointment Info   Signing Clinician's Name / Credentials (PT) Cheryl Fragoso DPT   Living Environment   People in Home spouse   Current Living Arrangements house   Home Accessibility stairs to enter home;stairs within home   Number of Stairs, Main Entrance 3   Stair Railings, Main Entrance railings safe and in good condition   Number of Stairs, Within Home, Primary greater than 10 stairs   Transportation Anticipated family or friend will provide   Self-Care   Usual Activity Tolerance good   Current Activity Tolerance fair   Equipment Currently Used at Home none   Fall history within last six months yes   Number of times patient has fallen within last six months 1   Activity/Exercise/Self-Care Comment has a fww at home if needed   General Information   Onset of Illness/Injury or Date of Surgery 05/16/25   Referring Physician Yolande Cameron MD   Patient/Family Therapy Goals Statement (PT) Return home   Pertinent History of Current Problem (include personal factors and/or comorbidities that impact the POC) Admitted for anemia   Existing Precautions/Restrictions fall   General Observations Pale, in bed upon arrival, alert and pleasant reporting feeling much better than upon arrival   Cognition   Affect/Mental Status (Cognition) WNL   Orientation Status (Cognition) oriented x 4   Follows Commands (Cognition) WNL   Pain Assessment   Patient Currently in Pain No   Integumentary/Edema   Integumentary/Edema no deficits were identifed   Posture    Posture Not impaired   Bed Mobility   Bed Mobility no deficits identified   Transfers   Transfers no deficits identified   Comment, (Transfers) SBA for safety to reduce risk of falls   Gait/Stairs (Locomotion)   [place] Level (Gait) contact guard   Assistive Device (Gait) walker, front-wheeled   Distance in Feet (Gait) 100   Pattern (Gait) step-through   Deviations/Abnormal Patterns (Gait) gait speed decreased   Maintains Weight-bearing Status (Gait)  able to maintain   Comment, (Gait/Stairs) Fatigued slighty with noted decrease in speed but report tolerated well   Clinical Impression   Criteria for Skilled Therapeutic Intervention Yes, treatment indicated   PT Diagnosis (PT) Decreased strength and endurance   Influenced by the following impairments Decreased strength with anemia   Functional limitations due to impairments Decreased gait mobility and endurance   Clinical Presentation (PT Evaluation Complexity) stable   Clinical Decision Making (Complexity) low complexity   Planned Therapy Interventions (PT) progressive activity/exercise;gait training   Risk & Benefits of therapy have been explained evaluation/treatment results reviewed;care plan/treatment goals reviewed;risks/benefits reviewed   Clinical Impression Comments Decreased gait and mobility capabilities present but improving   PT Total Evaluation Time   PT Eval, Low Complexity Minutes (25108) 12   Therapy Certification   Start of care date 05/18/25   Physical Therapy Goals   PT Frequency Daily   PT Predicted Duration/Target Date for Goal Attainment 05/22/25   PT Goals Gait   PT: Gait Greater than 200 feet;Independent   Interventions   Interventions Quick Adds Gait Training   Gait Training   Gait Training Minutes (37519) 15   Symptoms Noted During/After Treatment (Gait Training) fatigue  (minimal after, noted slightly with reports of legs heavy mid walk)   Treatment Detail/Skilled Intervention CGAx1 with fww with gait instruction with use of A device   Distance in Feet 100   [place] Level (Gait Training) contact guard   Physical Assistance Level (Gait Training) supervision   Weight Bearing (Gait Training) full weight-bearing   Assistive Device (Gait Training) standard walker  (fww)   Impairments (Gait Analysis/Training) strength decreased   PT Discharge Planning   PT Plan Continue PT daily to promote increased strength and endurance   PT Discharge Recommendation (DC Rec) home with assist   PT  Rationale for DC Rec Promote safe and stable mobility   PT Brief overview of current status Improving energy, CGAx1 with fww for gait, SBA for transfers   PT Total Distance Amb During Session (feet) 100   Physical Therapy Time and Intention   Timed Code Treatment Minutes 15   Total Session Time (sum of timed and untimed services) 27

## 2025-05-18 NOTE — PROGRESS NOTES
Hgb check 7.5, MD notified, order to transfuse 1 unit of PRBC, BP 99/59 at start of transfusion LS clear

## 2025-05-18 NOTE — INTERVAL H&P NOTE
I have reviewed the surgical consult. Patient with no active bleeding overnight. Hgb has responded appropriately to transfusion.   Discussed EGD with biopsies. Questions answered consent completed.     Clinical Conditions Present on Arrival:  Clinically Significant Risk Factors Present on Admission    ASA class 3     EF 40%  Anemia-hgb 8.7

## 2025-05-18 NOTE — ANESTHESIA POSTPROCEDURE EVALUATION
Patient: Ness Bales    Procedure: Procedure(s):  ESOPHAGOGASTRODUODENOSCOPY, WITH BIOPSY       Anesthesia Type:  MAC    Note:  Disposition: Inpatient   Postop Pain Control: Uneventful            Sign Out: Well controlled pain   PONV: No   Neuro/Psych: Uneventful            Sign Out: Acceptable/Baseline neuro status   Airway/Respiratory: Uneventful            Sign Out: Acceptable/Baseline resp. status   CV/Hemodynamics: Uneventful            Sign Out: Acceptable CV status; No obvious hypovolemia; No obvious fluid overload   Other NRE: NONE   DID A NON-ROUTINE EVENT OCCUR?            Last vitals:  Vitals Value Taken Time   /59 05/18/25 10:55   Temp 97  F (36.1  C) 05/18/25 10:50   Pulse 78 05/18/25 10:55   Resp 16 05/18/25 10:55   SpO2 97 % 05/18/25 10:56   Vitals shown include unfiled device data.    Electronically Signed By: LIZBETH Griffin CRNA  May 18, 2025  11:08 AM

## 2025-05-18 NOTE — PROGRESS NOTES
[facility]    Medicine Progress Note - Hospitalist Service    Date of Admission:  5/16/2025    Assessment & Plan   Ness Bales is a 74 year-old female with a history of HFrEF (acute systolic heart failure recently with known chronic diastolic heart failure), non-obstructive CAD, paroxysmal atrial fibrillation s/p DCCV on apixaban, HTN with primary hyperaldosteronism, ALICE, anemia, vit B12 deficiency, GERD, severe obesity, history of gastric bypass surgery, hypomagnesemia, recent acute pancreatitis with Mounjaro discontinued, recent diagnosis SIADH on fluid restriction and neurocognitive disorder who presented to the ED on 5/16/2025 from Cardiology Clinic at [facility] due to symptomatic hypotension. She had recently been admitted to [facility] 4/30-5/3/2025 with acute pancreatitis, troponin elevation due to acute myocardial injury, SIADH, possible acute cystitis and found to have Takotsubo cardiomyopathy with LVEF newly reduced to 40%, down from 50-55% with LHC showing non-obstructive CAD. She had been discharged on a fluid restriction of 1500 mL. She was newly started on aspirin 81 mg daily (though appears intent was to discontinue prior to discharge) and continued on Lasix, spironolactone, metoprolol succinate, and losartan & Mounjaro was stopped since it likely triggered acute pancreatitis; she completed treated for a UTI. Patient recalls having watery black diarrhea for a couple days just after discharge and continued to have black stools she has attributed to her oral iron repletion. Thinks she may have transiently had some upper abdominal pain, though she admits difficulty remembering. She was noticing increased shortness of breath/LYNNE after discharge and lightheadedness that increases with sitting/standing. She was aware that her blood pressures were significantly low at home and that she was having trouble with thirst on the fluid restriction. She has overall felt unwell. When she arrived to  Cardiology Clinic, her BP was 73/54 while standing with /71 sitting and she was sent to the ED due to concern for symptomatic orthostatic hypotension. While in the ED, SBP was in the low 100's to 110's lying down. EKG with NSR, initially was reported to be tachycardic to 130's and received 1 L IVF bolus with SBP still dropping to the 80's after this. She was noted to have an LISBET with Cr 1.29 and hyperkalemia (K 5.5) on lab evaluation with WBC 12.1, Hgb 9.2. Her case was discussed with Cardiology who recommended holding her cardiac meds (Lasix, ARB, spironolactone, ?BB) and felt local admission was appropriate. Following admission, she was given an additional 500 mL IVF. Follow-up labs 5/17 then demonstrated a Hgb downtrend to 5.6 (confirmed this low on repeat) with Na 132, Cr improved to 1.07. She did not have signs of obvious bleeding since admission. Repeat CT abd/pelvis 4/29 noted to have thickening of the pylorus and proximal duodenal bulb which could reflect gastritis, duodenitis, or PUD along with mild wall thickening of the esophagus possibly from esophagitis. CTA GI bleed protocol 5/17 without evidence of acute bleeding. 2 units PRBCs administered 5/17, holding apixaban, initiated IV PPI and consulted General Surgery with plan for EGD on 5/18. Continuing to follow Hgb trends and will transfuse for Hgb >8 given recent diagnosis of Takotsubo. Received 2 additional units of PRBCs 5/18 for total of 4 units. Patient is feeling much better; Bps are still low. She may be ready for discharge 5/19 pending Hgb trends.     Principal Problem:    Symptomatic anemia, acute on chronic, due to acute blood loss anemia with concern for upper GI bleeding with bile reflux gastritis found on EGD 5/18/25 (black diarrhea stools recently) given recent CT findings concerning for esophagitis/gastritis/duodenitis, patient on anticoagulation and now with hypotension increasing risk of ischemic colitis, etc  -Admitted to  inpatient  -Hold apixaban though may restart after discharge discharge  -Transfuse 2 units PRBCs 5/17/25 with Lasix 20 mg IV x1 after first dose; given 2 additional units PRBCs 5/18/25 for goal Hgb 8  -Follow serial Hgb/CBCs, transfuse if Hgb <8 due to recent new Cardiomyopathy diagnosis  -Initiated on IV PPI, switch   -CTA GI bleed study without evidence of acute bleeding  -General surgery consulted, appreciate recommendations, EGD done 5/18 with bile reflux gastritis seen without ulceration in the setting of gastric bypass without Rouen y limb   -Diet now advanced, tolerating well       LISBET (acute kidney injury), mild - Cr 1.29 at admission and baseline likely ~0.8  -Improving/downtrending Cr, continue to monitor renal function closely; improving toward prior baseline   -Holding ARB, diuretic therapy. Will likely need to hold at discharge with close monitoring needed due to ongoing low BPs      Hyperkalemia - mild with K 5.5, in the setting of spironolactone, losartan and LISBET  -Lokelma and IVF given with resolution       Symptomatic orthostatic hypotension - suspect combination of iatrogenic from cardiac medications as well as due to   -Continue to hold cardiac meds  -Receiving blood as above   -Ok to defer fluid restriction for now      SIADH (syndrome of inappropriate ADH production) - recently diagnosed and recommended 1500 mL fluid restriction at discharge  -Will hold off on restricting fluids for now due to hypovolemia suspected; Na remaining stable in the low 130's off fluid restriction   -Monitor Na trends and add back fluid restriction if needed       Paroxysmal atrial fibrillation (H)    Hypercoagulable state due to paroxysmal atrial fibrillation (H)  -Holding apixaban and metoprolol succinate for now       Acute systolic heart failure (H) -Takotsubo cardiomyopathy - recently diagnosed 5/2025, LVEF newly 40% on TTE down from 50-55%    Chronic diastolic heart failure (H)  -Transfusion goal >8 mg/dL for Hgb  given this recent diagnosis, monitor for signs of acute hypervolemia during transfusion   -On telemetry  -Holding loop diuretic, BB, ARB, MRA  -Monitor daily weights, I&Os    Active Problems:      Myocardial injury, acute  - recently when at [facility]; initially concern for NSTEMI with elevated troponin, though [facility] only with non-obstructive CAD      Vitamin B12 deficiency -Receives monthly injections       Esophageal reflux - known diagnosis, with CT changes seen  -EGD 5/18      Recent acute pancreatitis - during recent admission, believed to have been provoked by Mounjaro which has been discontinued       Essential hypertension, benign    Primary aldosteronism  -Holding all meds as above due to symptomatic orthostatic hypotension. Monitor BP trends and restart when appropriate       Sleep apnea - not using CPAP       Hx of gastric bypass - noted       Hypothyroidism - on levothyroxine       Prediabetes - noted, last HgbA1c 5.6% 1/17/2024; no need for diet restriction and follow-up with PCP to recheck A1c since it will be inaccurate in the setting of acute anemia       Osteoporosis - follow-up with PCP      Neurocognitive disorder - memory deficits noted and patient is aware of them; believes that memory is worse recently       Spinal stenosis, lumbar - noted, treat pain PRN       Morbid obesity (H) - admission BMI Body mass index is 32.29 kg/m .            Diet: NPO for Procedure/Surgery per Anesthesia Guidelines Except for: Ice Chips; Clear liquids before procedure/surgery: ADULT (Age GREATER than or Equal to 18 years) - Clear liquids 2 hours before procedure/surgery    DVT Prophylaxis: VTE Prophylaxis contraindicated due to GI hemorrhage, holding home DOAC  Rubio Catheter: Not present  Lines: None     Cardiac Monitoring: None  Code Status: Full Code      Clinically Significant Risk Factors        # Hyperkalemia: Highest K = 5.5 mmol/L in last 2 days, will monitor as appropriate  # Hyponatremia: Lowest Na = 132 mmol/L  "in last 2 days, will monitor as appropriate             # Hypertension: Noted on problem list            # Obesity: Estimated body mass index is 32.29 kg/m  as calculated from the following:    Height as of this encounter: 1.626 m (5' 4\").    Weight as of this encounter: 85.3 kg (188 lb 1.6 oz)., PRESENT ON ADMISSION            Social Drivers of Health    Physical Activity: Insufficiently Active (4/11/2022)    Received from [facility]    Exercise Vital Sign     Days of Exercise per Week: 3 days     Minutes of Exercise per Session: 30 min          Disposition Plan     Medically Ready for Discharge: Anticipated in 2-4 Days             Yolande Cameron MD  Hospitalist Service  [facility]  Securely message with [employer] (more info)  Text page via [employer] Paging/Directory   ______________________________________________________________________    Interval History   1 unit of PRBCs overnight. EGD planned today. Patient feeling well after, visiting with family. Hopes for discharge tomorrow.     Physical Exam   Vital Signs: Temp: 97.8  F (36.6  C) Temp src: Tympanic BP: 113/65 Pulse: 88   Resp: 16 SpO2: 100 % O2 Device: None (Room air) Oxygen Delivery: 1 LPM  Weight: 188 lbs 1.6 oz    General: Well-nourished, well-developed, in no acute distress   HEENT: Anicteric, no conjunctivitis   Cardiovascular: Regular rate and rhythm, normal S1/S2, no appreciable murmurs   Lungs: Clear to auscultation bilaterally anteriorly  Abdomen: Soft, NT/ND   Extremities: Trace to 1+ distal LE edema L>R (improved)  Neuro: Sensory and motor grossly intact, no focal deficits, alert and answering questions appropriately though with cognitive impairment/short term memory deficits  Psych: Appropriate mood   Skin: No rashes/lesions on visible skin      Medical Decision Making       55 MINUTES SPENT BY ME on the date of service doing chart review, history, exam, documentation & further activities per the note.      Data     I have " personally reviewed the following data over the past 24 hrs:    8.2  \   8.7 (L)   / 205     133 (L) 101 38.4 (H) /  115 (H)   3.8 24 1.04 (H) \       Imaging results reviewed over the past 24 hrs:   Recent Results (from the past 24 hours)   CTA GI Bleed    Narrative    EXAM: CTA GI BLEED  LOCATION[facility]  DATE: 5/17/2025    INDICATION: Concern for GI bleeding, acute drop in Hgb with recent hypotension, on DOAC, known upper GI pathology from recent.  COMPARISON: CT 4/29/2025  TECHNIQUE: CT angiogram abdomen pelvis during arterial phase of injection of IV contrast. 2D and 3D MIP reconstructions were performed by the CT technologist. Dose reduction techniques were used.  CONTRAST: Isovue 370, 107 mL    FINDINGS:  ANGIOGRAM ABDOMEN/PELVIS: No evidence of active GI bleed. The celiac, SMA, DAMIAN, 2 right renal arteries and single left renal artery are patent without significant stenosis. No aortoiliac aneurysm nor significant stenosis.    LOWER CHEST: Bibasilar subsegmental atelectasis versus scar. Small sliding-type hiatal hernia.    HEPATOBILIARY: Diffuse hepatic steatosis. Normal gallbladder and biliary system.    PANCREAS: Normal.    SPLEEN: Calcified splenic granulomata.    ADRENAL GLANDS: Normal.    KIDNEYS/BLADDER: Chronic left renal cortical scarring redemonstrated. Simple right renal cortical cysts do not require imaging follow-up. No urinary tract calculus nor hydronephrosis. The bladder is negative.    BOWEL: No evidence of active GI bleed. No obstruction or inflammatory change. Colonic diverticulosis. Normal appendix. Moderate amount of stool in rectum with otherwise mild colonic stool burden. Previous Cherry-en-Y gastric bypass.    LYMPH NODES: No lymphadenopathy.    PELVIC ORGANS: Normal.    MUSCULOSKELETAL: No suspicious osseous lesions.      Impression    IMPRESSION:  1.  No evidence of active GI bleed. No acute inflammatory bowel process. Patient has had previous Cherry-en-Y gastric  bypass.  2.  Colonic diverticulosis with no diverticulitis.  3.  Chronic left renal cortical scarring

## 2025-05-18 NOTE — PROGRESS NOTES
05/18/25 1323   Appointment Info   Signing Clinician's Name / Credentials (OT) Lyndajacqui Hogan, OTR/L   Living Environment   People in Home spouse   Current Living Arrangements house   Home Accessibility no concerns;stairs to enter home   Number of Stairs, Main Entrance 3   Transportation Anticipated family or friend will provide   Living Environment Comments Patient lives in a house with her spouse. She has 3 steps to enter. Stairs down to the basement but she doesn't have to go down there. She has a FWW from a previouse knee surgery. She has a walk-in shower.   Self-Care   Usual Activity Tolerance good   Current Activity Tolerance fair   Equipment Currently Used at Home   (Does not use any AD but has a FWW from a previous knee surgery.)   Fall history within last six months yes   Number of times patient has fallen within last six months 1   Instrumental Activities of Daily Living (IADL)   Previous Responsibilities meal prep;housekeeping;laundry;shopping;yardwork;medication management;finances;driving   General Information   Onset of Illness/Injury or Date of Surgery 05/18/25   Patient/Family Therapy Goal Statement (OT) Patient would like to return home to [facility].   Cognitive Status Examination   Orientation Status orientation to person, place and time   Affect/Mental Status (Cognitive) WNL   Follows Commands WNL   Pain Assessment   Patient Currently in Pain   (No reports of pain at eval.)   Range of Motion Comprehensive   General Range of Motion no range of motion deficits identified   Bed Mobility   Supine-Sit [place] (Bed Mobility) supervision   Assistive Device (Bed Mobility) bed rails   Transfers   Transfers sit-stand transfer;bed-chair transfer   Transfer Skill: Bed to Chair/Chair to Bed   Bed-Chair [place] (Transfers) contact guard   Assistive Device (Bed-Chair Transfers) standard walker   Sit-Stand Transfer   Sit-Stand [place] (Transfers) contact guard   Assistive Device (Sit-Stand Transfers)  walker, standard   Clinical Impression   Criteria for Skilled Therapeutic Interventions Met (OT) Yes, treatment indicated   OT Diagnosis Weakness and decline in ADLs.   OT Problem List-Impairments impacting ADL problems related to;activity tolerance impaired;mobility;strength   Assessment of Occupational Performance 1-3 Performance Deficits   Planned Therapy Interventions (OT) ADL retraining;strengthening;transfer training;home program guidelines;progressive activity/exercise;risk factor education   Clinical Decision Making Complexity (OT) problem focused assessment/low complexity   Risk & Benefits of therapy have been explained evaluation/treatment results reviewed;care plan/treatment goals reviewed;risks/benefits reviewed   OT Total Evaluation Time   OT Eval, Low Complexity Minutes (00870) 15   OT Goals   Therapy Frequency (OT) Daily   OT Predicted Duration/Target Date for Goal Attainment 05/23/25   OT: Upper Body Dressing Supervision/stand-by assist   OT: Lower Body Dressing Supervision/stand-by assist   OT: Toilet Transfer/Toileting Supervision/stand-by assist   Therapeutic Activities   Therapeutic Activity Minutes (64421) 18   Symptoms noted during/after treatment none   Treatment Detail/Skilled Intervention Supine to sit with supervision. Sat at EOB with supervision. Sit to stand x2 with FWW and CGA. Ambulated in the room and hallways about 100 feet with CGA and FWW with no adverse symptoms. BP at end of session was 134/72 and 02 was 100% after activity on room air.   OT Discharge Planning   OT Plan Continue with OT daily while hospitalized.   OT Discharge Recommendation (DC Rec) home with assist   OT Rationale for DC Rec Patient is close to baseline and feeling better than day of admit. She has assistance from her spouse at home as needed.   OT Brief overview of current status See above   OT Total Distance Amb During Session (feet) 100   Total Session Time   Timed Code Treatment Minutes 18   Total Session  Time (sum of timed and untimed services) 33

## 2025-05-18 NOTE — OP NOTE
PROCEDURE NOTE    SURGEON: Sagrario Bhat MD.    PRE-OP DIAGNOSIS:   , history of gastric bypass, melena, anemia      POST-OP DIAGNOSIS: bile reflux, inflammation of the gastric pouch changes of gastric bypass     PROCEDURE PLANNED:   Diagnostic EGD, flexible        PROCEDURE PERFORMED:  EGD with biopsy, flexible    SPECIMEN:  gastric pouch and GEJ biopsies    ANESTHESIA: See anesthesia record, Coverage requested due to:ASA 3, anemia and EF of 40%    ESTIMATED BLOOD LOSS: None    COMPLICATIONS:  None    INDICATION FORTHE PROCEDURE: The patient is a 74 year oldfemale. The patient presents with melena and hgb in the 5s.. I explained to the patient the risks, benefits and alternatives to diagnostic EGD for evaluating for bleeding source. We specifically discussed the risks of bleeding, infection, perforation and the risks of sedation. The patient's questions were answered and the patient wished to proceed. Informed consent paperwork was completed.    PROCEDURE: The patient was taken to the endoscopy suite. Appropriate monitors were attached. The patient was placed in the left lateral decubitus position. Bite block was positioned.Timeout was performed confirming the patient's identity and procedure to be performed. After appropriate sedation was confirmed, the flexible endoscope was advanced into the oropharynx. The posterior oropharynx appeared grossly normal. The scope was advanced into the proximal esophagus. The esophagus was insufflated with air. The scope was advanced under direct visualization. No acute abnormalities of the esophagus were noted. The scope was advanced into the stomach pouch. NO active bleeding was noted. There was inflammation of the mucosa of the gastric pouch. No anastomotic ulceration was noted. The scope was advanced through the anastomosis. No abnormalities of the biliopancreatic or alimentary limb. The scope was withdrawn back into the gastric pouch. Biopsies were obtained and sent to  pathology. The gastric pouch was decompressed. The scope was withdrawn to the GE junction and biopsy obtained. The mucosa of the esophagus was inspected while withdrawing the scope. No abnormalities were noted. The scope was withdrawn from the patient. The bite block was removed. The patient tolerated the procedure with no immediately apparent complication. The patient was taken to recovery in stable condition.    FOLLOW UP:  RECOMMENDATIONS Will call with pathology results. Start carafate suspect bile reflux is contributing to the irritation.

## 2025-05-18 NOTE — PLAN OF CARE
Goal Outcome Evaluation:      Plan of Care Reviewed With: patient    Overall Patient Progress: improvingOverall Patient Progress: improving         Patient is able to stand at bedside, no dizziness reported by patient,  SOB much improved. 1 unit of PRBC infused for 7.5 Hgb without issue, BP WNL. +2-3 bilateral lower extremity edema, LS remain clear.

## 2025-05-18 NOTE — OR NURSING
PACU Transfer Note    Ness Bales was transferred to [facility] via cot.  Equipment used for transport:  none.  Accompanied by:  DOMINGO Rodriguez  Prescriptions were: n/a    PACU Respiratory Event Documentation     1) Episodes of Apnea greater than or equal to 10 seconds: no    2) Bradypnea - less than 8 breaths per minute: no    3) Pain score on 0 to 10 scale: 0    4) Pain-sedation mismatch (yes or no): no    5) Repeated 02 desaturation less than 90% (yes or no): no    Anesthesia notified? (yes or no): n/a    Any of the above events occuring repeatedly in separate 30 minute intervals may be considered recurrent PACU respiratory events.    Patient stable and meets phase 1 discharge criteria for transport from PACU.    Report called to DOMINGO Sanchez RN on 5/18/2025 at 10:54 AM

## 2025-05-18 NOTE — ANESTHESIA CARE TRANSFER NOTE
Patient: Ness Bales    Procedure: Procedure(s):  ESOPHAGOGASTRODUODENOSCOPY, WITH BIOPSY       Diagnosis: Hx of gastric bypass [Z98.84]  Gastrointestinal hemorrhage with melena [K92.1]  Anemia due to blood loss, acute [D62]  Diagnosis Additional Information: No value filed.    Anesthesia Type:   MAC     Note:    Oropharynx: oropharynx clear of all foreign objects and spontaneously breathing  Level of Consciousness: awake  Oxygen Supplementation: nasal cannula  Level of Supplemental Oxygen (L/min / FiO2): 2  Independent Airway: airway patency satisfactory and stable  Dentition: dentition unchanged  Vital Signs Stable: post-procedure vital signs reviewed and stable  Report to RN Given: handoff report given  Patient transferred to: PACU    Handoff Report: Identifed the Patient, Identified the Reponsible Provider, Reviewed the pertinent medical history, Discussed the surgical course, Reviewed Intra-OP anesthesia mangement and issues during anesthesia, Set expectations for post-procedure period and Allowed opportunity for questions and acknowledgement of understanding      Vitals:  Vitals Value Taken Time   BP     Temp     Pulse     Resp     SpO2         Electronically Signed By: LIZBETH Griffin CRNA  May 18, 2025  10:42 AM

## 2025-05-18 NOTE — PROGRESS NOTES
"Patient is AxOx4. Patient denies pain. Patient lungs clear throughout, LYNNE, denies SOB at rest, no cough noted, on room air. Heart RRR at this time, tele discontinued, denies chest pain. Left hand tingling r/t \"hit a nerve a couple weeks ago.\" VSS. Patient is able to make her needs known. NPO r/t procedure today.  Daniel Dexter RN May 18, 2025 7:45 AM    Patient returned from PACU. Denies pain. VSS.  Daniel Dexter RN May 18, 2025 11:20 AM    Following procedure, denies LYNNE or SOB. Heart irregular rhythm, HR 80s, denies chest pain. Patient tolerating regular diet well, denies n/v. VSS.  Daniel Dexter RN May 18, 2025 11:58 AM    Patient SBA with walker, feeling much more strong per patient and denies SOB/LYNNE. Voiding without issue. Patient had bowel movement, not visualized by this writer. Heart irregular rhythm, rate 80s. Patient is able to make her needs known. VSS.  Daniel Dexter RN May 18, 2025 1:43 PM                                          "

## 2025-05-18 NOTE — ANESTHESIA PREPROCEDURE EVALUATION
Anesthesia Pre-Procedure Evaluation    Patient: Ness Bales   MRN: [id number] : 1950          Procedure : Procedure(s):  ESOPHAGOGASTRODUODENOSCOPY         Past Medical History:   Diagnosis Date    Benign essential hypertension     Depression     Hyperaldosteronism     Hypothyroidism     Obesity     No Comments Provided    Personal history of other medical treatment (CODE)     3 vaginal childbirth uncomplicated    Personal history of other medical treatment (CODE)     ,blood transfusion with stomach stapling    Vitamin B12 deficiency (non anemic)       Past Surgical History:   Procedure Laterality Date    ARTHROPLASTY KNEE Right     BIOPSY Bilateral 10/18/2021    Adrenal Vein; Hyperaldosteronism    BIOPSY BREAST      Incisional x2 benign breast biopsies    FRACTURE SURGERY Left     open treatment of Humeral Fx w/ prosthesis    INJECT BOTOX N/A 2020    [facility]; [place] ([place], [place]), Dr. Joe Cruz    LAPAROSCOPIC BYPASS GASTRIC      ,Stomach stapling complicated by incision rupture, then repaired wi second surgery    OTHER SURGICAL HISTORY      ,82252.0,SKIN/SUBCUTANEOUS SURGERY,repaired incision from stomach stapling    OTHER SURGICAL HISTORY      2084,ANESTHESIA ALERT,No problems with anaesthesia. ?      Allergies   Allergen Reactions    Chlorthalidone Other (See Comments)     Nightmares/Caused hyponatremia      Social History     Tobacco Use    Smoking status: Never    Smokeless tobacco: Never   Substance Use Topics    Alcohol use: No     Comment: very little       Wt Readings from Last 1 Encounters:   25 85.3 kg (188 lb 1.6 oz)        Anesthesia Evaluation   Pt has had prior anesthetic.     No history of anesthetic complications       ROS/MED HX  ENT/Pulmonary:     (+) sleep apnea,                                       Neurologic: Comment: Hx unspecified cognitive disorder      Cardiovascular:     (+)  hypertension- -   -  - -   Taking blood thinners Pt has received  instructions:  CHF (diastolic)                  dysrhythmias, a-fib,        Previous cardiac testing   Echo: Date: 2020 Results:  EF 55%  Stress Test:  Date: Results:    ECG Reviewed:  Date: Results:    Cath:  Date: Results:      METS/Exercise Tolerance: >4 METS    Hematologic:     (+)      anemia,          Musculoskeletal: Comment: Spinal stenosis   (+)  arthritis,             GI/Hepatic: Comment: GI Bleed  Hx gastric bypass    (+) GERD, Asymptomatic on medication,                  Renal/Genitourinary:     (+) renal disease,             Endo: Comment: SIADH    (+)  type II DM,        thyroid problem, hypothyroidism,    Obesity,       Psychiatric/Substance Use:  - neg psychiatric ROS     Infectious Disease:  - neg infectious disease ROS     Malignancy:  - neg malignancy ROS     Other:  - neg other ROS            Physical Exam  Airway  Mallampati: II  TM distance: >3 FB  Neck ROM: full  Upper bite lip test: I  Mouth opening: >= 4 cm    Cardiovascular - normal exam  Rhythm: regular  Rate: normal rate     Dental   (+) Completely normal teeth      Pulmonary - normal examBreath sounds clear to auscultation        Neurological   She appears awake, alert and oriented x3.    Other Findings       OUTSIDE LABS:  CBC:   Lab Results   Component Value Date    WBC 8.2 05/18/2025    WBC 13.4 (H) 05/17/2025    HGB 8.7 (L) 05/18/2025    HGB 7.5 (L) 05/17/2025    HCT 24.6 (L) 05/18/2025    HCT 24.5 (L) 05/17/2025     05/18/2025     05/17/2025     BMP:   Lab Results   Component Value Date     (L) 05/18/2025     (L) 05/17/2025    POTASSIUM 3.8 05/18/2025    POTASSIUM 4.4 05/17/2025    CHLORIDE 101 05/18/2025    CHLORIDE 100 05/17/2025    CO2 24 05/18/2025    CO2 21 (L) 05/17/2025    BUN 38.4 (H) 05/18/2025    BUN 61.0 (H) 05/17/2025    CR 1.04 (H) 05/18/2025    CR 1.07 (H) 05/17/2025     (H) 05/18/2025     (H) 05/18/2025     COAGS:   Lab Results   Component Value Date    PTT 41 (H) 04/30/2025  "    POC: No results found for: \"BGM\", \"HCG\", \"HCGS\"  HEPATIC:   Lab Results   Component Value Date    ALBUMIN 4.2 04/29/2025    PROTTOTAL 7.7 04/29/2025    ALT 19 04/29/2025    AST 22 04/29/2025    ALKPHOS 91 04/29/2025    BILITOTAL 0.4 04/29/2025     OTHER:   Lab Results   Component Value Date    LACT 2.1 (H) 04/29/2025    A1C 5.6 01/17/2024    OPAL 9.2 05/18/2025    PHOS 3.8 05/15/2023    MAG 1.8 05/18/2025    LIPASE 110 (H) 04/29/2025    AMYLASE 44 05/15/2023    TSH 4.27 (H) 05/15/2023    T4 1.31 05/15/2023    SED 10 08/25/2020       Anesthesia Plan    ASA Status:  3      NPO Status: NPO Appropriate   Anesthesia Type: MAC.   Techniques and Equipment:       - Monitoring Plan: standard ASA monitoring     Consents    Anesthesia Plan(s) and associated risks, benefits, and realistic alternatives discussed. Questions answered and patient/representative(s) expressed understanding.     - Discussed:     - Discussed with:  Patient        - Pt is DNR/DNI Status: no DNR          Postoperative Care         Comments:                   LIZBETH Griffin CRNA    I have reviewed the pertinent notes and labs in the chart from the past 30 days and (re)examined the patient.  Any updates or changes from those notes are reflected in this note.    Clinically Significant Risk Factors        # Hyperkalemia: Highest K = 5.5 mmol/L in last 2 days, will monitor as appropriate  # Hyponatremia: Lowest Na = 132 mmol/L in last 2 days, will monitor as appropriate           # Hypertension: Noted on problem list            # Obesity: Estimated body mass index is 32.29 kg/m  as calculated from the following:    Height as of this encounter: 1.626 m (5' 4\").    Weight as of this encounter: 85.3 kg (188 lb 1.6 oz)., PRESENT ON ADMISSION                  "

## 2025-05-19 VITALS
HEIGHT: 64 IN | TEMPERATURE: 98.5 F | OXYGEN SATURATION: 99 % | HEART RATE: 81 BPM | BODY MASS INDEX: 32.69 KG/M2 | WEIGHT: 191.5 LBS | SYSTOLIC BLOOD PRESSURE: 144 MMHG | DIASTOLIC BLOOD PRESSURE: 73 MMHG | RESPIRATION RATE: 18 BRPM

## 2025-05-19 LAB
ANION GAP SERPL CALCULATED.3IONS-SCNC: 7 MMOL/L (ref 7–15)
BUN SERPL-MCNC: 21.8 MG/DL (ref 8–23)
CALCIUM SERPL-MCNC: 9.3 MG/DL (ref 8.8–10.4)
CHLORIDE SERPL-SCNC: 107 MMOL/L (ref 98–107)
CREAT SERPL-MCNC: 0.91 MG/DL (ref 0.51–0.95)
EGFRCR SERPLBLD CKD-EPI 2021: 66 ML/MIN/1.73M2
ERYTHROCYTE [DISTWIDTH] IN BLOOD BY AUTOMATED COUNT: 14.7 % (ref 10–15)
GLUCOSE SERPL-MCNC: 113 MG/DL (ref 70–99)
HCO3 SERPL-SCNC: 25 MMOL/L (ref 22–29)
HCT VFR BLD AUTO: 27.5 % (ref 35–47)
HGB BLD-MCNC: 9.6 G/DL (ref 11.7–15.7)
MAGNESIUM SERPL-MCNC: 1.7 MG/DL (ref 1.7–2.3)
MCH RBC QN AUTO: 29.5 PG (ref 26.5–33)
MCHC RBC AUTO-ENTMCNC: 34.9 G/DL (ref 31.5–36.5)
MCV RBC AUTO: 85 FL (ref 78–100)
PLATELET # BLD AUTO: 212 10E3/UL (ref 150–450)
POTASSIUM SERPL-SCNC: 3.6 MMOL/L (ref 3.4–5.3)
RBC # BLD AUTO: 3.25 10E6/UL (ref 3.8–5.2)
SODIUM SERPL-SCNC: 139 MMOL/L (ref 135–145)
WBC # BLD AUTO: 7.9 10E3/UL (ref 4–11)

## 2025-05-19 PROCEDURE — 250N000011 HC RX IP 250 OP 636: Performed by: SURGERY

## 2025-05-19 PROCEDURE — 80048 BASIC METABOLIC PNL TOTAL CA: CPT | Performed by: SURGERY

## 2025-05-19 PROCEDURE — 36415 COLL VENOUS BLD VENIPUNCTURE: CPT | Performed by: SURGERY

## 2025-05-19 PROCEDURE — 250N000013 HC RX MED GY IP 250 OP 250 PS 637: Performed by: SURGERY

## 2025-05-19 PROCEDURE — 83735 ASSAY OF MAGNESIUM: CPT | Performed by: SURGERY

## 2025-05-19 PROCEDURE — 250N000013 HC RX MED GY IP 250 OP 250 PS 637: Performed by: INTERNAL MEDICINE

## 2025-05-19 PROCEDURE — 99239 HOSP IP/OBS DSCHRG MGMT >30: CPT | Performed by: INTERNAL MEDICINE

## 2025-05-19 PROCEDURE — 85027 COMPLETE CBC AUTOMATED: CPT | Performed by: SURGERY

## 2025-05-19 RX ORDER — METOPROLOL SUCCINATE 100 MG/1
100 TABLET, EXTENDED RELEASE ORAL DAILY
Status: DISCONTINUED | OUTPATIENT
Start: 2025-05-19 | End: 2025-05-19 | Stop reason: HOSPADM

## 2025-05-19 RX ORDER — SPIRONOLACTONE 25 MG/1
50 TABLET ORAL DAILY
Status: DISCONTINUED | OUTPATIENT
Start: 2025-05-19 | End: 2025-05-19

## 2025-05-19 RX ORDER — METOPROLOL SUCCINATE 100 MG/1
100 TABLET, EXTENDED RELEASE ORAL
Status: SHIPPED
Start: 2025-05-19

## 2025-05-19 RX ORDER — SPIRONOLACTONE 50 MG/1
TABLET, FILM COATED ORAL
Status: SHIPPED
Start: 2025-05-19

## 2025-05-19 RX ORDER — FUROSEMIDE 20 MG/1
20 TABLET ORAL DAILY
Status: SHIPPED
Start: 2025-05-19

## 2025-05-19 RX ORDER — SPIRONOLACTONE 25 MG/1
25 TABLET ORAL
Status: DISCONTINUED | OUTPATIENT
Start: 2025-05-19 | End: 2025-05-19

## 2025-05-19 RX ORDER — SPIRONOLACTONE 25 MG/1
25 TABLET ORAL 2 TIMES DAILY
Status: DISCONTINUED | OUTPATIENT
Start: 2025-05-19 | End: 2025-05-19 | Stop reason: HOSPADM

## 2025-05-19 RX ORDER — FUROSEMIDE 20 MG/1
20 TABLET ORAL DAILY
Status: DISCONTINUED | OUTPATIENT
Start: 2025-05-19 | End: 2025-05-19 | Stop reason: HOSPADM

## 2025-05-19 RX ADMIN — METOPROLOL SUCCINATE 100 MG: 100 TABLET, EXTENDED RELEASE ORAL at 10:01

## 2025-05-19 RX ADMIN — FERROUS SULFATE TAB EC 325 MG (65 MG FE EQUIVALENT) 325 MG: 325 (65 FE) TABLET DELAYED RESPONSE at 07:57

## 2025-05-19 RX ADMIN — FUROSEMIDE 20 MG: 20 TABLET ORAL at 10:01

## 2025-05-19 RX ADMIN — SUCRALFATE 1 G: 1 TABLET ORAL at 11:52

## 2025-05-19 RX ADMIN — ARIPIPRAZOLE 5 MG: 5 TABLET ORAL at 10:01

## 2025-05-19 RX ADMIN — DULOXETINE 60 MG: 30 CAPSULE, DELAYED RELEASE ORAL at 10:01

## 2025-05-19 RX ADMIN — SUCRALFATE 1 G: 1 TABLET ORAL at 07:57

## 2025-05-19 RX ADMIN — SPIRONOLACTONE 25 MG: 25 TABLET ORAL at 10:01

## 2025-05-19 RX ADMIN — PANTOPRAZOLE SODIUM 40 MG: 40 INJECTION, POWDER, FOR SOLUTION INTRAVENOUS at 07:55

## 2025-05-19 RX ADMIN — LEVOTHYROXINE SODIUM 50 MCG: 0.05 TABLET ORAL at 10:01

## 2025-05-19 ASSESSMENT — ACTIVITIES OF DAILY LIVING (ADL)
ADLS_ACUITY_SCORE: 39

## 2025-05-19 NOTE — PROGRESS NOTES
[facility] DISCHARGE NOTE    Patient discharged to home at 1:30PM via wheel chair. Accompanied by spouse and staff. Discharge instructions reviewed with patient and spouse, opportunity offered to ask questions. Prescriptions sent to patients preferred pharmacy. All belongings sent with patient.    Herminia Grover RN

## 2025-05-19 NOTE — DISCHARGE SUMMARY
[facility]    Discharge Summary  Hospitalist    Date of Admission:  5/16/2025  Date of Discharge:  5/19/2025  Discharging Provider: Yolande Cameron MD  Date of Service (when I saw the patient): 05/19/25    Follow-Up Recommendations for the Outpatient Clinician   -PCP within 3 to 5 days to reassess her blood pressure and blood work, likely able to return to her prior medication regimen for HTN/CHF (or at least closer to it). Repeat CBC, BMP.   -She also has planned cardiology follow-up in about 2 weeks per her report  -General Surgery recommended holding apixaban until she is called with the biopsy results from EGD; if patient does not hear specific instructions, recommend restarting in 1 week   -General Surgery recommended indefinite sucralfate, no planned repeat EGD per discussion   -Patient no longer requires a fluid restriction for SIADH; Na normal on day of discharge     Discharge Diagnoses   Principal Problem:    Symptomatic anemia  Active Problems:    Esophageal reflux    Essential hypertension, benign    Hypothyroidism    Sleep apnea    Spinal stenosis, lumbar    Morbid obesity (H)    Primary aldosteronism    Osteoporosis, unspecified osteoporosis type, unspecified pathological fracture presence    Hyperkalemia    Hx of gastric bypass    Prediabetes    Vitamin B12 deficiency    Orthostatic hypotension    LISBET (acute kidney injury)    Paroxysmal atrial fibrillation (H)    Acute systolic heart failure (H) -Takotsubo cardiomyopathy    Chronic diastolic heart failure (H)    Symptomatic hypotension    Hypercoagulable state due to paroxysmal atrial fibrillation (H)    SIADH (syndrome of inappropriate ADH production)    Acute pancreatitis    Neurocognitive disorder    Myocardial injury    Takotsubo cardiomyopathy    Anemia due to blood loss, acute    Upper GI bleed    Bile reflux gastritis - found on EGD 5/18/25      Hospital Course   Ness Bales is a 74 year-old female with a history of  HFrEF (acute systolic heart failure recently with known chronic diastolic heart failure), non-obstructive CAD, paroxysmal atrial fibrillation s/p DCCV on apixaban, HTN with primary hyperaldosteronism, ALICE, anemia, vit B12 deficiency, GERD, severe obesity, history of gastric bypass surgery, hypomagnesemia, recent acute pancreatitis with Mounjaro discontinued, recent diagnosis SIADH on fluid restriction and neurocognitive disorder who presented to the ED on 5/16/2025 from Cardiology Clinic at [facility] due to symptomatic hypotension. She had recently been admitted to [facility] 4/30-5/3/2025 with acute pancreatitis, troponin elevation due to acute myocardial injury, SIADH, possible acute cystitis and found to have Takotsubo cardiomyopathy with LVEF newly reduced to 40%, down from 50-55% with LHC showing non-obstructive CAD. She had been discharged on a fluid restriction of 1500 mL. She was newly started on aspirin 81 mg daily (though appears intent was to discontinue prior to discharge) and continued on Lasix, spironolactone, metoprolol succinate, and losartan & Mounjaro was stopped since it likely triggered acute pancreatitis; she completed treated for a UTI. Patient recalls having watery black diarrhea for a couple days just after discharge and continued to have black stools she has attributed to her oral iron repletion. Thinks she may have transiently had some upper abdominal pain, though she admits difficulty remembering. She was noticing increased shortness of breath/LYNNE after discharge and lightheadedness that increases with sitting/standing. She was aware that her blood pressures were significantly low at home and that she was having trouble with thirst on the fluid restriction. She has overall felt unwell. When she arrived to Cardiology Clinic, her BP was 73/54 while standing with /71 sitting and she was sent to the ED due to concern for symptomatic orthostatic hypotension. While in the ED, SBP was in the low  100's to 110's lying down. EKG with NSR, initially was reported to be tachycardic to 130's and received 1 L IVF bolus with SBP still dropping to the 80's after this. She was noted to have an LISBET with Cr 1.29 and hyperkalemia (K 5.5) on lab evaluation with WBC 12.1, Hgb 9.2. Her case was discussed with Cardiology who recommended holding her cardiac meds (Lasix, ARB, spironolactone, ?BB) and felt local admission was appropriate. Following admission, she was given an additional 500 mL IVF. Follow-up labs 5/17 then demonstrated a Hgb downtrend to 5.6 (confirmed this low on repeat) with Na 132, Cr improved to 1.07. She did not have signs of obvious bleeding since admission. Repeat CT abd/pelvis 4/29 noted to have thickening of the pylorus and proximal duodenal bulb which could reflect gastritis, duodenitis, or PUD along with mild wall thickening of the esophagus possibly from esophagitis. CTA GI bleed protocol 5/17 without evidence of acute bleeding. 2 units PRBCs administered 5/17, holding apixaban, initiated IV PPI and consulted General Surgery with plan for EGD on 5/18. Continuing to follow Hgb trends and will transfuse for Hgb >8 given recent diagnosis of Takotsubo. Received 2 additional units of PRBCs 5/18 for total of 4 units. Patient is feeling much better; Bps are still low. She was felt ready for discharge 5/19 with Hgb in the 9's, resolution of hyponatremia (off fluid restriction) and finally showing signs of recovery of blood pressure (with new hypertension instead of SBP in the low 100's) on the day of discharge. Able to restart most of her BP/CHF meds at lower doses on the day of discharge will have her follow-up closely with her PCP within 3 to 5 days to reassess her blood pressure and blood work, likely able to return to her prior medication regimen (or at least closer to it).  She also has planned cardiology follow-up in about 2 weeks per her report.     Principal Problem:    Symptomatic anemia, acute on  chronic, due to acute blood loss anemia with concern for upper GI bleeding with bile reflux gastritis found on EGD 5/18/25 (black diarrhea stools recently) given recent CT findings concerning for esophagitis/gastritis/duodenitis, found to have bile acid reflux gastritis on EGD -  in the setting of patient on a DOAC  -Admitted to inpatient  -Hold apixaban for now; discussed restart of apixaban with general surgery with recommendation to hold until she is called with biopsy results at which time the general surgery to discuss restarting.  If she does not receive specific instructions, she can restart apixaban in 1 week  -Transfuse 2 units PRBCs 5/17/25 with Lasix 20 mg IV x1 after first dose; given 2 additional units PRBCs 5/18/25 for goal Hgb 8  -Follow serial Hgb/CBCs, transfuse if Hgb <8 due to recent new Cardiomyopathy diagnosis  -Initiated on IV PPI, switch to oral PPI daily at discharge in addition to sucralfate as recommended by General Surgery    -CTA GI bleed study without evidence of acute bleeding  -General surgery consulted, appreciate recommendations, EGD done 5/18 with bile reflux gastritis seen without ulceration in the setting of gastric bypass without Rouen y limb   -Diet now advanced, tolerating well        Esophageal reflux - known diagnosis, with CT changes seen  -EGD 5/18 with GE junction biopsies taken and bile reflux gastritis seen       LISBET (acute kidney injury), mild - Cr 1.29 at admission and baseline likely ~0.8  -Improving/downtrending Cr, continue to monitor renal function closely; improving toward prior baseline   -Holding ARB, diuretic therapy. Will restart lower dose diuretics, continue to hold ARB        Hyperkalemia - mild with K 5.5, in the setting of spironolactone, losartan and LISBET  -Lokelma and IVF given with resolution        Symptomatic orthostatic hypotension - suspect combination of iatrogenic from cardiac medications as well as due to   -Now resolved with blood  administration, no longer on fluid restriction       SIADH (syndrome of inappropriate ADH production) - RESOLVED, recently diagnosed and recommended 1500 mL fluid restriction at discharge  -Will hold off on restricting fluids for now due to hypovolemia suspected; Na remaining stable in the low 130's off fluid restriction. Na actually normalized after addressing her anemia with blood and treating gastritis/hypotension, so does not need to discharge on a fluid restriction    -Monitor Na trends as outpatient and add back fluid restriction if needed        Paroxysmal atrial fibrillation (H)    Hypercoagulable state due to paroxysmal atrial fibrillation (H)  -Holding apixaban and metoprolol succinate this hospitalization; restart Toprol at 50% dose reduction 5/19 and apixaban plan as above        Acute systolic heart failure (H) -Takotsubo cardiomyopathy - recently diagnosed 5/2025, LVEF newly 40% on TTE down from 50-55%    Chronic diastolic heart failure (H)  -Transfusion goal >8 mg/dL for Hgb given this recent diagnosis, monitor for signs of acute hypervolemia during transfusion   -On telemetry initially   -Holding loop diuretic, BB, ARB, MRA -> on 5/19, restarting meds as listed below in HTN section on the day of discharge   -Monitor daily weights, I&Os     Active Problems:       Myocardial injury, acute  - recently when at [facility]; initially concern for NSTEMI with elevated troponin, though [facility] only with non-obstructive CAD       Vitamin B12 deficiency -Receives monthly injections        Recent acute pancreatitis - during recent admission, believed to have been provoked by Mounjaro which has been discontinued        Essential hypertension, benign    Primary aldosteronism  -Holding all meds as above due to symptomatic orthostatic hypotension. Monitor BP trends and restart when appropriate -> Bps finally above the low 100's SBP on the day of discharge and re-starting metoprolol succinate, spironolactone and furosemide at  half of her home dose; continue to hold losartan and needs close interval follow-up in 3-5 days to reassess whether she can return to full dosing of her home medications now that she's showing signs of recovery        Sleep apnea - not using CPAP        Hx of gastric bypass - noted; does not have Cherry-en-y component making her prone to bile reflux gastritis        Hypothyroidism - on levothyroxine        Prediabetes - noted, last HgbA1c 5.6% 1/17/2024; no need for diet restriction and follow-up with PCP to recheck A1c since it will be inaccurate in the setting of acute anemia        Osteoporosis - follow-up with PCP       Neurocognitive disorder - memory deficits noted and patient is aware of them; believes that memory is worse recently        Spinal stenosis, lumbar - noted, treat pain PRN        Morbid obesity (H) - admission BMI Body mass index is 32.29 kg/m .        Diet: Generally healthy diet low in sodium recommended, no longer requires fluid restriction   DVT Prophylaxis: VTE Prophylaxis contraindicated due to GI hemorrhage, holding home DOAC  Rubio Catheter: Not present  Lines: None     Cardiac Monitoring: None  Code Status: Full Code      Yolande Cameron MD    Significant Results and Procedures   EGD on 5/19/2025 with Dr. Bhat: with findings likely consistent with bile reflux gastritis without ulceration or active signs of bleeding, biopsies pending     Pending Results   These results will be followed up by General Surgery   Unresulted Labs Ordered in the Past 30 Days of this Admission       Date and Time Order Name Status Description    5/18/2025 10:28 AM Surgical Pathology Exam In process             Code Status   Full Code       Primary Care Physician   Jennifer Maurice    Physical Exam   Temp: 98.7  F (37.1  C) Temp src: Tympanic BP: (!) 141/66 Pulse: 74   Resp: 16 SpO2: 98 % O2 Device: None (Room air)    Vitals:    05/17/25 0101 05/18/25 0258 05/19/25 0010   Weight: 84.6 kg (186 lb 8 oz) 85.3 kg  (188 lb 1.6 oz) 86.9 kg (191 lb 8 oz)     Vital Signs with Ranges  Temp:  [97  F (36.1  C)-98.7  F (37.1  C)] 98.7  F (37.1  C)  Pulse:  [74-98] 74  Resp:  [14-18] 16  BP: (107-156)/(52-76) 141/66  SpO2:  [94 %-100 %] 98 %  I/O last 3 completed shifts:  In: 1490 [P.O.:800; I.V.:350]  Out: 2500 [Urine:2500]    General: Well-nourished, well-developed, in no acute distress   HEENT: Anicteric, no conjunctivitis   Cardiovascular: Regular rate and rhythm, normal S1/S2, no appreciable murmurs (vs subtle flow murmur today)   Lungs: Clear to auscultation bilaterally anteriorly  Abdomen: Soft, NT/ND   Extremities: Trace distal LE edema L>R (improved further)  Neuro: Sensory and motor grossly intact, no focal deficits, alert and answering questions appropriately though with cognitive impairment/short term memory deficits  Psych: Appropriate mood   Skin: No rashes/lesions on visible skin, no longer pale     Discharge Disposition   Discharged to home  Condition at discharge: Satisfactory    Consultations This Hospital Stay   PHYSICAL THERAPY ADULT IP CONSULT  OCCUPATIONAL THERAPY ADULT IP CONSULT  SURGERY GENERAL IP CONSULT    Time Spent on this Encounter   I, Yolande Cameron MD, personally saw the patient today and spent greater than 30 minutes discharging this patient.    Discharge Orders      Reason for your hospital stay    Symptomatic anemia and symptomatic orthostatic hypotension, acute on chronic blood loss anemia due to GI bleed, bile reflux gastritis on EGD     Activity    Your activity upon discharge: activity as tolerated     Follow-up and recommended labs and tests     Follow up with primary care provider, Jennifer Maurice, within 3-5 days to evaluate medication change, for hospital follow- up, to follow up on results, and to repeat vital signs as well as recheck CBC for stability and BMP to check lytes/renal function prior to medication titration as indicated. Ensure stability of sodium off fluid restriction.      Follow up with cardiology in 2 weeks as planned, sooner as needed     Diet    Follow this diet upon discharge: Current Diet:Orders Placed This Encounter      Advance Diet as Tolerated: Regular Diet Adult with low sodium. Stop fluid restriction.     Discharge Medications   Current Discharge Medication List        START taking these medications    Details   pantoprazole (PROTONIX) 40 MG EC tablet Take 1 tablet (40 mg) by mouth every morning (before breakfast).  Qty: 90 tablet, Refills: 0    Associated Diagnoses: Upper GI bleed      sucralfate (CARAFATE) 1 GM tablet Take 1 tablet (1 g) by mouth 4 times daily (before meals and nightly). After 6 weeks (around June 29, 2025), may reduce to 1 tablet three times daily recommended lifelong per General Surgery  Qty: 360 tablet, Refills: 0    Associated Diagnoses: Upper GI bleed           CONTINUE these medications which have CHANGED    Details   apixaban ANTICOAGULANT (ELIQUIS) 5 MG tablet Take 1 tablet (5 mg) by mouth 2 times daily. HOLD THIS MEDICATION UNTIL YOU HEAR FROM GENERAL SURGERY ABOUT THE BIOPSY RESULT AND GENERAL SURGERY INFORMS THEY WILL DISCUSS RESTARTING THE MEDICATION. IF SPECIFIC INSTRUCTIONS ARE NOT GIVEN AT THE TIME OF THE BIOPSY PHONE CALL, RESTART ELIQUIS IN ONE WEEK    Associated Diagnoses: Atrial fibrillation with RVR (H); Paroxysmal atrial fibrillation (H)      furosemide (LASIX) 20 MG tablet Take 1 tablet (20 mg) by mouth daily.    Comments: TEMPORARILY REDUCE LASIX TO 1 TABLET DAILY AND THIS WILL LIKELY BE ADJUSTED BACK TO 2 TIMES DAILY AT YOUR FOLLOW UP  Associated Diagnoses: Acute systolic heart failure (H); Chronic diastolic heart failure (H)      metoprolol succinate ER (TOPROL XL) 100 MG 24 hr tablet Take 1 tablet (100 mg) by mouth daily at 2 pm. TEMPORARILY REDUCE TO 1 TABLET DAILY AND THIS WILL LIKELY BE ADJUSTED BACK TO 2 TABLETS DAILY AT YOUR FOLLOW UP    Associated Diagnoses: Essential hypertension, benign      spironolactone  (ALDACTONE) 50 MG tablet Take 1/2 tablet (25 mg) two times daily for now and discuss increasing back to 1 tablet two times daily at your hospital follow-up    Associated Diagnoses: Primary aldosteronism; Acute systolic heart failure (H); Chronic diastolic heart failure (H)           CONTINUE these medications which have NOT CHANGED    Details   acetaminophen (TYLENOL) 325 MG tablet Take 325-650 mg by mouth every 6 hours as needed for mild pain      ARIPiprazole (ABILIFY) 5 MG tablet Take 1 tablet (5 mg) by mouth daily  Qty: 30 tablet, Refills: 5    Associated Diagnoses: Dysthymic disorder      carboxymethylcellulose PF (REFRESH PLUS) 0.5 % ophthalmic solution Place 1 drop into both eyes 3 times daily as needed for dry eyes.      Coenzyme Q10 (CO Q 10) 100 MG CAPS Take 1 capsule by mouth daily      cyanocobalamin (CYANOCOBALAMIN) 1000 mcg/mL injection INJECT 1 ML (1,000 MCG) INTO THE MUSCLE EVERY 30 DAYS  Qty: 3 mL, Refills: 4    Associated Diagnoses: S/P gastric bypass      DULoxetine (CYMBALTA) 60 MG capsule Take 60 mg by mouth daily.      ferrous sulfate (FEROSUL) 325 (65 Fe) MG tablet Take 1 tablet (325 mg) by mouth daily (with breakfast)      folic acid (FOLVITE) 400 MCG tablet Take 400 mcg by mouth daily.      Krill Oil 1000 MG CAPS Take 1 capsule by mouth daily.      levothyroxine (SYNTHROID/LEVOTHROID) 50 MCG tablet TAKE 1 TABLET (50 MCG) BY MOUTH DAILY BEFORE BREAKFAST  Qty: 90 tablet, Refills: 4    Associated Diagnoses: Hypothyroidism, unspecified type      losartan (COZAAR) 25 MG tablet Take 1 tablet by mouth daily.      MAG-G 500 (27 Mg) MG tablet Take 500 mg by mouth 2 times daily.      modafinil (PROVIGIL) 200 MG tablet Take 2 tablets (400 mg) by mouth daily      Syringe Luer Slip 3 ML MISC 1 each every 30 days.  Qty: 25 each, Refills: 1    Associated Diagnoses: Other vitamin B12 deficiency anemia           Allergies   Allergies   Allergen Reactions    Chlorthalidone Other (See Comments)      Nightmares/Caused hyponatremia       Diet: Generally healthy, low sodium diet  Activity/Restrictions: As tolerated   Wound care: None   Lines/drains: None   Oxygen requirement at discharge: None       Data   Most Recent 3 CBC's:  Recent Labs   Lab Test 05/19/25  0539 05/18/25  1822 05/18/25  0634 05/17/25  2344 05/17/25  1844   WBC 7.9  --  8.2  --  13.4*   HGB 9.6* 7.9* 8.7*   < > 8.6*   MCV 85 85 84   < > 85     --  205  --  251    < > = values in this interval not displayed.      Most Recent 3 BMP's:  Recent Labs   Lab Test 05/19/25  0539 05/18/25  0915 05/18/25  0634 05/17/25  0637     --  133* 132*   POTASSIUM 3.6  --  3.8 4.4   CHLORIDE 107  --  101 100   CO2 25  --  24 21*   BUN 21.8  --  38.4* 61.0*   CR 0.91  --  1.04* 1.07*   ANIONGAP 7  --  8 11   OPAL 9.3  --  9.2 9.1   * 109* 115* 139*     Most Recent 2 LFT's:  Recent Labs   Lab Test 04/29/25  1942 05/15/23  1549   AST 22 14   ALT 19 9*   ALKPHOS 91 103   BILITOTAL 0.4 0.4     Most Recent INR's and Anticoagulation Dosing History:  Anticoagulation Dose History           No data to display              Most Recent 3 Troponin's:No lab results found.  Most Recent Cholesterol Panel:  Recent Labs   Lab Test 01/17/24  0925   CHOL 197   LDL 85   HDL 93   TRIG 93     Most Recent 6 Bacteria Isolates From Any Culture (See EPIC Reports for Culture Details):  Recent Labs   Lab Test 03/19/21  1503 11/27/20  1244 10/24/20  1421 09/14/20  1628 08/25/20  0930 12/16/19  1542   CULT >100,000 colonies/mL  Escherichia coli  * >100,000 colonies/mL  Escherichia coli  * >100,000 colonies/mL  Escherichia coli  * 50,000 to 100,000 colonies/mL  Escherichia coli  * >100,000 colonies/mL  Escherichia coli  * >100,000 colonies/mL  Escherichia coli  *  ESBL-positive (Extended-spectrum beta-lactamase) multi drug-resistant organism.     Most Recent TSH, T4 and A1c Labs:  Recent Labs   Lab Test 01/17/24  0925 05/15/23  1929   TSH  --  4.27*   T4  --  1.31   A1C 5.6   --      Results for orders placed or performed during the hospital encounter of 05/16/25   POC US ECHO LIMITED    Impression    Indication: presyncope    Location: Apical, parasternal, substernal    Probe: Cardiac phased array probe    Findings: Left ventricle with questioanble mildly reduced ejection fracture, questionable apical hyokinesis in context of recent likely takotsubo. Concentric contractions. No anechoic strip between ventricles and pericardium. IVC without any significant collapse with inspiration. IVC <2.1 cm. B lines <3 per lung field.    Impression: Left ventricular systolic function with questioanble mild reduced ejection fracture without pericardial effusion or pulmonary edema. Non-collapsible IVC suggests increased RA pressure. Questionable mild apical hypokinesis.     Kannan Holland MD          CTA GI Bleed    Narrative    EXAM: CTA GI BLEED  LOCATION: [facility]  DATE: 5/17/2025    INDICATION: Concern for GI bleeding, acute drop in Hgb with recent hypotension, on DOAC, known upper GI pathology from recent.  COMPARISON: CT 4/29/2025  TECHNIQUE: CT angiogram abdomen pelvis during arterial phase of injection of IV contrast. 2D and 3D MIP reconstructions were performed by the CT technologist. Dose reduction techniques were used.  CONTRAST: Isovue 370, 107 mL    FINDINGS:  ANGIOGRAM ABDOMEN/PELVIS: No evidence of active GI bleed. The celiac, SMA, DAMIAN, 2 right renal arteries and single left renal artery are patent without significant stenosis. No aortoiliac aneurysm nor significant stenosis.    LOWER CHEST: Bibasilar subsegmental atelectasis versus scar. Small sliding-type hiatal hernia.    HEPATOBILIARY: Diffuse hepatic steatosis. Normal gallbladder and biliary system.    PANCREAS: Normal.    SPLEEN: Calcified splenic granulomata.    ADRENAL GLANDS: Normal.    KIDNEYS/BLADDER: Chronic left renal cortical scarring redemonstrated. Simple right renal cortical cysts do not require  imaging follow-up. No urinary tract calculus nor hydronephrosis. The bladder is negative.    BOWEL: No evidence of active GI bleed. No obstruction or inflammatory change. Colonic diverticulosis. Normal appendix. Moderate amount of stool in rectum with otherwise mild colonic stool burden. Previous Cherry-en-Y gastric bypass.    LYMPH NODES: No lymphadenopathy.    PELVIC ORGANS: Normal.    MUSCULOSKELETAL: No suspicious osseous lesions.      Impression    IMPRESSION:  1.  No evidence of active GI bleed. No acute inflammatory bowel process. Patient has had previous Cherry-en-Y gastric bypass.  2.  Colonic diverticulosis with no diverticulitis.  3.  Chronic left renal cortical scarring

## 2025-05-19 NOTE — PROGRESS NOTES
"Received report from DOMINGO Nieves. Resuming patient plan of care.    BP (!) 156/76 (BP Location: Right arm, Patient Position: Sitting, Cuff Size: Adult Regular)   Pulse 74   Temp 98.7  F (37.1  C) (Tympanic)   Resp 16   Ht 1.626 m (5' 4\")   Wt 86.9 kg (191 lb 8 oz)   LMP  (LMP Unknown)   SpO2 98%   BMI 32.87 kg/m       SAFETY CHECKLIST  ID Bands and Risk clasps correct and in place (DNR, Fall risk, Allergy, Latex, Limb):  Yes  All Lines Reconciled and labeled correctly: Yes  Whiteboard updated:Yes  Environmental interventions: Yes  Verify Tele #:  NA  "

## 2025-05-19 NOTE — PROGRESS NOTES
[facility]    Surgical Progress Note  Post Operative Day:1 EGD with biopsies    Assessment & Plan   Ness Bales is a 74 year old female who was admitted on 5/16/2025.     Principal Problem:    Symptomatic anemia    Assessment: improved    Plan: carafate 4x a day for 1 month. PPI daily. Will call patient with pathology results and make adjustments if needed. Hold ASA and consider risk/benefit of anti coagulation. Surgery will sign off.   Active Problems:    Esophageal reflux    Essential hypertension, benign    Hypothyroidism    Sleep apnea    Spinal stenosis, lumbar    Morbid obesity (H)    Primary aldosteronism    Osteoporosis, unspecified osteoporosis type, unspecified pathological fracture presence    Hyperkalemia    Hx of gastric bypass    Assessment: chronic     Plan: resulting in bile reflux    Prediabetes    Vitamin B12 deficiency    Orthostatic hypotension    LISBET (acute kidney injury)    Paroxysmal atrial fibrillation (H)    Acute systolic heart failure (H) -Takotsubo cardiomyopathy    Chronic diastolic heart failure (H)    Symptomatic hypotension    Hypercoagulable state due to paroxysmal atrial fibrillation (H)    SIADH (syndrome of inappropriate ADH production)    Acute pancreatitis    Neurocognitive disorder    Myocardial injury    Takotsubo cardiomyopathy     Anemia due to blood loss, acute    Upper GI bleed    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 denies   Ness s pain level was assessed and she currently denies pain.      DVT Prophylaxis: Defer to primary service  Code Status: Full Code    Sagrario Bhat MD    Interval History   Hgb stable-no new abdominal pain or signs of increased bleeding.   -Data reviewed today: I reviewed all new labs and imaging results over the last 24 hours.    Physical  Exam   Temp: 97.4  F (36.3  C) Temp src: Tympanic BP: 122/65 Pulse: 81   Resp: 16 SpO2: 96 % O2 Device: None (Room air)    Vitals:    05/17/25 0101 05/18/25 0258 05/19/25 0010   Weight: 84.6 kg (186 lb 8 oz) 85.3 kg (188 lb 1.6 oz) 86.9 kg (191 lb 8 oz)     Vital Signs with Ranges  Temp:  [97  F (36.1  C)-98.7  F (37.1  C)] 97.4  F (36.3  C)  Pulse:  [75-98] 81  Resp:  [14-18] 16  BP: ()/(49-76) 122/65  SpO2:  [94 %-100 %] 96 %  I/O last 3 completed shifts:  In: 1620 [P.O.:600; I.V.:350]  Out: 2300 [Urine:2300]      Medications   Current Facility-Administered Medications   Medication Dose Route Frequency Provider Last Rate Last Admin    Patient is already receiving anticoagulation with heparin, enoxaparin (LOVENOX), warfarin (COUMADIN)  or other anticoagulant medication   Does not apply Continuous PRN Sagrario Bhat MD         Current Facility-Administered Medications   Medication Dose Route Frequency Provider Last Rate Last Admin    [Held by provider] apixaban ANTICOAGULANT (ELIQUIS) tablet 5 mg  5 mg Oral BID Sagrario Bhat MD   5 mg at 05/16/25 2228    ARIPiprazole (ABILIFY) tablet 5 mg  5 mg Oral Daily Sagrario Bhat MD   5 mg at 05/18/25 1202    DULoxetine (CYMBALTA) DR capsule 60 mg  60 mg Oral Daily Sagrario Bhat MD   60 mg at 05/18/25 1202    ferrous sulfate (FE TABS) EC tablet 325 mg  325 mg Oral Daily with breakfast Sagrario Bhat MD   325 mg at 05/18/25 1202    [Held by provider] furosemide (LASIX) tablet 20 mg  20 mg Oral BID Sagrario Bhat MD        levothyroxine (SYNTHROID/LEVOTHROID) tablet 50 mcg  50 mcg Oral Daily Sagrario Bhat MD   50 mcg at 05/18/25 1202    [Held by provider] losartan (COZAAR) tablet 25 mg  25 mg Oral Daily Sagrario Bhat MD        [Held by provider] metoprolol succinate ER (TOPROL XL) 24 hr tablet 200 mg  200 mg Oral Daily Sagrario Bhat MD        [Held by provider] modafinil (PROVIGIL) tablet 400 mg  400 mg Oral Daily Sagrario Bhat MD        pantoprazole (PROTONIX) IV push injection 40 mg   40 mg Intravenous Q12H Sagrario Bhat MD   40 mg at 05/18/25 2143    sodium chloride (PF) 0.9% PF flush 3 mL  3 mL Intracatheter Q8H [facility] Sagrario Bhat MD   3 mL at 05/18/25 1337    [Held by provider] spironolactone (ALDACTONE) tablet 50 mg  50 mg Oral BID Sagrario Bhat MD        sucralfate (CARAFATE) tablet 1 g  1 g Oral 4x Daily AC & HS Sagrario Bhat MD   1 g at 05/18/25 2031       Data   Recent Labs   Lab 05/19/25  0539 05/18/25  1822 05/18/25  0915 05/18/25  0634 05/17/25  2344 05/17/25  1844 05/17/25  0734 05/17/25  0637   WBC 7.9  --   --  8.2  --  13.4*   < >  --    HGB 9.6* 7.9*  --  8.7*   < > 8.6*   < >  --    MCV 85 85  --  84   < > 85   < >  --      --   --  205  --  251   < >  --      --   --  133*  --   --   --  132*   POTASSIUM 3.6  --   --  3.8  --   --   --  4.4   CHLORIDE 107  --   --  101  --   --   --  100   CO2 25  --   --  24  --   --   --  21*   BUN 21.8  --   --  38.4*  --   --   --  61.0*   CR 0.91  --   --  1.04*  --   --   --  1.07*   ANIONGAP 7  --   --  8  --   --   --  11   OPAL 9.3  --   --  9.2  --   --   --  9.1   *  --  109* 115*  --   --   --  139*    < > = values in this interval not displayed.       No results found for this or any previous visit (from the past 24 hours).

## 2025-05-19 NOTE — PLAN OF CARE
Goal Outcome Evaluation:      Plan of Care Reviewed With: patient    Overall Patient Progress: improvingOverall Patient Progress: improving           1 unit of PRBC infused earlier in shift for hgb 7.9, Patient reports no SOB or dizziness upon standing. LS are clear. New 18 G placed. BP has remained WNL.     Hgb improved to 9

## 2025-05-19 NOTE — PLAN OF CARE
"Goal Outcome Evaluation: Patient is A&Ox4. VSS. Patient denies shortness of breath or dizziness. Ambulating to bathroom with SBA. LS clear. Hemoglobin improved to 9.6. Patient is discharging to home at 1pm. Provided patient education on new and changed meds; all patient questions answered.    BP (!) 144/73 (BP Location: Left arm, Patient Position: Sitting, Cuff Size: Adult Regular)   Pulse 81   Temp 98.5  F (36.9  C) (Tympanic)   Resp 18   Ht 1.626 m (5' 4\")   Wt 86.9 kg (191 lb 8 oz)   LMP  (LMP Unknown)   SpO2 99%   BMI 32.87 kg/m         Plan of Care Reviewed With: patient    Overall Patient Progress: improving    Outcome Evaluation: VSS and labs improved. Pt discharging to home at 1pm.    Individualized Care Needs: None at this time  Anxieties, Fears or Concerns: None expressed  Patient/Family-Specific Goals (Include Timeframe): Discharge back to home with stable hemoglobin.     "

## 2025-05-19 NOTE — PHARMACY
Pharmacy - Transfer Medication Reconciliation     The patient's transfer medication orders have been compared to the medication administration record and to the Prior to Admissions Medications list - any noted discrepancies were resolved with the MD.     Thank you. Pharmacy will continue to monitor.     Dashawn Cotto [facility] ....................  5/18/2025   7:44 PM

## 2025-05-20 ENCOUNTER — PATIENT OUTREACH (OUTPATIENT)
Dept: FAMILY MEDICINE | Facility: OTHER | Age: 75
End: 2025-05-20
Payer: MEDICARE

## 2025-05-20 NOTE — TELEPHONE ENCOUNTER
[facility] Trans[facility] Care Management phone call attempted at 8:51 AM 5/20/2025.   Left message for patient to return call.  Maggie Biswas RN on 5/20/2025 at 8:52 AM

## 2025-05-20 NOTE — TELEPHONE ENCOUNTER
Transitions of Care Outreach  Chief Complaint   Patient presents with    Hospital F/U       Most Recent Admission Date: 5/16/2025   Most Recent Admission Diagnosis: Orthostatic hypotension - I95.1  Hyperkalemia - E87.5  LISBET (acute kidney injury) - N17.9     Most Recent Discharge Date: 5/19/2025   Most Recent Discharge Diagnosis: Orthostatic hypotension - I95.1  LISBET (acute kidney injury) - N17.9  Hyperkalemia - E87.5  Hx of gastric bypass - Z98.84  Gastrointestinal hemorrhage with melena - K92.1  Anemia due to blood loss, acute - D62  Upper GI bleed - K92.2  Essential hypertension, benign - I10  Primary aldosteronism - E26.09  Acute systolic heart failure (H) - I50.21  Chronic diastolic heart failure (H) - I50.32  Atrial fibrillation with RVR (H) - I48.91  Paroxysmal atrial fibrillation (H) - I48.0     Transitions of Care Assessment    Discharge Assessment  How are you doing now that you are home?: really good  How are your symptoms? (Red Flag symptoms escalate to triage hotline per guidelines): Improved  Do you know how to contact your clinic care team if you have future questions or changes to your health status? : Yes  Does the patient have their discharge instructions? : Yes  Does the patient have questions regarding their discharge instructions? : No  Were you started on any new medications or were there changes to any of your previous medications? : Yes  Does the patient have all of their medications?: Yes  Do you have questions regarding any of your medications? : No  Do you have all of your needed medical supplies or equipment (DME)?  (i.e. oxygen tank, CPAP, cane, etc.): Yes    Follow up Plan     Discharge Follow-Up  Discharge follow up appointment scheduled in alignment with recommended follow up timeframe or Transitions of Risk Category? (Low = within 30 days; Moderate= within 14 days; High= within 7 days): Yes  Discharge Follow Up Appointment Date: 05/22/25  Discharge Follow Up Appointment Scheduled  with?: Primary Care Provider    Future Appointments   Date Time Provider Department Center   5/22/2025  2:20 PM Cherise Marie PA-C [facility] [place]   8/21/2025 11:00 AM Jennifer Maurice,  [facility] [place]       Outpatient Plan as outlined on AVS reviewed with patient.    For any urgent concerns, please contact our 24 hour nurse triage line: [phone] ([phone])       Maggie Biswas RN

## 2025-05-21 ENCOUNTER — RESULTS FOLLOW-UP (OUTPATIENT)
Dept: SURGERY | Facility: CLINIC | Age: 75
End: 2025-05-21
Payer: MEDICARE

## 2025-05-21 LAB
PATH REPORT.COMMENTS IMP SPEC: NORMAL
PATH REPORT.FINAL DX SPEC: NORMAL
PATH REPORT.RELEVANT HX SPEC: NORMAL
PHOTO IMAGE: NORMAL

## 2025-05-22 ENCOUNTER — RESULTS FOLLOW-UP (OUTPATIENT)
Dept: FAMILY MEDICINE | Facility: OTHER | Age: 75
End: 2025-05-22

## 2025-05-22 ENCOUNTER — OFFICE VISIT (OUTPATIENT)
Dept: FAMILY MEDICINE | Facility: OTHER | Age: 75
End: 2025-05-22
Attending: PHYSICIAN ASSISTANT
Payer: MEDICARE

## 2025-05-22 VITALS
WEIGHT: 190 LBS | TEMPERATURE: 97.7 F | OXYGEN SATURATION: 98 % | BODY MASS INDEX: 32.61 KG/M2 | HEART RATE: 82 BPM | RESPIRATION RATE: 18 BRPM | SYSTOLIC BLOOD PRESSURE: 110 MMHG | DIASTOLIC BLOOD PRESSURE: 84 MMHG

## 2025-05-22 DIAGNOSIS — D64.9 ANEMIA, UNSPECIFIED TYPE: Primary | ICD-10-CM

## 2025-05-22 DIAGNOSIS — I10 ESSENTIAL HYPERTENSION, BENIGN: ICD-10-CM

## 2025-05-22 LAB
ANION GAP SERPL CALCULATED.3IONS-SCNC: 11 MMOL/L (ref 7–15)
BASOPHILS # BLD AUTO: 0 10E3/UL (ref 0–0.2)
BASOPHILS NFR BLD AUTO: 0 %
BUN SERPL-MCNC: 18.1 MG/DL (ref 8–23)
CALCIUM SERPL-MCNC: 9.9 MG/DL (ref 8.8–10.4)
CHLORIDE SERPL-SCNC: 100 MMOL/L (ref 98–107)
CREAT SERPL-MCNC: 1.05 MG/DL (ref 0.51–0.95)
EGFRCR SERPLBLD CKD-EPI 2021: 56 ML/MIN/1.73M2
EOSINOPHIL # BLD AUTO: 0.5 10E3/UL (ref 0–0.7)
EOSINOPHIL NFR BLD AUTO: 5 %
ERYTHROCYTE [DISTWIDTH] IN BLOOD BY AUTOMATED COUNT: 15 % (ref 10–15)
FERRITIN SERPL-MCNC: 25 NG/ML (ref 11–328)
GLUCOSE SERPL-MCNC: 89 MG/DL (ref 70–99)
HCO3 SERPL-SCNC: 25 MMOL/L (ref 22–29)
HCT VFR BLD AUTO: 31.6 % (ref 35–47)
HGB BLD-MCNC: 10.6 G/DL (ref 11.7–15.7)
IMM GRANULOCYTES # BLD: 0 10E3/UL
IMM GRANULOCYTES NFR BLD: 0 %
IRON BINDING CAPACITY (ROCHE): 283 UG/DL (ref 240–430)
IRON SATN MFR SERPL: 8 % (ref 15–46)
IRON SERPL-MCNC: 24 UG/DL (ref 37–145)
LYMPHOCYTES # BLD AUTO: 1.8 10E3/UL (ref 0.8–5.3)
LYMPHOCYTES NFR BLD AUTO: 18 %
MCH RBC QN AUTO: 29.6 PG (ref 26.5–33)
MCHC RBC AUTO-ENTMCNC: 33.5 G/DL (ref 31.5–36.5)
MCV RBC AUTO: 88 FL (ref 78–100)
MONOCYTES # BLD AUTO: 0.6 10E3/UL (ref 0–1.3)
MONOCYTES NFR BLD AUTO: 6 %
NEUTROPHILS # BLD AUTO: 7.2 10E3/UL (ref 1.6–8.3)
NEUTROPHILS NFR BLD AUTO: 71 %
NRBC # BLD AUTO: 0 10E3/UL
NRBC BLD AUTO-RTO: 0 /100
PLATELET # BLD AUTO: 366 10E3/UL (ref 150–450)
POTASSIUM SERPL-SCNC: 5 MMOL/L (ref 3.4–5.3)
RBC # BLD AUTO: 3.58 10E6/UL (ref 3.8–5.2)
SODIUM SERPL-SCNC: 136 MMOL/L (ref 135–145)
WBC # BLD AUTO: 10.1 10E3/UL (ref 4–11)

## 2025-05-22 PROCEDURE — 80048 BASIC METABOLIC PNL TOTAL CA: CPT | Mod: ZL | Performed by: PHYSICIAN ASSISTANT

## 2025-05-22 PROCEDURE — 85004 AUTOMATED DIFF WBC COUNT: CPT | Mod: ZL | Performed by: PHYSICIAN ASSISTANT

## 2025-05-22 PROCEDURE — 82728 ASSAY OF FERRITIN: CPT | Mod: ZL | Performed by: PHYSICIAN ASSISTANT

## 2025-05-22 PROCEDURE — 83550 IRON BINDING TEST: CPT | Mod: ZL | Performed by: PHYSICIAN ASSISTANT

## 2025-05-22 PROCEDURE — 36415 COLL VENOUS BLD VENIPUNCTURE: CPT | Mod: ZL | Performed by: PHYSICIAN ASSISTANT

## 2025-05-22 RX ORDER — MAGNESIUM OXIDE 400 MG/1
2 TABLET ORAL
COMMUNITY
Start: 2025-01-25

## 2025-05-22 RX ORDER — GINSENG 100 MG
50 CAPSULE ORAL
COMMUNITY

## 2025-05-22 RX ORDER — SYRINGE WITH NEEDLE, 1 ML 25GX5/8"
SYRINGE, EMPTY DISPOSABLE MISCELLANEOUS
COMMUNITY
Start: 2025-05-16

## 2025-05-22 ASSESSMENT — PAIN SCALES - GENERAL: PAINLEVEL_OUTOF10: NO PAIN (0)

## 2025-05-22 ASSESSMENT — PATIENT HEALTH QUESTIONNAIRE - PHQ9
SUM OF ALL RESPONSES TO PHQ QUESTIONS 1-9: 3
10. IF YOU CHECKED OFF ANY PROBLEMS, HOW DIFFICULT HAVE THESE PROBLEMS MADE IT FOR YOU TO DO YOUR WORK, TAKE CARE OF THINGS AT HOME, OR GET ALONG WITH OTHER PEOPLE: NOT DIFFICULT AT ALL
SUM OF ALL RESPONSES TO PHQ QUESTIONS 1-9: 3

## 2025-05-22 NOTE — TELEPHONE ENCOUNTER
Patient reviewed her results as noted via her my chart.  Rehana Mendoza LPN .......5/22/2025 8:32 AM

## 2025-05-22 NOTE — NURSING NOTE
"Chief Complaint   Patient presents with    Hospital F/U       Initial /84   Pulse 82   Temp 97.7  F (36.5  C) (Tympanic)   Resp 18   Wt 86.2 kg (190 lb)   LMP  (LMP Unknown)   SpO2 98%   BMI 32.61 kg/m   Estimated body mass index is 32.61 kg/m  as calculated from the following:    Height as of 5/16/25: 1.626 m (5' 4\").    Weight as of this encounter: 86.2 kg (190 lb).  Medication Review: complete    The next two questions are to help us understand your food security.  If you are feeling you need any assistance in this area, we have resources available to support you today.          5/16/2025   SDOH- Food Insecurity   Within the past 12 months, did you worry that your food would run out before you got money to buy more? N   Within the past 12 months, did the food you bought just not last and you didn t have money to get more? N         Health Care Directive:  Patient does not have a Health Care Directive: Discussed advance care planning with patient; however, patient declined at this time.    Verna Johnson LPN      "

## 2025-05-23 PROBLEM — D64.9 ANEMIA, UNSPECIFIED TYPE: Status: ACTIVE | Noted: 2025-05-23

## 2025-05-23 PROBLEM — E61.1 IRON DEFICIENCY: Status: ACTIVE | Noted: 2025-05-23

## 2025-08-20 RX ORDER — FUROSEMIDE 20 MG/1
20 TABLET ORAL
Status: SHIPPED
Start: 2025-08-20

## 2025-08-21 ENCOUNTER — ORDERS ONLY (AUTO-RELEASED) (OUTPATIENT)
Dept: FAMILY MEDICINE | Facility: OTHER | Age: 75
End: 2025-08-21

## 2025-08-21 ENCOUNTER — OFFICE VISIT (OUTPATIENT)
Dept: FAMILY MEDICINE | Facility: OTHER | Age: 75
End: 2025-08-21
Attending: FAMILY MEDICINE
Payer: MEDICARE

## 2025-08-21 VITALS
SYSTOLIC BLOOD PRESSURE: 130 MMHG | BODY MASS INDEX: 30.8 KG/M2 | OXYGEN SATURATION: 100 % | HEART RATE: 50 BPM | DIASTOLIC BLOOD PRESSURE: 78 MMHG | WEIGHT: 180.38 LBS | TEMPERATURE: 98.8 F | HEIGHT: 64 IN

## 2025-08-21 DIAGNOSIS — K92.2 UPPER GI BLEED: ICD-10-CM

## 2025-08-21 DIAGNOSIS — N18.31 CHRONIC KIDNEY DISEASE, STAGE 3A (H): ICD-10-CM

## 2025-08-21 DIAGNOSIS — E83.42 HYPOMAGNESEMIA: ICD-10-CM

## 2025-08-21 DIAGNOSIS — I48.0 HYPERCOAGULABLE STATE DUE TO PAROXYSMAL ATRIAL FIBRILLATION (H): ICD-10-CM

## 2025-08-21 DIAGNOSIS — I10 ESSENTIAL HYPERTENSION, BENIGN: ICD-10-CM

## 2025-08-21 DIAGNOSIS — Z00.00 ENCOUNTER FOR MEDICARE ANNUAL WELLNESS EXAM: Primary | ICD-10-CM

## 2025-08-21 DIAGNOSIS — E26.09 PRIMARY ALDOSTERONISM: ICD-10-CM

## 2025-08-21 DIAGNOSIS — E66.01 MORBID OBESITY (H): ICD-10-CM

## 2025-08-21 DIAGNOSIS — E03.9 HYPOTHYROIDISM, UNSPECIFIED TYPE: ICD-10-CM

## 2025-08-21 DIAGNOSIS — D68.69 HYPERCOAGULABLE STATE DUE TO PAROXYSMAL ATRIAL FIBRILLATION (H): ICD-10-CM

## 2025-08-21 DIAGNOSIS — Z12.11 COLON CANCER SCREENING: ICD-10-CM

## 2025-08-21 DIAGNOSIS — I50.32 CHRONIC DIASTOLIC HEART FAILURE (H): ICD-10-CM

## 2025-08-21 DIAGNOSIS — I50.21 ACUTE SYSTOLIC HEART FAILURE (H): ICD-10-CM

## 2025-08-21 LAB
ANION GAP SERPL CALCULATED.3IONS-SCNC: 10 MMOL/L (ref 7–15)
BASOPHILS # BLD AUTO: 0.03 10E3/UL (ref 0–0.2)
BASOPHILS NFR BLD AUTO: 0.3 %
BUN SERPL-MCNC: 13.9 MG/DL (ref 8–23)
CALCIUM SERPL-MCNC: 10.9 MG/DL (ref 8.8–10.4)
CHLORIDE SERPL-SCNC: 98 MMOL/L (ref 98–107)
CREAT SERPL-MCNC: 1 MG/DL (ref 0.51–0.95)
EGFRCR SERPLBLD CKD-EPI 2021: 59 ML/MIN/1.73M2
EOSINOPHIL # BLD AUTO: 0.21 10E3/UL (ref 0–0.7)
EOSINOPHIL NFR BLD AUTO: 2.3 %
ERYTHROCYTE [DISTWIDTH] IN BLOOD BY AUTOMATED COUNT: 14.6 % (ref 10–15)
FERRITIN SERPL-MCNC: 259 NG/ML (ref 11–328)
GLUCOSE SERPL-MCNC: 102 MG/DL (ref 70–99)
HCO3 SERPL-SCNC: 27 MMOL/L (ref 22–29)
HCT VFR BLD AUTO: 39.7 % (ref 35–47)
HGB BLD-MCNC: 13.5 G/DL (ref 11.7–15.7)
IMM GRANULOCYTES # BLD: <0.03 10E3/UL
IMM GRANULOCYTES NFR BLD: 0.2 %
IRON BINDING CAPACITY (ROCHE): 191 UG/DL (ref 240–430)
IRON SATN MFR SERPL: 37 % (ref 15–46)
IRON SERPL-MCNC: 71 UG/DL (ref 37–145)
LYMPHOCYTES # BLD AUTO: 2.13 10E3/UL (ref 0.8–5.3)
LYMPHOCYTES NFR BLD AUTO: 22.8 %
MAGNESIUM SERPL-MCNC: 1.5 MG/DL (ref 1.7–2.3)
MCH RBC QN AUTO: 29.3 PG (ref 26.5–33)
MCHC RBC AUTO-ENTMCNC: 34 G/DL (ref 31.5–36.5)
MCV RBC AUTO: 86.3 FL (ref 78–100)
MONOCYTES # BLD AUTO: 0.65 10E3/UL (ref 0–1.3)
MONOCYTES NFR BLD AUTO: 7 %
NEUTROPHILS # BLD AUTO: 6.29 10E3/UL (ref 1.6–8.3)
NEUTROPHILS NFR BLD AUTO: 67.4 %
NRBC # BLD AUTO: <0.03 10E3/UL
NRBC BLD AUTO-RTO: 0 /100
PLATELET # BLD AUTO: 281 10E3/UL (ref 150–450)
POTASSIUM SERPL-SCNC: 5.2 MMOL/L (ref 3.4–5.3)
RBC # BLD AUTO: 4.6 10E6/UL (ref 3.8–5.2)
SODIUM SERPL-SCNC: 135 MMOL/L (ref 135–145)
WBC # BLD AUTO: 9.33 10E3/UL (ref 4–11)

## 2025-08-21 PROCEDURE — 85025 COMPLETE CBC W/AUTO DIFF WBC: CPT | Mod: ZL | Performed by: FAMILY MEDICINE

## 2025-08-21 PROCEDURE — 80048 BASIC METABOLIC PNL TOTAL CA: CPT | Mod: ZL | Performed by: FAMILY MEDICINE

## 2025-08-21 PROCEDURE — 83550 IRON BINDING TEST: CPT | Mod: ZL | Performed by: FAMILY MEDICINE

## 2025-08-21 PROCEDURE — 82728 ASSAY OF FERRITIN: CPT | Mod: ZL | Performed by: FAMILY MEDICINE

## 2025-08-21 PROCEDURE — 36415 COLL VENOUS BLD VENIPUNCTURE: CPT | Mod: ZL | Performed by: FAMILY MEDICINE

## 2025-08-21 PROCEDURE — 83735 ASSAY OF MAGNESIUM: CPT | Mod: ZL | Performed by: FAMILY MEDICINE

## 2025-08-21 RX ORDER — TIRZEPATIDE 12.5 MG/.5ML
12.5 INJECTION, SOLUTION SUBCUTANEOUS WEEKLY
COMMUNITY
Start: 2025-08-06

## 2025-08-21 RX ORDER — MIRABEGRON 25 MG/1
25 TABLET, FILM COATED, EXTENDED RELEASE ORAL EVERY MORNING
COMMUNITY
Start: 2025-08-19

## 2025-08-21 RX ORDER — MAGNESIUM GLUCONATE 27 MG(500)
500 TABLET ORAL 2 TIMES DAILY
Qty: 180 TABLET | Refills: 1 | Status: SHIPPED | OUTPATIENT
Start: 2025-08-21

## 2025-08-21 SDOH — HEALTH STABILITY: PHYSICAL HEALTH: ON AVERAGE, HOW MANY DAYS PER WEEK DO YOU ENGAGE IN MODERATE TO STRENUOUS EXERCISE (LIKE A BRISK WALK)?: 0 DAYS

## 2025-08-21 ASSESSMENT — SOCIAL DETERMINANTS OF HEALTH (SDOH): HOW OFTEN DO YOU GET TOGETHER WITH FRIENDS OR RELATIVES?: THREE TIMES A WEEK

## 2025-08-21 ASSESSMENT — PAIN SCALES - GENERAL: PAINLEVEL_OUTOF10: MILD PAIN (1)

## (undated) DEVICE — SUCTION MANIFOLD NEPTUNE 2 SYS 4 PORT 0702-020-000

## (undated) DEVICE — ENDO BITE BLOCK 60 MAXI LF 00712804

## (undated) DEVICE — HEMOCLIP QUICKCLIP PRO OLYMPUS 230CM HX-202UR.B

## (undated) DEVICE — ENDO KIT COMPLIANCE DYKENDOCMPLY

## (undated) DEVICE — ENDO FORCEP ENDOJAW BIOPSY 2.8MMX230CM FB-220U

## (undated) DEVICE — TUBING SUCTION 10'X3/16" N510

## (undated) DEVICE — SOL WATER 1500ML

## (undated) DEVICE — SYR 50ML LL W/O NDL 309653

## (undated) RX ORDER — ONDANSETRON 2 MG/ML
INJECTION INTRAMUSCULAR; INTRAVENOUS
Status: DISPENSED
Start: 2025-04-29

## (undated) RX ORDER — SODIUM CHLORIDE, SODIUM LACTATE, POTASSIUM CHLORIDE, CALCIUM CHLORIDE 600; 310; 30; 20 MG/100ML; MG/100ML; MG/100ML; MG/100ML
INJECTION, SOLUTION INTRAVENOUS
Status: DISPENSED
Start: 2025-05-18

## (undated) RX ORDER — PANTOPRAZOLE SODIUM 40 MG/10ML
INJECTION, POWDER, LYOPHILIZED, FOR SOLUTION INTRAVENOUS
Status: DISPENSED
Start: 2025-04-30

## (undated) RX ORDER — ALBUTEROL SULFATE 0.83 MG/ML
SOLUTION RESPIRATORY (INHALATION)
Status: DISPENSED
Start: 2023-05-15

## (undated) RX ORDER — CEFTRIAXONE SODIUM 2 G/50ML
INJECTION, SOLUTION INTRAVENOUS
Status: DISPENSED
Start: 2023-05-15

## (undated) RX ORDER — KETOROLAC TROMETHAMINE 15 MG/ML
INJECTION, SOLUTION INTRAMUSCULAR; INTRAVENOUS
Status: DISPENSED
Start: 2023-05-15

## (undated) RX ORDER — MAGNESIUM HYDROXIDE/ALUMINUM HYDROXICE/SIMETHICONE 120; 1200; 1200 MG/30ML; MG/30ML; MG/30ML
SUSPENSION ORAL
Status: DISPENSED
Start: 2025-04-30

## (undated) RX ORDER — PIPERACILLIN SODIUM, TAZOBACTAM SODIUM 3; .375 G/15ML; G/15ML
INJECTION, POWDER, LYOPHILIZED, FOR SOLUTION INTRAVENOUS
Status: DISPENSED
Start: 2025-04-30

## (undated) RX ORDER — DEXTROSE MONOHYDRATE 25 G/50ML
INJECTION, SOLUTION INTRAVENOUS
Status: DISPENSED
Start: 2023-05-15

## (undated) RX ORDER — HYDROMORPHONE HYDROCHLORIDE 1 MG/ML
INJECTION, SOLUTION INTRAMUSCULAR; INTRAVENOUS; SUBCUTANEOUS
Status: DISPENSED
Start: 2025-04-29

## (undated) RX ORDER — HEPARIN SODIUM 10000 [USP'U]/100ML
INJECTION, SOLUTION INTRAVENOUS
Status: DISPENSED
Start: 2025-04-29

## (undated) RX ORDER — HYDRALAZINE HYDROCHLORIDE 20 MG/ML
INJECTION INTRAMUSCULAR; INTRAVENOUS
Status: DISPENSED
Start: 2025-04-29

## (undated) RX ORDER — CLOPIDOGREL BISULFATE 75 MG/1
TABLET ORAL
Status: DISPENSED
Start: 2025-04-29

## (undated) RX ORDER — METOPROLOL TARTRATE 1 MG/ML
INJECTION, SOLUTION INTRAVENOUS
Status: DISPENSED
Start: 2023-05-15

## (undated) RX ORDER — ASPIRIN 81 MG/1
TABLET, CHEWABLE ORAL
Status: DISPENSED
Start: 2025-04-29

## (undated) RX ORDER — DEXTROSE MONOHYDRATE 100 MG/ML
INJECTION, SOLUTION INTRAVENOUS
Status: DISPENSED
Start: 2023-05-15

## (undated) RX ORDER — NITROGLYCERIN 0.4 MG/1
TABLET SUBLINGUAL
Status: DISPENSED
Start: 2025-04-29

## (undated) RX ORDER — LIDOCAINE HYDROCHLORIDE 20 MG/ML
SOLUTION OROPHARYNGEAL
Status: DISPENSED
Start: 2025-04-30

## (undated) RX ORDER — PROPOFOL 10 MG/ML
INJECTION, EMULSION INTRAVENOUS
Status: DISPENSED
Start: 2025-05-18